# Patient Record
Sex: MALE | Race: WHITE | NOT HISPANIC OR LATINO | Employment: OTHER | URBAN - METROPOLITAN AREA
[De-identification: names, ages, dates, MRNs, and addresses within clinical notes are randomized per-mention and may not be internally consistent; named-entity substitution may affect disease eponyms.]

---

## 2017-01-12 ENCOUNTER — OFFICE VISIT (OUTPATIENT)
Dept: CARDIOLOGY | Facility: CLINIC | Age: 67
End: 2017-01-12
Payer: MEDICARE

## 2017-01-12 VITALS
HEART RATE: 64 BPM | SYSTOLIC BLOOD PRESSURE: 130 MMHG | DIASTOLIC BLOOD PRESSURE: 84 MMHG | BODY MASS INDEX: 38.07 KG/M2 | HEIGHT: 73 IN | WEIGHT: 287.25 LBS

## 2017-01-12 DIAGNOSIS — I25.10 CAD S/P PERCUTANEOUS CORONARY ANGIOPLASTY: Primary | ICD-10-CM

## 2017-01-12 DIAGNOSIS — I20.89 ANGINA EFFORT: ICD-10-CM

## 2017-01-12 DIAGNOSIS — Z98.61 CAD S/P PERCUTANEOUS CORONARY ANGIOPLASTY: Primary | ICD-10-CM

## 2017-01-12 DIAGNOSIS — Z98.61 S/P PTCA (PERCUTANEOUS TRANSLUMINAL CORONARY ANGIOPLASTY): ICD-10-CM

## 2017-01-12 DIAGNOSIS — I25.119 ATHEROSCLEROSIS OF NATIVE CORONARY ARTERY WITH ANGINA PECTORIS, UNSPECIFIED WHETHER NATIVE OR TRANSPLANTED HEART: ICD-10-CM

## 2017-01-12 DIAGNOSIS — E66.9 OBESITY (BMI 30-39.9): ICD-10-CM

## 2017-01-12 DIAGNOSIS — I10 ESSENTIAL HYPERTENSION: ICD-10-CM

## 2017-01-12 DIAGNOSIS — E66.01 MORBID OBESITY DUE TO EXCESS CALORIES: Chronic | ICD-10-CM

## 2017-01-12 DIAGNOSIS — I45.10 RBBB: ICD-10-CM

## 2017-01-12 DIAGNOSIS — E78.5 HYPERLIPIDEMIA, UNSPECIFIED HYPERLIPIDEMIA TYPE: ICD-10-CM

## 2017-01-12 PROCEDURE — 99214 OFFICE O/P EST MOD 30 MIN: CPT | Mod: S$PBB,,, | Performed by: NURSE PRACTITIONER

## 2017-01-12 PROCEDURE — 99213 OFFICE O/P EST LOW 20 MIN: CPT | Mod: PBBFAC | Performed by: NURSE PRACTITIONER

## 2017-01-12 PROCEDURE — 99999 PR PBB SHADOW E&M-EST. PATIENT-LVL III: CPT | Mod: PBBFAC,,, | Performed by: NURSE PRACTITIONER
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
## 2017-01-12 NOTE — MR AVS SNAPSHOT
[facility]  [id number] [place] [id number]  Phone: [phone]  Fax: [phone]                  Cole Doan   2017 1:00 PM   Office Visit    Description:  Male : 1950   Provider:  Gómez Oseguera NP   Department:  [facility]           Reason for Visit     Coronary Artery Disease     Hypertension           Diagnoses this Visit        Comments    CAD S/P percutaneous coronary angioplasty    -  Primary     S/P PTCA (percutaneous transluminal coronary angioplasty)         Morbid obesity due to excess calories         Hyperlipidemia, unspecified hyperlipidemia type         Angina effort         RBBB         Essential hypertension         Atherosclerosis of native coronary artery with angina pectoris, unspecified whether native or transplanted heart         Obesity (BMI 30-39.9)                To Do List           Goals (5 Years of Data)              Today    11/30/16    10/6/16    Weight < 220 lb (99.791 kg)   130.3 kg (287 lb 4.2 oz)  134.8 kg (297 lb 2.9 oz)  131.4 kg (289 lb 11 oz)      Follow-Up and Disposition     Return in about 6 months (around 2017).      [facility]     [facility] -  Assistance  Registered nurses in the [facility] provide clinical advisement, health education, appointment booking, and other advisory services.  Call for this free service at [phone].             Medications           Message regarding Medications     Verify the changes and/or additions to your medication regime listed below are the same as discussed with your clinician today.  If any of these changes or additions are incorrect, please notify your healthcare provider.        STOP taking these medications     enalapril (VASOTEC) 20 MG tablet Take 20 mg by mouth once daily.           Verify that the below list of medications is an accurate representation of the medications you are currently taking.  If none  "reported, the list may be blank. If incorrect, please contact your healthcare provider. Carry this list with you in case of emergency.           Current Medications     alprazolam (XANAX) 0.5 MG tablet Take 0.5 mg by mouth 3 (three) times daily as needed.     amlodipine (NORVASC) 2.5 MG tablet Take 1 tablet (2.5 mg total) by mouth once daily.    aspirin (ECOTRIN) 81 MG EC tablet Take 81 mg by mouth once daily.    atorvastatin (LIPITOR) 20 MG tablet Take 80 mg by mouth once daily. TAKE 1/2 TABLET DAILY (40MG TOTAL)    b complex vitamins tablet Take 1 tablet by mouth once daily.    buPROPion (WELLBUTRIN SR) 200 MG TbSR Take 200 mg by mouth once daily.    clopidogrel (PLAVIX) 75 mg tablet Take 1 tablet (75 mg total) by mouth once daily.    desonide (DESOWEN) 0.05 % cream Apply topically 2 (two) times daily. Apply to lids.    furosemide (LASIX) 40 MG tablet Takes one-half tablet by mouth daily    gabapentin (NEURONTIN) 300 MG capsule Take 300 mg by mouth 3 (three) times daily.     isosorbide mononitrate (IMDUR) 60 MG 24 hr tablet Take 1 tablet (60 mg total) by mouth 2 (two) times daily.    metformin (GLUCOPHAGE) 1000 MG tablet Take 1,000 mg by mouth 2 (two) times daily with meals.    miconazole (MICOTIN) 2 % cream Apply topically 2 (two) times daily.    ranolazine (RANEXA) 500 MG Tb12 Take 1 tablet (500 mg total) by mouth 2 (two) times daily.    terbinafine HCl (LAMISIL) 250 mg tablet Take 250 mg by mouth once daily.           Clinical Reference Information           Vital Signs - Last Recorded  Most recent update: 1/12/2017  1:09 PM by Elizabeth Oneill LPN    BP Pulse Ht Wt BMI    130/84 (BP Location: Right arm, Patient Position: Sitting, BP Method: Manual) 64 6' 1" (1.854 m) 130.3 kg (287 lb 4.2 oz) 37.9 kg/m2      Blood Pressure          Most Recent Value    BP  130/84      Allergies as of 1/12/2017     Iodinated Contrast Media - Iv Dye      Immunizations Administered on Date of Encounter - 1/12/2017     None    "   Orders Placed During Today's Visit     Future Labs/Procedures Expected by Expires    Comprehensive metabolic panel  1/12/2017 3/13/2018    Lipid panel  1/12/2017 3/13/2018

## 2017-01-19 ENCOUNTER — LAB VISIT (OUTPATIENT)
Dept: LAB | Facility: HOSPITAL | Age: 67
End: 2017-01-19
Attending: INTERNAL MEDICINE
Payer: MEDICARE

## 2017-01-19 DIAGNOSIS — I25.10 CAD S/P PERCUTANEOUS CORONARY ANGIOPLASTY: ICD-10-CM

## 2017-01-19 DIAGNOSIS — Z98.61 CAD S/P PERCUTANEOUS CORONARY ANGIOPLASTY: ICD-10-CM

## 2017-01-19 LAB
ALBUMIN SERPL BCP-MCNC: 3.5 G/DL
ALP SERPL-CCNC: 72 U/L
ALT SERPL W/O P-5'-P-CCNC: 19 U/L
ANION GAP SERPL CALC-SCNC: 9 MMOL/L
AST SERPL-CCNC: 15 U/L
BILIRUB SERPL-MCNC: 0.6 MG/DL
BUN SERPL-MCNC: 16 MG/DL
CALCIUM SERPL-MCNC: 9 MG/DL
CHLORIDE SERPL-SCNC: 106 MMOL/L
CHOLEST/HDLC SERPL: 5 {RATIO}
CO2 SERPL-SCNC: 27 MMOL/L
CREAT SERPL-MCNC: 0.9 MG/DL
EST. GFR  (AFRICAN AMERICAN): >60 ML/MIN/1.73 M^2
EST. GFR  (NON AFRICAN AMERICAN): >60 ML/MIN/1.73 M^2
GLUCOSE SERPL-MCNC: 117 MG/DL
HDL/CHOLESTEROL RATIO: 19.9 %
HDLC SERPL-MCNC: 161 MG/DL
HDLC SERPL-MCNC: 32 MG/DL
LDLC SERPL CALC-MCNC: 89.2 MG/DL
NONHDLC SERPL-MCNC: 129 MG/DL
POTASSIUM SERPL-SCNC: 4.3 MMOL/L
PROT SERPL-MCNC: 6.7 G/DL
SODIUM SERPL-SCNC: 142 MMOL/L
TRIGL SERPL-MCNC: 199 MG/DL

## 2017-01-19 PROCEDURE — 36415 COLL VENOUS BLD VENIPUNCTURE: CPT

## 2017-01-19 PROCEDURE — 80061 LIPID PANEL: CPT

## 2017-01-19 PROCEDURE — 80053 COMPREHEN METABOLIC PANEL: CPT

## 2017-01-23 ENCOUNTER — TELEPHONE (OUTPATIENT)
Dept: CARDIOLOGY | Facility: CLINIC | Age: 67
End: 2017-01-23

## 2017-02-18 DIAGNOSIS — Z98.61 CAD S/P PERCUTANEOUS CORONARY ANGIOPLASTY: ICD-10-CM

## 2017-02-18 DIAGNOSIS — I25.10 CAD S/P PERCUTANEOUS CORONARY ANGIOPLASTY: ICD-10-CM

## 2017-02-18 RX ORDER — AMLODIPINE BESYLATE 2.5 MG/1
TABLET ORAL
Qty: 30 TABLET | Refills: 3 | Status: SHIPPED | OUTPATIENT
Start: 2017-02-18 | End: 2017-11-29 | Stop reason: SDUPTHER

## 2017-02-20 DIAGNOSIS — I25.10 CAD S/P PERCUTANEOUS CORONARY ANGIOPLASTY: ICD-10-CM

## 2017-02-20 DIAGNOSIS — Z98.61 CAD S/P PERCUTANEOUS CORONARY ANGIOPLASTY: ICD-10-CM

## 2017-02-20 RX ORDER — AMLODIPINE BESYLATE 2.5 MG/1
2.5 TABLET ORAL DAILY
Qty: 30 TABLET | Refills: 3 | Status: SHIPPED | OUTPATIENT
Start: 2017-02-20 | End: 2017-03-17 | Stop reason: SDUPTHER

## 2017-03-17 ENCOUNTER — OFFICE VISIT (OUTPATIENT)
Dept: INTERNAL MEDICINE | Facility: CLINIC | Age: 67
End: 2017-03-17
Payer: MEDICARE

## 2017-03-17 VITALS
HEART RATE: 62 BPM | WEIGHT: 300.94 LBS | OXYGEN SATURATION: 97 % | SYSTOLIC BLOOD PRESSURE: 130 MMHG | TEMPERATURE: 99 F | DIASTOLIC BLOOD PRESSURE: 82 MMHG | HEIGHT: 74 IN | BODY MASS INDEX: 38.62 KG/M2

## 2017-03-17 DIAGNOSIS — J01.10 ACUTE NON-RECURRENT FRONTAL SINUSITIS: Primary | ICD-10-CM

## 2017-03-17 PROCEDURE — 96372 THER/PROPH/DIAG INJ SC/IM: CPT | Mod: PBBFAC,PO

## 2017-03-17 PROCEDURE — 99213 OFFICE O/P EST LOW 20 MIN: CPT | Mod: S$PBB,,,

## 2017-03-17 PROCEDURE — 99999 PR PBB SHADOW E&M-EST. PATIENT-LVL IV: CPT | Mod: PBBFAC,,,

## 2017-03-17 PROCEDURE — 99214 OFFICE O/P EST MOD 30 MIN: CPT | Mod: PBBFAC,25,PO

## 2017-03-17 RX ORDER — ATORVASTATIN CALCIUM 80 MG/1
80 TABLET, FILM COATED ORAL DAILY
COMMUNITY
End: 2022-07-18

## 2017-03-17 RX ORDER — AMOXICILLIN AND CLAVULANATE POTASSIUM 875; 125 MG/1; MG/1
1 TABLET, FILM COATED ORAL EVERY 12 HOURS
Qty: 14 TABLET | Refills: 0 | Status: SHIPPED | OUTPATIENT
Start: 2017-03-17 | End: 2017-03-24

## 2017-03-17 RX ORDER — METHYLPREDNISOLONE ACETATE 80 MG/ML
80 INJECTION, SUSPENSION INTRA-ARTICULAR; INTRALESIONAL; INTRAMUSCULAR; SOFT TISSUE
Status: COMPLETED | OUTPATIENT
Start: 2017-03-17 | End: 2017-03-17

## 2017-03-17 RX ORDER — HYDROCODONE POLISTIREX AND CHLORPHENIRAMINE POLISTIREX 10; 8 MG/5ML; MG/5ML
5 SUSPENSION, EXTENDED RELEASE ORAL EVERY 12 HOURS PRN
Qty: 115 ML | Refills: 0 | Status: SHIPPED | OUTPATIENT
Start: 2017-03-17 | End: 2017-03-27

## 2017-03-17 RX ADMIN — METHYLPREDNISOLONE ACETATE 80 MG: 80 INJECTION, SUSPENSION INTRALESIONAL; INTRAMUSCULAR; INTRASYNOVIAL; SOFT TISSUE at 11:03

## 2017-03-17 NOTE — PROGRESS NOTES
Administered Depomedrol 80mg/ml IM Left Ventrogluteal per Mr. Dion NP. Advised to remain in lobby 15 min to monitor for adverse reaction. Tolerated well/TGD

## 2017-03-17 NOTE — PROGRESS NOTES
Subjective:       Patient ID: Cole Doan is a 66 y.o. male.    Chief Complaint: Cough (X 3 days ); Nasal Congestion; and Headache    HPI   The patient presents today with a 3 day(s) complaint of nasal congestion, PND, rhinorrhea. he says His symptoms are constant and moderate in intensity.  he voices associated cough and sinus pressure. he denies SOB, wheezing. he can not identify and exacerbating or mitigating factors.      Review of Systems   Constitutional: Negative for activity change, appetite change, fatigue, fever and unexpected weight change.   HENT: Positive for congestion, postnasal drip, rhinorrhea and sinus pressure.    Eyes: Negative.    Respiratory: Positive for cough. Negative for chest tightness, shortness of breath and wheezing.    Cardiovascular: Negative for chest pain, palpitations and leg swelling.   Gastrointestinal: Negative for constipation, diarrhea, nausea and vomiting.   Endocrine: Negative.    Genitourinary: Negative.    Musculoskeletal: Negative.    Skin: Negative for color change.   Allergic/Immunologic: Negative.    Neurological: Negative for dizziness, weakness and light-headedness.   Hematological: Negative.    Psychiatric/Behavioral: Negative for sleep disturbance.         Objective:      Physical Exam   Constitutional: He is oriented to person, place, and time. He appears well-developed and well-nourished. No distress.   HENT:   Head: Normocephalic and atraumatic. Hair is normal.   Right Ear: Hearing, tympanic membrane, external ear and ear canal normal.   Left Ear: Hearing, tympanic membrane, external ear and ear canal normal.   Nose: Mucosal edema and rhinorrhea present. No nose lacerations, sinus tenderness, nasal deformity, septal deviation or nasal septal hematoma. No epistaxis.  No foreign bodies. Right sinus exhibits frontal sinus tenderness. Right sinus exhibits no maxillary sinus tenderness. Left sinus exhibits no maxillary sinus tenderness and no frontal sinus  tenderness.   Mouth/Throat: Uvula is midline, oropharynx is clear and moist and mucous membranes are normal.   Eyes: Conjunctivae are normal. Pupils are equal, round, and reactive to light. Right eye exhibits no discharge. Left eye exhibits no discharge.   Neck: Trachea normal, normal range of motion and phonation normal. Neck supple. No tracheal deviation present.   Cardiovascular: Normal rate, regular rhythm, normal heart sounds and intact distal pulses.  Exam reveals no gallop and no friction rub.    No murmur heard.  Pulmonary/Chest: Effort normal and breath sounds normal. No respiratory distress. He has no decreased breath sounds. He has no wheezes. He has no rhonchi. He has no rales. He exhibits no tenderness.   Musculoskeletal: Normal range of motion.   Lymphadenopathy:        Head (right side): No submental, no submandibular, no tonsillar, no preauricular, no posterior auricular and no occipital adenopathy present.        Head (left side): No submental, no submandibular, no tonsillar, no preauricular, no posterior auricular and no occipital adenopathy present.     He has no cervical adenopathy.        Right cervical: No superficial cervical, no deep cervical and no posterior cervical adenopathy present.       Left cervical: No superficial cervical, no deep cervical and no posterior cervical adenopathy present.   Neurological: He is alert and oriented to person, place, and time. He exhibits normal muscle tone. GCS eye subscore is 4. GCS verbal subscore is 5. GCS motor subscore is 6.   Skin: Skin is warm and dry. No rash noted. He is not diaphoretic. No erythema. No pallor.   Psychiatric: He has a normal mood and affect. His speech is normal and behavior is normal. Judgment and thought content normal.       Assessment:       1. Acute non-recurrent frontal sinusitis          Plan:   Acute non-recurrent frontal sinusitis  -     amoxicillin-clavulanate 875-125mg (AUGMENTIN) 875-125 mg per tablet; Take 1 tablet by  mouth every 12 (twelve) hours.  Dispense: 14 tablet; Refill: 0  -     methylPREDNISolone acetate injection 80 mg; Inject 1 mL (80 mg total) into the muscle one time.  -     hydrocodone-chlorpheniramine (TUSSIONEX) 10-8 mg/5 mL suspension; Take 5 mLs by mouth every 12 (twelve) hours as needed.  Dispense: 115 mL; Refill: 0          Disclaimer: This note is prepared using voice recognition software.  As such there may be errors in the dictation.  It has not been proofread.

## 2017-06-27 DIAGNOSIS — I25.10 CORONARY ARTERY DISEASE INVOLVING NATIVE CORONARY ARTERY OF NATIVE HEART WITHOUT ANGINA PECTORIS: ICD-10-CM

## 2017-06-27 RX ORDER — CLOPIDOGREL BISULFATE 75 MG/1
75 TABLET ORAL DAILY
Qty: 30 TABLET | Refills: 6 | OUTPATIENT
Start: 2017-06-27

## 2017-08-04 ENCOUNTER — OFFICE VISIT (OUTPATIENT)
Dept: CARDIOLOGY | Facility: CLINIC | Age: 67
End: 2017-08-04
Payer: MEDICARE

## 2017-08-04 VITALS
DIASTOLIC BLOOD PRESSURE: 58 MMHG | SYSTOLIC BLOOD PRESSURE: 102 MMHG | HEIGHT: 74 IN | WEIGHT: 302 LBS | HEART RATE: 76 BPM | BODY MASS INDEX: 38.76 KG/M2

## 2017-08-04 DIAGNOSIS — G47.30 SLEEP APNEA, UNSPECIFIED TYPE: ICD-10-CM

## 2017-08-04 DIAGNOSIS — Z98.61 CAD S/P PERCUTANEOUS CORONARY ANGIOPLASTY: ICD-10-CM

## 2017-08-04 DIAGNOSIS — Z98.61 S/P PTCA (PERCUTANEOUS TRANSLUMINAL CORONARY ANGIOPLASTY): ICD-10-CM

## 2017-08-04 DIAGNOSIS — I10 ESSENTIAL HYPERTENSION: ICD-10-CM

## 2017-08-04 DIAGNOSIS — E11.9 TYPE 2 DIABETES MELLITUS WITHOUT COMPLICATION, WITHOUT LONG-TERM CURRENT USE OF INSULIN: ICD-10-CM

## 2017-08-04 DIAGNOSIS — E78.5 HYPERLIPIDEMIA, UNSPECIFIED HYPERLIPIDEMIA TYPE: ICD-10-CM

## 2017-08-04 DIAGNOSIS — I45.10 RBBB: ICD-10-CM

## 2017-08-04 DIAGNOSIS — E66.01 MORBID OBESITY DUE TO EXCESS CALORIES: Chronic | ICD-10-CM

## 2017-08-04 DIAGNOSIS — R07.9 CHEST PAIN, UNSPECIFIED TYPE: Primary | ICD-10-CM

## 2017-08-04 DIAGNOSIS — I25.10 CAD S/P PERCUTANEOUS CORONARY ANGIOPLASTY: ICD-10-CM

## 2017-08-04 DIAGNOSIS — F43.10 PTSD (POST-TRAUMATIC STRESS DISORDER): ICD-10-CM

## 2017-08-04 DIAGNOSIS — I25.119 ATHEROSCLEROSIS OF NATIVE CORONARY ARTERY WITH ANGINA PECTORIS, UNSPECIFIED WHETHER NATIVE OR TRANSPLANTED HEART: ICD-10-CM

## 2017-08-04 PROCEDURE — 99213 OFFICE O/P EST LOW 20 MIN: CPT | Mod: PBBFAC,25,PO | Performed by: INTERNAL MEDICINE

## 2017-08-04 PROCEDURE — 99999 PR PBB SHADOW E&M-EST. PATIENT-LVL III: CPT | Mod: PBBFAC,,, | Performed by: INTERNAL MEDICINE

## 2017-08-04 PROCEDURE — 1159F MED LIST DOCD IN RCRD: CPT | Mod: ,,, | Performed by: INTERNAL MEDICINE

## 2017-08-04 PROCEDURE — 99214 OFFICE O/P EST MOD 30 MIN: CPT | Mod: S$PBB,,, | Performed by: INTERNAL MEDICINE

## 2017-08-04 PROCEDURE — 93005 ELECTROCARDIOGRAM TRACING: CPT | Mod: PBBFAC,PO | Performed by: NUCLEAR MEDICINE

## 2017-08-04 PROCEDURE — 93010 ELECTROCARDIOGRAM REPORT: CPT | Mod: S$PBB,,, | Performed by: NUCLEAR MEDICINE

## 2017-08-04 RX ORDER — RANOLAZINE 500 MG/1
500 TABLET, EXTENDED RELEASE ORAL 2 TIMES DAILY
COMMUNITY
End: 2020-10-09

## 2017-08-04 RX ORDER — LANOLIN ALCOHOL/MO/W.PET/CERES
100 CREAM (GRAM) TOPICAL DAILY
COMMUNITY
End: 2017-10-04

## 2017-08-04 NOTE — PROGRESS NOTES
Subjective:   Patient ID:  Cole Doan is a 66 y.o. male who presents for follow up of No chief complaint on file.      HPI  A 67 yo male with newly diagnosed diabetic on metformin neuropathy is here for f/u. He has been using his cpap regularily claims he has  Been compliant with meds. Has however gained weight . He has been complaining of more swelling in legs and arms he has been having issues with gait and neuropathy. He is very limited now with his activities. He has chest pain he is having pain now it is a constant pain regardless of activity. It is  not related to activity it is across the mid chest it is not associated with any other symptoms. He has no issues clinically at nite he uses his cpap and sleeps well no orthopnea pnd he is more sleepy and tired.he has not been checking his sugar he has no meter and has gained weight.he checks his bp at home is elevated unusually low today.no podiatrist eval.  Past Medical History:   Diagnosis Date    Anticoagulant long-term use     BPH (benign prostatic hypertrophy)     CAD (coronary artery disease)     HTN (hypertension) 5/15/2014    Hyperlipidemia     Hypertension     Obesity 5/15/2014    Pneumonia     PTSD (post-traumatic stress disorder) 5/15/2014    RBBB 5/15/2014    S/P PTCA (percutaneous transluminal coronary angioplasty) 5/15/2014    Sleep apnea 5/15/2014    Special screening for malignant neoplasms, colon 1/22/2014    Type 2 diabetes mellitus without complication 9/28/2015    Type 2 diabetes mellitus without complication 9/28/2015       Past Surgical History:   Procedure Laterality Date    APPENDECTOMY      at age 14    CORONARY ANGIOPLASTY WITH STENT PLACEMENT      1990, 2008, 2012       Social History   Substance Use Topics    Smoking status: Former Smoker     Packs/day: 1.00     Years: 40.00     Types: Cigarettes     Start date: 5/15/2009    Smokeless tobacco: Former User     Quit date: 1/13/2010      Comment: He quit four years  ago.    Alcohol use No       Family History   Problem Relation Age of Onset    Cataracts Father     Colon cancer Neg Hx        Current Outpatient Prescriptions   Medication Sig    alprazolam (XANAX) 0.5 MG tablet Take 0.5 mg by mouth 3 (three) times daily as needed.     amlodipine (NORVASC) 2.5 MG tablet TAKE 1 TABLET (2.5 MG TOTAL) BY MOUTH ONCE DAILY.    aspirin (ECOTRIN) 81 MG EC tablet Take 81 mg by mouth once daily.    atorvastatin (LIPITOR) 80 MG tablet Take 80 mg by mouth. Take a half tablet daily    buPROPion (WELLBUTRIN SR) 200 MG TbSR Take 200 mg by mouth once daily.    clopidogrel (PLAVIX) 75 mg tablet Take 1 tablet (75 mg total) by mouth once daily.    cyanocobalamin (VITAMIN B-12) 1000 MCG tablet Take 100 mcg by mouth once daily.    desonide (DESOWEN) 0.05 % cream Apply topically 2 (two) times daily. Apply to lids.    furosemide (LASIX) 40 MG tablet Takes one-half tablet by mouth daily (Patient taking differently: 40 mg. Takes 2 tablet by mouth daily)    gabapentin (NEURONTIN) 300 MG capsule Take 300 mg by mouth 3 (three) times daily.     isosorbide mononitrate (IMDUR) 60 MG 24 hr tablet Take 1 tablet (60 mg total) by mouth 2 (two) times daily. (Patient taking differently: Take 60 mg by mouth once daily. )    metformin (GLUCOPHAGE) 1000 MG tablet Take 1,000 mg by mouth 2 (two) times daily with meals.    miconazole (MICOTIN) 2 % cream Apply topically 2 (two) times daily.    ranolazine (RANEXA) 500 MG Tb12 Take 500 mg by mouth 2 (two) times daily.    terbinafine HCl (LAMISIL) 250 mg tablet Take 250 mg by mouth once daily.     No current facility-administered medications for this visit.      Current Outpatient Prescriptions on File Prior to Visit   Medication Sig    alprazolam (XANAX) 0.5 MG tablet Take 0.5 mg by mouth 3 (three) times daily as needed.     amlodipine (NORVASC) 2.5 MG tablet TAKE 1 TABLET (2.5 MG TOTAL) BY MOUTH ONCE DAILY.    aspirin (ECOTRIN) 81 MG EC tablet Take 81 mg  by mouth once daily.    atorvastatin (LIPITOR) 80 MG tablet Take 80 mg by mouth. Take a half tablet daily    buPROPion (WELLBUTRIN SR) 200 MG TbSR Take 200 mg by mouth once daily.    clopidogrel (PLAVIX) 75 mg tablet Take 1 tablet (75 mg total) by mouth once daily.    desonide (DESOWEN) 0.05 % cream Apply topically 2 (two) times daily. Apply to lids.    furosemide (LASIX) 40 MG tablet Takes one-half tablet by mouth daily (Patient taking differently: 40 mg. Takes 2 tablet by mouth daily)    gabapentin (NEURONTIN) 300 MG capsule Take 300 mg by mouth 3 (three) times daily.     isosorbide mononitrate (IMDUR) 60 MG 24 hr tablet Take 1 tablet (60 mg total) by mouth 2 (two) times daily. (Patient taking differently: Take 60 mg by mouth once daily. )    metformin (GLUCOPHAGE) 1000 MG tablet Take 1,000 mg by mouth 2 (two) times daily with meals.    miconazole (MICOTIN) 2 % cream Apply topically 2 (two) times daily.    terbinafine HCl (LAMISIL) 250 mg tablet Take 250 mg by mouth once daily.    [DISCONTINUED] atorvastatin (LIPITOR) 20 MG tablet Take 80 mg by mouth once daily. TAKE 1/2 TABLET DAILY (40MG TOTAL)    [DISCONTINUED] b complex vitamins tablet Take 1 tablet by mouth once daily.     No current facility-administered medications on file prior to visit.      Review of patient's allergies indicates:   Allergen Reactions    Iodinated contrast- oral and iv dye        Review of Systems   Constitution: Positive for weakness and malaise/fatigue.   HENT: Negative for headaches.    Eyes: Negative for blurred vision.   Cardiovascular: Positive for chest pain, dyspnea on exertion and leg swelling. Negative for claudication, cyanosis, irregular heartbeat, near-syncope, orthopnea, palpitations and paroxysmal nocturnal dyspnea.   Respiratory: Positive for shortness of breath and snoring. Negative for cough and hemoptysis.    Hematologic/Lymphatic: Negative for bleeding problem. Does not bruise/bleed easily.   Skin:  "Negative for dry skin and itching.   Musculoskeletal: Positive for falls. Negative for muscle weakness and myalgias.   Gastrointestinal: Negative for abdominal pain, diarrhea, heartburn, hematemesis, hematochezia and melena.   Genitourinary: Negative for flank pain and hematuria.   Neurological: Positive for disturbances in coordination, loss of balance, numbness and paresthesias. Negative for dizziness, focal weakness, light-headedness and seizures.   Psychiatric/Behavioral: Negative for altered mental status and memory loss. The patient is not nervous/anxious.    Allergic/Immunologic: Negative for hives.       Objective:   Physical Exam   Constitutional: He is oriented to person, place, and time. He appears well-developed and well-nourished. No distress.   HENT:   Head: Normocephalic and atraumatic.   Eyes: EOM are normal. Pupils are equal, round, and reactive to light. Right eye exhibits no discharge. Left eye exhibits no discharge.   Neck: Neck supple. No JVD present. No thyromegaly present.   Cardiovascular: Normal rate, regular rhythm, normal heart sounds and intact distal pulses.  Exam reveals no gallop and no friction rub.    No murmur heard.  Pulmonary/Chest: Effort normal and breath sounds normal. No respiratory distress. He has no wheezes. He has no rales. He exhibits no tenderness.   Abdominal: Soft. Bowel sounds are normal. He exhibits no distension. There is no tenderness.   Obese abdomen   Musculoskeletal: Normal range of motion. He exhibits no edema.   Neurological: He is alert and oriented to person, place, and time. No cranial nerve deficit.   Skin: Skin is warm and dry. No rash noted. He is not diaphoretic. No erythema.   Psychiatric: He has a normal mood and affect. His behavior is normal.   Nursing note and vitals reviewed.    Vitals:    08/04/17 0912   BP: (!) 102/58   Pulse: 76   Weight: (!) 137 kg (302 lb 0.5 oz)   Height: 6' 1.5" (1.867 m)     Lab Results   Component Value Date    CHOL 161 " 01/19/2017    CHOL 171 08/03/2016    CHOL 147 07/14/2014     Lab Results   Component Value Date    HDL 32 (L) 01/19/2017    HDL 25 (L) 08/03/2016    HDL 31 (L) 07/14/2014     Lab Results   Component Value Date    LDLCALC 89.2 01/19/2017    LDLCALC 106.4 08/03/2016    LDLCALC 86.6 07/14/2014     Lab Results   Component Value Date    TRIG 199 (H) 01/19/2017    TRIG 198 (H) 08/03/2016    TRIG 147 07/14/2014     Lab Results   Component Value Date    CHOLHDL 19.9 (L) 01/19/2017    CHOLHDL 14.6 (L) 08/03/2016    CHOLHDL 21.1 07/14/2014       Chemistry        Component Value Date/Time     01/19/2017 1016    K 4.3 01/19/2017 1016     01/19/2017 1016    CO2 27 01/19/2017 1016    BUN 16 01/19/2017 1016    CREATININE 0.9 01/19/2017 1016     (H) 01/19/2017 1016        Component Value Date/Time    CALCIUM 9.0 01/19/2017 1016    ALKPHOS 72 01/19/2017 1016    AST 15 01/19/2017 1016    ALT 19 01/19/2017 1016    BILITOT 0.6 01/19/2017 1016    ESTGFRAFRICA >60.0 01/19/2017 1016    EGFRNONAA >60.0 01/19/2017 1016          Lab Results   Component Value Date    TSH 1.139 09/23/2016     Lab Results   Component Value Date    INR 1.0 08/03/2016    INR 1.0 09/28/2015     Lab Results   Component Value Date    WBC 5.31 08/03/2016    HGB 16.0 08/03/2016    HCT 47.2 08/03/2016    MCV 91 08/03/2016     08/03/2016     BMP  Sodium   Date Value Ref Range Status   01/19/2017 142 136 - 145 mmol/L Final     Potassium   Date Value Ref Range Status   01/19/2017 4.3 3.5 - 5.1 mmol/L Final     Chloride   Date Value Ref Range Status   01/19/2017 106 95 - 110 mmol/L Final     CO2   Date Value Ref Range Status   01/19/2017 27 23 - 29 mmol/L Final     BUN, Bld   Date Value Ref Range Status   01/19/2017 16 8 - 23 mg/dL Final     Creatinine   Date Value Ref Range Status   01/19/2017 0.9 0.5 - 1.4 mg/dL Final     Calcium   Date Value Ref Range Status   01/19/2017 9.0 8.7 - 10.5 mg/dL Final     Anion Gap   Date Value Ref Range Status    01/19/2017 9 8 - 16 mmol/L Final     eGFR if    Date Value Ref Range Status   01/19/2017 >60.0 >60 mL/min/1.73 m^2 Final     eGFR if non    Date Value Ref Range Status   01/19/2017 >60.0 >60 mL/min/1.73 m^2 Final     Comment:     Calculation used to obtain the estimated glomerular filtration  rate (eGFR) is the CKD-EPI equation. Since race is unknown   in our information system, the eGFR values for   -American and Non--American patients are given   for each creatinine result.       CrCl cannot be calculated (Patient's most recent sCr result is older than the maximum 7 days allowed.).    Assessment:     1. Chest pain, unspecified type    2. Morbid obesity due to excess calories    3. Atherosclerosis of native coronary artery with angina pectoris, unspecified whether native or transplanted heart    4. S/P PTCA (percutaneous transluminal coronary angioplasty)    5. Essential hypertension    6. Hyperlipidemia, unspecified hyperlipidemia type    7. RBBB    8. Sleep apnea, unspecified type    9. PTSD (post-traumatic stress disorder)    10. CAD S/P percutaneous coronary angioplasty    11. Type 2 diabetes mellitus without complication, without long-term current use of insulin      He is back with recurrent symptoms and weight fluctuation with questionable blood pressure control and diabetes control.  He has issues with neuropathy decreased exercise tolerance and element of deconditioning.  elio supposed to be adequately treated needs pulmonary f/u and r/o pulmonary htn  Chest pain is atyp[ical and appears non cardiac.  Significant ectopy on ekg.  Cad is adequately treated with current regimen  Plan:     Back to pulmonary   Endocrinology referral   Podiatry f/u   rlo   Echo   bnp   F/u in 6 weeks   Weight loss diet control   Water aerobics.

## 2017-08-21 ENCOUNTER — CLINICAL SUPPORT (OUTPATIENT)
Dept: CARDIOLOGY | Facility: CLINIC | Age: 67
End: 2017-08-21
Payer: MEDICARE

## 2017-08-21 ENCOUNTER — PROCEDURE VISIT (OUTPATIENT)
Dept: PULMONOLOGY | Facility: CLINIC | Age: 67
End: 2017-08-21
Payer: MEDICARE

## 2017-08-21 ENCOUNTER — LAB VISIT (OUTPATIENT)
Dept: LAB | Facility: HOSPITAL | Age: 67
End: 2017-08-21
Attending: INTERNAL MEDICINE
Payer: MEDICARE

## 2017-08-21 VITALS — HEIGHT: 73 IN | WEIGHT: 302.13 LBS | BODY MASS INDEX: 40.04 KG/M2

## 2017-08-21 DIAGNOSIS — G47.30 SLEEP APNEA, UNSPECIFIED TYPE: ICD-10-CM

## 2017-08-21 DIAGNOSIS — E66.01 MORBID OBESITY DUE TO EXCESS CALORIES: Chronic | ICD-10-CM

## 2017-08-21 DIAGNOSIS — I25.10 CAD S/P PERCUTANEOUS CORONARY ANGIOPLASTY: ICD-10-CM

## 2017-08-21 DIAGNOSIS — R07.9 CHEST PAIN, UNSPECIFIED TYPE: ICD-10-CM

## 2017-08-21 DIAGNOSIS — Z98.61 CAD S/P PERCUTANEOUS CORONARY ANGIOPLASTY: ICD-10-CM

## 2017-08-21 DIAGNOSIS — E11.9 TYPE 2 DIABETES MELLITUS WITHOUT COMPLICATION, WITHOUT LONG-TERM CURRENT USE OF INSULIN: ICD-10-CM

## 2017-08-21 LAB
BNP SERPL-MCNC: 38 PG/ML
DIASTOLIC DYSFUNCTION: NO
ESTIMATED PA SYSTOLIC PRESSURE: 33.47
MITRAL VALVE MOBILITY: NORMAL
RETIRED EF AND QEF - SEE NOTES: 60 (ref 55–65)
TRICUSPID VALVE REGURGITATION: NORMAL

## 2017-08-21 PROCEDURE — 0298T HOLTER MONITOR - 3-14 DAY ADULT: CPT | Mod: ,,, | Performed by: INTERNAL MEDICINE

## 2017-08-21 PROCEDURE — 83880 ASSAY OF NATRIURETIC PEPTIDE: CPT

## 2017-08-21 PROCEDURE — 0296T HOLTER MONITOR - 3-14 DAY ADULT: CPT | Mod: PBBFAC | Performed by: INTERNAL MEDICINE

## 2017-08-21 PROCEDURE — 94620 PR PULMONARY STRESS TESTING,SIMPLE: CPT | Mod: 26,S$PBB,, | Performed by: INTERNAL MEDICINE

## 2017-08-21 PROCEDURE — 36415 COLL VENOUS BLD VENIPUNCTURE: CPT

## 2017-08-21 PROCEDURE — 93306 TTE W/DOPPLER COMPLETE: CPT | Mod: 26,S$PBB,, | Performed by: NUCLEAR MEDICINE

## 2017-08-21 NOTE — PROCEDURES
"[facility]  Six Minute Walk     SUMMARY     Ordering Provider: Dr. Rutledge   Interpreting Provider: Dr. Lawler  Performing nurse/tech/RT: NAOMI Fang RRt  Diagnosis:  (Sleep Apnea)  Height: 6' 1" (185.4 cm)  Weight: (!) 137.1 kg (302 lb 2.3 oz)  BMI (Calculated): 39.9   Patient Race:             Phase Oxygen Assessment Supplemental O2 Heart   Rate Blood Pressure Santino Dyspnea Scale Rating   Resting 97 % Room Air 86 bpm 131/78 3   Exercise        Minute        1 93 % Room Air 80 bpm     2 93 % Room Air 84 bpm     3 93 % Room Air 89 bpm     4 94 % Room Air 85 bpm     5 93 % Room Air 86 bpm     6  94 % Room Air 84 bpm 143/84 7-8   Recovery        Minute        1 96 % Room Air 61 bpm     2 96 % Room Air 59 bpm     3 96 % Room Air 57 bpm     4 95 % Room Air 56 bpm 118/74 4     Six Minute Walk Summary  6MWT Status: completed without stopping  Patient Reported: Chest pain, Dyspnea (Patient felt off balance) while walking.     Interpretation:  Did the patient stop or pause?: No         Total Time Walked (Calculated): 360 seconds  Final Partial Lap Distance (feet): 0 feet  Total Distance Meters (Calculated): 304.8 meters  Predicted Distance Meters (Calculated): 516.93 meters  Percentage of Predicted (Calculated): 58.96  Peak VO2 (Calculated): 13.12  Mets: 3.75  Has The Patient Had a Previous Six Minute Walk Test?: No       Previous 6MWT Results  Has The Patient Had a Previous Six Minute Walk Test?: No     REPORT  Patient complained of chest pain during protocol  Clinical correlation  Distance completed was less than predicted: 58% predicted  There was mild exercise desaturation to 93% oxygen saturation.  Dyspnea was moderate Santino score 7-8  Heart rate remained stable in the 80s.  Blood pressure was stable  Clinical correlation    Erik Lawler MD    "

## 2017-08-23 ENCOUNTER — TELEPHONE (OUTPATIENT)
Dept: CARDIOLOGY | Facility: CLINIC | Age: 67
End: 2017-08-23

## 2017-08-25 ENCOUNTER — HOSPITAL ENCOUNTER (OUTPATIENT)
Dept: CARDIOLOGY | Facility: CLINIC | Age: 67
Discharge: HOME | End: 2017-08-25
Payer: MEDICARE

## 2017-08-25 DIAGNOSIS — E11.9 TYPE 2 DIABETES MELLITUS WITHOUT COMPLICATION, WITHOUT LONG-TERM CURRENT USE OF INSULIN: ICD-10-CM

## 2017-08-25 DIAGNOSIS — E66.01 MORBID OBESITY DUE TO EXCESS CALORIES: Chronic | ICD-10-CM

## 2017-08-25 DIAGNOSIS — G47.30 SLEEP APNEA, UNSPECIFIED TYPE: ICD-10-CM

## 2017-08-25 DIAGNOSIS — I25.10 CAD S/P PERCUTANEOUS CORONARY ANGIOPLASTY: ICD-10-CM

## 2017-08-25 DIAGNOSIS — R07.9 CHEST PAIN, UNSPECIFIED TYPE: ICD-10-CM

## 2017-08-25 DIAGNOSIS — Z98.61 CAD S/P PERCUTANEOUS CORONARY ANGIOPLASTY: ICD-10-CM

## 2017-08-25 LAB — DIASTOLIC DYSFUNCTION: NO

## 2017-08-25 PROCEDURE — 94621 CARDIOPULM EXERCISE TESTING: CPT | Mod: PBBFAC | Performed by: INTERNAL MEDICINE

## 2017-08-28 ENCOUNTER — OFFICE VISIT (OUTPATIENT)
Dept: PULMONOLOGY | Facility: CLINIC | Age: 67
End: 2017-08-28
Payer: MEDICARE

## 2017-08-28 VITALS
OXYGEN SATURATION: 95 % | HEIGHT: 73 IN | SYSTOLIC BLOOD PRESSURE: 130 MMHG | HEART RATE: 60 BPM | WEIGHT: 304.44 LBS | BODY MASS INDEX: 40.35 KG/M2 | DIASTOLIC BLOOD PRESSURE: 78 MMHG | RESPIRATION RATE: 18 BRPM

## 2017-08-28 DIAGNOSIS — R06.02 SHORTNESS OF BREATH ON EXERTION: Primary | ICD-10-CM

## 2017-08-28 DIAGNOSIS — F17.201 TOBACCO USE DISORDER, MODERATE, IN SUSTAINED REMISSION: ICD-10-CM

## 2017-08-28 DIAGNOSIS — G47.33 OSA ON CPAP: ICD-10-CM

## 2017-08-28 PROCEDURE — 99999 PR PBB SHADOW E&M-EST. PATIENT-LVL V: CPT | Mod: PBBFAC,,, | Performed by: NURSE PRACTITIONER

## 2017-08-28 PROCEDURE — 3078F DIAST BP <80 MM HG: CPT | Mod: ,,, | Performed by: NURSE PRACTITIONER

## 2017-08-28 PROCEDURE — 3075F SYST BP GE 130 - 139MM HG: CPT | Mod: ,,, | Performed by: NURSE PRACTITIONER

## 2017-08-28 PROCEDURE — 99215 OFFICE O/P EST HI 40 MIN: CPT | Mod: PBBFAC | Performed by: NURSE PRACTITIONER

## 2017-08-28 PROCEDURE — 1159F MED LIST DOCD IN RCRD: CPT | Mod: ,,, | Performed by: NURSE PRACTITIONER

## 2017-08-28 PROCEDURE — 99213 OFFICE O/P EST LOW 20 MIN: CPT | Mod: S$PBB,,, | Performed by: NURSE PRACTITIONER

## 2017-08-28 PROCEDURE — 1125F AMNT PAIN NOTED PAIN PRSNT: CPT | Mod: ,,, | Performed by: NURSE PRACTITIONER

## 2017-08-28 NOTE — PROGRESS NOTES
Subjective:      Patient ID: Cole Doan is a 66 y.o. male.    Patient Active Problem List   Diagnosis    Special screening for malignant neoplasms, colon    Atherosclerotic heart disease of native coronary artery with angina pectoris    S/P PTCA (percutaneous transluminal coronary angioplasty)    HTN (hypertension)    Hyperlipidemia    RBBB    SATYA on CPAP    Obesity    PTSD (post-traumatic stress disorder)    Shortness of breath    Morbid obesity    Personal history of colonic polyps    Angina effort    CAD S/P percutaneous coronary angioplasty    Type 2 diabetes mellitus without complication    Obesity (BMI 30-39.9)    Chest pain    Tobacco use disorder, moderate, in sustained remission     Problem list has been reviewed.    Chief Complaint: Sleep Apnea and Shortness of Breath (with exertion)    HPI: Cole Doan presents to pulmonary clinic referred by his cardiologist, Dr. Rutledge related to diagnosis of sleep apnea on CPAP and complaints of shortness of breath with exertion.   He reports having a sleep study done in about 2014 or 2015 he is not sure exact date or place he had study.  He was seen by Dr. Rogers in 2014 and sleep study was ordered, not done by patient.   [facility] and obtained order for sleep study, CPAP equipment and [facility].   Compliance download done at this visit reveals:   He is on CPAP  of 13 cmH2O pressure.   He is compliant with CPAP use. Complaince download today reveals 100 % of days with greater than 4 hours of device use.  Patient reports benefit from CPAP use and denies snoring and excessive daytime sleepiness.   Brohman: 11     Shortness of breath with exertion  Dyspnea Characteristics:   Exertional Dyspnea   Dyspnea Duration:  Subacute, present off and on over past 6 months.  Dyspnea Severity:  YOSEF 3 - 7 moderate to very severe  Dyspnea Timing:  exertional   Dyspnea  Contributing Factors:  Tobacco Abuse and Organic dust exposure    Dyspnea Associated Symptoms: None  Denies  Pleuritic chest pain,  Cough,  Sputum Production,  Hemoptysis, Wheezing,  Dysphagia and Lightheadedness, tingling in fingers or perioral area.     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  90% disabled with [facility] . [place] exposed to agent orange (a herbicide and defoliant chemical composed of 2 herbicides, 2,4,5-T and 2,4-D) exposed from 2592-9529. No occupational exposure.     Avocational Exposure:   None currently.     Pet Exposures:  Dogs. Denies allergy to Dog and  cat dander.    Previous Report Reviewed: office notes     The following portions of the patient's history were reviewed and updated as appropriate: allergies, current medications, past family history, past medical history, past social history, past surgical history and problem list.    Review of Systems   Constitutional: Negative for fever, chills, weight loss, weight gain, activity change, appetite change, fatigue and night sweats.   HENT: Negative for postnasal drip, rhinorrhea, sinus pressure, voice change and congestion.    Eyes: Negative for redness and itching.   Respiratory: Negative for snoring, cough, sputum production, chest tightness, shortness of breath, wheezing, orthopnea, asthma nighttime symptoms, dyspnea on extertion, use of rescue inhaler and somnolence.    Cardiovascular: Negative.  Negative for chest pain, palpitations and leg swelling.   Genitourinary: Negative for difficulty urinating and hematuria.   Endocrine: Negative for cold intolerance and heat intolerance.    Musculoskeletal: Negative for arthralgias, gait problem, joint swelling and myalgias.   Skin: Negative.    Gastrointestinal: Negative for nausea, vomiting, abdominal pain and acid reflux.   Neurological: Negative for dizziness, weakness,  "light-headedness and headaches.   Hematological: Negative for adenopathy. No excessive bruising.   All other systems reviewed and are negative.     Objective:     Physical Exam   Constitutional: He is oriented to person, place, and time. Vital signs are normal. He appears well-developed and well-nourished. He is active and cooperative.  Non-toxic appearance. He does not appear ill. No distress.   HENT:   Head: Normocephalic and atraumatic.   Right Ear: External ear normal.   Left Ear: External ear normal.   Nose: Nose normal.   Mouth/Throat: Oropharynx is clear and moist.   Eyes: Conjunctivae are normal.   Neck: Normal range of motion. Neck supple.   Cardiovascular: Normal rate, regular rhythm, normal heart sounds and intact distal pulses.    Pulmonary/Chest: Effort normal and breath sounds normal.   Abdominal: Soft. Bowel sounds are normal.   Musculoskeletal: Normal range of motion. He exhibits no edema.   Neurological: He is alert and oriented to person, place, and time.   Skin: Skin is warm and dry.   Psychiatric: He has a normal mood and affect. His behavior is normal. Judgment and thought content normal.   Vitals reviewed.    Vitals:    08/28/17 0903   BP: 130/78   Pulse: 60   Resp: 18   SpO2: 95%   Weight: (!) 138.1 kg (304 lb 7.3 oz)   Height: 6' 1" (1.854 m)   PainSc:   5   PainLoc: Head     body mass index is 40.17 kg/m².    Personal Diagnostic Review    CPAP download  CPAP 13 cm  Compliance Summary  7/29/2017 - 8/27/2017 (30 days)  Days with Device Usage 30 days  Days without Device Usage 0 days  Percent Days with Device Usage 100.0%  Cumulative Usage 11 days 15 hrs. 47 mins. 52 secs.  Maximum Usage (1 Day) 12 hrs. 12 mins. 1 secs.  Average Usage (All Days) 9 hrs. 19 mins. 35 secs.  Average Usage (Days Used) 9 hrs. 19 mins. 35 secs.  Minimum Usage (1 Day) 7 hrs. 35 mins. 16 secs.  Percent of Days with Usage >= 4 Hours 100.0%  Percent of Days with Usage < 4 Hours 0.0%  Date Range  Total Blower Time 11 days " 16 hrs. 6 mins. 59 secs.  Sleep Therapy Statistics  Average Time in Large Leak Per Day 22 secs.  Average AHI 0.4  CPAP 13.0 cmH2O    Patient benefits and is compliant    Pulmonary stress/six minute walk done 8/21/2017: No desaturations requiring supplemental oxygen  Phase Oxygen Assessment Supplemental O2 Heart   Rate Blood Pressure Santino Dyspnea Scale Rating   Resting 97 % Room Air 86 bpm 131/78 3   Exercise        Minute        1 93 % Room Air 80 bpm     2 93 % Room Air 84 bpm     3 93 % Room Air 89 bpm     4 94 % Room Air 85 bpm     5 93 % Room Air 86 bpm     6  94 % Room Air 84 bpm 143/84 7-8   Recovery        Minute        1 96 % Room Air 61 bpm     2 96 % Room Air 59 bpm     3 96 % Room Air 57 bpm     4 95 % Room Air 56 bpm 118/74 4     Six Minute Walk Summary  6MWT Status: completed without stopping  Patient Reported: Chest pain, Dyspnea (Patient felt off balance)   while walking.     Assessment:     1. Shortness of breath on exertion    2. Tobacco use disorder, moderate, in sustained remission    3. SATYA on CPAP        Orders Placed This Encounter   Procedures    X-Ray Chest PA And Lateral     Standing Status:   Future     Standing Expiration Date:   8/28/2018     Order Specific Question:   May the Radiologist modify the order per protocol to meet the clinical needs of the patient?     Answer:   Yes    CT Chest Lung Screening Low Dose     Schedule follow up office visit after test.     Standing Status:   Future     Standing Expiration Date:   8/28/2018     Order Specific Question:   Reason for Exam:     Answer:   pulmonary nodule    Complete PFT with bronchodilator     Standing Status:   Future     Standing Expiration Date:   8/28/2018     Plan:     Problem List Items Addressed This Visit     SATYA on CPAP    Tobacco use disorder, moderate, in sustained remission     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   CT chest screening for pulmonary nodules         Relevant Orders    CT Chest Lung Screening  Low Dose      Other Visit Diagnoses     Shortness of breath on exertion    -  Primary    Relevant Orders    Complete PFT with bronchodilator    X-Ray Chest PA And Lateral        ([facility] - [facility] Reviewed therapeutic goals for positive airway pressure therapy CPAP  Ideal is usage 100% of nights for 6 - 8 hours per night. Minimum usage is 70% of night for at least 4 hours per night used. Pateint expressed understanding.

## 2017-08-28 NOTE — LETTER
August 28, 2017      Eric Rutledge MD  [address] [facility] [address]           [facility]  [address] [place] [id number]  Phone: [phone]  Fax: [phone]          Patient: Cole Doan   MR Number: [id number]   YOB: 1950   Date of Visit: 8/28/2017       Dear Dr. Eric Rutledge:    Thank you for referring Cole Doan to me for evaluation. Attached you will find relevant portions of my assessment and plan of care.    If you have questions, please do not hesitate to call me. I look forward to following Cole Doan along with you.    Sincerely,    Ryena Jaquez, NP    Enclosure  CC:  No Recipients    If you would like to receive this communication electronically, please contact [email or web address] or [phone] to request more information on [facility] Link access.    For providers and/or their staff who would like to refer a patient to [facility], please contact us through our one-stop-shop provider referral line, [facility] , at [phone].    If you feel you have received this communication in error or would no longer like to receive these types of communications, please e-mail [email or web address]

## 2017-09-07 DIAGNOSIS — I25.10 CORONARY ARTERY DISEASE INVOLVING NATIVE CORONARY ARTERY OF NATIVE HEART WITHOUT ANGINA PECTORIS: ICD-10-CM

## 2017-09-07 RX ORDER — CLOPIDOGREL BISULFATE 75 MG/1
TABLET ORAL
Qty: 30 TABLET | Refills: 6 | Status: SHIPPED | OUTPATIENT
Start: 2017-09-07 | End: 2018-04-25 | Stop reason: SDUPTHER

## 2017-09-11 ENCOUNTER — TELEPHONE (OUTPATIENT)
Dept: RADIOLOGY | Facility: HOSPITAL | Age: 67
End: 2017-09-11

## 2017-09-12 ENCOUNTER — HOSPITAL ENCOUNTER (OUTPATIENT)
Dept: RADIOLOGY | Facility: HOSPITAL | Age: 67
Discharge: HOME | End: 2017-09-12
Attending: NURSE PRACTITIONER
Payer: MEDICARE

## 2017-09-12 ENCOUNTER — HOSPITAL ENCOUNTER (OUTPATIENT)
Dept: RADIOLOGY | Facility: HOSPITAL | Age: 67
Discharge: HOME | End: 2017-09-12
Attending: NURSE PRACTITIONER

## 2017-09-12 ENCOUNTER — PROCEDURE VISIT (OUTPATIENT)
Dept: PULMONOLOGY | Facility: CLINIC | Age: 67
End: 2017-09-12
Payer: MEDICARE

## 2017-09-12 DIAGNOSIS — F17.201 TOBACCO USE DISORDER, MODERATE, IN SUSTAINED REMISSION: ICD-10-CM

## 2017-09-12 DIAGNOSIS — R06.02 SHORTNESS OF BREATH ON EXERTION: ICD-10-CM

## 2017-09-12 PROCEDURE — 71020 XR CHEST PA AND LATERAL: CPT | Mod: TC,PO

## 2017-09-12 PROCEDURE — 94726 PLETHYSMOGRAPHY LUNG VOLUMES: CPT | Mod: 26,S$PBB,, | Performed by: INTERNAL MEDICINE

## 2017-09-12 PROCEDURE — 94727 GAS DIL/WSHOT DETER LNG VOL: CPT | Mod: PBBFAC,PO | Performed by: GENERAL PRACTICE

## 2017-09-12 PROCEDURE — 94726 PLETHYSMOGRAPHY LUNG VOLUMES: CPT | Mod: PBBFAC,PO | Performed by: GENERAL PRACTICE

## 2017-09-12 PROCEDURE — 94729 DIFFUSING CAPACITY: CPT | Mod: 26,S$PBB,, | Performed by: INTERNAL MEDICINE

## 2017-09-12 PROCEDURE — 94729 DIFFUSING CAPACITY: CPT | Mod: PBBFAC,PO | Performed by: GENERAL PRACTICE

## 2017-09-12 PROCEDURE — 94060 EVALUATION OF WHEEZING: CPT | Mod: PBBFAC,PO | Performed by: GENERAL PRACTICE

## 2017-09-12 PROCEDURE — 76497 UNLISTED CT PROCEDURE: CPT | Mod: TC,PO

## 2017-09-12 PROCEDURE — 94060 EVALUATION OF WHEEZING: CPT | Mod: 26,S$PBB,, | Performed by: INTERNAL MEDICINE

## 2017-09-12 PROCEDURE — 71020 XR CHEST PA AND LATERAL: CPT | Mod: 26,,, | Performed by: RADIOLOGY

## 2017-09-13 ENCOUNTER — TELEPHONE (OUTPATIENT)
Dept: PULMONOLOGY | Facility: CLINIC | Age: 67
End: 2017-09-13

## 2017-09-13 ENCOUNTER — TELEPHONE (OUTPATIENT)
Dept: CARDIOLOGY | Facility: CLINIC | Age: 67
End: 2017-09-13

## 2017-09-13 NOTE — TELEPHONE ENCOUNTER
----- Message from Cydney Byrnes sent at 9/13/2017  9:32 AM CDT -----  Contact: pt   Call pt regarding lab results.    ..[phone] (pgrx)

## 2017-09-13 NOTE — TELEPHONE ENCOUNTER
----- Message from Eric Rutledge MD sent at 8/26/2017  9:09 AM CDT -----  cpx reveals his symptoms from deconditioning

## 2017-09-14 LAB
POST FEF 25 75: 1.9 L/S (ref 2.12–3.9)
POST FET 100: 9.53 S
POST FEV1 FVC: 77 %
POST FEV1: 2.14 L (ref 3.45–4.33)
POST FIF 50: 1.45 L/S
POST FVC: 2.78 L (ref 4.71–5.75)
POST PEF: 6.81 L/S (ref 8.28–10.87)
PRE DLCO: 18.89 ML/MMHG/MIN (ref 26.64–34.93)
PRE ERV: 0.19 L
PRE FEF 25 75: 1.4 L/S (ref 2.12–3.9)
PRE FET 100: 11.45 S
PRE FEV1 FVC: 73 %
PRE FEV1: 2.06 L (ref 3.45–4.33)
PRE FIF 50: 1.95 L/S
PRE FRC PL: 2.98 L (ref 2.91–4.12)
PRE FVC: 2.82 L (ref 4.71–5.75)
PRE KROGHS K: 5.24 1/MIN
PRE PEF: 8.04 L/S (ref 8.28–10.87)
PRE RV: 2.63 L (ref 2.37–3.16)
PRE SVC: 2.83 L
PRE TLC: 5.46 L (ref 7.01–8.01)
PREDICTED DLCO: 30.78 ML/MMHG/MIN (ref 26.64–34.93)
PREDICTED FEV1 FVC: 74.22 % (ref 69.38–79.06)
PREDICTED FEV1: 3.89 L (ref 3.45–4.33)
PREDICTED FRC N2: 3.52 L (ref 2.91–4.12)
PREDICTED FRC PL: 3.52 L (ref 2.91–4.12)
PREDICTED FVC: 5.23 L (ref 4.71–5.75)
PREDICTED RV: 2.77 L (ref 2.37–3.16)
PREDICTED SVC: 5.09 L
PREDICTED TLC: 7.51 L (ref 7.01–8.01)
PROVOCATION PROTOCOL: ABNORMAL

## 2017-09-18 ENCOUNTER — OFFICE VISIT (OUTPATIENT)
Dept: PULMONOLOGY | Facility: CLINIC | Age: 67
End: 2017-09-18
Attending: NURSE PRACTITIONER
Payer: MEDICARE

## 2017-09-18 VITALS
OXYGEN SATURATION: 95 % | BODY MASS INDEX: 40.41 KG/M2 | RESPIRATION RATE: 20 BRPM | SYSTOLIC BLOOD PRESSURE: 110 MMHG | HEIGHT: 73 IN | HEART RATE: 42 BPM | DIASTOLIC BLOOD PRESSURE: 70 MMHG | WEIGHT: 304.88 LBS

## 2017-09-18 DIAGNOSIS — E66.01 MORBID OBESITY WITH BMI OF 40.0-44.9, ADULT: ICD-10-CM

## 2017-09-18 DIAGNOSIS — J98.4 MIXED RESTRICTIVE AND OBSTRUCTIVE LUNG DISEASE: Primary | ICD-10-CM

## 2017-09-18 DIAGNOSIS — G47.33 OSA ON CPAP: ICD-10-CM

## 2017-09-18 DIAGNOSIS — R53.81 PHYSICAL DECONDITIONING: ICD-10-CM

## 2017-09-18 DIAGNOSIS — J43.9 MIXED RESTRICTIVE AND OBSTRUCTIVE LUNG DISEASE: Primary | ICD-10-CM

## 2017-09-18 PROBLEM — J44.9 MIXED RESTRICTIVE AND OBSTRUCTIVE LUNG DISEASE: Status: ACTIVE | Noted: 2017-09-18

## 2017-09-18 PROCEDURE — 99214 OFFICE O/P EST MOD 30 MIN: CPT | Mod: PBBFAC | Performed by: NURSE PRACTITIONER

## 2017-09-18 PROCEDURE — 3078F DIAST BP <80 MM HG: CPT | Mod: ,,, | Performed by: NURSE PRACTITIONER

## 2017-09-18 PROCEDURE — 1159F MED LIST DOCD IN RCRD: CPT | Mod: ,,, | Performed by: NURSE PRACTITIONER

## 2017-09-18 PROCEDURE — 3074F SYST BP LT 130 MM HG: CPT | Mod: ,,, | Performed by: NURSE PRACTITIONER

## 2017-09-18 PROCEDURE — 99999 PR PBB SHADOW E&M-EST. PATIENT-LVL IV: CPT | Mod: PBBFAC,,, | Performed by: NURSE PRACTITIONER

## 2017-09-18 PROCEDURE — 99214 OFFICE O/P EST MOD 30 MIN: CPT | Mod: S$PBB,,, | Performed by: NURSE PRACTITIONER

## 2017-09-18 NOTE — PROGRESS NOTES
Subjective:      Patient ID: Cole Doan is a 66 y.o. male.    Chief Complaint: Shortness of Breath    HPI: Cole Doan present for follow up visit related to complaints of shortness of breath with exertion with review of chest xray, CT chest  and cpft.   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   He does not meet COPD gold criteria classification to be staged in the COPD category with FEV1/FVC:  77 (104 % predicted).   Will begin Combivent Respimat inhaler to determine benefit of short acting ipratropium bromide/albuterol for his shortness of breath with exertion. ([facility]).   CT of chest 9/7/2017 revealed no lung screening findings that require follow-up.  CXR 9/7/2017 revealed lungs are clear. Heart normal size.   Pulmonary stress/six minute walk done 8/21/2017: No desaturations requiring supplemental oxygen.  Cardiac testing cpx 8/25/2017 revealed deconditioning with report reading:   Moderate functional impairment associated with a borderline reduced breathing reserve, normal oxygen saturation, poor effort, and a normal AT.   These findings are indicative of functional impairment secondary to poor effort and deconditioning.      He is on CPAP  of 13 cmH2O pressure.   He is compliant with CPAP use. Complaince download at his visit in August 2017 revealed 100 % of days with greater than 4 hours of device use.  Patient reports benefit from CPAP use and denies snoring and excessive daytime sleepiness.   [facility].   [place]: 11     Shortness of breath with exertion  Dyspnea Characteristics:   Exertional Dyspnea   Dyspnea Duration:  Subacute, present off and on over past 6 months.  Dyspnea Severity:  YOSEF 3 - 7 moderate to very severe  Dyspnea Timing:  exertional   Dyspnea Contributing Factors:  Tobacco Abuse and Organic dust exposure    Dyspnea Associated Symptoms: None  Denies  Pleuritic chest  pain,  Cough,  Sputum Production,  Hemoptysis, Wheezing,  Dysphagia and Lightheadedness, tingling in fingers or perioral area.     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  [facility] . [place] [employer] exposed to agent orange (a herbicide and defoliant chemical composed of 2 herbicides, 2,4,5-T and 2,4-D) exposed from 2401-3411. No occupational exposure.     Avocational Exposure:   None currently.     Pet Exposures:  Dogs. Denies allergy to Dog and  cat dander.    Previous Report Reviewed: office notes     The following portions of the patient's history were reviewed and updated as appropriate: allergies, current medications, past family history, past medical history, past social history, past surgical history and problem list.    Review of Systems   Constitutional: Negative for fever, chills, weight loss, weight gain, activity change, appetite change, fatigue and night sweats.   HENT: Negative for postnasal drip, rhinorrhea, sinus pressure, voice change and congestion.    Eyes: Negative for redness and itching.   Respiratory: Negative for snoring, cough, sputum production, chest tightness, shortness of breath, wheezing, orthopnea, asthma nighttime symptoms, dyspnea on extertion, use of rescue inhaler and somnolence.    Cardiovascular: Negative.  Negative for chest pain, palpitations and leg swelling.   Genitourinary: Negative for difficulty urinating and hematuria.   Endocrine: Negative for cold intolerance and heat intolerance.    Musculoskeletal: Negative for arthralgias, gait problem, joint swelling and myalgias.   Skin: Negative.    Gastrointestinal: Negative for nausea, vomiting, abdominal pain and acid reflux.   Neurological: Negative for dizziness, weakness, light-headedness and headaches.   Hematological: Negative for adenopathy. No excessive bruising.   All other systems  "reviewed and are negative.     Objective:     Physical Exam   Constitutional: He is oriented to person, place, and time. Vital signs are normal. He appears well-developed and well-nourished. He is active and cooperative.  Non-toxic appearance. He does not appear ill. No distress.   HENT:   Head: Normocephalic and atraumatic.   Right Ear: External ear normal.   Left Ear: External ear normal.   Nose: Nose normal.   Mouth/Throat: Oropharynx is clear and moist.   Eyes: Conjunctivae are normal.   Cardiovascular: Regular rhythm, normal heart sounds and intact distal pulses.  Bradycardia present.    Pulmonary/Chest: Effort normal and breath sounds normal. No stridor.   Abdominal: Soft.   Musculoskeletal: He exhibits no edema.   Lymphadenopathy:     He has no cervical adenopathy.   Neurological: He is alert and oriented to person, place, and time.   Skin: Skin is warm and dry.   Psychiatric: He has a normal mood and affect. His behavior is normal. Judgment and thought content normal.   Vitals reviewed.    Vitals:    17 0802   BP: 110/70   Pulse: (!) 42   Resp: 20   SpO2: 95%   Weight: (!) 138.3 kg (304 lb 14.3 oz)   Height: 6' 1.2" (1.859 m)     body mass index is 40.01 kg/m².    Personal Diagnostic Review  Pulmonary function tests:2017 FEV1: 2.06  (53 % predicted), FVC:  2.82 (54 % predicted), FEV1/FVC:  73 (98 % predicted), T.46 (73 % predicted), RV/TLVC: 48 (121 % predicted), DLCO: 18.9 (61 % predicted)   Post bronchodilator: FEV1: 2.14  (55 % predicted), FVC:  2.78 (53 % predicted), FEV1/FVC:  77 (104 % predicted).   Moderate restriction.  Mild reduction in diffusion capacity -unadjusted for hemoglobin (DLCO >60% and less than 79%) .  Moderately severe obstruction (FEV1>50 and <59% of predicted).  Airflow is not clearly improved after bronchodilator. Clinical improvement following bronchodilator therapy may occur in  the absence of spirometric improvement.  This is a pattern of mixed obstruction and " restriction. Clinical correlation suggested    Chest xray 9/12/2017   2 view chest    Comparison: 11/30/2016    Findings: The lungs are clear and free of infiltrate.  No pleural effusion or pneumothorax is identified.  The heart is not enlarged.   Impression         1.  No acute cardiopulmonary process.     CT chest screening 9/12/2017  Technique: CT of the thorax was performed with low dose, lung screening protocol.  No contrast was administered.  Sagittal and coronal reconstructions were obtained.  Comparison: None.  Findings:  Lungs: There are no abnormal nodular opacities that require further evaluation.  Minor scarring or dependent atelectasis present the lung bases.  A few small subpleural blebs in the right apex.  Pleura: No effusion.  Mediastinum: No pathologic lymphadenopathy.  Heart and pericardium: Normal size without effusion.  Aorta and vasculature: Atherosclerosis including coronary arteries.  Chest wall and skeletal structures: Unremarkable except age-appropriate degenerative changes.  Upper abdomen: Unremarkable.  IMPRESSION:  No lung screening findings that require follow-up.     CPAP download  CPAP 13 cm  Compliance Summary  7/29/2017 - 8/27/2017 (30 days)  Days with Device Usage 30 days  Days without Device Usage 0 days  Percent Days with Device Usage 100.0%  Cumulative Usage 11 days 15 hrs. 47 mins. 52 secs.  Maximum Usage (1 Day) 12 hrs. 12 mins. 1 secs.  Average Usage (All Days) 9 hrs. 19 mins. 35 secs.  Average Usage (Days Used) 9 hrs. 19 mins. 35 secs.  Minimum Usage (1 Day) 7 hrs. 35 mins. 16 secs.  Percent of Days with Usage >= 4 Hours 100.0%  Percent of Days with Usage < 4 Hours 0.0%  Date Range  Total Blower Time 11 days 16 hrs. 6 mins. 59 secs.  Sleep Therapy Statistics  Average Time in Large Leak Per Day 22 secs.  Average AHI 0.4  CPAP 13.0 cmH2O    Patient benefits and is compliant    Pulmonary stress/six minute walk done 8/21/2017: No desaturations requiring supplemental oxygen  Phase  Oxygen Assessment Supplemental O2 Heart   Rate Blood Pressure Santino Dyspnea Scale Rating   Resting 97 % Room Air 86 bpm 131/78 3   Exercise        Minute        1 93 % Room Air 80 bpm     2 93 % Room Air 84 bpm     3 93 % Room Air 89 bpm     4 94 % Room Air 85 bpm     5 93 % Room Air 86 bpm     6  94 % Room Air 84 bpm 143/84 7-8   Recovery        Minute        1 96 % Room Air 61 bpm     2 96 % Room Air 59 bpm     3 96 % Room Air 57 bpm     4 95 % Room Air 56 bpm 118/74 4     Six Minute Walk Summary  6MWT Status: completed without stopping  Patient Reported: Chest pain, Dyspnea (Patient felt off balance)   while walking.     Assessment:     1. Mixed restrictive and obstructive lung disease    2. Morbid obesity with BMI of 40.0-44.9, adult    3. Physical deconditioning    4. SATYA on CPAP      No orders of the defined types were placed in this encounter.    Plan:     Problem List Items Addressed This Visit     Mixed restrictive and obstructive lung disease - Primary     Complete PFT 9/7/2017 revealed Mixed restrictive/obstructive component.  Moderate restriction.  Mild reduction in diffusion capacity -unadjusted for hemoglobin (DLCO >60% and less than 79%) .  Moderately severe obstruction (FEV1>50 and <59% of predicted).   Begin Combivent Respimat inhaler 3-4 times daily to determine if we'll improve shortness of breath with exertion symptoms.  (Patient [facility]).         Relevant Medications    ipratropium-albuterol (COMBIVENT RESPIMAT)  mcg/actuation inhaler    Morbid obesity with BMI of 40.0-44.9, adult     Encouraged calorie reduction and 30 minutes of exercise daily. Discussed impact of obesity on general health and sleep apnea.  Patient is contemplating bariatric [facility].  There is plan to meet with nutritionist then move forward with next steps.           SATYA on CPAP     Benefits and compliant.  Continue CPAP 13 cm.  [facility].    [facility]: [facility]           Physical deconditioning     Seen on cardiac CPX testing 8/25/2017: Moderate functional impairment associated with a borderline reduced breathing reserve, normal oxygen saturation, poor effort, and a normal AT. These findings are indicative of functional impairment secondary to poor effort and deconditioning.            Other Visit Diagnoses    None.       ([facility] - [facility] Reviewed therapeutic goals for positive airway pressure therapy CPAP      Return for if new onset of symtpoms, worsening, or fail to improve.  continued fu is planned with the [facility] .

## 2017-09-18 NOTE — ASSESSMENT & PLAN NOTE
Complete PFT 9/7/2017 revealed Mixed restrictive/obstructive component.  Moderate restriction.  Mild reduction in diffusion capacity -unadjusted for hemoglobin (DLCO >60% and less than 79%) .  Moderately severe obstruction (FEV1>50 and <59% of predicted).   Begin Combivent Respimat inhaler 3-4 times daily to determine if we'll improve shortness of breath with exertion symptoms.  (Patient states will [facility]).

## 2017-09-18 NOTE — ASSESSMENT & PLAN NOTE
Seen on cardiac CPX testing 8/25/2017: Moderate functional impairment associated with a borderline reduced breathing reserve, normal oxygen saturation, poor effort, and a normal AT. These findings are indicative of functional impairment secondary to poor effort and deconditioning.

## 2017-09-18 NOTE — ASSESSMENT & PLAN NOTE
Encouraged calorie reduction and 30 minutes of exercise daily. Discussed impact of obesity on general health and sleep apnea.  Patient is contemplating bariatric [facility].  There is plan to meet with nutritionist then move forward with next steps.

## 2017-10-04 ENCOUNTER — OFFICE VISIT (OUTPATIENT)
Dept: CARDIOLOGY | Facility: CLINIC | Age: 67
End: 2017-10-04
Payer: MEDICARE

## 2017-10-04 VITALS
BODY MASS INDEX: 40 KG/M2 | HEART RATE: 72 BPM | DIASTOLIC BLOOD PRESSURE: 78 MMHG | SYSTOLIC BLOOD PRESSURE: 120 MMHG | HEIGHT: 73 IN | WEIGHT: 301.81 LBS

## 2017-10-04 DIAGNOSIS — F43.10 PTSD (POST-TRAUMATIC STRESS DISORDER): ICD-10-CM

## 2017-10-04 DIAGNOSIS — I25.10 CAD S/P PERCUTANEOUS CORONARY ANGIOPLASTY: Primary | ICD-10-CM

## 2017-10-04 DIAGNOSIS — E66.01 MORBID OBESITY: Chronic | ICD-10-CM

## 2017-10-04 DIAGNOSIS — J98.4 MIXED RESTRICTIVE AND OBSTRUCTIVE LUNG DISEASE: ICD-10-CM

## 2017-10-04 DIAGNOSIS — E78.5 HYPERLIPIDEMIA, UNSPECIFIED HYPERLIPIDEMIA TYPE: ICD-10-CM

## 2017-10-04 DIAGNOSIS — E11.9 TYPE 2 DIABETES MELLITUS WITHOUT COMPLICATION, WITHOUT LONG-TERM CURRENT USE OF INSULIN: ICD-10-CM

## 2017-10-04 DIAGNOSIS — I10 ESSENTIAL HYPERTENSION: ICD-10-CM

## 2017-10-04 DIAGNOSIS — G47.33 OSA ON CPAP: ICD-10-CM

## 2017-10-04 DIAGNOSIS — J43.9 MIXED RESTRICTIVE AND OBSTRUCTIVE LUNG DISEASE: ICD-10-CM

## 2017-10-04 DIAGNOSIS — R53.81 PHYSICAL DECONDITIONING: ICD-10-CM

## 2017-10-04 DIAGNOSIS — Z98.61 S/P PTCA (PERCUTANEOUS TRANSLUMINAL CORONARY ANGIOPLASTY): ICD-10-CM

## 2017-10-04 DIAGNOSIS — E66.9 OBESITY (BMI 30-39.9): ICD-10-CM

## 2017-10-04 DIAGNOSIS — I45.10 RBBB: ICD-10-CM

## 2017-10-04 DIAGNOSIS — R07.9 CHEST PAIN, UNSPECIFIED TYPE: ICD-10-CM

## 2017-10-04 DIAGNOSIS — I25.119 ATHEROSCLEROSIS OF NATIVE CORONARY ARTERY WITH ANGINA PECTORIS, UNSPECIFIED WHETHER NATIVE OR TRANSPLANTED HEART: ICD-10-CM

## 2017-10-04 DIAGNOSIS — E66.01 MORBID OBESITY WITH BMI OF 40.0-44.9, ADULT: ICD-10-CM

## 2017-10-04 DIAGNOSIS — Z98.61 CAD S/P PERCUTANEOUS CORONARY ANGIOPLASTY: Primary | ICD-10-CM

## 2017-10-04 PROCEDURE — 99214 OFFICE O/P EST MOD 30 MIN: CPT | Mod: S$PBB,,, | Performed by: INTERNAL MEDICINE

## 2017-10-04 PROCEDURE — 99213 OFFICE O/P EST LOW 20 MIN: CPT | Mod: PBBFAC,PO | Performed by: INTERNAL MEDICINE

## 2017-10-04 PROCEDURE — 99999 PR PBB SHADOW E&M-EST. PATIENT-LVL III: CPT | Mod: PBBFAC,,, | Performed by: INTERNAL MEDICINE

## 2017-10-04 NOTE — PROGRESS NOTES
Subjective:   Patient ID:  Cole Doan is a 67 y.o. male who presents for follow up of Results (rev labs,zio,echo)      HPI  A 66 yo male with h/o cad s/p ptca obesity htn hlp obesity deconditioning is here fro f/u . He has significant limitation due to shortness of breath he had a w/u to try to pinpoint the reason he had cpx that pointed towards deconditioning and poor effort. He has  No significant arrythmias of zio patch he ahs normal lvf. He has been suing cpap regualrily tries to eb complaint. Issue to day was to discuss test results. has no change in status. [facility] he wants to have the sleeve procedure done.  Past Medical History:   Diagnosis Date    Anticoagulant long-term use     BPH (benign prostatic hypertrophy)     CAD (coronary artery disease)     HTN (hypertension) 5/15/2014    Hyperlipidemia     Hypertension     Obesity 5/15/2014    Pneumonia     PTSD (post-traumatic stress disorder) 5/15/2014    RBBB 5/15/2014    S/P PTCA (percutaneous transluminal coronary angioplasty) 5/15/2014    Sleep apnea 5/15/2014    Special screening for malignant neoplasms, colon 1/22/2014    Type 2 diabetes mellitus without complication 9/28/2015    Type 2 diabetes mellitus without complication 9/28/2015       Past Surgical History:   Procedure Laterality Date    APPENDECTOMY      at age 14    CORONARY ANGIOPLASTY WITH STENT PLACEMENT      1990, 2008, 2012       Social History   Substance Use Topics    Smoking status: Former Smoker     Packs/day: 1.00     Years: 40.00     Types: Cigarettes     Start date: 1965     Quit date: 12/31/2009    Smokeless tobacco: Never Used    Alcohol use No       Family History   Problem Relation Age of Onset    Cataracts Father     Heart disease Father     Hypertension Father     Heart disease Mother     Hypertension Mother     Colon cancer Neg Hx        Current Outpatient Prescriptions   Medication Sig     alprazolam (XANAX) 0.5 MG tablet Take 0.5 mg by mouth 3 (three) times daily as needed.     amlodipine (NORVASC) 2.5 MG tablet TAKE 1 TABLET (2.5 MG TOTAL) BY MOUTH ONCE DAILY.    aspirin (ECOTRIN) 81 MG EC tablet Take 81 mg by mouth once daily.    atorvastatin (LIPITOR) 80 MG tablet Take 80 mg by mouth. Take a half tablet daily    buPROPion (WELLBUTRIN SR) 200 MG TbSR Take 200 mg by mouth once daily.    clopidogrel (PLAVIX) 75 mg tablet TAKE 1 TABLET BY MOUTH EVERY DAY    desonide (DESOWEN) 0.05 % cream Apply topically 2 (two) times daily. Apply to lids.    furosemide (LASIX) 40 MG tablet Takes one-half tablet by mouth daily (Patient taking differently: 40 mg. Takes 2 tablet by mouth daily)    gabapentin (NEURONTIN) 300 MG capsule Take 300 mg by mouth 3 (three) times daily.     ipratropium-albuterol (COMBIVENT RESPIMAT)  mcg/actuation inhaler Inhale 1 puff into the lungs 4 (four) times daily.    isosorbide mononitrate (IMDUR) 60 MG 24 hr tablet Take 1 tablet (60 mg total) by mouth 2 (two) times daily. (Patient taking differently: Take 60 mg by mouth once daily. )    metformin (GLUCOPHAGE) 1000 MG tablet Take 1,000 mg by mouth 2 (two) times daily with meals.    miconazole (MICOTIN) 2 % cream Apply topically 2 (two) times daily.    ranolazine (RANEXA) 500 MG Tb12 Take 500 mg by mouth 2 (two) times daily.    terbinafine HCl (LAMISIL) 250 mg tablet Take 250 mg by mouth once daily.     No current facility-administered medications for this visit.      Current Outpatient Prescriptions on File Prior to Visit   Medication Sig    alprazolam (XANAX) 0.5 MG tablet Take 0.5 mg by mouth 3 (three) times daily as needed.     amlodipine (NORVASC) 2.5 MG tablet TAKE 1 TABLET (2.5 MG TOTAL) BY MOUTH ONCE DAILY.    aspirin (ECOTRIN) 81 MG EC tablet Take 81 mg by mouth once daily.    atorvastatin (LIPITOR) 80 MG tablet Take 80 mg by mouth. Take a half tablet daily    buPROPion (WELLBUTRIN SR) 200 MG TbSR Take  200 mg by mouth once daily.    clopidogrel (PLAVIX) 75 mg tablet TAKE 1 TABLET BY MOUTH EVERY DAY    desonide (DESOWEN) 0.05 % cream Apply topically 2 (two) times daily. Apply to lids.    furosemide (LASIX) 40 MG tablet Takes one-half tablet by mouth daily (Patient taking differently: 40 mg. Takes 2 tablet by mouth daily)    gabapentin (NEURONTIN) 300 MG capsule Take 300 mg by mouth 3 (three) times daily.     ipratropium-albuterol (COMBIVENT RESPIMAT)  mcg/actuation inhaler Inhale 1 puff into the lungs 4 (four) times daily.    isosorbide mononitrate (IMDUR) 60 MG 24 hr tablet Take 1 tablet (60 mg total) by mouth 2 (two) times daily. (Patient taking differently: Take 60 mg by mouth once daily. )    metformin (GLUCOPHAGE) 1000 MG tablet Take 1,000 mg by mouth 2 (two) times daily with meals.    miconazole (MICOTIN) 2 % cream Apply topically 2 (two) times daily.    ranolazine (RANEXA) 500 MG Tb12 Take 500 mg by mouth 2 (two) times daily.    terbinafine HCl (LAMISIL) 250 mg tablet Take 250 mg by mouth once daily.    [DISCONTINUED] cyanocobalamin (VITAMIN B-12) 1000 MCG tablet Take 100 mcg by mouth once daily.     No current facility-administered medications on file prior to visit.      Review of patient's allergies indicates:   Allergen Reactions    Iodinated contrast- oral and iv dye        ROS  Constitution: Positive for weakness and malaise/fatigue.   HENT: Negative for headaches.    Eyes: Negative for blurred vision.   Cardiovascular: Positive for chest pain, dyspnea on exertion and leg swelling. Negative for claudication, cyanosis, irregular heartbeat, near-syncope, orthopnea, palpitations and paroxysmal nocturnal dyspnea.   Respiratory: Positive for shortness of breath and snoring. Negative for cough and hemoptysis.    Hematologic/Lymphatic: Negative for bleeding problem. Does not bruise/bleed easily.   Skin: Negative for dry skin and itching.   Musculoskeletal: Positive for falls. Negative for  "muscle weakness and myalgias.   Gastrointestinal: Negative for abdominal pain, diarrhea, heartburn, hematemesis, hematochezia and melena.   Genitourinary: Negative for flank pain and hematuria.   Neurological: Positive for disturbances in coordination, loss of balance, numbness and paresthesias. Negative for dizziness, focal weakness, light-headedness and seizures.   Psychiatric/Behavioral: Negative for altered mental status and memory loss. The patient is not nervous/anxious.    Allergic/Immunologic: Negative for hives.   Objective:   Physical Exam  Vitals:    10/04/17 1023   BP: 120/78   Pulse: 72   Weight: (!) 136.9 kg (301 lb 13 oz)   Height: 6' 1.2" (1.859 m)   Constitutional: He is oriented to person, place, and time. He appears well-developed and well-nourished. No distress.   HENT:   Head: Normocephalic and atraumatic.   Eyes: EOM are normal. Pupils are equal, round, and reactive to light. Right eye exhibits no discharge. Left eye exhibits no discharge.   Neck: Neck supple. No JVD present. No thyromegaly present.   Cardiovascular: Normal rate, regular rhythm, normal heart sounds and intact distal pulses.  Exam reveals no gallop and no friction rub.    No murmur heard.  Pulmonary/Chest: Effort normal and breath sounds normal. No respiratory distress. He has no wheezes. He has no rales. He exhibits no tenderness.   Abdominal: Soft. Bowel sounds are normal. He exhibits no distension. There is no tenderness.   Obese abdomen   Musculoskeletal: Normal range of motion. He exhibits no edema.   Neurological: He is alert and oriented to person, place, and time. No cranial nerve deficit.   Skin: Skin is warm and dry. No rash noted. He is not diaphoretic. No erythema.   Psychiatric: He has a normal mood and affect. His behavior is normal.   Nursing note and vitals reviewed  Lab Results   Component Value Date    CHOL 161 01/19/2017    CHOL 171 08/03/2016    CHOL 147 07/14/2014     Lab Results   Component Value Date    " HDL 32 (L) 01/19/2017    HDL 25 (L) 08/03/2016    HDL 31 (L) 07/14/2014     Lab Results   Component Value Date    LDLCALC 89.2 01/19/2017    LDLCALC 106.4 08/03/2016    LDLCALC 86.6 07/14/2014     Lab Results   Component Value Date    TRIG 199 (H) 01/19/2017    TRIG 198 (H) 08/03/2016    TRIG 147 07/14/2014     Lab Results   Component Value Date    CHOLHDL 19.9 (L) 01/19/2017    CHOLHDL 14.6 (L) 08/03/2016    CHOLHDL 21.1 07/14/2014       Chemistry        Component Value Date/Time     01/19/2017 1016    K 4.3 01/19/2017 1016     01/19/2017 1016    CO2 27 01/19/2017 1016    BUN 16 01/19/2017 1016    CREATININE 0.9 01/19/2017 1016     (H) 01/19/2017 1016        Component Value Date/Time    CALCIUM 9.0 01/19/2017 1016    ALKPHOS 72 01/19/2017 1016    AST 15 01/19/2017 1016    ALT 19 01/19/2017 1016    BILITOT 0.6 01/19/2017 1016    ESTGFRAFRICA >60.0 01/19/2017 1016    EGFRNONAA >60.0 01/19/2017 1016          Lab Results   Component Value Date    TSH 1.139 09/23/2016     Lab Results   Component Value Date    INR 1.0 08/03/2016    INR 1.0 09/28/2015     Lab Results   Component Value Date    WBC 5.31 08/03/2016    HGB 16.0 08/03/2016    HCT 47.2 08/03/2016    MCV 91 08/03/2016     08/03/2016     BMP  Sodium   Date Value Ref Range Status   01/19/2017 142 136 - 145 mmol/L Final     Potassium   Date Value Ref Range Status   01/19/2017 4.3 3.5 - 5.1 mmol/L Final     Chloride   Date Value Ref Range Status   01/19/2017 106 95 - 110 mmol/L Final     CO2   Date Value Ref Range Status   01/19/2017 27 23 - 29 mmol/L Final     BUN, Bld   Date Value Ref Range Status   01/19/2017 16 8 - 23 mg/dL Final     Creatinine   Date Value Ref Range Status   01/19/2017 0.9 0.5 - 1.4 mg/dL Final     Calcium   Date Value Ref Range Status   01/19/2017 9.0 8.7 - 10.5 mg/dL Final     Anion Gap   Date Value Ref Range Status   01/19/2017 9 8 - 16 mmol/L Final     eGFR if    Date Value Ref Range Status    01/19/2017 >60.0 >60 mL/min/1.73 m^2 Final     eGFR if non    Date Value Ref Range Status   01/19/2017 >60.0 >60 mL/min/1.73 m^2 Final     Comment:     Calculation used to obtain the estimated glomerular filtration  rate (eGFR) is the CKD-EPI equation. Since race is unknown   in our information system, the eGFR values for   -American and Non--American patients are given   for each creatinine result.       CrCl cannot be calculated (Patient's most recent lab result is older than the maximum 7 days allowed.).    Assessment:     1. CAD S/P percutaneous coronary angioplasty    2. Morbid obesity    3. S/P PTCA (percutaneous transluminal coronary angioplasty)    4. Essential hypertension    5. SATYA on CPAP    6. RBBB    7. Hyperlipidemia, unspecified hyperlipidemia type    8. Type 2 diabetes mellitus without complication, without long-term current use of insulin    9. Obesity (BMI 30-39.9)    10. Chest pain, unspecified type    11. Morbid obesity with BMI of 40.0-44.9, adult    12. Mixed restrictive and obstructive lung disease    13. Physical deconditioning    14. PTSD (post-traumatic stress disorder)    15. Atherosclerosis of native coronary artery with angina pectoris, unspecified whether native or transplanted heart      No change in status studies point to deconditioning and poor effort for symptoms. He will be evaluated for weight loss surgery in an effort to control his symptoms and longevity.emphasized risk factor modification.  Plan:   Continue current therapy  Cardiac low salt diet.  Risk factor modification and excercise program.  F/u in 6 months   No contraindication cardiac wise for weight loss surgery.

## 2017-11-12 DIAGNOSIS — Z23 NEED FOR TDAP VACCINATION: Primary | ICD-10-CM

## 2017-11-29 DIAGNOSIS — Z98.61 CAD S/P PERCUTANEOUS CORONARY ANGIOPLASTY: ICD-10-CM

## 2017-11-29 DIAGNOSIS — I25.10 CAD S/P PERCUTANEOUS CORONARY ANGIOPLASTY: ICD-10-CM

## 2017-11-29 RX ORDER — AMLODIPINE BESYLATE 2.5 MG/1
TABLET ORAL
Qty: 30 TABLET | Refills: 6 | Status: SHIPPED | OUTPATIENT
Start: 2017-11-29 | End: 2018-05-01 | Stop reason: SDUPTHER

## 2018-03-21 ENCOUNTER — PATIENT MESSAGE (OUTPATIENT)
Dept: GASTROENTEROLOGY | Facility: CLINIC | Age: 68
End: 2018-03-21

## 2018-03-21 ENCOUNTER — TELEPHONE (OUTPATIENT)
Dept: GASTROENTEROLOGY | Facility: CLINIC | Age: 68
End: 2018-03-21

## 2018-03-21 DIAGNOSIS — Z86.010 PERSONAL HISTORY OF COLONIC POLYPS: Primary | ICD-10-CM

## 2018-03-21 RX ORDER — SODIUM, POTASSIUM,MAG SULFATES 17.5-3.13G
SOLUTION, RECONSTITUTED, ORAL ORAL
Qty: 1 BOTTLE | Refills: 0 | Status: ON HOLD | OUTPATIENT
Start: 2018-03-21 | End: 2018-04-27 | Stop reason: CLARIF

## 2018-03-21 NOTE — TELEPHONE ENCOUNTER
Personal history of colonic polyps  Comments:  Last Colonoscopy in 2015  Orders:  -     sodium,potassium,mag sulfates (SUPREP BOWEL PREP KIT) 17.5-3.13-1.6 gram SolR; As directed  Dispense: 1 Bottle; Refill: 0  -     Case request GI: COLONOSCOPY      He will need to be cleared by Dr. Rutledge to hold Plavix and OK for colonoscopy.     Past Medical History:   Diagnosis Date    Anticoagulant long-term use     BPH (benign prostatic hypertrophy)     CAD (coronary artery disease)     HTN (hypertension) 5/15/2014    Hyperlipidemia     Hypertension     Obesity 5/15/2014    Pneumonia     PTSD (post-traumatic stress disorder) 5/15/2014    RBBB 5/15/2014    S/P PTCA (percutaneous transluminal coronary angioplasty) 5/15/2014    Sleep apnea 5/15/2014    Special screening for malignant neoplasms, colon 1/22/2014    Type 2 diabetes mellitus without complication 9/28/2015    Type 2 diabetes mellitus without complication 9/28/2015     Past Surgical History:   Procedure Laterality Date    APPENDECTOMY      at age 14    CORONARY ANGIOPLASTY WITH STENT PLACEMENT      1990, 2008, 2012         Current Outpatient Prescriptions on File Prior to Visit   Medication Sig Dispense Refill    alprazolam (XANAX) 0.5 MG tablet Take 0.5 mg by mouth 3 (three) times daily as needed.       amLODIPine (NORVASC) 2.5 MG tablet TAKE 1 TABLET BY MOUTH ONCE DAILY. 30 tablet 6    aspirin (ECOTRIN) 81 MG EC tablet Take 81 mg by mouth once daily.      atorvastatin (LIPITOR) 80 MG tablet Take 80 mg by mouth. Take a half tablet daily      buPROPion (WELLBUTRIN SR) 200 MG TbSR Take 200 mg by mouth once daily.      clopidogrel (PLAVIX) 75 mg tablet TAKE 1 TABLET BY MOUTH EVERY DAY 30 tablet 6    desonide (DESOWEN) 0.05 % cream Apply topically 2 (two) times daily. Apply to lids. 1 Tube 3    furosemide (LASIX) 40 MG tablet Takes one-half tablet by mouth daily (Patient taking differently: 40 mg. Takes 2 tablet by mouth daily) 30 tablet 1     gabapentin (NEURONTIN) 300 MG capsule Take 300 mg by mouth 3 (three) times daily.       ipratropium-albuterol (COMBIVENT RESPIMAT)  mcg/actuation inhaler Inhale 1 puff into the lungs 4 (four) times daily. 4 g 11    isosorbide mononitrate (IMDUR) 60 MG 24 hr tablet Take 1 tablet (60 mg total) by mouth 2 (two) times daily. (Patient taking differently: Take 60 mg by mouth once daily. ) 60 tablet 6    metformin (GLUCOPHAGE) 1000 MG tablet Take 1,000 mg by mouth 2 (two) times daily with meals.      miconazole (MICOTIN) 2 % cream Apply topically 2 (two) times daily.      ranolazine (RANEXA) 500 MG Tb12 Take 500 mg by mouth 2 (two) times daily.      terbinafine HCl (LAMISIL) 250 mg tablet Take 250 mg by mouth once daily.       No current facility-administered medications on file prior to visit.          Thanks,    Artem Medeiros

## 2018-03-21 NOTE — TELEPHONE ENCOUNTER
----- Message from Sally Weinstein MA sent at 3/21/2018 10:27 AM CDT -----  Contact: self  GM Dr. Medeiros pt due for colon in May and he would like to know if you can put the order in early for him to schedule this procedure?  ----- Message -----  From: Chela Sánchez  Sent: 3/21/2018   9:50 AM  To: [facility] Staff    Pt would like to schedule a colonoscopy.  He will need to speak with someone regarding orders.    Please call pt to discuss.  He can be reached at [phone]

## 2018-04-25 DIAGNOSIS — I25.10 CORONARY ARTERY DISEASE INVOLVING NATIVE CORONARY ARTERY OF NATIVE HEART WITHOUT ANGINA PECTORIS: ICD-10-CM

## 2018-04-25 RX ORDER — CLOPIDOGREL BISULFATE 75 MG/1
TABLET ORAL
Qty: 30 TABLET | Refills: 6 | Status: SHIPPED | OUTPATIENT
Start: 2018-04-25 | End: 2022-07-18

## 2018-04-27 ENCOUNTER — HOSPITAL ENCOUNTER (OUTPATIENT)
Facility: HOSPITAL | Age: 68
Discharge: HOME | End: 2018-04-27
Attending: INTERNAL MEDICINE | Admitting: INTERNAL MEDICINE
Payer: MEDICARE

## 2018-04-27 ENCOUNTER — ANESTHESIA EVENT (OUTPATIENT)
Dept: ENDOSCOPY | Facility: HOSPITAL | Age: 68
End: 2018-04-27
Payer: MEDICARE

## 2018-04-27 ENCOUNTER — SURGERY (OUTPATIENT)
Age: 68
End: 2018-04-27

## 2018-04-27 ENCOUNTER — PATIENT MESSAGE (OUTPATIENT)
Dept: GASTROENTEROLOGY | Facility: HOSPITAL | Age: 68
End: 2018-04-27

## 2018-04-27 ENCOUNTER — ANESTHESIA (OUTPATIENT)
Dept: ENDOSCOPY | Facility: HOSPITAL | Age: 68
End: 2018-04-27
Payer: MEDICARE

## 2018-04-27 DIAGNOSIS — Z12.11 SPECIAL SCREENING FOR MALIGNANT NEOPLASMS, COLON: ICD-10-CM

## 2018-04-27 DIAGNOSIS — D12.3 BENIGN NEOPLASM OF TRANSVERSE COLON: ICD-10-CM

## 2018-04-27 DIAGNOSIS — K57.30 DIVERTICULOSIS OF LARGE INTESTINE WITHOUT HEMORRHAGE: ICD-10-CM

## 2018-04-27 DIAGNOSIS — Z86.010 PERSONAL HISTORY OF COLONIC POLYPS: Primary | ICD-10-CM

## 2018-04-27 LAB — POCT GLUCOSE: 81 MG/DL (ref 70–110)

## 2018-04-27 PROCEDURE — 45380 COLONOSCOPY AND BIOPSY: CPT | Performed by: INTERNAL MEDICINE

## 2018-04-27 PROCEDURE — 27201012 HC FORCEPS, HOT/COLD, DISP: Performed by: INTERNAL MEDICINE

## 2018-04-27 PROCEDURE — 82962 GLUCOSE BLOOD TEST: CPT | Performed by: INTERNAL MEDICINE

## 2018-04-27 PROCEDURE — 88305 TISSUE EXAM BY PATHOLOGIST: CPT | Mod: 26,,, | Performed by: PATHOLOGY

## 2018-04-27 PROCEDURE — 63600175 PHARM REV CODE 636 W HCPCS: Performed by: NURSE ANESTHETIST, CERTIFIED REGISTERED

## 2018-04-27 PROCEDURE — 88305 TISSUE EXAM BY PATHOLOGIST: CPT | Performed by: PATHOLOGY

## 2018-04-27 PROCEDURE — 25000003 PHARM REV CODE 250: Performed by: INTERNAL MEDICINE

## 2018-04-27 PROCEDURE — 25000003 PHARM REV CODE 250: Performed by: NURSE ANESTHETIST, CERTIFIED REGISTERED

## 2018-04-27 PROCEDURE — 45380 COLONOSCOPY AND BIOPSY: CPT | Mod: PT,,, | Performed by: INTERNAL MEDICINE

## 2018-04-27 PROCEDURE — 37000008 HC ANESTHESIA 1ST 15 MINUTES: Performed by: INTERNAL MEDICINE

## 2018-04-27 PROCEDURE — 37000009 HC ANESTHESIA EA ADD 15 MINS: Performed by: INTERNAL MEDICINE

## 2018-04-27 RX ORDER — SODIUM CHLORIDE, SODIUM LACTATE, POTASSIUM CHLORIDE, CALCIUM CHLORIDE 600; 310; 30; 20 MG/100ML; MG/100ML; MG/100ML; MG/100ML
INJECTION, SOLUTION INTRAVENOUS CONTINUOUS
Status: DISCONTINUED | OUTPATIENT
Start: 2018-04-27 | End: 2018-04-27 | Stop reason: HOSPADM

## 2018-04-27 RX ORDER — PROPOFOL 10 MG/ML
VIAL (ML) INTRAVENOUS
Status: DISCONTINUED | OUTPATIENT
Start: 2018-04-27 | End: 2018-04-27

## 2018-04-27 RX ORDER — LIDOCAINE HYDROCHLORIDE 20 MG/ML
INJECTION, SOLUTION EPIDURAL; INFILTRATION; INTRACAUDAL; PERINEURAL
Status: DISCONTINUED | OUTPATIENT
Start: 2018-04-27 | End: 2018-04-27

## 2018-04-27 RX ADMIN — LIDOCAINE HYDROCHLORIDE 100 MG: 20 INJECTION, SOLUTION EPIDURAL; INFILTRATION; INTRACAUDAL; PERINEURAL at 03:04

## 2018-04-27 RX ADMIN — PROPOFOL 50 MG: 10 INJECTION, EMULSION INTRAVENOUS at 04:04

## 2018-04-27 RX ADMIN — SODIUM CHLORIDE, SODIUM LACTATE, POTASSIUM CHLORIDE, AND CALCIUM CHLORIDE: 600; 310; 30; 20 INJECTION, SOLUTION INTRAVENOUS at 03:04

## 2018-04-27 RX ADMIN — PROPOFOL 50 MG: 10 INJECTION, EMULSION INTRAVENOUS at 03:04

## 2018-04-27 RX ADMIN — PROPOFOL 150 MG: 10 INJECTION, EMULSION INTRAVENOUS at 03:04

## 2018-04-27 NOTE — ANESTHESIA POSTPROCEDURE EVALUATION
"Anesthesia Post Evaluation    Patient: Cole Doan    Procedure(s) Performed: Procedure(s) (LRB):  COLONOSCOPY (Left)    Final Anesthesia Type: MAC  Patient location during evaluation: PACU  Patient participation: Yes- Able to Participate  Level of consciousness: awake and alert and oriented  Post-procedure vital signs: reviewed and stable  Pain management: adequate  Airway patency: patent  PONV status at discharge: No PONV  Anesthetic complications: no      Cardiovascular status: blood pressure returned to baseline, hemodynamically stable and stable  Respiratory status: unassisted, spontaneous ventilation and room air  Hydration status: euvolemic  Follow-up not needed.        Visit Vitals  /67   Pulse 61   Temp 36.7 °C (98 °F) (Oral)   Resp 17   Ht 6' 1" (1.854 m)   Wt (!) 271.9 kg (599 lb 6.9 oz)   SpO2 98%   BMI 79.09 kg/m²       Pain/Lorena Score: Pain Assessment Performed: Yes (4/27/2018  4:26 PM)  Presence of Pain: denies (4/27/2018  4:26 PM)  Lorena Score: 10 (4/27/2018  4:16 PM)      "

## 2018-04-27 NOTE — DISCHARGE INSTRUCTIONS
If you develop fevers, severe abdominal pain, significant bleeding, nausea or vomiting, please contact us or come to our Emergency Department at [facility].  Our  contact number is [phone] available for emergencies or any question that you may have.      You may return to normal activities tomorrow.  Written discharge instructions were provided to you today and have them handy since you may not remember our conversation today.       I also recommend that you follow a high fiber diet and take calcium supplements daily as well as to continue your present medications.      If you take Aspirin, please resume taking it at your prior dose today. If we obtained biopsies or removed polyps during your procedure, we will contact you within 5 to 6 days with the results.      Thanks for trusting us with your healthcare needs.      Sincerely,     Artem Medeiros M.D.        To rate your experience with Dr. Medeiros, please click on the link below     [email or web address]

## 2018-04-27 NOTE — ANESTHESIA RELEASE NOTE
"Anesthesia Release from PACU Note    Patient: Cole Doan    Procedure(s) Performed: Procedure(s) (LRB):  COLONOSCOPY (Left)    Anesthesia type: MAC    Post pain: Adequate analgesia    Post assessment: no apparent anesthetic complications, tolerated procedure well and no evidence of recall    Last Vitals:   Visit Vitals  /67   Pulse 61   Temp 36.7 °C (98 °F) (Oral)   Resp 17   Ht 6' 1" (1.854 m)   Wt (!) 271.9 kg (599 lb 6.9 oz)   SpO2 98%   BMI 79.09 kg/m²       Post vital signs: stable    Level of consciousness: awake, alert  and oriented    Nausea/Vomiting: no nausea/no vomiting    Complications: none    Airway Patency: patent    Respiratory: unassisted, spontaneous ventilation, room air    Cardiovascular: stable and blood pressure at baseline    Hydration: euvolemic  "

## 2018-04-27 NOTE — DISCHARGE SUMMARY
Endoscopy Discharge Summary      Admit Date: 4/27/2018    Discharge Date and Time:  4/27/2018 4:10 PM    Attending Physician: Artem Medeiros MD     Discharge Physician: Artem Medeiros MD     Principal Admitting Diagnoses: Personal history of colonic polyps         Discharge Diagnosis: The primary encounter diagnosis was Personal history of colonic polyps. Diagnoses of Diverticulosis of large intestine without hemorrhage, Benign neoplasm of transverse colon, and Special screening for malignant neoplasms, colon were also pertinent to this visit.     Discharged Condition: Good    Indication for Admission: Personal history of colonic polyps     Hospital Course: Patient was admitted for an inpatient procedure and tolerated the procedure well with no complications.    Significant Diagnostic Studies:  COLON    Pathology (if any):  Specimen (12h ago through future)    Start     Ordered    04/27/18 155  Specimen to Pathology - Surgery  Once     Comments:  1. Colon polyp      04/27/18 1601          Disposition: Home.    Bleeding: None    No Implants         Follow Up/Patient Instructions:   Current Discharge Medication List      CONTINUE these medications which have NOT CHANGED    Details   alprazolam (XANAX) 0.5 MG tablet Take 0.5 mg by mouth 3 (three) times daily as needed.       amLODIPine (NORVASC) 2.5 MG tablet TAKE 1 TABLET BY MOUTH ONCE DAILY.  Qty: 30 tablet, Refills: 6    Associated Diagnoses: CAD S/P percutaneous coronary angioplasty      aspirin (ECOTRIN) 81 MG EC tablet Take 81 mg by mouth once daily.      atorvastatin (LIPITOR) 80 MG tablet Take 80 mg by mouth. Take a half tablet daily      buPROPion (WELLBUTRIN SR) 200 MG TbSR Take 200 mg by mouth once daily.      furosemide (LASIX) 40 MG tablet Takes one-half tablet by mouth daily  Qty: 30 tablet, Refills: 1    Associated Diagnoses: Unspecified essential hypertension      gabapentin (NEURONTIN) 300 MG capsule Take 300 mg by mouth 3 (three) times daily.        ipratropium-albuterol (COMBIVENT RESPIMAT)  mcg/actuation inhaler Inhale 1 puff into the lungs 4 (four) times daily.  Qty: 4 g, Refills: 11    Associated Diagnoses: Mixed restrictive and obstructive lung disease      isosorbide mononitrate (IMDUR) 60 MG 24 hr tablet Take 1 tablet (60 mg total) by mouth 2 (two) times daily.  Qty: 60 tablet, Refills: 6      metformin (GLUCOPHAGE) 1000 MG tablet Take 1,000 mg by mouth 2 (two) times daily with meals.      miconazole (MICOTIN) 2 % cream Apply topically 2 (two) times daily.      ranolazine (RANEXA) 500 MG Tb12 Take 500 mg by mouth 2 (two) times daily.      terbinafine HCl (LAMISIL) 250 mg tablet Take 250 mg by mouth once daily.      clopidogrel (PLAVIX) 75 mg tablet TAKE 1 TABLET BY MOUTH EVERY DAY  Qty: 30 tablet, Refills: 6    Associated Diagnoses: Coronary artery disease involving native coronary artery of native heart without angina pectoris               Discharge Procedure Orders  Diet general     Activity as tolerated     Call MD for:  temperature >100.4     Call MD for:  persistent nausea and vomiting     Call MD for:  severe uncontrolled pain     Call MD for:  difficulty breathing, headache or visual disturbances     Call MD for:  redness, tenderness, or signs of infection (pain, swelling, redness, odor or green/yellow discharge around incision site)     Call MD for:  hives     Call MD for:  persistent dizziness or light-headedness     No dressing needed         Follow-up Information     Yoly Spring MD.    Specialty:  Cardiology  Why:  As needed  Contact information:  [address] [facility] [address] [phone]

## 2018-04-27 NOTE — TRANSFER OF CARE
"Anesthesia Transfer of Care Note    Patient: Cole Doan    Procedure(s) Performed: Procedure(s) (LRB):  COLONOSCOPY (Left)    Patient location: PACU    Anesthesia Type: MAC    Transport from OR: Transported from OR on room air with adequate spontaneous ventilation    Post pain: adequate analgesia    Post assessment: no apparent anesthetic complications and tolerated procedure well    Post vital signs: stable    Level of consciousness: awake and responds to stimulation    Nausea/Vomiting: no nausea/vomiting    Complications: none    Transfer of care protocol was followed      Last vitals:   Visit Vitals  BP (!) 109/53   Pulse 64   Temp 36.7 °C (98 °F) (Oral)   Resp 14   Ht 6' 1" (1.854 m)   Wt (!) 271.9 kg (599 lb 6.9 oz)   SpO2 (!) 94%   BMI 79.09 kg/m²     "

## 2018-04-27 NOTE — H&P
[facility]  Gastroenterology  H&P    Patient Name: Cole Doan  MRN: [id number]  Admission Date: 4/27/2018  Code Status: No Order    Attending Provider: Artem Medeiros MD   Primary Care Physician: Yoly Spring MD  Principal Problem:Personal history of colonic polyps    Subjective:     History of Present Illness/Reasons for procedure(s): Patient has a history of colon polyps.     Past Medical History:   Diagnosis Date    Anticoagulant long-term use     BPH (benign prostatic hypertrophy)     CAD (coronary artery disease)     HTN (hypertension) 5/15/2014    Hyperlipidemia     Hypertension     Obesity 5/15/2014    Pneumonia     PTSD (post-traumatic stress disorder) 5/15/2014    RBBB 5/15/2014    S/P PTCA (percutaneous transluminal coronary angioplasty) 5/15/2014    Sleep apnea 5/15/2014    Special screening for malignant neoplasms, colon 1/22/2014    Type 2 diabetes mellitus without complication 9/28/2015    Type 2 diabetes mellitus without complication 9/28/2015       No current facility-administered medications on file prior to encounter.      Current Outpatient Prescriptions on File Prior to Encounter   Medication Sig Dispense Refill    alprazolam (XANAX) 0.5 MG tablet Take 0.5 mg by mouth 3 (three) times daily as needed.       amLODIPine (NORVASC) 2.5 MG tablet TAKE 1 TABLET BY MOUTH ONCE DAILY. 30 tablet 6    aspirin (ECOTRIN) 81 MG EC tablet Take 81 mg by mouth once daily.      atorvastatin (LIPITOR) 80 MG tablet Take 80 mg by mouth. Take a half tablet daily      buPROPion (WELLBUTRIN SR) 200 MG TbSR Take 200 mg by mouth once daily.      furosemide (LASIX) 40 MG tablet Takes one-half tablet by mouth daily (Patient taking differently: 40 mg. Takes 2 tablet by mouth daily) 30 tablet 1    gabapentin (NEURONTIN) 300 MG capsule Take 300 mg by mouth 3 (three) times daily.       ipratropium-albuterol (COMBIVENT RESPIMAT)  mcg/actuation inhaler Inhale 1 puff into the  lungs 4 (four) times daily. 4 g 11    isosorbide mononitrate (IMDUR) 60 MG 24 hr tablet Take 1 tablet (60 mg total) by mouth 2 (two) times daily. (Patient taking differently: Take 60 mg by mouth once daily. ) 60 tablet 6    metformin (GLUCOPHAGE) 1000 MG tablet Take 1,000 mg by mouth 2 (two) times daily with meals.      miconazole (MICOTIN) 2 % cream Apply topically 2 (two) times daily.      ranolazine (RANEXA) 500 MG Tb12 Take 500 mg by mouth 2 (two) times daily.      terbinafine HCl (LAMISIL) 250 mg tablet Take 250 mg by mouth once daily.      [DISCONTINUED] sodium,potassium,mag sulfates (SUPREP BOWEL PREP KIT) 17.5-3.13-1.6 gram SolR As directed 1 Bottle 0    [DISCONTINUED] desonide (DESOWEN) 0.05 % cream Apply topically 2 (two) times daily. Apply to lids. 1 Tube 3       Past Surgical History:   Procedure Laterality Date    APPENDECTOMY      at age 14    CORONARY ANGIOPLASTY WITH STENT PLACEMENT      1990, 2008, 2012       Review of patient's allergies indicates:   Allergen Reactions    Iodinated contrast- oral and iv dye      Family History     Problem Relation (Age of Onset)    Cataracts Father    Heart disease Father, Mother    Hypertension Father, Mother        Social History Main Topics    Smoking status: Former Smoker     Packs/day: 1.00     Years: 40.00     Types: Cigarettes     Start date: 1965     Quit date: 12/31/2009    Smokeless tobacco: Never Used    Alcohol use No    Drug use: No    Sexual activity: Not on file     Review of Systems   Constitutional: Negative for activity change, appetite change, fatigue, fever and unexpected weight change.   HENT: Negative for congestion, ear pain, hearing loss, mouth sores, trouble swallowing and voice change.    Eyes: Negative for pain, redness and itching.   Respiratory: Positive for shortness of breath. Negative for apnea, cough, choking, chest tightness and wheezing.    Cardiovascular: Negative for chest pain, palpitations and leg swelling.    Gastrointestinal: Negative for abdominal distention, abdominal pain, anal bleeding, blood in stool, constipation, diarrhea, nausea and vomiting.   Endocrine: Negative for cold intolerance, heat intolerance and polyphagia.   Genitourinary: Negative for dysuria, hematuria and urgency.   Musculoskeletal: Negative for arthralgias, joint swelling and neck pain.   Skin: Negative for pallor and rash.   Allergic/Immunologic: Negative for environmental allergies and food allergies.   Neurological: Negative for dizziness, facial asymmetry and light-headedness.   Hematological: Negative for adenopathy. Does not bruise/bleed easily.   Psychiatric/Behavioral: Negative for agitation, behavioral problems, confusion and decreased concentration.     Objective:     Vital Signs (Most Recent):    Vital Signs (24h Range):           There is no height or weight on file to calculate BMI.    No intake or output data in the 24 hours ending 04/27/18 1456    Lines/Drains/Airways     Peripheral Intravenous Line                 Peripheral IV - Single Lumen 08/09/16 0930 Right Hand 626 days                Physical Exam   Constitutional: He is oriented to person, place, and time. He appears well-developed and well-nourished.   HENT:   Head: Normocephalic and atraumatic.   Eyes: Conjunctivae are normal. Pupils are equal, round, and reactive to light.   Neck: Normal range of motion. Neck supple. No tracheal deviation present. No thyromegaly present.   Cardiovascular: Normal rate and regular rhythm.    Pulmonary/Chest: Effort normal and breath sounds normal. He has no wheezes. He has no rales. He exhibits no tenderness.   Abdominal: Soft. Bowel sounds are normal. He exhibits no distension and no mass. There is no tenderness. There is no guarding.   Musculoskeletal: Normal range of motion.   Lymphadenopathy:     He has no cervical adenopathy.   Neurological: He is alert and oriented to person, place, and time. No cranial nerve deficit.   Skin: Skin  is warm and dry.   Psychiatric: He has a normal mood and affect. His behavior is normal.   Nursing note and vitals reviewed.      Lab Summary Latest Ref Rng & Units 1/19/2017   Hemoglobin - (None)   Hematocrit - (None)   White count - (None)   Platelet count - (None)   Sodium 136 - 145 mmol/L 142   Potassium 3.5 - 5.1 mmol/L 4.3   Calcium 8.7 - 10.5 mg/dL 9.0   Phosphorus - (None)   Creatinine 0.5 - 1.4 mg/dL 0.9   AST 10 - 40 U/L 15   Alk Phos 55 - 135 U/L 72   Bilirubin 0.1 - 1.0 mg/dL 0.6   Glucose 70 - 110 mg/dL 117(H)   Cholesterol 120 - 199 mg/dL 161   HDL cholesterol 40 - 75 mg/dL 32(L)   Triglycerides 30 - 150 mg/dL 199(H)   LDL calc - (None)   Total protein 6.0 - 8.4 g/dL 6.7   Albumin 3.5 - 5.2 g/dL 3.5   A1C - (None)   PSA Screen - (None)   Some recent data might be hidden        Assessment/Plan:     Active Diagnoses:    Diagnosis Date Noted POA    PRINCIPAL PROBLEM:  Personal history of colonic polyps [Z86.010] 05/14/2015 Not Applicable      Problems Resolved During this Admission:    Diagnosis Date Noted Date Resolved POA       Risks, benefits and alternative options were discussed with the patient. Risks including but not limited to infection, bleeding, heart or respiratory problems and perforation that may require surgery were all explained to the patient. The patient had a chance for questions if there were doubts or concerns about the test. The referring provider will be notified that our consultation is complete and available through the patient's records.    Thanks for letting us participate in the care of this nice patient,      Artem Medeiros MD  Gastroenterology  [facility]

## 2018-04-27 NOTE — ANESTHESIA PREPROCEDURE EVALUATION
04/27/2018  Cole Doan is a 67 y.o., male.    Pre-op Assessment    I have reviewed the Patient Summary Reports.     I have reviewed the Nursing Notes.   I have reviewed the Medications.     Review of Systems  Anesthesia Hx:  No problems with previous Anesthesia  History of prior surgery of interest to airway management or planning: Previous anesthesia: General Denies Family Hx of Anesthesia complications.   Denies Personal Hx of Anesthesia complications.   Social:  Non-Smoker    Hematology/Oncology:  Hematology Normal   Oncology Normal     EENT/Dental:   Multiple caps and missing some top left rear teeth   Cardiovascular:   Exercise tolerance: poor Hypertension, well controlled CAD  CABG/stent Dysrhythmias  Angina Does report chest pain with exertion.Normal sinus rhythm  Right bundle branch block  Abnormal ECG  When compared with ECG of 26-DEC-2006 09:13,  Right bundle branch block is now Present  Confirmed by RITA ESPINAL MD ([id number]) on 5/16/2014 5:06:10 AM     Pulmonary:   Shortness of breath Sleep Apnea, CPAP    Renal/:  Renal/ Normal     Hepatic/GI:  Hepatic/GI Normal    Neurological:  Neurology Normal    Endocrine:   Diabetes    Psych:   Psychiatric History PTSD         Physical Exam  General:  Obesity    Airway/Jaw/Neck:  Airway Findings: Mouth Opening: Small, but > 3cm Tongue: Normal  General Airway Assessment: Adult  Mallampati: III  Improves to III with phonation.  TM Distance: < 4 cm  Jaw/Neck Findings:  Neck ROM: Extension Decreased, Mild  Neck Findings:  Girth Increased, Short Neck      Dental:  Dental Findings: In tact         Mental Status:  Mental Status Findings:  Cooperative         Anesthesia Plan  Type of Anesthesia, risks & benefits discussed:  Anesthesia Type:  MAC  Patient's Preference:   Intra-op Monitoring Plan:   Intra-op Monitoring Plan Comments:   Post Op Pain Control  Plan:   Post Op Pain Control Plan Comments:   Induction:   IV  Beta Blocker:         Informed Consent: Patient understands risks and agrees with Anesthesia plan.  Questions answered. Anesthesia consent signed with patient.  ASA Score: 3     Day of Surgery Review of History & Physical: I have interviewed and examined the patient. I have reviewed the patient's H&P dated:  There are no significant changes.  H&P update referred to the provider.         Ready For Surgery From Anesthesia Perspective.

## 2018-04-27 NOTE — PROVATION PATIENT INSTRUCTIONS
Discharge Summary/Instructions after an Endoscopic Procedure  Patient Name: Cole Doan  Patient MRN: [id number]  Patient YOB: 1950 Friday, April 27, 2018 Artem Medeiros MD  RESTRICTIONS:  During your procedure today, you received medications for sedation.  These   medications may affect your judgment, balance and coordination.  Therefore,   for 24 hours, you have the following restrictions:   - DO NOT drive a car, operate machinery, make legal/financial decisions,   sign important papers or drink alcohol.    ACTIVITY:  The following day: return to full activity including work, except no heavy   lifting, straining or running for 3 days if polyps were removed.  DIET:  Eat and drink normally unless instructed otherwise.     TREATMENT FOR COMMON SIDE EFFECTS:  - Mild abdominal pain, nausea, belching, bloating or excessive gas:  rest,   eat lightly and use a heating pad.  - Sore Throat: treat with throat lozenges and/or gargle with warm salt   water.  - Because air was used during the procedure, expelling large amounts of air   from your rectum or belching is normal.  - If a bowel prep was taken, you may not have a bowel movement for 1-3 days.    This is normal.  SYMPTOMS TO WATCH FOR AND REPORT TO YOUR PHYSICIAN:  1. Abdominal pain or bloating, other than gas cramps.  2. Chest pain.  3. Back pain.  4. Signs of infection such as: chills or fever occurring within 24 hours   after the procedure.  5. Rectal bleeding, which would show as bright red, maroon, or black stools.   (A tablespoon of blood from the rectum is not serious, especially if   hemorrhoids are present.)  6. Vomiting.  7. Weakness or dizziness.  GO DIRECTLY TO THE NEAREST EMERGENCY ROOM IF YOU HAVE ANY OF THE FOLLOWING:      Difficulty breathing              Chills and/or fever over 101 F   Persistent vomiting and/or vomiting blood   Severe abdominal pain   Severe chest pain   Black, tarry stools   Bleeding- more than one tablespoon   Any  other symptom or condition that you feel may need urgent attention  Your doctor recommends these additional instructions:  If any biopsies were taken, your doctors clinic will contact you in 1 to 2   weeks with any results.  - The patient will be observed post-procedure, until all discharge criteria   are met.   - Discharge patient to home (ambulatory).   - Patient has a contact number available for emergencies.  The signs and   symptoms of potential delayed complications were discussed with the   patient.  Return to normal activities tomorrow.  Written discharge   instructions were provided to the patient.   - Resume previous diet.   - Continue present medications.   - Await pathology results.   - Repeat colonoscopy in 5 years for surveillance.   - Return to referring physician as previously scheduled.  For questions, problems or results please call your physician Artem Medeiros MD at Work:  [phone]  If you have any questions about the above instructions, call the GI   department at [phone] or call the endoscopy unit at [phone]   from 7am until 3 pm.  [facility] [place], EMERGENCY ROOM PHONE NUMBER:   [phone]  IF A COMPLICATION OR EMERGENCY SITUATION ARISES AND YOU ARE UNABLE TO REACH   YOUR PHYSICIAN - GO DIRECTLY TO THE EMERGENCY ROOM.  I have read or have had read to me these discharge instructions for my   procedure and have received a written copy.  I understand these   instructions and will follow-up with my physician if I have any questions.     __________________________________       _____________________________________  Nurse Signature                                          Patient/Designated   Responsible Party Signature  Artme Medeiros MD  4/27/2018 4:07:17 PM  This report has been verified and signed electronically.

## 2018-04-30 VITALS
HEART RATE: 61 BPM | OXYGEN SATURATION: 98 % | RESPIRATION RATE: 17 BRPM | TEMPERATURE: 98 F | BODY MASS INDEX: 41.75 KG/M2 | WEIGHT: 315 LBS | SYSTOLIC BLOOD PRESSURE: 136 MMHG | HEIGHT: 73 IN | DIASTOLIC BLOOD PRESSURE: 67 MMHG

## 2018-05-01 DIAGNOSIS — Z98.61 CAD S/P PERCUTANEOUS CORONARY ANGIOPLASTY: ICD-10-CM

## 2018-05-01 DIAGNOSIS — I25.10 CAD S/P PERCUTANEOUS CORONARY ANGIOPLASTY: ICD-10-CM

## 2018-05-01 RX ORDER — AMLODIPINE BESYLATE 2.5 MG/1
TABLET ORAL
Qty: 90 TABLET | Refills: 3 | Status: SHIPPED | OUTPATIENT
Start: 2018-05-01 | End: 2022-07-18

## 2018-05-03 ENCOUNTER — PATIENT MESSAGE (OUTPATIENT)
Dept: GASTROENTEROLOGY | Facility: CLINIC | Age: 68
End: 2018-05-03

## 2018-07-24 ENCOUNTER — TELEPHONE (OUTPATIENT)
Dept: SURGERY | Facility: CLINIC | Age: 68
End: 2018-07-24

## 2018-07-24 NOTE — TELEPHONE ENCOUNTER
----- Message from Winnie Michelle sent at 7/24/2018  4:14 PM CDT -----  Contact: travis-Daughter  [facility] and [facility] would like Dr. Candelario to complete surgery. He was told he had to complete a survey (at the clinic) to be given a  nutritionist first. Please call back at [phone] or daughter at [phone].    Thanks,  Winnie Michelle

## 2018-08-01 ENCOUNTER — OFFICE VISIT (OUTPATIENT)
Dept: SURGERY | Facility: CLINIC | Age: 68
End: 2018-08-01
Payer: COMMERCIAL

## 2018-08-01 VITALS
TEMPERATURE: 98 F | HEART RATE: 69 BPM | HEIGHT: 73 IN | WEIGHT: 271.38 LBS | DIASTOLIC BLOOD PRESSURE: 57 MMHG | SYSTOLIC BLOOD PRESSURE: 85 MMHG | BODY MASS INDEX: 35.97 KG/M2

## 2018-08-01 DIAGNOSIS — I10 ESSENTIAL HYPERTENSION: ICD-10-CM

## 2018-08-01 DIAGNOSIS — E66.9 OBESITY (BMI 30-39.9): ICD-10-CM

## 2018-08-01 DIAGNOSIS — I25.10 CAD S/P PERCUTANEOUS CORONARY ANGIOPLASTY: ICD-10-CM

## 2018-08-01 DIAGNOSIS — Z98.61 S/P PTCA (PERCUTANEOUS TRANSLUMINAL CORONARY ANGIOPLASTY): ICD-10-CM

## 2018-08-01 DIAGNOSIS — E66.01 MORBID OBESITY WITH BMI OF 40.0-44.9, ADULT: ICD-10-CM

## 2018-08-01 DIAGNOSIS — E78.5 HYPERLIPIDEMIA, UNSPECIFIED HYPERLIPIDEMIA TYPE: Primary | ICD-10-CM

## 2018-08-01 DIAGNOSIS — G47.33 OSA ON CPAP: ICD-10-CM

## 2018-08-01 DIAGNOSIS — E11.9 TYPE 2 DIABETES MELLITUS WITHOUT COMPLICATION, WITHOUT LONG-TERM CURRENT USE OF INSULIN: ICD-10-CM

## 2018-08-01 DIAGNOSIS — Z98.61 CAD S/P PERCUTANEOUS CORONARY ANGIOPLASTY: ICD-10-CM

## 2018-08-01 DIAGNOSIS — F17.201 TOBACCO USE DISORDER, MODERATE, IN SUSTAINED REMISSION: ICD-10-CM

## 2018-08-01 PROCEDURE — 99213 OFFICE O/P EST LOW 20 MIN: CPT | Mod: PBBFAC,PO | Performed by: SURGERY

## 2018-08-01 PROCEDURE — 99204 OFFICE O/P NEW MOD 45 MIN: CPT | Mod: S$PBB,,, | Performed by: SURGERY

## 2018-08-01 PROCEDURE — 99999 PR PBB SHADOW E&M-EST. PATIENT-LVL III: CPT | Mod: PBBFAC,,, | Performed by: SURGERY

## 2018-08-01 RX ORDER — LANOLIN ALCOHOL/MO/W.PET/CERES
100 CREAM (GRAM) TOPICAL DAILY
COMMUNITY

## 2018-08-01 RX ORDER — DABIGATRAN ETEXILATE MESYLATE 150 MG/1
150 CAPSULE ORAL DAILY
COMMUNITY

## 2018-08-01 RX ORDER — CHOLECALCIFEROL (VITAMIN D3) 25 MCG
2000 TABLET ORAL DAILY
COMMUNITY

## 2018-08-02 NOTE — PROGRESS NOTES
BARIATRIC NEW PATIENT EVALUATION    CHIEF COMPLAINT:   Morbid obesity with a BMI of 35.81 and inability to lose weight.    HISTORY OF PRESENT ILLNESS:  Cole Doan is a 67 y.o.-year-old male presenting for morbid obesity with a BMI of 35.81 and inability to lose weight. The patient has tried diet as well as exercise.  [facility] and [facility] for [facility]ric surgery however he reports the bariatric surgeon he was following with has since left. He reports his wife and daughter both went to [place] to have weight loss surgery a to his comorbidities he like to have his done closer to where his physicians are.  He has [facility] and reports it is that he was cleared for his surgery from psych, Nutrition and has cardiology clearance.  He is currently taking aspirin, Plavix and Pradaxa.  He has not undergone an EGD yet.  Since following in the bariatric program he has noticed his weight good down from 315 lb to 271 lb.    HPI    CO-MORBIDITIES:  coronary artery disease, diabetes mellitus, dyslipidemia, hypertension and obstructive sleep apnea    PAST MEDICAL HISTORY:  Past Medical History:   Diagnosis Date    Anticoagulant long-term use     BPH (benign prostatic hypertrophy)     CAD (coronary artery disease)     HTN (hypertension) 5/15/2014    Hyperlipidemia     Hypertension     Obesity 5/15/2014    Pneumonia     PTSD (post-traumatic stress disorder) 5/15/2014    RBBB 5/15/2014    S/P PTCA (percutaneous transluminal coronary angioplasty) 5/15/2014    Sleep apnea 5/15/2014    Special screening for malignant neoplasms, colon 1/22/2014    Type 2 diabetes mellitus without complication 9/28/2015    Type 2 diabetes mellitus without complication 9/28/2015        PAST SURGICAL HISTORY:  Past Surgical History:   Procedure Laterality Date    APPENDECTOMY      at age 14    COLONOSCOPY Left 4/27/2018    Procedure: COLONOSCOPY;  Surgeon: Artem MONDRAGON  MD Waldemar;  Location: [facility];  Service: Endoscopy;  Laterality: Left;    CORONARY ANGIOPLASTY WITH STENT PLACEMENT      1990, 2008, 2012       FAMILY HISTORY:  Family History   Problem Relation Age of Onset    Cataracts Father     Heart disease Father     Hypertension Father     Heart disease Mother     Hypertension Mother     Colon cancer Neg Hx         SOCIAL HISTORY:   reports that he quit smoking about 8 years ago. His smoking use included Cigarettes. He started smoking about 53 years ago. He has a 40.00 pack-year smoking history. He has never used smokeless tobacco. He reports that he does not drink alcohol or use drugs.     MEDICATIONS:  Current Outpatient Prescriptions on File Prior to Visit   Medication Sig Dispense Refill    alprazolam (XANAX) 0.5 MG tablet Take 0.5 mg by mouth 3 (three) times daily as needed.       amLODIPine (NORVASC) 2.5 MG tablet TAKE 1 TABLET BY MOUTH ONCE DAILY. 90 tablet 3    aspirin (ECOTRIN) 81 MG EC tablet Take 81 mg by mouth once daily.      atorvastatin (LIPITOR) 80 MG tablet Take 80 mg by mouth. Take a half tablet daily      buPROPion (WELLBUTRIN SR) 200 MG TbSR Take 200 mg by mouth once daily.      clopidogrel (PLAVIX) 75 mg tablet TAKE 1 TABLET BY MOUTH EVERY DAY 30 tablet 6    furosemide (LASIX) 40 MG tablet Takes one-half tablet by mouth daily (Patient taking differently: 40 mg. Takes 2 tablet by mouth daily) 30 tablet 1    gabapentin (NEURONTIN) 300 MG capsule Take 300 mg by mouth 3 (three) times daily.       ipratropium-albuterol (COMBIVENT RESPIMAT)  mcg/actuation inhaler Inhale 1 puff into the lungs 4 (four) times daily. 4 g 11    isosorbide mononitrate (IMDUR) 60 MG 24 hr tablet Take 1 tablet (60 mg total) by mouth 2 (two) times daily. (Patient taking differently: Take 60 mg by mouth once daily. ) 60 tablet 6    metformin (GLUCOPHAGE) 1000 MG tablet Take 1,000 mg by mouth 2 (two) times daily with meals.      miconazole (MICOTIN) 2 % cream  Apply topically 2 (two) times daily.      ranolazine (RANEXA) 500 MG Tb12 Take 500 mg by mouth 2 (two) times daily.      terbinafine HCl (LAMISIL) 250 mg tablet Take 250 mg by mouth once daily.       No current facility-administered medications on file prior to visit.        Medications have been reviewed.    ALLERGIES:  Review of patient's allergies indicates:   Allergen Reactions    Iodinated contrast- oral and iv dye        Allergies have been reviewed.    ROS:  Review of Systems   Constitutional: Negative for activity change, appetite change, chills, diaphoresis, fatigue, fever and unexpected weight change.   HENT: Negative for congestion, hearing loss, sore throat and trouble swallowing.    Eyes: Negative for visual disturbance.   Respiratory: Negative for apnea, cough, choking, chest tightness, shortness of breath and stridor.    Cardiovascular: Negative for chest pain, palpitations and leg swelling.   Gastrointestinal: Negative for abdominal distention, abdominal pain, anal bleeding, blood in stool, constipation, diarrhea, nausea, rectal pain and vomiting.   Endocrine: Negative for cold intolerance, heat intolerance, polydipsia, polyphagia and polyuria.   Genitourinary: Negative for difficulty urinating, dysuria, frequency, hematuria and urgency.   Musculoskeletal: Negative for arthralgias, back pain, myalgias and neck pain.   Skin: Negative for color change, pallor, rash and wound.   Neurological: Negative for dizziness, syncope, weakness, light-headedness, numbness and headaches.   Hematological: Negative for adenopathy. Does not bruise/bleed easily.   Psychiatric/Behavioral: Negative for agitation, confusion, decreased concentration and sleep disturbance. The patient is not nervous/anxious.        PE:  Physical Exam   Constitutional: He is oriented to person, place, and time. He appears well-developed and well-nourished. No distress.   HENT:   Head: Normocephalic and atraumatic.   Right Ear: External  ear normal.   Left Ear: External ear normal.   Eyes: Conjunctivae and EOM are normal. Pupils are equal, round, and reactive to light. No scleral icterus.   Neck: Normal range of motion. Neck supple. No tracheal deviation present. No thyromegaly present.   Cardiovascular: Normal rate, regular rhythm, normal heart sounds and intact distal pulses.  Exam reveals no gallop and no friction rub.    No murmur heard.  Pulmonary/Chest: Effort normal and breath sounds normal. No respiratory distress. He has no wheezes. He has no rales. He exhibits no tenderness.   Abdominal: Soft. Bowel sounds are normal. He exhibits no distension. There is no tenderness. No hernia.       Musculoskeletal: Normal range of motion. He exhibits no edema, tenderness or deformity.   Lymphadenopathy:     He has no cervical adenopathy.   Neurological: He is alert and oriented to person, place, and time.   Skin: Skin is warm and dry. No rash noted. He is not diaphoretic. No erythema. No pallor.   Psychiatric: He has a normal mood and affect. His behavior is normal. Judgment and thought content normal.   Vitals reviewed.      DIAGNOSIS:  1.  Morbid obesity with a BMI of 35.81 and inability to lose weight.  2. Co-morbidities: coronary artery disease, diabetes mellitus, dyslipidemia, hypertension and obstructive sleep apnea    PLAN:  The patient is a good candidate for Bariatric Surgery. He is interested in sleeve gastrectomy. The surgery and post-op care was discussed in detail with the patient. All questions were answered.    He understands that bariatric surgery is a tool to aid in weight loss and that he needs to be committed to the diet and exercise post-operatively for successful weight loss. Discussed with patient that bariatric surgery is not the easy way out and that it will take plenty of dedication on the patient's part to be successful. Also discussed the possibility of weight regain if the patient strays from the diet guidelines or exercise  requirements. Patient verbalized understanding and wishes to proceed with the work-up.  The patient is a good candidate for operatively-induced weight loss.  The patient's comorbidities can significantly improve from weight loss following surgery.    We discussed preliminary steps of the program including the evaluation process.  I have outlined the risks of the procedures including, but not limited to, bleeding, slippage, erosion, stricture, death, deep venous thrombosis, pulmonary embolus, healing issues including intra-abdominal leakage or perforation with abscess or need for drainage or reoperation, wound infection, need for open surgery, injury to intra-abdominal organs or bleeding requiring transfusion, intensive care unit care, and possible prolonged hospitalization.      Other long-term issues were discussed including, but not limited to, permanent change in volume of food and type of foods eaten and importance of ongoing long-term followup.  I advised the patient that if they can lose weight before surgery, it will reduce his operative risk.  The patient understands and wishes to proceed.    PLAN: The patient is interested in proceeding with laparoscopic vertical sleeve gastrectomy with possible robotic assistance. We will start the next step of the evaluation process to include acquiring letters of medical necessity and insurance preapproval.  In addition, he will be sent for a pre-surgical psychological evaluation.  We will review his previous workup to this point and schedule him for the remaining test. Once insurance approves request or the patient has made other financial arrangements for surgery, we will schedule the following preoperative tests:  EGD  We will see him back for a final visit after the above have been completed.      Referring Physician: Teddy, Albert  RTC: As scheduled.

## 2018-08-13 ENCOUNTER — TELEPHONE (OUTPATIENT)
Dept: SURGERY | Facility: CLINIC | Age: 68
End: 2018-08-13

## 2018-08-13 ENCOUNTER — PATIENT MESSAGE (OUTPATIENT)
Dept: SURGERY | Facility: CLINIC | Age: 68
End: 2018-08-13

## 2018-08-13 NOTE — TELEPHONE ENCOUNTER
----- Message from Hazel San sent at 8/13/2018  2:56 PM CDT -----  Contact: Patient  Patient Is returning a call, please call him back at [phone]. Thank you

## 2018-08-14 ENCOUNTER — NUTRITION (OUTPATIENT)
Dept: SURGERY | Facility: CLINIC | Age: 68
End: 2018-08-14
Payer: OTHER GOVERNMENT

## 2018-08-14 VITALS — WEIGHT: 270.19 LBS | BODY MASS INDEX: 35.81 KG/M2 | HEIGHT: 73 IN

## 2018-08-14 DIAGNOSIS — E66.9 OBESITY (BMI 30-39.9): Primary | ICD-10-CM

## 2018-08-14 PROCEDURE — 99213 OFFICE O/P EST LOW 20 MIN: CPT | Mod: PBBFAC,PO | Performed by: DIETITIAN, REGISTERED

## 2018-08-14 PROCEDURE — 99999 PR PBB SHADOW E&M-EST. PATIENT-LVL III: CPT | Mod: PBBFAC,,, | Performed by: DIETITIAN, REGISTERED

## 2018-08-14 PROCEDURE — 97802 MEDICAL NUTRITION INDIV IN: CPT | Mod: PBBFAC,PO

## 2018-08-14 NOTE — PROGRESS NOTES
PCP: Yoly Spring MD  REFERRING PROVIDER:  [facility]      HISTORY OF PRESENT ILLNESS:  67 y.o. male patient is in clinic today for bariatric surgery assessment. Patient has 0 month(s) of MSD. Patient is planning to have gastric sleeve procedure.    Weight difficulties for 1 year.  [facility], protein shakes, portion control, lower carb, diabetes medications with wt loss with most lost (regained) 80. Patient-reported goal weight of 185 lbs - long term after surgery.     The encounter diagnosis was Obesity (BMI 30-39.9).    VITAL SIGNS:  There were no vitals filed for this visit.  IBW: 178 lbs +/-10%, AdjIBW: 213 lbs/96.8kg and BMI: 35.65    ALLERGIES & MEDICATIONS: Reviewed and Reconciled  MEDICAL/SURGICAL & FAMILY HISTORY: Reviewed and Reconciled    LABORATORY:  A1C: No results found for: HGBA1C  Chol:   Cholesterol   Date Value Ref Range Status   01/19/2017 161 120 - 199 mg/dL Final     Comment:     The National Cholesterol Education Program (NCEP) has set the  following guidelines (reference ranges) for Cholesterol:  Optimal.....................<200 mg/dL  Borderline High.............200-239 mg/dL  High........................> or = 240 mg/dL       Trig:   Triglycerides   Date Value Ref Range Status   01/19/2017 199 (H) 30 - 150 mg/dL Final     Comment:     The National Cholesterol Education Program (NCEP) has set the  following guidelines (reference values) for triglycerides:  Normal......................<150 mg/dL  Borderline High.............150-199 mg/dL  High........................200-499 mg/dL       HDL:   HDL   Date Value Ref Range Status   01/19/2017 32 (L) 40 - 75 mg/dL Final     Comment:     The National Cholesterol Education Program (NCEP) has set the  following guidelines (reference values) for HDL Cholesterol:  Low...............<40 mg/dL  Optimal...........>60 mg/dL       LDL: No components found for: LDL   BUN:      BUN, Bld   Date Value  Ref Range Status   01/19/2017 16 8 - 23 mg/dL Final     AST:    AST   Date Value Ref Range Status   01/19/2017 15 10 - 40 U/L Final         ALT:    ALT   Date Value Ref Range Status   01/19/2017 19 10 - 44 U/L Final     Micro/Cr Ratio:    Creatinine   Date Value Ref Range Status   01/19/2017 0.9 0.5 - 1.4 mg/dL Final       SELF-MONITORING: Glucometer - ; Food - none are avail    ACTIVITY LEVEL: Aerobic 30-35 min 3-4 d/wk. Resistance none. Walks in pool as well  NUTRITION INTAKE: Meal patterns include 3 meals, 1-2 snacks daily with intake 1800 cals/d. Patient is not limiting carbohydrates, saturated fat or sodium. Inadequate intakes of fruits, vegetables, whole grains.  B - protein shake or a few eggs  S - protein bar or 1/4 cup nuts  L - protein shake  S - protein bar  D - fish or chicken and vegetables  S - watermelon  Beverages - water- 68 oz/day  DIning out - shares meal with wife    PSYCHOSOCIAL: Stage of change - action  Barriers to change - none  Motivation to change (10high): 10  Predicted compliance (10high): 10  Realistic expectation for wt loss (10high): 9  Verbalizes understanding of dietary changes post procedure and willingness to participate in physical activity (10high): 10    MNT ASSESSMENT:   1500 calories,  grams protein daily  30 grams carb/meal, 15 grams carb/snack  increase fruit 2 serv/d, vegetables 2+ cups/d, whole grains 3+serv/d, lowfat dairy 3+serv/d  low-fat, low-sodium  150 min physical activity per week, moderate intensity, as tolerated    PLAN:    Reviewed MNT guidelines for obesity and weight management.   Encouraged daily self-monitoring of food & activity patterns.    Provided pre & post surgery meal-planning instruction via bariatric diet manual, foodlists, plate method, food models, food labels and/or ADA booklet. Reviewed micro/macronutrient effect on weight management. Discussed carbohydrate counting and spacing techniques with emphasis on supplementation necessary  for altered metabolism. Reviewed principles of energy metabolism to assist weight and health management.                                                          Discussed physical activity with review of benefits, methods, precautions.    Discussed bx strategies for improving, strategies for improving social & environmental support of lifestyle changes.     GOALS: Self monitoring: daily food & activity journal. Meal plan-90% accuracy, Physical activity-150 minutes per week. 1. Protein shake in place of breakfast daily, 2. Meal plan guide as provided, 3. Review manual and return with questions/concerns.  FU: Follow-up 2-4 weeks after surgery.    Visit Time Spent:  30 minutes    Thank you for the opportunity to work with your patient.

## 2018-08-16 ENCOUNTER — ANESTHESIA (OUTPATIENT)
Dept: ENDOSCOPY | Facility: HOSPITAL | Age: 68
End: 2018-08-16
Payer: OTHER GOVERNMENT

## 2018-08-16 ENCOUNTER — HOSPITAL ENCOUNTER (OUTPATIENT)
Facility: HOSPITAL | Age: 68
Discharge: HOME | End: 2018-08-16
Attending: SURGERY | Admitting: SURGERY
Payer: OTHER GOVERNMENT

## 2018-08-16 ENCOUNTER — ANESTHESIA EVENT (OUTPATIENT)
Dept: ENDOSCOPY | Facility: HOSPITAL | Age: 68
End: 2018-08-16
Payer: OTHER GOVERNMENT

## 2018-08-16 VITALS
RESPIRATION RATE: 17 BRPM | OXYGEN SATURATION: 97 % | BODY MASS INDEX: 35.39 KG/M2 | HEART RATE: 60 BPM | DIASTOLIC BLOOD PRESSURE: 78 MMHG | TEMPERATURE: 98 F | HEIGHT: 73 IN | SYSTOLIC BLOOD PRESSURE: 119 MMHG | WEIGHT: 267 LBS

## 2018-08-16 DIAGNOSIS — E66.01 MORBID OBESITY WITH BMI OF 40.0-44.9, ADULT: Primary | ICD-10-CM

## 2018-08-16 DIAGNOSIS — E66.01 MORBID OBESITY: ICD-10-CM

## 2018-08-16 LAB — POCT GLUCOSE: 93 MG/DL (ref 70–110)

## 2018-08-16 PROCEDURE — 63600175 PHARM REV CODE 636 W HCPCS: Performed by: NURSE ANESTHETIST, CERTIFIED REGISTERED

## 2018-08-16 PROCEDURE — 25000003 PHARM REV CODE 250: Performed by: SURGERY

## 2018-08-16 PROCEDURE — 88305 TISSUE EXAM BY PATHOLOGIST: CPT | Mod: 26,,, | Performed by: PATHOLOGY

## 2018-08-16 PROCEDURE — 82962 GLUCOSE BLOOD TEST: CPT | Performed by: SURGERY

## 2018-08-16 PROCEDURE — 88305 TISSUE EXAM BY PATHOLOGIST: CPT | Performed by: PATHOLOGY

## 2018-08-16 PROCEDURE — 37000009 HC ANESTHESIA EA ADD 15 MINS: Performed by: SURGERY

## 2018-08-16 PROCEDURE — 25000003 PHARM REV CODE 250: Performed by: NURSE ANESTHETIST, CERTIFIED REGISTERED

## 2018-08-16 PROCEDURE — 27201012 HC FORCEPS, HOT/COLD, DISP: Performed by: SURGERY

## 2018-08-16 PROCEDURE — 43239 EGD BIOPSY SINGLE/MULTIPLE: CPT | Performed by: SURGERY

## 2018-08-16 PROCEDURE — 37000008 HC ANESTHESIA 1ST 15 MINUTES: Performed by: SURGERY

## 2018-08-16 PROCEDURE — 43239 EGD BIOPSY SINGLE/MULTIPLE: CPT | Mod: ,,, | Performed by: SURGERY

## 2018-08-16 PROCEDURE — 00731 ANES UPR GI NDSC PX NOS: CPT | Performed by: SURGERY

## 2018-08-16 RX ORDER — PROPOFOL 10 MG/ML
INJECTION, EMULSION INTRAVENOUS
Status: DISCONTINUED | OUTPATIENT
Start: 2018-08-16 | End: 2018-08-16

## 2018-08-16 RX ORDER — SODIUM CHLORIDE, SODIUM LACTATE, POTASSIUM CHLORIDE, CALCIUM CHLORIDE 600; 310; 30; 20 MG/100ML; MG/100ML; MG/100ML; MG/100ML
INJECTION, SOLUTION INTRAVENOUS CONTINUOUS
Status: DISCONTINUED | OUTPATIENT
Start: 2018-08-16 | End: 2018-08-16 | Stop reason: HOSPADM

## 2018-08-16 RX ORDER — SODIUM CHLORIDE 0.9 % (FLUSH) 0.9 %
3 SYRINGE (ML) INJECTION
Status: DISCONTINUED | OUTPATIENT
Start: 2018-08-16 | End: 2018-08-16 | Stop reason: HOSPADM

## 2018-08-16 RX ORDER — LIDOCAINE HCL/PF 100 MG/5ML
SYRINGE (ML) INTRAVENOUS
Status: DISCONTINUED | OUTPATIENT
Start: 2018-08-16 | End: 2018-08-16

## 2018-08-16 RX ORDER — ETOMIDATE 2 MG/ML
INJECTION INTRAVENOUS
Status: DISCONTINUED | OUTPATIENT
Start: 2018-08-16 | End: 2018-08-16

## 2018-08-16 RX ADMIN — SODIUM CHLORIDE, SODIUM LACTATE, POTASSIUM CHLORIDE, AND CALCIUM CHLORIDE: 600; 310; 30; 20 INJECTION, SOLUTION INTRAVENOUS at 07:08

## 2018-08-16 RX ADMIN — PROPOFOL 140 MG: 10 INJECTION, EMULSION INTRAVENOUS at 07:08

## 2018-08-16 RX ADMIN — ETOMIDATE 20 MG: 2 INJECTION, SOLUTION INTRAVENOUS at 07:08

## 2018-08-16 RX ADMIN — SODIUM CHLORIDE, SODIUM LACTATE, POTASSIUM CHLORIDE, AND CALCIUM CHLORIDE: 600; 310; 30; 20 INJECTION, SOLUTION INTRAVENOUS at 06:08

## 2018-08-16 RX ADMIN — LIDOCAINE HYDROCHLORIDE 100 MG: 20 INJECTION, SOLUTION INTRAVENOUS at 07:08

## 2018-08-16 NOTE — DISCHARGE INSTRUCTIONS
Gastritis (Adult)    Gastritis is inflammation and irritation of the stomach lining. It can be present for a short time (acute) or be long lasting (chronic). Gastritis is often caused by infection with bacteria called H pylori. More than a third of people in the [place] have this bacteria in their bodies. In many cases, H pylori causes no problems or symptoms. In some people, though, the infection irritates the stomach lining and causes gastritis. Other causes of stomach irritation include drinking alcohol or taking pain-relieving medicines called NSAIDs (such as aspirin or ibuprofen).   Symptoms of gastritis can include:  · Abdominal pain or bloating  · Loss of appetite  · Nausea or vomiting  · Vomiting blood or having black stools  · Feeling more tired than usual  An inflamed and irritated stomach lining is more likely to develop a sore called an ulcer. To help prevent this, gastritis should be treated.  Home care  If needed, medicines may be prescribed. If you have H pylori infection, treating it will likely relieve your symptoms. Other changes can help reduce stomach irritation and help it heal.  · If you have been prescribed medicines for H pylori infection, take them as directed. Take all of the medicine until it is finished or your healthcare provider tells you to stop, even if you feel better.  · Your healthcare provider may recommend avoiding NSAIDs. If you take daily aspirin for your heart or other medical reasons, do not stop without talking to your healthcare provider first.  · Avoid drinking alcohol.  · Stop smoking. Smoking can irritate the stomach and delay healing. As much as possible, stay away from second hand smoke.  Follow-up care  Follow up with your healthcare provider, or as advised by our staff. Testing may be needed to check for inflammation or an ulcer.  When to seek medical advice  Call your healthcare provider for any of the following:  · Stomach pain that gets worse or moves to the lower  right abdomen (appendix area)  · Chest pain that appears or gets worse, or spreads to the back, neck, shoulder, or arm  · Frequent vomiting (cant keep down liquids)  · Blood in the stool or vomit (red or black in color)  · Feeling weak or dizzy  · Fever of 100.4ºF (38ºC) or higher, or as directed by your healthcare provider  Date Last Reviewed: 6/22/2015  © 2933-9261 [employer]. [address] [place] [address]. All rights reserved. This information is not intended as a substitute for professional medical care. Always follow your healthcare professional's instructions.

## 2018-08-16 NOTE — ANESTHESIA PREPROCEDURE EVALUATION
08/16/2018  Cole Doan is a 67 y.o., male.    Anesthesia Evaluation    I have reviewed the Patient Summary Reports.    I have reviewed the Nursing Notes.   I have reviewed the Medications.     Review of Systems  Anesthesia Hx:  No problems with previous Anesthesia    Social:  Former Smoker, No Alcohol Use    Hematology/Oncology:  Hematology Normal   Oncology Normal     EENT/Dental:EENT/Dental Normal   Cardiovascular:   Exercise tolerance: poor Pacemaker Hypertension, well controlled CAD  CABG/stent Dysrhythmias  Angina hyperlipidemia CONCLUSIONS     1 - No wall motion abnormalities.     2 - Normal left ventricular systolic function (EF 60-65%).     3 - Normal left ventricular diastolic function.     4 - Normal right ventricular systolic function .     5 - The estimated PA systolic pressure is 33 mmHg.     6 - Mild tricuspid regurgitation.   Sinus rhythm with frequent Premature ventricular complexes in a pattern of  bigeminy  Right bundle branch block  Abnormal ECG  No previous ECGs available  Confirmed by JULY VARGAS DENNIS ([id number]) on 8/5/2017 7:55:04 PM  x6 stents with last placed in 2012   Pulmonary:   Pneumonia COPD Shortness of breath Sleep Apnea    Renal/:  Renal/ Normal     Hepatic/GI:   2200 last fluid/meal.   Musculoskeletal:  Musculoskeletal Normal    Neurological:  Neurology Normal    Endocrine:   Diabetes, well controlled, type 2, using insulin    Dermatological:  Skin Normal    Psych:   Psychiatric History          Physical Exam  General:  Well nourished, Obesity    Airway/Jaw/Neck:  Airway Findings: Mallampati: IV                Anesthesia Plan  Type of Anesthesia, risks & benefits discussed:  Anesthesia Type:  MAC  Patient's Preference:   Intra-op Monitoring Plan:   Intra-op Monitoring Plan Comments:   Post Op Pain Control Plan:   Post Op Pain Control Plan Comments:   Induction:    IV  Beta Blocker:  Patient is not currently on a Beta-Blocker (No further documentation required).       Informed Consent: Patient understands risks and agrees with Anesthesia plan.  Questions answered. Anesthesia consent signed with patient.  ASA Score: 3     Day of Surgery Review of History & Physical: I have interviewed and examined the patient. I have reviewed the patient's H&P dated: 08/16/18. There are no significant changes.          Ready For Surgery From Anesthesia Perspective.

## 2018-08-16 NOTE — ADDENDUM NOTE
Addendum  created 08/16/18 2210 by Carolina Canales CRNA    Intraprocedure Flowsheets edited, Intraprocedure LDAs edited, LDA properties accepted

## 2018-08-16 NOTE — TRANSFER OF CARE
"Anesthesia Transfer of Care Note    Patient: Cole Doan    Procedure(s) Performed: Procedure(s) (LRB):  ESOPHAGOGASTRODUODENOSCOPY (EGD) (N/A)    Patient location: PACU    Anesthesia Type: MAC    Transport from OR: Transported from OR on room air with adequate spontaneous ventilation    Post pain: adequate analgesia    Post assessment: no apparent anesthetic complications    Post vital signs: stable    Level of consciousness: sedated    Nausea/Vomiting: no nausea/vomiting    Complications: none    Transfer of care protocol was followed      Last vitals:   Visit Vitals  /66 (BP Location: Right leg, Patient Position: Lying)   Pulse 60   Temp 36.8 °C (98.2 °F) (Oral)   Resp 17   Ht 6' 1" (1.854 m)   Wt 121.1 kg (267 lb)   SpO2 95%   BMI 35.23 kg/m²     "

## 2018-08-16 NOTE — [employer] PATIENT INSTRUCTIONS
Discharge Summary/Instructions after an Endoscopic Procedure  Patient Name: Cole Doan  Patient MRN: [id number]  Patient YOB: 1950 Thursday, August 16, 2018 Mario Cesar MD  RESTRICTIONS:  During your procedure today, you received medications for sedation.  These   medications may affect your judgment, balance and coordination.  Therefore,   for 24 hours, you have the following restrictions:   - DO NOT drive a car, operate machinery, make legal/financial decisions,   sign important papers or drink alcohol.    ACTIVITY:  Today: no heavy lifting, straining or running due to procedural   sedation/anesthesia.  The following day: return to full activity including work.  DIET:  Eat and drink normally unless instructed otherwise.     TREATMENT FOR COMMON SIDE EFFECTS:  - Mild abdominal pain, nausea, belching, bloating or excessive gas:  rest,   eat lightly and use a heating pad.  - Sore Throat: treat with throat lozenges and/or gargle with warm salt   water.  - Because air was used during the procedure, expelling large amounts of air   from your rectum or belching is normal.  - If a bowel prep was taken, you may not have a bowel movement for 1-3 days.    This is normal.  SYMPTOMS TO WATCH FOR AND REPORT TO YOUR PHYSICIAN:  1. Abdominal pain or bloating, other than gas cramps.  2. Chest pain.  3. Back pain.  4. Signs of infection such as: chills or fever occurring within 24 hours   after the procedure.  5. Rectal bleeding, which would show as bright red, maroon, or black stools.   (A tablespoon of blood from the rectum is not serious, especially if   hemorrhoids are present.)  6. Vomiting.  7. Weakness or dizziness.  GO DIRECTLY TO THE NEAREST EMERGENCY ROOM IF YOU HAVE ANY OF THE FOLLOWING:      Difficulty breathing              Chills and/or fever over 101 F   Persistent vomiting and/or vomiting blood   Severe abdominal pain   Severe chest pain   Black, tarry stools   Bleeding- more than one  tablespoon   Any other symptom or condition that you feel may need urgent attention  Your doctor recommends these additional instructions:  If any biopsies were taken, your doctors clinic will contact you in 1 to 2   weeks with any results.  - Patient has a contact number available for emergencies.  The signs and   symptoms of potential delayed complications were discussed with the   patient.  Return to normal activities tomorrow.  Written discharge   instructions were provided to the patient.   - Resume previous diet.   - Continue present medications.   - Resume Coumadin (warfarin) tomorrow at prior dose.   - Await pathology results.   - Discharge patient to home.   - Return to Bariatric clinic as previously scheduled.  For questions, problems or results please call your physician Mario Cesar MD at Work:  [phone]  If you have any questions about the above instructions, call the GI   department at [phone] or call the endoscopy unit at [phone]   from 7am until 3 pm.  [facility], EMERGENCY ROOM PHONE NUMBER:   [phone]  IF A COMPLICATION OR EMERGENCY SITUATION ARISES AND YOU ARE UNABLE TO REACH   YOUR PHYSICIAN - GO DIRECTLY TO THE EMERGENCY ROOM.  I have read or have had read to me these discharge instructions for my   procedure and have received a written copy.  I understand these   instructions and will follow-up with my physician if I have any questions.     __________________________________       _____________________________________  Nurse Signature                                          Patient/Designated   Responsible Party Signature  Mario Cesar MD  8/16/2018 7:21:11 AM  This report has been verified and signed electronically.  [employer]

## 2018-08-16 NOTE — ANESTHESIA RELEASE NOTE
"Anesthesia Release from PACU Note    Patient: Cole Doan    Procedure(s) Performed: Procedure(s) (LRB):  ESOPHAGOGASTRODUODENOSCOPY (EGD) (N/A)    Anesthesia type: MAC    Post pain: Adequate analgesia    Post assessment: no apparent anesthetic complications, tolerated procedure well and no evidence of recall    Last Vitals:   Visit Vitals  /66 (BP Location: Right leg, Patient Position: Lying)   Pulse 60   Temp 36.8 °C (98.2 °F) (Oral)   Resp 17   Ht 6' 1" (1.854 m)   Wt 121.1 kg (267 lb)   SpO2 95%   BMI 35.23 kg/m²       Post vital signs: stable    Level of consciousness: awake    Nausea/Vomiting: no nausea/no vomiting    Complications: none    Airway Patency: patent    Respiratory: unassisted, spontaneous ventilation, room air    Cardiovascular: stable    Hydration: euvolemic  "

## 2018-08-16 NOTE — PLAN OF CARE
Dr Cesar came to bedside and discussed findings. NO N/V,  no abdominal pain, no GI bleeding, and vitals stable.  Pt discharged from unit.

## 2018-08-16 NOTE — BRIEF OP NOTE
[facility]  Brief Operative Note     SUMMARY     Surgery Date: 8/16/2018     Surgeon(s) and Role:     * Mario Cesar MD - Primary    Assisting Surgeon: None    Pre-op Diagnosis:  Morbid obesity with BMI of 40.0-44.9, adult [E66.01, Z68.41]    Post-op Diagnosis:  Post-Op Diagnosis Codes:     * Morbid obesity with BMI of 40.0-44.9, adult [E66.01, Z68.41]    Procedure(s) (LRB):  ESOPHAGOGASTRODUODENOSCOPY (EGD) (N/A)    Anesthesia: Monitor Anesthesia Care    Description of the findings of the procedure: EGD    Findings/Key Components: see op note    Estimated Blood Loss: * No values recorded between 8/16/2018 12:00 AM and 8/16/2018  7:27 AM *         Specimens:   Specimen (12h ago, onward)    Start     Ordered    08/16/18 0716  Specimen to Pathology - Surgery  Once     Comments:  1. Antrum biopsy- r/o H. pylori     Start Status   08/16/18 0716 Collected (08/16/18 0719)       08/16/18 0718          Discharge Note    SUMMARY     Admit Date: 8/16/2018    Discharge Date and Time:  08/16/2018 7:28 AM    Hospital Course the patient underwent EGD and was discharged post op    Final Diagnosis: Post-Op Diagnosis Codes:     * Morbid obesity with BMI of 40.0-44.9, adult [E66.01, Z68.41]    Disposition: Home or Self Care    Follow Up/Patient Instructions:     Medications:  Reconciled Home Medications:      Medication List      CHANGE how you take these medications    furosemide 40 MG tablet  Commonly known as:  LASIX  Takes one-half tablet by mouth daily  What changed:    · how much to take  · additional instructions     isosorbide mononitrate 60 MG 24 hr tablet  Commonly known as:  IMDUR  Take 1 tablet (60 mg total) by mouth 2 (two) times daily.  What changed:  when to take this        CONTINUE taking these medications    ALPRAZolam 0.5 MG tablet  Commonly known as:  XANAX  Take 0.5 mg by mouth 3 (three) times daily as needed.     amLODIPine 2.5 MG tablet  Commonly known as:  NORVASC  TAKE 1 TABLET BY MOUTH ONCE  "DAILY.     aspirin 81 MG EC tablet  Commonly known as:  ECOTRIN  Take 81 mg by mouth once daily.     atorvastatin 80 MG tablet  Commonly known as:  LIPITOR  Take 80 mg by mouth. Take a half tablet daily     buPROPion 200 MG Tb12  Commonly known as:  WELLBUTRIN SR  Take 200 mg by mouth once daily.     clopidogrel 75 mg tablet  Commonly known as:  PLAVIX  TAKE 1 TABLET BY MOUTH EVERY DAY     cyanocobalamin 1000 MCG tablet  Commonly known as:  VITAMIN B-12  Take 100 mcg by mouth once daily.     gabapentin 300 MG capsule  Commonly known as:  NEURONTIN  Take 300 mg by mouth 3 (three) times daily.     ipratropium-albuterol  mcg/actuation inhaler  Commonly known as:  COMBIVENT RESPIMAT  Inhale 1 puff into the lungs 4 (four) times daily.     metFORMIN 1000 MG tablet  Commonly known as:  GLUCOPHAGE  Take 1,000 mg by mouth 2 (two) times daily with meals.     miconazole 2 % cream  Commonly known as:  MICOTIN  Apply topically 2 (two) times daily.     PRADAXA 150 mg Cap  Generic drug:  dabigatran etexilate  Take by mouth once daily. "Do NOT break, chew, or open capsules."     ranolazine 500 MG Tb12  Commonly known as:  RANEXA  Take 500 mg by mouth 2 (two) times daily.     terbinafine HCl 250 mg tablet  Commonly known as:  LAMISIL  Take 250 mg by mouth once daily.     vitamin D 1000 units Tab  Take 2,000 Units by mouth once daily.          Discharge Procedure Orders   Diet Adult Regular     Notify your health care provider if you experience any of the following:  temperature >100.4     Notify your health care provider if you experience any of the following:  persistent nausea and vomiting or diarrhea     Notify your health care provider if you experience any of the following:  severe uncontrolled pain     Notify your health care provider if you experience any of the following:  redness, tenderness, or signs of infection (pain, swelling, redness, odor or green/yellow discharge around incision site)     Notify your health " care provider if you experience any of the following:  difficulty breathing or increased cough     Notify your health care provider if you experience any of the following:  severe persistent headache     Notify your health care provider if you experience any of the following:  worsening rash     Notify your health care provider if you experience any of the following:  persistent dizziness, light-headedness, or visual disturbances     Notify your health care provider if you experience any of the following:  increased confusion or weakness     Activity as tolerated     Follow-up Information     Mario Cesar MD.    Specialties:  General Surgery, Bariatrics  Why:  As needed  Contact information:  [address] [place] [address] DR Rafita ALONZO [id number] [phone]

## 2018-08-16 NOTE — H&P
Endoscopy H&P    Procedure : EGD      preop bariatric surgery, morbid obesity      Past Medical History:   Diagnosis Date    Anticoagulant long-term use     BPH (benign prostatic hypertrophy)     CAD (coronary artery disease)     HTN (hypertension) 5/15/2014    Hyperlipidemia     Hypertension     Obesity 5/15/2014    Pneumonia     PTSD (post-traumatic stress disorder) 5/15/2014    RBBB 5/15/2014    S/P PTCA (percutaneous transluminal coronary angioplasty) 5/15/2014    Sleep apnea 5/15/2014    Special screening for malignant neoplasms, colon 1/22/2014    Type 2 diabetes mellitus without complication 9/28/2015    Type 2 diabetes mellitus without complication 9/28/2015     Past Surgical History:   Procedure Laterality Date    APPENDECTOMY      at age 14    CORONARY ANGIOPLASTY WITH STENT PLACEMENT      1990, 2008, 2012     No current facility-administered medications on file prior to encounter.      Current Outpatient Medications on File Prior to Encounter   Medication Sig Dispense Refill    alprazolam (XANAX) 0.5 MG tablet Take 0.5 mg by mouth 3 (three) times daily as needed.       amLODIPine (NORVASC) 2.5 MG tablet TAKE 1 TABLET BY MOUTH ONCE DAILY. 90 tablet 3    atorvastatin (LIPITOR) 80 MG tablet Take 80 mg by mouth. Take a half tablet daily      buPROPion (WELLBUTRIN SR) 200 MG TbSR Take 200 mg by mouth once daily.      cyanocobalamin (VITAMIN B-12) 1000 MCG tablet Take 100 mcg by mouth once daily.      furosemide (LASIX) 40 MG tablet Takes one-half tablet by mouth daily (Patient taking differently: 40 mg. Takes 2 tablet by mouth daily) 30 tablet 1    gabapentin (NEURONTIN) 300 MG capsule Take 300 mg by mouth 3 (three) times daily.       ipratropium-albuterol (COMBIVENT RESPIMAT)  mcg/actuation inhaler Inhale 1 puff into the lungs 4 (four) times daily. 4 g 11    isosorbide mononitrate (IMDUR) 60 MG 24 hr tablet Take 1 tablet (60 mg total) by mouth 2 (two) times daily. (Patient  "taking differently: Take 60 mg by mouth once daily. ) 60 tablet 6    metformin (GLUCOPHAGE) 1000 MG tablet Take 1,000 mg by mouth 2 (two) times daily with meals.      ranolazine (RANEXA) 500 MG Tb12 Take 500 mg by mouth 2 (two) times daily.      terbinafine HCl (LAMISIL) 250 mg tablet Take 250 mg by mouth once daily.      vitamin D 1000 units Tab Take 2,000 Units by mouth once daily.      aspirin (ECOTRIN) 81 MG EC tablet Take 81 mg by mouth once daily.      clopidogrel (PLAVIX) 75 mg tablet TAKE 1 TABLET BY MOUTH EVERY DAY 30 tablet 6    dabigatran etexilate (PRADAXA) 150 mg Cap Take by mouth once daily. "Do NOT break, chew, or open capsules."      miconazole (MICOTIN) 2 % cream Apply topically 2 (two) times daily.       Review of patient's allergies indicates:   Allergen Reactions    Iodinated contrast- oral and iv dye              Review of Systems -ROS:  GENERAL: No fever, chills, fatigability or weight loss.  CHEST: Denies HERRON, cyanosis, wheezing, cough and sputum production.  CARDIOVASCULAR: Denies chest pain, PND, orthopnea or reduced exercise tolerance.   Musculoskeletal ROS: negative for - gait disturbance or joint pain  Neurological ROS: negative for - confusion or memory loss        Physical Exam:  General: well developed, well nourished, no distress  Head: normocephalic  Neck: supple, symmetrical, trachea midline  Lungs:  clear to auscultation bilaterally and normal respiratory effort  Heart: regular rate and rhythm, S1, S2 normal, no murmur, rub or gallop and regular rate and rhythm  Abdomen: soft, non-tender non-distented; bowel sounds normal; no masses,  no organomegaly  Extremities: no cyanosis or edema, or clubbing       Moderate Sedation (choice): Mallampati Score 1    ASA : II    IMP: preop bariatric surgery, morbid obesity    Plan: EGD with Moderate sedation.  I have explained the procedure including indications, alternatives, expected outcomes and potential complications. The patient " appears to understand and gives informed consent. The patient is medically ready for surgery.

## 2018-08-21 ENCOUNTER — TELEPHONE (OUTPATIENT)
Dept: SURGERY | Facility: CLINIC | Age: 68
End: 2018-08-21

## 2018-08-21 ENCOUNTER — PATIENT MESSAGE (OUTPATIENT)
Dept: SURGERY | Facility: HOSPITAL | Age: 68
End: 2018-08-21

## 2018-08-21 RX ORDER — AMOXICILLIN 500 MG/1
1000 TABLET, FILM COATED ORAL EVERY 12 HOURS
Qty: 56 TABLET | Refills: 0 | Status: SHIPPED | OUTPATIENT
Start: 2018-08-21 | End: 2018-08-22

## 2018-08-21 RX ORDER — CLARITHROMYCIN 500 MG/1
500 TABLET, FILM COATED ORAL 2 TIMES DAILY
Qty: 28 TABLET | Refills: 0 | Status: SHIPPED | OUTPATIENT
Start: 2018-08-21 | End: 2018-08-22

## 2018-08-21 RX ORDER — PANTOPRAZOLE SODIUM 40 MG/1
40 TABLET, DELAYED RELEASE ORAL 2 TIMES DAILY
Qty: 28 TABLET | Refills: 0 | Status: SHIPPED | OUTPATIENT
Start: 2018-08-21 | End: 2018-08-22

## 2018-08-21 NOTE — TELEPHONE ENCOUNTER
----- Message from Mario Cesar MD sent at 8/21/2018  3:06 PM CDT -----  Regarding: triple therapy  Patient will need to come by clinic and pickup prescriptions for antibiotic triple therapy to treat H pylori as [facility] am [facility].  We can bring up the prescriptions for him tomorrow and he can [facility] the.  Thanks

## 2018-08-22 ENCOUNTER — TELEPHONE (OUTPATIENT)
Dept: SURGERY | Facility: CLINIC | Age: 68
End: 2018-08-22

## 2018-08-22 RX ORDER — AMOXICILLIN 500 MG/1
1000 TABLET, FILM COATED ORAL EVERY 12 HOURS
Qty: 56 TABLET | Refills: 0 | Status: SHIPPED | OUTPATIENT
Start: 2018-08-22 | End: 2018-09-05

## 2018-08-22 RX ORDER — PANTOPRAZOLE SODIUM 40 MG/1
40 TABLET, DELAYED RELEASE ORAL 2 TIMES DAILY
Qty: 28 TABLET | Refills: 0 | Status: SHIPPED | OUTPATIENT
Start: 2018-08-22 | End: 2019-05-23

## 2018-08-22 RX ORDER — CLARITHROMYCIN 500 MG/1
500 TABLET, FILM COATED ORAL 2 TIMES DAILY
Qty: 28 TABLET | Refills: 0 | Status: SHIPPED | OUTPATIENT
Start: 2018-08-22 | End: 2018-09-05

## 2018-08-22 NOTE — TELEPHONE ENCOUNTER
----- Message from Alexa Leonardo LPN sent at 8/22/2018 11:06 AM CDT -----  Contact: [phone]       ----- Message -----  From: Noa Suero  Sent: 8/22/2018  10:49 AM  To: Arsenio Martin Staff    Pt would like to consult with the nurse about Rx medications.  [facility], [facility] to [facility].   Please call back at [phone].  Thx_           Pt Uses:  .    [facility] [id number] [place] [address] [address] [facility]  MEGAN Am[address]S  [address]  [facility] [address]  Phone: [phone] Fax: [phone]

## 2018-09-04 ENCOUNTER — PATIENT MESSAGE (OUTPATIENT)
Dept: SURGERY | Facility: HOSPITAL | Age: 68
End: 2018-09-04

## 2018-09-04 DIAGNOSIS — A04.8 H. PYLORI INFECTION: Primary | ICD-10-CM

## 2018-09-06 ENCOUNTER — LAB VISIT (OUTPATIENT)
Dept: LAB | Facility: HOSPITAL | Age: 68
End: 2018-09-06
Attending: SURGERY
Payer: MEDICARE

## 2018-09-06 DIAGNOSIS — A04.8 H. PYLORI INFECTION: ICD-10-CM

## 2018-09-06 PROCEDURE — 87338 HPYLORI STOOL AG IA: CPT

## 2018-09-11 ENCOUNTER — PATIENT MESSAGE (OUTPATIENT)
Dept: SURGERY | Facility: HOSPITAL | Age: 68
End: 2018-09-11

## 2018-09-11 LAB — H PYLORI AG STL QL: NOT DETECTED

## 2018-09-19 ENCOUNTER — OFFICE VISIT (OUTPATIENT)
Dept: SURGERY | Facility: CLINIC | Age: 68
End: 2018-09-19
Payer: OTHER GOVERNMENT

## 2018-09-19 ENCOUNTER — LAB VISIT (OUTPATIENT)
Dept: LAB | Facility: HOSPITAL | Age: 68
End: 2018-09-19
Attending: SURGERY
Payer: MEDICARE

## 2018-09-19 VITALS
DIASTOLIC BLOOD PRESSURE: 60 MMHG | WEIGHT: 267 LBS | BODY MASS INDEX: 35.39 KG/M2 | SYSTOLIC BLOOD PRESSURE: 81 MMHG | HEIGHT: 73 IN | TEMPERATURE: 99 F | HEART RATE: 65 BPM

## 2018-09-19 DIAGNOSIS — G47.33 OSA ON CPAP: ICD-10-CM

## 2018-09-19 DIAGNOSIS — E78.5 HYPERLIPIDEMIA, UNSPECIFIED HYPERLIPIDEMIA TYPE: ICD-10-CM

## 2018-09-19 DIAGNOSIS — I10 ESSENTIAL HYPERTENSION: ICD-10-CM

## 2018-09-19 DIAGNOSIS — Z98.61 CAD S/P PERCUTANEOUS CORONARY ANGIOPLASTY: ICD-10-CM

## 2018-09-19 DIAGNOSIS — I25.119 ATHEROSCLEROSIS OF NATIVE CORONARY ARTERY WITH ANGINA PECTORIS, UNSPECIFIED WHETHER NATIVE OR TRANSPLANTED HEART: ICD-10-CM

## 2018-09-19 DIAGNOSIS — E66.01 MORBID OBESITY: Primary | Chronic | ICD-10-CM

## 2018-09-19 DIAGNOSIS — I25.10 CAD S/P PERCUTANEOUS CORONARY ANGIOPLASTY: ICD-10-CM

## 2018-09-19 DIAGNOSIS — E11.9 TYPE 2 DIABETES MELLITUS WITHOUT COMPLICATION, WITHOUT LONG-TERM CURRENT USE OF INSULIN: ICD-10-CM

## 2018-09-19 DIAGNOSIS — E66.01 MORBID OBESITY: Chronic | ICD-10-CM

## 2018-09-19 LAB
ALBUMIN SERPL BCP-MCNC: 3.9 G/DL
ALP SERPL-CCNC: 66 U/L
ALT SERPL W/O P-5'-P-CCNC: 18 U/L
ANION GAP SERPL CALC-SCNC: 9 MMOL/L
AST SERPL-CCNC: 14 U/L
BASOPHILS # BLD AUTO: 0.03 K/UL
BASOPHILS NFR BLD: 0.4 %
BILIRUB SERPL-MCNC: 1 MG/DL
BUN SERPL-MCNC: 21 MG/DL
CALCIUM SERPL-MCNC: 9.4 MG/DL
CHLORIDE SERPL-SCNC: 105 MMOL/L
CO2 SERPL-SCNC: 29 MMOL/L
CREAT SERPL-MCNC: 0.9 MG/DL
DIFFERENTIAL METHOD: NORMAL
EOSINOPHIL # BLD AUTO: 0.1 K/UL
EOSINOPHIL NFR BLD: 1 %
ERYTHROCYTE [DISTWIDTH] IN BLOOD BY AUTOMATED COUNT: 14.5 %
EST. GFR  (AFRICAN AMERICAN): >60 ML/MIN/1.73 M^2
EST. GFR  (NON AFRICAN AMERICAN): >60 ML/MIN/1.73 M^2
GLUCOSE SERPL-MCNC: 82 MG/DL
HCT VFR BLD AUTO: 44.3 %
HGB BLD-MCNC: 14.4 G/DL
IMM GRANULOCYTES # BLD AUTO: 0.02 K/UL
IMM GRANULOCYTES NFR BLD AUTO: 0.3 %
LYMPHOCYTES # BLD AUTO: 1.8 K/UL
LYMPHOCYTES NFR BLD: 24.5 %
MCH RBC QN AUTO: 30.8 PG
MCHC RBC AUTO-ENTMCNC: 32.5 G/DL
MCV RBC AUTO: 95 FL
MONOCYTES # BLD AUTO: 0.8 K/UL
MONOCYTES NFR BLD: 10.7 %
NEUTROPHILS # BLD AUTO: 4.5 K/UL
NEUTROPHILS NFR BLD: 63.1 %
NRBC BLD-RTO: 0 /100 WBC
PLATELET # BLD AUTO: 175 K/UL
PMV BLD AUTO: 10.7 FL
POTASSIUM SERPL-SCNC: 4 MMOL/L
PROT SERPL-MCNC: 7 G/DL
RBC # BLD AUTO: 4.68 M/UL
SODIUM SERPL-SCNC: 143 MMOL/L
WBC # BLD AUTO: 7.18 K/UL

## 2018-09-19 PROCEDURE — 99213 OFFICE O/P EST LOW 20 MIN: CPT | Mod: S$PBB,,, | Performed by: SURGERY

## 2018-09-19 PROCEDURE — 80053 COMPREHEN METABOLIC PANEL: CPT

## 2018-09-19 PROCEDURE — 36415 COLL VENOUS BLD VENIPUNCTURE: CPT | Mod: PO

## 2018-09-19 PROCEDURE — 99214 OFFICE O/P EST MOD 30 MIN: CPT | Mod: PBBFAC,PO | Performed by: SURGERY

## 2018-09-19 PROCEDURE — 99999 PR PBB SHADOW E&M-EST. PATIENT-LVL IV: CPT | Mod: PBBFAC,,, | Performed by: SURGERY

## 2018-09-19 PROCEDURE — 85025 COMPLETE CBC W/AUTO DIFF WBC: CPT

## 2018-09-19 RX ORDER — SODIUM CHLORIDE 9 MG/ML
INJECTION, SOLUTION INTRAVENOUS CONTINUOUS
Status: CANCELLED | OUTPATIENT
Start: 2018-09-19

## 2018-09-19 RX ORDER — LIRAGLUTIDE 6 MG/ML
INJECTION, SOLUTION SUBCUTANEOUS DAILY
COMMUNITY
End: 2019-05-23

## 2018-09-19 RX ORDER — LIDOCAINE HYDROCHLORIDE 10 MG/ML
1 INJECTION, SOLUTION EPIDURAL; INFILTRATION; INTRACAUDAL; PERINEURAL ONCE
Status: CANCELLED | OUTPATIENT
Start: 2018-09-19 | End: 2018-09-19

## 2018-09-19 RX ORDER — HEPARIN SODIUM 5000 [USP'U]/ML
5000 INJECTION, SOLUTION INTRAVENOUS; SUBCUTANEOUS
Status: CANCELLED | OUTPATIENT
Start: 2018-09-19

## 2018-09-19 NOTE — PROGRESS NOTES
BARIATRIC NEW PATIENT EVALUATION    CHIEF COMPLAINT:   Morbid obesity with a BMI of 35.81 and inability to lose weight.    HISTORY OF PRESENT ILLNESS:  Cole Doan is a 67 y.o.-year-old male presents to schedule surgery. Since last visit he has undergone EGD which biopsy showed H pylori, he was treated for this and subsequent tests confirmed a right occasion.  He has continued to lose weight wall dieting.    Initially presenting for morbid obesity with a BMI of 35.81 and inability to lose weight. The patient has tried diet as well as exercise.  [facility] however he reports the bariatric surgeon he was following with has since left. He reports his wife and daughter both went to [place] to have weight loss surgery a to his comorbidities he like to have his done closer to where his physicians are.  [facility] and reports it is that he was cleared for his surgery from psych, Nutrition and has cardiology clearance.  He is currently taking aspirin, Plavix and Pradaxa.  He has not undergone an EGD yet.  Since following in the bariatric program he has noticed his weight good down from 315 lb to 271 lb.    HPI    CO-MORBIDITIES:  coronary artery disease, diabetes mellitus, dyslipidemia, hypertension and obstructive sleep apnea    PAST MEDICAL HISTORY:  Past Medical History:   Diagnosis Date    Anticoagulant long-term use     BPH (benign prostatic hypertrophy)     CAD (coronary artery disease)     HTN (hypertension) 5/15/2014    Hyperlipidemia     Hypertension     Obesity 5/15/2014    Pneumonia     PTSD (post-traumatic stress disorder) 5/15/2014    RBBB 5/15/2014    S/P PTCA (percutaneous transluminal coronary angioplasty) 5/15/2014    Sleep apnea 5/15/2014    Special screening for malignant neoplasms, colon 1/22/2014    Type 2 diabetes mellitus without complication 9/28/2015    Type 2 diabetes  mellitus without complication 9/28/2015        PAST SURGICAL HISTORY:  Past Surgical History:   Procedure Laterality Date    APPENDECTOMY      at age 14    COLONOSCOPY Left 4/27/2018    Procedure: COLONOSCOPY;  Surgeon: Artem Medeiros MD;  Location: [facility];  Service: Endoscopy;  Laterality: Left;    COLONOSCOPY Left 4/27/2018    [facility] ENDO    COLONOSCOPY N/A 5/14/2015    [facility] Travis Miguel [facility] ENDO    COLONOSCOPY N/A 1/22/2014    [facility] at [facility]    CORONARY ANGIOPLASTY WITH STENT PLACEMENT      1990, 2008, 2012    ESOPHAGOGASTRODUODENOSCOPY N/A 8/16/2018    Procedure: ESOPHAGOGASTRODUODENOSCOPY (EGD);  Surgeon: Mario Cesar MD;  Location: [facility];  Service: General;  Laterality: N/A;    ESOPHAGOGASTRODUODENOSCOPY (EGD) N/A 8/16/2018    [facility] at [facility]    HEART CATH-LEFT Left 9/28/2015    Performed by Eric Rutledge [facility] at [facility] CATH LAB       FAMILY HISTORY:  Family History   Problem Relation Age of Onset    Cataracts Father     Heart disease Father     Hypertension Father     Heart disease Mother     Hypertension Mother     Colon cancer Neg Hx         SOCIAL HISTORY:   reports that he quit smoking about 8 years ago. His smoking use included cigarettes. He started smoking about 53 years ago. He has a 40.00 pack-year smoking history. he has never used smokeless tobacco. He reports that he does not drink alcohol or use drugs.     MEDICATIONS:  Current Outpatient Medications on File Prior to Visit   Medication Sig Dispense Refill    alprazolam (XANAX) 0.5 MG tablet Take 0.5 mg by mouth 3 (three) times daily as needed.       amLODIPine (NORVASC) 2.5 MG tablet TAKE 1 TABLET BY MOUTH ONCE DAILY. 90 tablet 3    aspirin (ECOTRIN) 81 MG EC tablet Take 81 mg by mouth once daily.      atorvastatin (LIPITOR) 80 MG tablet Take 80 mg by mouth. Take a half tablet daily      buPROPion (WELLBUTRIN SR)  "200 MG TbSR Take 200 mg by mouth once daily.      clopidogrel (PLAVIX) 75 mg tablet TAKE 1 TABLET BY MOUTH EVERY DAY 30 tablet 6    cyanocobalamin (VITAMIN B-12) 1000 MCG tablet Take 100 mcg by mouth once daily.      dabigatran etexilate (PRADAXA) 150 mg Cap Take by mouth once daily. "Do NOT break, chew, or open capsules."      furosemide (LASIX) 40 MG tablet Takes one-half tablet by mouth daily (Patient taking differently: 40 mg. Takes 2 tablet by mouth daily) 30 tablet 1    gabapentin (NEURONTIN) 300 MG capsule Take 300 mg by mouth 3 (three) times daily.       ipratropium-albuterol (COMBIVENT RESPIMAT)  mcg/actuation inhaler Inhale 1 puff into the lungs 4 (four) times daily. 4 g 11    isosorbide mononitrate (IMDUR) 60 MG 24 hr tablet Take 1 tablet (60 mg total) by mouth 2 (two) times daily. (Patient taking differently: Take 60 mg by mouth once daily. ) 60 tablet 6    liraglutide (SAXENDA) 3 mg/0.5 mL (18 mg/3 mL) PnIj Inject into the skin once daily.      metformin (GLUCOPHAGE) 1000 MG tablet Take 1,000 mg by mouth 2 (two) times daily with meals.      miconazole (MICOTIN) 2 % cream Apply topically 2 (two) times daily.      ranolazine (RANEXA) 500 MG Tb12 Take 500 mg by mouth 2 (two) times daily.      terbinafine HCl (LAMISIL) 250 mg tablet Take 250 mg by mouth once daily.      vitamin D 1000 units Tab Take 2,000 Units by mouth once daily.      pantoprazole (PROTONIX) 40 MG tablet Take 1 tablet (40 mg total) by mouth 2 (two) times daily. for 14 days 28 tablet 0     No current facility-administered medications on file prior to visit.        Medications have been reviewed.    ALLERGIES:  Review of patient's allergies indicates:   Allergen Reactions    Iodinated contrast- oral and iv dye        Allergies have been reviewed.    ROS:  Review of Systems   Constitutional: Negative for activity change, appetite change, chills, diaphoresis, fatigue, fever and unexpected weight change.   HENT: Negative " for congestion, hearing loss, sore throat and trouble swallowing.    Eyes: Negative for visual disturbance.   Respiratory: Negative for apnea, cough, choking, chest tightness, shortness of breath and stridor.    Cardiovascular: Negative for chest pain, palpitations and leg swelling.   Gastrointestinal: Negative for abdominal distention, abdominal pain, anal bleeding, blood in stool, constipation, diarrhea, nausea, rectal pain and vomiting.   Endocrine: Negative for cold intolerance, heat intolerance, polydipsia, polyphagia and polyuria.   Genitourinary: Negative for difficulty urinating, dysuria, frequency, hematuria and urgency.   Musculoskeletal: Negative for arthralgias, back pain, myalgias and neck pain.   Skin: Negative for color change, pallor, rash and wound.   Neurological: Negative for dizziness, syncope, weakness, light-headedness, numbness and headaches.   Hematological: Negative for adenopathy. Does not bruise/bleed easily.   Psychiatric/Behavioral: Negative for agitation, confusion, decreased concentration and sleep disturbance. The patient is not nervous/anxious.        PE:  Physical Exam   Constitutional: He is oriented to person, place, and time. He appears well-developed and well-nourished. No distress.   HENT:   Head: Normocephalic and atraumatic.   Right Ear: External ear normal.   Left Ear: External ear normal.   Eyes: Conjunctivae and EOM are normal. Pupils are equal, round, and reactive to light. No scleral icterus.   Neck: Normal range of motion. Neck supple. No tracheal deviation present. No thyromegaly present.   Cardiovascular: Normal rate, regular rhythm, normal heart sounds and intact distal pulses. Exam reveals no gallop and no friction rub.   No murmur heard.  Pulmonary/Chest: Effort normal and breath sounds normal. No respiratory distress. He has no wheezes. He has no rales. He exhibits no tenderness.   Abdominal: Soft. Bowel sounds are normal. He exhibits no distension. There is no  tenderness. No hernia.       Musculoskeletal: Normal range of motion. He exhibits no edema, tenderness or deformity.   Lymphadenopathy:     He has no cervical adenopathy.   Neurological: He is alert and oriented to person, place, and time.   Skin: Skin is warm and dry. No rash noted. He is not diaphoretic. No erythema. No pallor.   Psychiatric: He has a normal mood and affect. His behavior is normal. Judgment and thought content normal.   Vitals reviewed.      DIAGNOSIS:  1.  Morbid obesity with a BMI of 35.81 and inability to lose weight.  2. Co-morbidities: coronary artery disease, diabetes mellitus, dyslipidemia, hypertension and obstructive sleep apnea    PLAN:  Robotic sleeve gastrectomy 9/27/18  Preop:  CBC, CMP; EKG reviewed  He understands that bariatric surgery is a tool to aid in weight loss and that he needs to be committed to the diet and exercise post-operatively for successful weight loss. Discussed with patient that bariatric surgery is not the easy way out and that it will take plenty of dedication on the patient's part to be successful. Also discussed the possibility of weight regain if the patient strays from the diet guidelines or exercise requirements. Patient verbalized understanding and wishes to proceed with the work-up.  The patient is a good candidate for operatively-induced weight loss.  The patient's comorbidities can significantly improve from weight loss following surgery.    We discussed preliminary steps of the program including the evaluation process.  I have outlined the risks of the procedures including, but not limited to, bleeding, slippage, erosion, stricture, death, deep venous thrombosis, pulmonary embolus, healing issues including intra-abdominal leakage or perforation with abscess or need for drainage or reoperation, wound infection, need for open surgery, injury to intra-abdominal organs or bleeding requiring transfusion, intensive care unit care, and possible prolonged  hospitalization.    Other long-term issues were discussed including, but not limited to, permanent change in volume of food and type of foods eaten and importance of ongoing long-term followup.  I advised the patient that if they can lose weight before surgery, it will reduce his operative risk.  The patient understands and wishes to proceed.      Referring Physician: [facility]*  RTC: As scheduled.

## 2018-09-19 NOTE — LETTER
September 19, 2018      [facility]  [address] [place] [address]           [facility] - General Surgery  [address] [place] [address]  Phone: [phone]  Fax: [phone]          Patient: Cole Doan   MR Number: [id number]   YOB: 1950   Date of Visit: 9/19/2018       Dear [facility]:    Thank you for referring Cole Doan to me for evaluation. Attached you will find relevant portions of my assessment and plan of care.    If you have questions, please do not hesitate to call me. I look forward to following Cole Doan along with you.    Sincerely,    Mario Cesar MD    Enclosure  CC:  No Recipients    If you would like to receive this communication electronically, please contact [email or web address] or [phone] to request more information on [employer] access.    For providers and/or their staff who would like to refer a patient to [facility], please contact us through our one-stop-shop provider referral line, Freddie Freed, at [phone].    If you feel you have received this communication in error or would no longer like to receive these types of communications, please e-mail [email or web address]

## 2018-09-19 NOTE — H&P (VIEW-ONLY)
BARIATRIC NEW PATIENT EVALUATION    CHIEF COMPLAINT:   Morbid obesity with a BMI of 35.81 and inability to lose weight.    HISTORY OF PRESENT ILLNESS:  Cole Doan is a 67 y.o.-year-old male presents to schedule surgery. Since last visit he has undergone EGD which biopsy showed H pylori, he was treated for this and subsequent tests confirmed a right occasion.  He has continued to lose weight wall dieting.    Initially presenting for morbid obesity with a BMI of 35.81 and inability to lose weight. The patient has tried diet as well as exercise.  [facility] for [facility] surgery however he reports the bariatric surgeon he was following with has since left. He reports his wife and daughter both went to [place] to have weight loss surgery a to his comorbidities he like to have his done closer to where his physicians are.  [facility] and reports it is that he was cleared for his surgery from psych, Nutrition and has cardiology clearance.  He is currently taking aspirin, Plavix and Pradaxa.  He has not undergone an EGD yet.  Since following in the bariatric program he has noticed his weight good down from 315 lb to 271 lb.    HPI    CO-MORBIDITIES:  coronary artery disease, diabetes mellitus, dyslipidemia, hypertension and obstructive sleep apnea    PAST MEDICAL HISTORY:  Past Medical History:   Diagnosis Date    Anticoagulant long-term use     BPH (benign prostatic hypertrophy)     CAD (coronary artery disease)     HTN (hypertension) 5/15/2014    Hyperlipidemia     Hypertension     Obesity 5/15/2014    Pneumonia     PTSD (post-traumatic stress disorder) 5/15/2014    RBBB 5/15/2014    S/P PTCA (percutaneous transluminal coronary angioplasty) 5/15/2014    Sleep apnea 5/15/2014    Special screening for malignant neoplasms, colon 1/22/2014    Type 2 diabetes mellitus without complication 9/28/2015    Type 2 diabetes  mellitus without complication 9/28/2015        PAST SURGICAL HISTORY:  Past Surgical History:   Procedure Laterality Date    APPENDECTOMY      at age 14    COLONOSCOPY Left 4/27/2018    Procedure: COLONOSCOPY;  Surgeon: Artem Medeiros MD;  Location: [facility];  Service: Endoscopy;  Laterality: Left;    COLONOSCOPY Left 4/27/2018    [facility] ENDO    COLONOSCOPY N/A 5/14/2015    [facility] by Travis Miguel MD at [facility] ENDO    COLONOSCOPY N/A 1/22/2014    [facility]    CORONARY ANGIOPLASTY WITH STENT PLACEMENT      1990, 2008, 2012    ESOPHAGOGASTRODUODENOSCOPY N/A 8/16/2018    Procedure: ESOPHAGOGASTRODUODENOSCOPY (EGD);  Surgeon: Mario Cesar MD;  Location: [facility];  Service: General;  Laterality: N/A;    ESOPHAGOGASTRODUODENOSCOPY (EGD) N/A 8/16/2018    [facility]formed by [facility] at [facility]    HEART CATH-LEFT Left 9/28/2015    Performed by Eric Rutledge MD at [facility] CATH LAB       FAMILY HISTORY:  Family History   Problem Relation Age of Onset    Cataracts Father     Heart disease Father     Hypertension Father     Heart disease Mother     Hypertension Mother     Colon cancer Neg Hx         SOCIAL HISTORY:   reports that he quit smoking about 8 years ago. His smoking use included cigarettes. He started smoking about 53 years ago. He has a 40.00 pack-year smoking history. he has never used smokeless tobacco. He reports that he does not drink alcohol or use drugs.     MEDICATIONS:  Current Outpatient Medications on File Prior to Visit   Medication Sig Dispense Refill    alprazolam (XANAX) 0.5 MG tablet Take 0.5 mg by mouth 3 (three) times daily as needed.       amLODIPine (NORVASC) 2.5 MG tablet TAKE 1 TABLET BY MOUTH ONCE DAILY. 90 tablet 3    aspirin (ECOTRIN) 81 MG EC tablet Take 81 mg by mouth once daily.      atorvastatin (LIPITOR) 80 MG tablet Take 80 mg by mouth. Take a half tablet daily      buPROPion (WELLBUTRIN SR)  "200 MG TbSR Take 200 mg by mouth once daily.      clopidogrel (PLAVIX) 75 mg tablet TAKE 1 TABLET BY MOUTH EVERY DAY 30 tablet 6    cyanocobalamin (VITAMIN B-12) 1000 MCG tablet Take 100 mcg by mouth once daily.      dabigatran etexilate (PRADAXA) 150 mg Cap Take by mouth once daily. "Do NOT break, chew, or open capsules."      furosemide (LASIX) 40 MG tablet Takes one-half tablet by mouth daily (Patient taking differently: 40 mg. Takes 2 tablet by mouth daily) 30 tablet 1    gabapentin (NEURONTIN) 300 MG capsule Take 300 mg by mouth 3 (three) times daily.       ipratropium-albuterol (COMBIVENT RESPIMAT)  mcg/actuation inhaler Inhale 1 puff into the lungs 4 (four) times daily. 4 g 11    isosorbide mononitrate (IMDUR) 60 MG 24 hr tablet Take 1 tablet (60 mg total) by mouth 2 (two) times daily. (Patient taking differently: Take 60 mg by mouth once daily. ) 60 tablet 6    liraglutide (SAXENDA) 3 mg/0.5 mL (18 mg/3 mL) PnIj Inject into the skin once daily.      metformin (GLUCOPHAGE) 1000 MG tablet Take 1,000 mg by mouth 2 (two) times daily with meals.      miconazole (MICOTIN) 2 % cream Apply topically 2 (two) times daily.      ranolazine (RANEXA) 500 MG Tb12 Take 500 mg by mouth 2 (two) times daily.      terbinafine HCl (LAMISIL) 250 mg tablet Take 250 mg by mouth once daily.      vitamin D 1000 units Tab Take 2,000 Units by mouth once daily.      pantoprazole (PROTONIX) 40 MG tablet Take 1 tablet (40 mg total) by mouth 2 (two) times daily. for 14 days 28 tablet 0     No current facility-administered medications on file prior to visit.        Medications have been reviewed.    ALLERGIES:  Review of patient's allergies indicates:   Allergen Reactions    Iodinated contrast- oral and iv dye        Allergies have been reviewed.    ROS:  Review of Systems   Constitutional: Negative for activity change, appetite change, chills, diaphoresis, fatigue, fever and unexpected weight change.   HENT: Negative " for congestion, hearing loss, sore throat and trouble swallowing.    Eyes: Negative for visual disturbance.   Respiratory: Negative for apnea, cough, choking, chest tightness, shortness of breath and stridor.    Cardiovascular: Negative for chest pain, palpitations and leg swelling.   Gastrointestinal: Negative for abdominal distention, abdominal pain, anal bleeding, blood in stool, constipation, diarrhea, nausea, rectal pain and vomiting.   Endocrine: Negative for cold intolerance, heat intolerance, polydipsia, polyphagia and polyuria.   Genitourinary: Negative for difficulty urinating, dysuria, frequency, hematuria and urgency.   Musculoskeletal: Negative for arthralgias, back pain, myalgias and neck pain.   Skin: Negative for color change, pallor, rash and wound.   Neurological: Negative for dizziness, syncope, weakness, light-headedness, numbness and headaches.   Hematological: Negative for adenopathy. Does not bruise/bleed easily.   Psychiatric/Behavioral: Negative for agitation, confusion, decreased concentration and sleep disturbance. The patient is not nervous/anxious.        PE:  Physical Exam   Constitutional: He is oriented to person, place, and time. He appears well-developed and well-nourished. No distress.   HENT:   Head: Normocephalic and atraumatic.   Right Ear: External ear normal.   Left Ear: External ear normal.   Eyes: Conjunctivae and EOM are normal. Pupils are equal, round, and reactive to light. No scleral icterus.   Neck: Normal range of motion. Neck supple. No tracheal deviation present. No thyromegaly present.   Cardiovascular: Normal rate, regular rhythm, normal heart sounds and intact distal pulses. Exam reveals no gallop and no friction rub.   No murmur heard.  Pulmonary/Chest: Effort normal and breath sounds normal. No respiratory distress. He has no wheezes. He has no rales. He exhibits no tenderness.   Abdominal: Soft. Bowel sounds are normal. He exhibits no distension. There is no  tenderness. No hernia.       Musculoskeletal: Normal range of motion. He exhibits no edema, tenderness or deformity.   Lymphadenopathy:     He has no cervical adenopathy.   Neurological: He is alert and oriented to person, place, and time.   Skin: Skin is warm and dry. No rash noted. He is not diaphoretic. No erythema. No pallor.   Psychiatric: He has a normal mood and affect. His behavior is normal. Judgment and thought content normal.   Vitals reviewed.      DIAGNOSIS:  1.  Morbid obesity with a BMI of 35.81 and inability to lose weight.  2. Co-morbidities: coronary artery disease, diabetes mellitus, dyslipidemia, hypertension and obstructive sleep apnea    PLAN:  Robotic sleeve gastrectomy 9/27/18  Preop:  CBC, CMP; EKG reviewed  He understands that bariatric surgery is a tool to aid in weight loss and that he needs to be committed to the diet and exercise post-operatively for successful weight loss. Discussed with patient that bariatric surgery is not the easy way out and that it will take plenty of dedication on the patient's part to be successful. Also discussed the possibility of weight regain if the patient strays from the diet guidelines or exercise requirements. Patient verbalized understanding and wishes to proceed with the work-up.  The patient is a good candidate for operatively-induced weight loss.  The patient's comorbidities can significantly improve from weight loss following surgery.    We discussed preliminary steps of the program including the evaluation process.  I have outlined the risks of the procedures including, but not limited to, bleeding, slippage, erosion, stricture, death, deep venous thrombosis, pulmonary embolus, healing issues including intra-abdominal leakage or perforation with abscess or need for drainage or reoperation, wound infection, need for open surgery, injury to intra-abdominal organs or bleeding requiring transfusion, intensive care unit care, and possible prolonged  hospitalization.    Other long-term issues were discussed including, but not limited to, permanent change in volume of food and type of foods eaten and importance of ongoing long-term followup.  I advised the patient that if they can lose weight before surgery, it will reduce his operative risk.  The patient understands and wishes to proceed.      Referring Physician: [facility]*  RTC: As scheduled.

## 2018-09-25 ENCOUNTER — TELEPHONE (OUTPATIENT)
Dept: SURGERY | Facility: CLINIC | Age: 68
End: 2018-09-25

## 2018-09-26 ENCOUNTER — ANESTHESIA EVENT (OUTPATIENT)
Dept: SURGERY | Facility: HOSPITAL | Age: 68
DRG: 619 | End: 2018-09-26
Payer: OTHER GOVERNMENT

## 2018-09-26 ENCOUNTER — CLINICAL SUPPORT (OUTPATIENT)
Dept: CARDIOLOGY | Facility: CLINIC | Age: 68
End: 2018-09-26
Payer: MEDICARE

## 2018-09-26 DIAGNOSIS — Z01.818 PRE-OP TESTING: Primary | ICD-10-CM

## 2018-09-26 DIAGNOSIS — Z01.818 PRE-OP TESTING: ICD-10-CM

## 2018-09-26 PROCEDURE — 93010 ELECTROCARDIOGRAM REPORT: CPT | Mod: S$PBB,,, | Performed by: INTERNAL MEDICINE

## 2018-09-26 PROCEDURE — 93005 ELECTROCARDIOGRAM TRACING: CPT | Mod: PBBFAC | Performed by: INTERNAL MEDICINE

## 2018-09-26 NOTE — PRE ADMISSION SCREENING
Pre op instructions reviewed with patient per phone:    To confirm, Your surgeon has instructed you:  Surgery is scheduled 09/27/18at 0830.  Pre admit office to call later today only if arrival time changes    Please report to [facility] 1st floor main lobby by 0700.      INSTRUCTIONS IMPORTANT!!! Pt states he is currently on liquid diet /Dr Cesar's instructions  ¨ No smoking after 12 midnight, the night before surgery.  ¨ You  may have clear liquids up until 3 hours prior to surgery.  This includes: grape, cranberry, and apple juice (not orange, and no coffee.)   ¨ OK to brush teeth, but no gum, candy or mints!    ¨ Take only these medicines with a small swallow of water-morning of surgery.  Xanax, Amlodipine, Atorvastatin, Wellbutrin, Gabapentin, Ranexa, Isosorbide, Pantoprazole, use Combivent inhaler      ____  Do not wear makeup, including mascara.  ____  No powder, lotions or creams to surgical area.  ____  Please remove all jewelry, including piercings and leave at home.  ____  No money or valuables needed. Please leave at home.  ____  Please bring identification and insurance information to hospital.  ____  If going home the same day, arrange for a ride home. You will not be able to   drive if Anesthesia was used.  ____  Children, under 12 years old, must remain in the waiting room with an adult.  They are not allowed in patient areas.  ____  Wear loose fitting clothing. Allow for dressings, bandages.  ____  Stop Aspirin, Ibuprofen, Motrin and Aleve at least 5-7 days before surgery, unless otherwise instructed by your doctor, or the nurse.   You MAY use Tylenol/acetaminophen until day of surgery.  ____  If you take diabetic medication, do not take am of surgery unless instructed by   Doctor.  ____ Stop taking any Fish Oil supplement or any Vitamins that contain Vitamin E at least 5 days prior to surgery.          Bathing Instructions-- The night before surgery and the morning prior to  coming to the hospital:   -Do not shave the surgical area.   -Shower and wash your hair and body as usual with your regular soap and shampoo.   -Rinse your hair and body completely.   -Use one packet of hibiclens to wash the surgical site (using your hand) gently for 5 minutes.  Do not scrub you skin too hard.   -Do not use hibiclens on your head, face, or genitals.   -Do not wash with regular soap after you use the hibiclens.   -Rinse your body thoroughly.   -Dry with clean, soft towel.  Do not use lotion, cream, deodorant, or powders on   the surgical site.    Use antibacterial soap in place of hibiclens if your surgery is on the head, face or genitals.         Surgical Site Infection    Prevention of surgical site infections:     -Keep incisions clean and dry.   -Do not soak/submerge incisions in water until completely healed.   -Do not apply lotions, powders, creams, or deodorants to site.   -Always make sure hands are cleaned with antibacterial soap/ alcohol-based   prior to touching the surgical site.  (This includes doctors, nurses, staff, and yourself.)    Signs and symptoms:   -Redness and pain around the area where you had surgery   -Drainage of cloudy fluid from your surgical wound   -Fever over 100.4  I have read or had read and explained to me, and understand the above information.

## 2018-09-27 ENCOUNTER — ANESTHESIA (OUTPATIENT)
Dept: SURGERY | Facility: HOSPITAL | Age: 68
DRG: 619 | End: 2018-09-27
Payer: OTHER GOVERNMENT

## 2018-09-27 ENCOUNTER — HOSPITAL ENCOUNTER (INPATIENT)
Facility: HOSPITAL | Age: 68
LOS: 4 days | Discharge: HOME | DRG: 619 | End: 2018-10-01
Attending: SURGERY | Admitting: SURGERY
Payer: OTHER GOVERNMENT

## 2018-09-27 DIAGNOSIS — I25.119 ATHEROSCLEROSIS OF NATIVE CORONARY ARTERY WITH ANGINA PECTORIS, UNSPECIFIED WHETHER NATIVE OR TRANSPLANTED HEART: ICD-10-CM

## 2018-09-27 DIAGNOSIS — I25.10 CAD S/P PERCUTANEOUS CORONARY ANGIOPLASTY: ICD-10-CM

## 2018-09-27 DIAGNOSIS — R79.89 ELEVATED TROPONIN: ICD-10-CM

## 2018-09-27 DIAGNOSIS — G47.33 OSA ON CPAP: ICD-10-CM

## 2018-09-27 DIAGNOSIS — E11.9 TYPE 2 DIABETES MELLITUS WITHOUT COMPLICATION, WITHOUT LONG-TERM CURRENT USE OF INSULIN: ICD-10-CM

## 2018-09-27 DIAGNOSIS — J43.9 MIXED RESTRICTIVE AND OBSTRUCTIVE LUNG DISEASE: ICD-10-CM

## 2018-09-27 DIAGNOSIS — E66.01 MORBID OBESITY WITH BMI OF 40.0-44.9, ADULT: ICD-10-CM

## 2018-09-27 DIAGNOSIS — E66.01 MORBID OBESITY: Chronic | ICD-10-CM

## 2018-09-27 DIAGNOSIS — R07.9 CHEST PAIN: ICD-10-CM

## 2018-09-27 DIAGNOSIS — Z98.84 S/P LAPAROSCOPIC SLEEVE GASTRECTOMY: Primary | ICD-10-CM

## 2018-09-27 DIAGNOSIS — I21.4 NSTEMI (NON-ST ELEVATED MYOCARDIAL INFARCTION): ICD-10-CM

## 2018-09-27 DIAGNOSIS — E78.5 HYPERLIPIDEMIA, UNSPECIFIED HYPERLIPIDEMIA TYPE: ICD-10-CM

## 2018-09-27 DIAGNOSIS — Z98.61 CAD S/P PERCUTANEOUS CORONARY ANGIOPLASTY: ICD-10-CM

## 2018-09-27 DIAGNOSIS — I10 ESSENTIAL HYPERTENSION: ICD-10-CM

## 2018-09-27 DIAGNOSIS — J98.4 MIXED RESTRICTIVE AND OBSTRUCTIVE LUNG DISEASE: ICD-10-CM

## 2018-09-27 LAB
ANION GAP SERPL CALC-SCNC: 12 MMOL/L
BASOPHILS # BLD AUTO: 0 K/UL
BASOPHILS NFR BLD: 0 %
BNP SERPL-MCNC: 38 PG/ML
BUN SERPL-MCNC: 26 MG/DL
CALCIUM SERPL-MCNC: 9.4 MG/DL
CHLORIDE SERPL-SCNC: 103 MMOL/L
CO2 SERPL-SCNC: 26 MMOL/L
CREAT SERPL-MCNC: 0.8 MG/DL
DIFFERENTIAL METHOD: ABNORMAL
EOSINOPHIL # BLD AUTO: 0 K/UL
EOSINOPHIL NFR BLD: 0 %
ERYTHROCYTE [DISTWIDTH] IN BLOOD BY AUTOMATED COUNT: 14.1 %
EST. GFR  (AFRICAN AMERICAN): >60 ML/MIN/1.73 M^2
EST. GFR  (NON AFRICAN AMERICAN): >60 ML/MIN/1.73 M^2
ESTIMATED AVG GLUCOSE: 100 MG/DL
GLUCOSE SERPL-MCNC: 128 MG/DL
HBA1C MFR BLD HPLC: 5.1 %
HCT VFR BLD AUTO: 43 %
HGB BLD-MCNC: 15 G/DL
LYMPHOCYTES # BLD AUTO: 0.8 K/UL
LYMPHOCYTES NFR BLD: 10.4 %
MAGNESIUM SERPL-MCNC: 1.8 MG/DL
MCH RBC QN AUTO: 31.9 PG
MCHC RBC AUTO-ENTMCNC: 34.9 G/DL
MCV RBC AUTO: 92 FL
MONOCYTES # BLD AUTO: 0.1 K/UL
MONOCYTES NFR BLD: 0.8 %
NEUTROPHILS # BLD AUTO: 6.8 K/UL
NEUTROPHILS NFR BLD: 88.8 %
PLATELET # BLD AUTO: 194 K/UL
PMV BLD AUTO: 9.4 FL
POCT GLUCOSE: 115 MG/DL (ref 70–110)
POCT GLUCOSE: 124 MG/DL (ref 70–110)
POCT GLUCOSE: 148 MG/DL (ref 70–110)
POCT GLUCOSE: 93 MG/DL (ref 70–110)
POTASSIUM SERPL-SCNC: 4.4 MMOL/L
RBC # BLD AUTO: 4.7 M/UL
SODIUM SERPL-SCNC: 141 MMOL/L
TROPONIN I SERPL DL<=0.01 NG/ML-MCNC: 0.26 NG/ML
WBC # BLD AUTO: 7.67 K/UL

## 2018-09-27 PROCEDURE — 37000008 HC ANESTHESIA 1ST 15 MINUTES: Performed by: SURGERY

## 2018-09-27 PROCEDURE — 99900031 HC PATIENT EDUCATION (STAT)

## 2018-09-27 PROCEDURE — 94770 HC EXHALED C02 TEST: CPT

## 2018-09-27 PROCEDURE — 80048 BASIC METABOLIC PNL TOTAL CA: CPT

## 2018-09-27 PROCEDURE — 88307 TISSUE EXAM BY PATHOLOGIST: CPT | Performed by: PATHOLOGY

## 2018-09-27 PROCEDURE — 43775 LAP SLEEVE GASTRECTOMY: CPT | Mod: 80,,, | Performed by: SURGERY

## 2018-09-27 PROCEDURE — 63600175 PHARM REV CODE 636 W HCPCS: Performed by: NURSE ANESTHETIST, CERTIFIED REGISTERED

## 2018-09-27 PROCEDURE — 36000713 HC OR TIME LEV V EA ADD 15 MIN: Performed by: SURGERY

## 2018-09-27 PROCEDURE — 25000003 PHARM REV CODE 250: Performed by: SURGERY

## 2018-09-27 PROCEDURE — 63600175 PHARM REV CODE 636 W HCPCS: Performed by: ANESTHESIOLOGY

## 2018-09-27 PROCEDURE — 71000033 HC RECOVERY, INTIAL HOUR: Performed by: SURGERY

## 2018-09-27 PROCEDURE — 84484 ASSAY OF TROPONIN QUANT: CPT

## 2018-09-27 PROCEDURE — 63600175 PHARM REV CODE 636 W HCPCS: Performed by: SURGERY

## 2018-09-27 PROCEDURE — 88307 TISSUE EXAM BY PATHOLOGIST: CPT | Mod: 26,,, | Performed by: PATHOLOGY

## 2018-09-27 PROCEDURE — 36415 COLL VENOUS BLD VENIPUNCTURE: CPT

## 2018-09-27 PROCEDURE — 99900035 HC TECH TIME PER 15 MIN (STAT)

## 2018-09-27 PROCEDURE — 43775 LAP SLEEVE GASTRECTOMY: CPT | Mod: ,,, | Performed by: SURGERY

## 2018-09-27 PROCEDURE — 84484 ASSAY OF TROPONIN QUANT: CPT | Mod: 91

## 2018-09-27 PROCEDURE — 27000221 HC OXYGEN, UP TO 24 HOURS

## 2018-09-27 PROCEDURE — 27201423 OPTIME MED/SURG SUP & DEVICES STERILE SUPPLY: Performed by: SURGERY

## 2018-09-27 PROCEDURE — 93005 ELECTROCARDIOGRAM TRACING: CPT

## 2018-09-27 PROCEDURE — 25000003 PHARM REV CODE 250: Performed by: NURSE ANESTHETIST, CERTIFIED REGISTERED

## 2018-09-27 PROCEDURE — S0020 INJECTION, BUPIVICAINE HYDRO: HCPCS | Performed by: SURGERY

## 2018-09-27 PROCEDURE — 83880 ASSAY OF NATRIURETIC PEPTIDE: CPT

## 2018-09-27 PROCEDURE — 37000009 HC ANESTHESIA EA ADD 15 MINS: Performed by: SURGERY

## 2018-09-27 PROCEDURE — 83735 ASSAY OF MAGNESIUM: CPT

## 2018-09-27 PROCEDURE — 93010 ELECTROCARDIOGRAM REPORT: CPT | Mod: ,,, | Performed by: INTERNAL MEDICINE

## 2018-09-27 PROCEDURE — 11000001 HC ACUTE MED/SURG PRIVATE ROOM

## 2018-09-27 PROCEDURE — 8E0W4CZ ROBOTIC ASSISTED PROCEDURE OF TRUNK REGION, PERCUTANEOUS ENDOSCOPIC APPROACH: ICD-10-PCS | Performed by: SURGERY

## 2018-09-27 PROCEDURE — 94760 N-INVAS EAR/PLS OXIMETRY 1: CPT

## 2018-09-27 PROCEDURE — 85025 COMPLETE CBC W/AUTO DIFF WBC: CPT

## 2018-09-27 PROCEDURE — 36000712 HC OR TIME LEV V 1ST 15 MIN: Performed by: SURGERY

## 2018-09-27 PROCEDURE — 94799 UNLISTED PULMONARY SVC/PX: CPT

## 2018-09-27 PROCEDURE — 25000003 PHARM REV CODE 250: Performed by: ANESTHESIOLOGY

## 2018-09-27 PROCEDURE — 83036 HEMOGLOBIN GLYCOSYLATED A1C: CPT

## 2018-09-27 PROCEDURE — 0DB64Z3 EXCISION OF STOMACH, PERCUTANEOUS ENDOSCOPIC APPROACH, VERTICAL: ICD-10-PCS | Performed by: SURGERY

## 2018-09-27 RX ORDER — GLUCAGON 1 MG
1 KIT INJECTION
Status: DISCONTINUED | OUTPATIENT
Start: 2018-09-27 | End: 2018-10-01 | Stop reason: HOSPADM

## 2018-09-27 RX ORDER — GLYCOPYRROLATE 0.2 MG/ML
INJECTION INTRAMUSCULAR; INTRAVENOUS
Status: DISCONTINUED | OUTPATIENT
Start: 2018-09-27 | End: 2018-09-27

## 2018-09-27 RX ORDER — ONDANSETRON 2 MG/ML
4 INJECTION INTRAMUSCULAR; INTRAVENOUS EVERY 8 HOURS PRN
Status: DISCONTINUED | OUTPATIENT
Start: 2018-09-27 | End: 2018-10-01 | Stop reason: HOSPADM

## 2018-09-27 RX ORDER — CHLORHEXIDINE GLUCONATE ORAL RINSE 1.2 MG/ML
10 SOLUTION DENTAL 2 TIMES DAILY
Status: DISCONTINUED | OUTPATIENT
Start: 2018-09-27 | End: 2018-10-01 | Stop reason: HOSPADM

## 2018-09-27 RX ORDER — ROCURONIUM BROMIDE 10 MG/ML
INJECTION, SOLUTION INTRAVENOUS
Status: DISCONTINUED | OUTPATIENT
Start: 2018-09-27 | End: 2018-09-27

## 2018-09-27 RX ORDER — MORPHINE SULFATE 4 MG/ML
2 INJECTION, SOLUTION INTRAMUSCULAR; INTRAVENOUS EVERY 5 MIN PRN
Status: DISCONTINUED | OUTPATIENT
Start: 2018-09-27 | End: 2018-09-27

## 2018-09-27 RX ORDER — SUCCINYLCHOLINE CHLORIDE 20 MG/ML
INJECTION INTRAMUSCULAR; INTRAVENOUS
Status: DISCONTINUED | OUTPATIENT
Start: 2018-09-27 | End: 2018-09-27

## 2018-09-27 RX ORDER — SODIUM CHLORIDE, SODIUM LACTATE, POTASSIUM CHLORIDE, CALCIUM CHLORIDE 600; 310; 30; 20 MG/100ML; MG/100ML; MG/100ML; MG/100ML
INJECTION, SOLUTION INTRAVENOUS CONTINUOUS
Status: DISCONTINUED | OUTPATIENT
Start: 2018-09-27 | End: 2018-09-27

## 2018-09-27 RX ORDER — LIDOCAINE HYDROCHLORIDE 10 MG/ML
1 INJECTION, SOLUTION EPIDURAL; INFILTRATION; INTRACAUDAL; PERINEURAL ONCE
Status: DISCONTINUED | OUTPATIENT
Start: 2018-09-27 | End: 2018-09-27 | Stop reason: HOSPADM

## 2018-09-27 RX ORDER — PROPOFOL 10 MG/ML
VIAL (ML) INTRAVENOUS
Status: DISCONTINUED | OUTPATIENT
Start: 2018-09-27 | End: 2018-09-27

## 2018-09-27 RX ORDER — ACETAMINOPHEN 10 MG/ML
INJECTION, SOLUTION INTRAVENOUS
Status: DISCONTINUED | OUTPATIENT
Start: 2018-09-27 | End: 2018-09-27

## 2018-09-27 RX ORDER — ENOXAPARIN SODIUM 100 MG/ML
40 INJECTION SUBCUTANEOUS EVERY 24 HOURS
Status: DISCONTINUED | OUTPATIENT
Start: 2018-09-28 | End: 2018-09-28

## 2018-09-27 RX ORDER — EPHEDRINE SULFATE 50 MG/ML
INJECTION, SOLUTION INTRAVENOUS
Status: DISCONTINUED | OUTPATIENT
Start: 2018-09-27 | End: 2018-09-27

## 2018-09-27 RX ORDER — SODIUM CHLORIDE, SODIUM LACTATE, POTASSIUM CHLORIDE, CALCIUM CHLORIDE 600; 310; 30; 20 MG/100ML; MG/100ML; MG/100ML; MG/100ML
INJECTION, SOLUTION INTRAVENOUS CONTINUOUS
Status: DISCONTINUED | OUTPATIENT
Start: 2018-09-27 | End: 2018-09-28

## 2018-09-27 RX ORDER — NITROGLYCERIN 0.4 MG/1
0.4 TABLET SUBLINGUAL EVERY 5 MIN PRN
Status: DISCONTINUED | OUTPATIENT
Start: 2018-09-27 | End: 2018-10-01 | Stop reason: HOSPADM

## 2018-09-27 RX ORDER — SODIUM CHLORIDE 0.9 % (FLUSH) 0.9 %
3 SYRINGE (ML) INJECTION EVERY 8 HOURS
Status: DISCONTINUED | OUTPATIENT
Start: 2018-09-27 | End: 2018-09-27 | Stop reason: HOSPADM

## 2018-09-27 RX ORDER — DIPHENHYDRAMINE HYDROCHLORIDE 50 MG/ML
25 INJECTION INTRAMUSCULAR; INTRAVENOUS EVERY 4 HOURS PRN
Status: DISCONTINUED | OUTPATIENT
Start: 2018-09-27 | End: 2018-10-01 | Stop reason: HOSPADM

## 2018-09-27 RX ORDER — HYDRALAZINE HYDROCHLORIDE 20 MG/ML
10 INJECTION INTRAMUSCULAR; INTRAVENOUS EVERY 6 HOURS PRN
Status: DISCONTINUED | OUTPATIENT
Start: 2018-09-27 | End: 2018-10-01 | Stop reason: HOSPADM

## 2018-09-27 RX ORDER — OXYCODONE HYDROCHLORIDE 5 MG/1
5 TABLET ORAL
Status: DISCONTINUED | OUTPATIENT
Start: 2018-09-27 | End: 2018-09-27

## 2018-09-27 RX ORDER — HYDROMORPHONE HCL IN 0.9% NACL 6 MG/30 ML
PATIENT CONTROLLED ANALGESIA SYRINGE INTRAVENOUS CONTINUOUS
Status: DISCONTINUED | OUTPATIENT
Start: 2018-09-27 | End: 2018-09-29

## 2018-09-27 RX ORDER — ONDANSETRON 2 MG/ML
INJECTION INTRAMUSCULAR; INTRAVENOUS
Status: DISCONTINUED | OUTPATIENT
Start: 2018-09-27 | End: 2018-09-27

## 2018-09-27 RX ORDER — FENTANYL CITRATE 50 UG/ML
INJECTION, SOLUTION INTRAMUSCULAR; INTRAVENOUS
Status: DISCONTINUED | OUTPATIENT
Start: 2018-09-27 | End: 2018-09-27

## 2018-09-27 RX ORDER — HEPARIN SODIUM 5000 [USP'U]/ML
5000 INJECTION, SOLUTION INTRAVENOUS; SUBCUTANEOUS
Status: DISCONTINUED | OUTPATIENT
Start: 2018-09-27 | End: 2018-09-27 | Stop reason: HOSPADM

## 2018-09-27 RX ORDER — ACETAMINOPHEN 10 MG/ML
1000 INJECTION, SOLUTION INTRAVENOUS EVERY 8 HOURS
Status: COMPLETED | OUTPATIENT
Start: 2018-09-27 | End: 2018-09-28

## 2018-09-27 RX ORDER — MEPERIDINE HYDROCHLORIDE 50 MG/ML
12.5 INJECTION INTRAMUSCULAR; INTRAVENOUS; SUBCUTANEOUS ONCE AS NEEDED
Status: COMPLETED | OUTPATIENT
Start: 2018-09-27 | End: 2018-09-27

## 2018-09-27 RX ORDER — PANTOPRAZOLE SODIUM 40 MG/10ML
40 INJECTION, POWDER, LYOPHILIZED, FOR SOLUTION INTRAVENOUS
Status: DISCONTINUED | OUTPATIENT
Start: 2018-09-28 | End: 2018-10-01 | Stop reason: HOSPADM

## 2018-09-27 RX ORDER — DEXAMETHASONE SODIUM PHOSPHATE 4 MG/ML
INJECTION, SOLUTION INTRA-ARTICULAR; INTRALESIONAL; INTRAMUSCULAR; INTRAVENOUS; SOFT TISSUE
Status: DISCONTINUED | OUTPATIENT
Start: 2018-09-27 | End: 2018-09-27

## 2018-09-27 RX ORDER — LIDOCAINE HCL/PF 100 MG/5ML
SYRINGE (ML) INTRAVENOUS
Status: DISCONTINUED | OUTPATIENT
Start: 2018-09-27 | End: 2018-09-27

## 2018-09-27 RX ORDER — IPRATROPIUM BROMIDE AND ALBUTEROL SULFATE 2.5; .5 MG/3ML; MG/3ML
3 SOLUTION RESPIRATORY (INHALATION) EVERY 6 HOURS PRN
Status: DISCONTINUED | OUTPATIENT
Start: 2018-09-27 | End: 2018-10-01 | Stop reason: HOSPADM

## 2018-09-27 RX ORDER — ASPIRIN 325 MG
325 TABLET ORAL DAILY
Status: DISCONTINUED | OUTPATIENT
Start: 2018-09-28 | End: 2018-09-27

## 2018-09-27 RX ORDER — SODIUM CHLORIDE 0.9 % (FLUSH) 0.9 %
3 SYRINGE (ML) INJECTION
Status: DISCONTINUED | OUTPATIENT
Start: 2018-09-27 | End: 2018-10-01 | Stop reason: HOSPADM

## 2018-09-27 RX ORDER — NEOSTIGMINE METHYLSULFATE 1 MG/ML
INJECTION, SOLUTION INTRAVENOUS
Status: DISCONTINUED | OUTPATIENT
Start: 2018-09-27 | End: 2018-09-27

## 2018-09-27 RX ORDER — BUPIVACAINE HYDROCHLORIDE 5 MG/ML
INJECTION, SOLUTION EPIDURAL; INTRACAUDAL
Status: DISCONTINUED | OUTPATIENT
Start: 2018-09-27 | End: 2018-09-27 | Stop reason: HOSPADM

## 2018-09-27 RX ORDER — PHENYLEPHRINE HYDROCHLORIDE 10 MG/ML
INJECTION INTRAVENOUS
Status: DISCONTINUED | OUTPATIENT
Start: 2018-09-27 | End: 2018-09-27

## 2018-09-27 RX ORDER — LIDOCAINE HYDROCHLORIDE 10 MG/ML
INJECTION, SOLUTION EPIDURAL; INFILTRATION; INTRACAUDAL; PERINEURAL
Status: DISCONTINUED | OUTPATIENT
Start: 2018-09-27 | End: 2018-09-27 | Stop reason: HOSPADM

## 2018-09-27 RX ORDER — NALOXONE HCL 0.4 MG/ML
0.02 VIAL (ML) INJECTION
Status: DISCONTINUED | OUTPATIENT
Start: 2018-09-27 | End: 2018-10-01 | Stop reason: HOSPADM

## 2018-09-27 RX ORDER — INSULIN ASPART 100 [IU]/ML
0-5 INJECTION, SOLUTION INTRAVENOUS; SUBCUTANEOUS EVERY 6 HOURS PRN
Status: DISCONTINUED | OUTPATIENT
Start: 2018-09-27 | End: 2018-10-01 | Stop reason: HOSPADM

## 2018-09-27 RX ORDER — SODIUM CHLORIDE 9 MG/ML
INJECTION, SOLUTION INTRAVENOUS CONTINUOUS
Status: DISCONTINUED | OUTPATIENT
Start: 2018-09-27 | End: 2018-09-27

## 2018-09-27 RX ORDER — CEFAZOLIN SODIUM 1 G/50ML
3 SOLUTION INTRAVENOUS
Status: COMPLETED | OUTPATIENT
Start: 2018-09-27 | End: 2018-09-27

## 2018-09-27 RX ORDER — ASPIRIN 325 MG
325 TABLET ORAL ONCE
Status: DISCONTINUED | OUTPATIENT
Start: 2018-09-27 | End: 2018-10-01 | Stop reason: HOSPADM

## 2018-09-27 RX ORDER — METOCLOPRAMIDE HYDROCHLORIDE 5 MG/ML
10 INJECTION INTRAMUSCULAR; INTRAVENOUS EVERY 10 MIN PRN
Status: DISCONTINUED | OUTPATIENT
Start: 2018-09-27 | End: 2018-09-27 | Stop reason: HOSPADM

## 2018-09-27 RX ADMIN — EPHEDRINE SULFATE 10 MG: 50 INJECTION, SOLUTION INTRAMUSCULAR; INTRAVENOUS; SUBCUTANEOUS at 10:09

## 2018-09-27 RX ADMIN — NEOSTIGMINE METHYLSULFATE 5 MG: 1 INJECTION INTRAVENOUS at 12:09

## 2018-09-27 RX ADMIN — PHENYLEPHRINE HYDROCHLORIDE 200 MCG: 10 INJECTION INTRAVENOUS at 10:09

## 2018-09-27 RX ADMIN — PROPOFOL 100 MG: 10 INJECTION, EMULSION INTRAVENOUS at 10:09

## 2018-09-27 RX ADMIN — FENTANYL CITRATE 25 MCG: 50 INJECTION, SOLUTION INTRAMUSCULAR; INTRAVENOUS at 12:09

## 2018-09-27 RX ADMIN — ONDANSETRON 4 MG: 2 INJECTION INTRAMUSCULAR; INTRAVENOUS at 02:09

## 2018-09-27 RX ADMIN — FENTANYL CITRATE 25 MCG: 50 INJECTION, SOLUTION INTRAMUSCULAR; INTRAVENOUS at 01:09

## 2018-09-27 RX ADMIN — ROCURONIUM BROMIDE 45 MG: 10 INJECTION, SOLUTION INTRAVENOUS at 10:09

## 2018-09-27 RX ADMIN — SODIUM CHLORIDE, SODIUM LACTATE, POTASSIUM CHLORIDE, AND CALCIUM CHLORIDE: 600; 310; 30; 20 INJECTION, SOLUTION INTRAVENOUS at 10:09

## 2018-09-27 RX ADMIN — SODIUM CHLORIDE, SODIUM LACTATE, POTASSIUM CHLORIDE, AND CALCIUM CHLORIDE: .6; .31; .03; .02 INJECTION, SOLUTION INTRAVENOUS at 02:09

## 2018-09-27 RX ADMIN — MEPERIDINE HYDROCHLORIDE 12.5 MG: 50 INJECTION INTRAMUSCULAR; INTRAVENOUS; SUBCUTANEOUS at 01:09

## 2018-09-27 RX ADMIN — ROCURONIUM BROMIDE 20 MG: 10 INJECTION, SOLUTION INTRAVENOUS at 11:09

## 2018-09-27 RX ADMIN — FENTANYL CITRATE 100 MCG: 50 INJECTION, SOLUTION INTRAMUSCULAR; INTRAVENOUS at 10:09

## 2018-09-27 RX ADMIN — Medication: at 01:09

## 2018-09-27 RX ADMIN — DEXAMETHASONE SODIUM PHOSPHATE 8 MG: 4 INJECTION, SOLUTION INTRA-ARTICULAR; INTRALESIONAL; INTRAMUSCULAR; INTRAVENOUS; SOFT TISSUE at 11:09

## 2018-09-27 RX ADMIN — ACETAMINOPHEN 1000 MG: 10 INJECTION, SOLUTION INTRAVENOUS at 11:09

## 2018-09-27 RX ADMIN — ONDANSETRON 4 MG: 2 INJECTION, SOLUTION INTRAMUSCULAR; INTRAVENOUS at 12:09

## 2018-09-27 RX ADMIN — PROMETHAZINE HYDROCHLORIDE 6.25 MG: 25 INJECTION INTRAMUSCULAR; INTRAVENOUS at 04:09

## 2018-09-27 RX ADMIN — CHLORHEXIDINE GLUCONATE 10 ML: 1.2 RINSE ORAL at 08:09

## 2018-09-27 RX ADMIN — SODIUM CHLORIDE, SODIUM LACTATE, POTASSIUM CHLORIDE, AND CALCIUM CHLORIDE: 600; 310; 30; 20 INJECTION, SOLUTION INTRAVENOUS at 11:09

## 2018-09-27 RX ADMIN — HEPARIN SODIUM 5000 UNITS: 5000 INJECTION, SOLUTION INTRAVENOUS; SUBCUTANEOUS at 10:09

## 2018-09-27 RX ADMIN — PHENYLEPHRINE HYDROCHLORIDE 100 MCG: 10 INJECTION INTRAVENOUS at 10:09

## 2018-09-27 RX ADMIN — ROCURONIUM BROMIDE 5 MG: 10 INJECTION, SOLUTION INTRAVENOUS at 10:09

## 2018-09-27 RX ADMIN — SUCCINYLCHOLINE CHLORIDE 200 MG: 20 INJECTION, SOLUTION INTRAMUSCULAR; INTRAVENOUS at 10:09

## 2018-09-27 RX ADMIN — SODIUM CHLORIDE, SODIUM LACTATE, POTASSIUM CHLORIDE, AND CALCIUM CHLORIDE: .6; .31; .03; .02 INJECTION, SOLUTION INTRAVENOUS at 08:09

## 2018-09-27 RX ADMIN — EPHEDRINE SULFATE 15 MG: 50 INJECTION, SOLUTION INTRAMUSCULAR; INTRAVENOUS; SUBCUTANEOUS at 10:09

## 2018-09-27 RX ADMIN — LIDOCAINE HYDROCHLORIDE 100 MG: 20 INJECTION, SOLUTION INTRAVENOUS at 10:09

## 2018-09-27 RX ADMIN — CEFAZOLIN SODIUM 3 G: 1 SOLUTION INTRAVENOUS at 10:09

## 2018-09-27 RX ADMIN — ROBINUL 0.7 MG: 0.2 INJECTION INTRAMUSCULAR; INTRAVENOUS at 12:09

## 2018-09-27 RX ADMIN — ACETAMINOPHEN 1000 MG: 10 INJECTION, SOLUTION INTRAVENOUS at 06:09

## 2018-09-27 NOTE — ASSESSMENT & PLAN NOTE
- General surgery primary -- medical management per primary team  - S/p robotic sleeve gastrectomy today, 09/27/18, by Dr. Cesar  - Analgesics/Antiemetics PRN  - Encourage IS use

## 2018-09-27 NOTE — ANESTHESIA PROCEDURE NOTES
Intubation    Patient location during procedure: done in OR  Staffing  Resident/CRNA: Keila Britton CRNA  Performed: resident/CRNA   Preanesthetic Checklist  Completed: patient identified, site marked, surgical consent, pre-op evaluation, timeout performed, IV checked, risks and benefits discussed, monitors and equipment checked and anesthesia consent given  Intubation  Indication: airway protection  Pre-oxygenation. Induction: intravenous, mask ventilation: easy with oral airway.  Intubation: postinduction, laryngoscopy glidescope, Glidescope (4).  Endotracheal Tube: 8.0 mm ID, cuffed (inflated to minimal occlusive pressure)  Attempts: 1, Grade I - full view of cords  Complicating Factors: none  Tube secured at 23 cm at the lips.  Findings post-intubation: bilateral breath sounds, positive ETCO2, atraumatic / condition of teeth unchanged  Position Confirmation: auscultation  Eye Care: taped after induction / before airway management

## 2018-09-27 NOTE — PLAN OF CARE
Problem: Patient Care Overview  Goal: Plan of Care Review  Outcome: Ongoing (interventions implemented as appropriate)  Patient remained free from injury and IVF maintained. Patient received adequate dosing from PCA when needed. Patient experienced nausea after procedure and was treated.

## 2018-09-27 NOTE — ANESTHESIA POSTPROCEDURE EVALUATION
"Anesthesia Post Evaluation    Patient: Cole Doan    Procedure(s) Performed: Procedure(s) (LRB):  XI ROBOTIC SLEEVE GASTRECTOMY (N/A)    Final Anesthesia Type: general  Patient location during evaluation: PACU  Patient participation: Yes- Able to Participate  Level of consciousness: awake and alert  Post-procedure vital signs: reviewed and stable  Pain management: adequate  Airway patency: patent  PONV status at discharge: No PONV  Anesthetic complications: no      Cardiovascular status: blood pressure returned to baseline  Respiratory status: unassisted  Hydration status: euvolemic  Follow-up not needed.        Visit Vitals  BP (!) 158/76 (BP Location: Right arm, Patient Position: Lying)   Pulse 73   Temp 36.6 °C (97.8 °F) (Oral)   Resp 20   Ht 6' 1" (1.854 m)   Wt 112.5 kg (248 lb 0.3 oz)   SpO2 (!) 94%   BMI 32.72 kg/m²       Pain/Lorena Score: Pain Assessment Performed: Yes (9/27/2018  3:26 PM)  Presence of Pain: denies (9/27/2018  3:26 PM)  Pain Rating Prior to Med Admin: 6 (9/27/2018  2:21 PM)  Pain Rating Post Med Admin: 0 (9/27/2018  3:26 PM)  Lorena Score: 8 (9/27/2018  2:00 PM)        "

## 2018-09-27 NOTE — SUBJECTIVE & OBJECTIVE
"Past Medical History:   Diagnosis Date    Anticoagulant long-term use     Plavix    BPH (benign prostatic hypertrophy)     CAD (coronary artery disease)     HTN (hypertension) 5/15/2014    Hyperlipidemia     Obesity 5/15/2014    SATYA on CPAP     Pneumonia     PTSD (post-traumatic stress disorder) 5/15/2014    RBBB 5/15/2014    S/P PTCA (percutaneous transluminal coronary angioplasty) 5/15/2014    Special screening for malignant neoplasms, colon 1/22/2014    Type 2 diabetes mellitus without complication 9/28/2015       Past Surgical History:   Procedure Laterality Date    APPENDECTOMY      at age 14    ATRIAL CARDIAC PACEMAKER INSERTION      COLONOSCOPY Left 4/27/2018    Procedure: COLONOSCOPY;  Surgeon: Artem Medeiros MD;  Location: [facility];  Service: Endoscopy;  Laterality: Left;    COLONOSCOPY Left 4/27/2018    [facility] END[facility]    COLONOSCOPY N/A 5/14/2015    Performed by Travis Miguel MD at [facility] ENDO    COLONOSCOPY N/A 1/22/2014    Per[facility] by [facility] at [facility]    CORONARY ANGIOPLASTY WITH STENT PLACEMENT      1990, 2008, 2012    ESOPHAGOGASTRODUODENOSCOPY N/A 8/16/2018    Procedure: ESOPHAGOGASTRODUODENOSCOPY (EGD);  Surgeon: Mario Cesar MD;  Location: [facility];  Service: General;  Laterality: N/A;    ESOPHAGOGASTRODUODENOSCOPY (EGD) N/A 8/16/2018    Performed by [facility]esar MD at [facility] ENDO    HEART CATH-LEFT Left 9/28/2015    Performed by Eric Rutledge MD at [facility] CATH LAB    VASECTOMY         Review of patient's allergies indicates:   Allergen Reactions    Iodinated contrast- oral and iv dye      States, "My arteries started shutting down on me"       No current facility-administered medications on file prior to encounter.      Current Outpatient Medications on File Prior to Encounter   Medication Sig    alprazolam (XANAX) 0.5 MG tablet Take 0.5 mg by mouth 3 (three) times daily as needed.     amLODIPine (NORVASC) 2.5 MG tablet " "TAKE 1 TABLET BY MOUTH ONCE DAILY.    aspirin (ECOTRIN) 81 MG EC tablet Take 81 mg by mouth once daily.    atorvastatin (LIPITOR) 80 MG tablet Take 80 mg by mouth once daily. Take a half tablet daily     buPROPion (WELLBUTRIN SR) 200 MG TbSR Take 200 mg by mouth once daily.    clopidogrel (PLAVIX) 75 mg tablet TAKE 1 TABLET BY MOUTH EVERY DAY    dabigatran etexilate (PRADAXA) 150 mg Cap Take 150 mg by mouth once daily. "Do NOT break, chew, or open capsules."     furosemide (LASIX) 40 MG tablet Takes one-half tablet by mouth daily (Patient taking differently: Take 40 mg by mouth 2 (two) times daily. Takes 2 tablet by mouth daily)    gabapentin (NEURONTIN) 300 MG capsule Take 300 mg by mouth 3 (three) times daily.     ipratropium-albuterol (COMBIVENT RESPIMAT)  mcg/actuation inhaler Inhale 1 puff into the lungs 4 (four) times daily.    isosorbide mononitrate (IMDUR) 60 MG 24 hr tablet Take 1 tablet (60 mg total) by mouth 2 (two) times daily. (Patient taking differently: Take 60 mg by mouth once daily. )    liraglutide (SAXENDA) 3 mg/0.5 mL (18 mg/3 mL) PnIj Inject into the skin once daily.    metformin (GLUCOPHAGE) 1000 MG tablet Take 1,000 mg by mouth 2 (two) times daily with meals.    pantoprazole (PROTONIX) 40 MG tablet Take 1 tablet (40 mg total) by mouth 2 (two) times daily. for 14 days    ranolazine (RANEXA) 500 MG Tb12 Take 500 mg by mouth 2 (two) times daily.    vitamin D 1000 units Tab Take 2,000 Units by mouth once daily.    cyanocobalamin (VITAMIN B-12) 1000 MCG tablet Take 100 mcg by mouth once daily.    terbinafine HCl (LAMISIL) 250 mg tablet Take 250 mg by mouth once daily.     Family History     Problem Relation (Age of Onset)    Cataracts Father    Heart disease Father, Mother    Hypertension Father, Mother        Tobacco Use    Smoking status: Former Smoker     Packs/day: 1.00     Years: 40.00     Pack years: 40.00     Types: Cigarettes     Start date: 1965     Last attempt to " quit: 2009     Years since quittin.7    Smokeless tobacco: Never Used   Substance and Sexual Activity    Alcohol use: No    Drug use: No    Sexual activity: Not on file     Review of Systems   Constitutional: Positive for activity change. Negative for chills and fever.   HENT: Negative.    Eyes: Negative.    Respiratory: Negative for apnea, cough, choking, chest tightness, shortness of breath, wheezing and stridor.    Cardiovascular: Negative for chest pain, palpitations and leg swelling.   Gastrointestinal: Positive for abdominal pain and nausea. Negative for constipation, diarrhea and vomiting.   Endocrine: Negative.    Genitourinary: Negative for decreased urine volume, difficulty urinating, dysuria, frequency, hematuria and urgency.   Musculoskeletal: Negative for arthralgias, back pain, myalgias, neck pain and neck stiffness.   Allergic/Immunologic: Negative.    Neurological: Negative for dizziness, tremors, seizures, syncope, speech difficulty, weakness and headaches.   Hematological: Negative.    Psychiatric/Behavioral: Negative.      Objective:     Vital Signs (Most Recent):  Temp: 97.6 °F (36.4 °C) (18 1526)  Pulse: 67 (18 1526)  Resp: 16 (18 1526)  BP: (!) 149/72 (18 1526)  SpO2: 95 % (18 1526) Vital Signs (24h Range):  Temp:  [97.5 °F (36.4 °C)-98 °F (36.7 °C)] 97.6 °F (36.4 °C)  Pulse:  [60-72] 67  Resp:  [11-20] 16  SpO2:  [93 %-99 %] 95 %  BP: (112-167)/(66-77) 149/72     Weight: 112.5 kg (248 lb 0.3 oz)  Body mass index is 32.72 kg/m².    Physical Exam   Constitutional: He is oriented to person, place, and time. He appears well-developed and well-nourished. He is cooperative. He is easily aroused.   HENT:   Head: Normocephalic and atraumatic.   Eyes: EOM are normal.   Neck: Neck supple.   Cardiovascular: Normal rate, regular rhythm, normal heart sounds and intact distal pulses.   No murmur heard.  Pulmonary/Chest: Effort normal and breath sounds normal. No  stridor. No respiratory distress. He has no wheezes. He has no rales. He exhibits no tenderness.   Abdominal: Normal appearance. Bowel sounds are decreased. There is generalized tenderness.   4 laparoscopic sites   Genitourinary:   Genitourinary Comments: Deferred   Musculoskeletal: He exhibits no edema, tenderness or deformity.   Neurological: He is alert, oriented to person, place, and time and easily aroused. No sensory deficit.   Skin: Skin is warm and dry. Capillary refill takes less than 2 seconds.   Psychiatric: He has a normal mood and affect. His behavior is normal. Judgment and thought content normal.   Nursing note and vitals reviewed.      Significant Labs:   POCT Glucose:   Recent Labs   Lab  09/27/18   0806  09/27/18   1323   POCTGLUCOSE  93  148*       Significant Imaging: I have reviewed all pertinent imaging results/findings within the past 24 hours.

## 2018-09-27 NOTE — ASSESSMENT & PLAN NOTE
- Resume Statin once diet advanced. Will defer ASA and Pradaxa to primary on when to restart these medications post op

## 2018-09-27 NOTE — INTERVAL H&P NOTE
The patient has been examined and the H&P has been reviewed:    I concur with the findings and no changes have occurred since H&P was written.    Anesthesia/Surgery risks, benefits and alternative options discussed and understood by patient/family.          Active Hospital Problems    Diagnosis  POA    Morbid obesity [E66.01]  Yes     Chronic      Resolved Hospital Problems   No resolved problems to display.

## 2018-09-27 NOTE — OP NOTE
[facility]  Surgery Department  Operative Note    SUMMARY     Date of Procedure: 9/27/2018     Procedure: Procedure(s) (LRB):  XI ROBOTIC SLEEVE GASTRECTOMY (N/A)     Surgeon(s) and Role:     * Mario Cesar MD - Primary    Assisting Surgeon:  Louis Jeansonne, MD    Pre-Operative Diagnosis: Morbid obesity [E66.01]  Essential hypertension [I10]  CAD S/P percutaneous coronary angioplasty [I25.10, Z98.61]  SATYA on CPAP [G47.33, Z99.89]  Atherosclerosis of native coronary artery with angina pectoris, unspecified whether native or transplanted heart [I25.119]  Hyperlipidemia, unspecified hyperlipidemia type [E78.5]  Type 2 diabetes mellitus without complication, without long-term current use of insulin [E11.9]    Post-Operative Diagnosis: Post-Op Diagnosis Codes:     * Morbid obesity [E66.01]     * Essential hypertension [I10]     * CAD S/P percutaneous coronary angioplasty [I25.10, Z98.61]     * SATYA on CPAP [G47.33, Z99.89]     * Atherosclerosis of native coronary artery with angina pectoris, unspecified whether native or transplanted heart [I25.119]     * Hyperlipidemia, unspecified hyperlipidemia type [E78.5]     * Type 2 diabetes mellitus without complication, without long-term current use of insulin [E11.9]    Anesthesia: General    Technical Procedures Used:  Robotic sleeve gastrectomy    Description of the Findings of the Procedure:  Gastric sleeve    Complications: No    Estimated Blood Loss (EBL): 15 mL           Implants: * No implants in log *    Specimens:   Specimen (12h ago, onward)    Start     Ordered    09/27/18 1223  Specimen to Pathology - Surgery  Once     Comments:  1.) greater curvature of stomach-permDx.) Morbid obesity     Start Status   09/27/18 1223 Collected (09/27/18 1223)       09/27/18 1222                  Condition: Good    Disposition: PACU - hemodynamically stable.    Procedure in detail:  The patient was placed under general anesthesia. Pre-op antibiotics were given  within an hour before the incision and SCD's were placed for DVT prophylaxis. The abdomen was prepped and draped in sterile fashion. General anesthesia was applied with lidocaine.     An incision was made to the right of the umbilicus. The fascia was elevated and the Veress needle was inserted. Proper position was confirmed by aspiration and saline meniscus test. The abdomen was insufflated with carbon dioxide to a pressure of 15 mmHg. An 8-mm trocar was then inserted under direct vision using the optiview technique. 8 mm trocars were also placed in the left mid abdomen x 2. A 5mm trocar was placed in the right subcostal area, and a 12 mm trocar was placed in the right mid abdomen. A liver retractor was placed through the 5 mm port.     The short gastric vessels were divided with the vessel sealer from 5 cm proximal to the pylorus to the GE junction. A 38-Czech bougie was then placed orally and positioned against the lesser curvature. A vertical sleeve gastrectomy was then performed. Starting 5 cm proximal to the pylorus, the stomach was stapled, keeping the staple line several centimeters from the lesser curve when inferior to the angularis. Superior to the angularis, the staple line was placed against the bougie. The final staple firing was 1 cm lateral to the bougie in order to avoid stapling esophageal tissue. Two green loads were used on the inferior aspect of the staple line, and the rest were blue loads.  There was good hemostasis.     The superior aspect of the staple line was reinforced using strata fix absorbable suture.  The greater curve of the stomach was removed.   The liver retractor and trocars were removed.   The 12 mm trocar site was closed with 0 vicryl.  Skin incisions were closed with 4-0 Monocryl and acrylate adhesive.  The patient tolerated the procedure in a stable and satisfactory condition and was transferred to recovery postoperatively

## 2018-09-27 NOTE — HPI
Cole Doan is a 68 year old with a PMHx of DM II, SATYA on CPAP, HLD, CAD, and BPH admitted by General surgery s/p robotic sleeve gastrectomy performed by Dr. Cesar on today, 09/27/18. EBL 15 mL. HM consulted for medical management.
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
## 2018-09-27 NOTE — CONSULTS
[facility] Hospital Medicine  Consult Note    Patient Name: Cole Doan  MRN: [id number]  Admission Date: 9/27/2018  Hospital Length of Stay: 0 days  Attending Physician: Mario Cesar MD   Primary Care Provider: Yoly Spring MD           Patient information was obtained from patient and ER records.     Inpatient consult to Internal Medicine  Consult performed by: MICHELLE uFng  Consult ordered by: Mario Cesar MD  Reason for consult: Medical management        Subjective:     Principal Problem: Morbid obesity    Chief Complaint: No chief complaint on file.       HPI: Cole Doan is a 68 year old with a PMHx of DM II, SATYA on CPAP, HLD, CAD, and BPH admitted by General surgery s/p robotic sleeve gastrectomy performed by Dr. Cesar on today, 09/27/18. EBL 15 mL. HM consulted for medical management.     Past Medical History:   Diagnosis Date    Anticoagulant long-term use     Plavix    BPH (benign prostatic hypertrophy)     CAD (coronary artery disease)     HTN (hypertension) 5/15/2014    Hyperlipidemia     Obesity 5/15/2014    SATYA on CPAP     Pneumonia     PTSD (post-traumatic stress disorder) 5/15/2014    RBBB 5/15/2014    S/P PTCA (percutaneous transluminal coronary angioplasty) 5/15/2014    Special screening for malignant neoplasms, colon 1/22/2014    Type 2 diabetes mellitus without complication 9/28/2015       Past Surgical History:   Procedure Laterality Date    APPENDECTOMY      at age 14    ATRIAL CARDIAC PACEMAKER INSERTION      COLONOSCOPY Left 4/27/2018    Procedure: COLONOSCOPY;  Surgeon: Artem Medeiros MD;  Location: [facility];  Service: Endoscopy;  Laterality: Left;    COLONOSCOPY Left 4/27/2018    [facility]em [facility] at [facility] ENDO    COLONOSCOPY N/A 5/14/2015    Performed by Travis Miguel MD at [facility] ENDO    COLONOSCOPY N/A 1/22/2014    Performed by Artem Medeiros MD at [facility]    CORONARY ANGIOPLASTY WITH STENT PLACEMENT      1990,  "2008, 2012    ESOPHAGOGASTRODUODENOSCOPY N/A 8/16/2018    Procedure: ESOPHAGOGASTRODUODENOSCOPY (EGD);  Surgeon: Mario Cesar MD;  Location: [facility];  Service: General;  Laterality: N/A;    ESOPHAGOGASTRODUODENOSCOPY (EGD) N/A 8/16/2018    Performed by [facility] at [facility] ENDO    HEART CATH-LEFT Left 9/28/2015    Performed by Eric Rutledge MD at [facility] CATH LAB    VASECTOMY         Review of patient's allergies indicates:   Allergen Reactions    Iodinated contrast- oral and iv dye      States, "My arteries started shutting down on me"       No current facility-administered medications on file prior to encounter.      Current Outpatient Medications on File Prior to Encounter   Medication Sig    alprazolam (XANAX) 0.5 MG tablet Take 0.5 mg by mouth 3 (three) times daily as needed.     amLODIPine (NORVASC) 2.5 MG tablet TAKE 1 TABLET BY MOUTH ONCE DAILY.    aspirin (ECOTRIN) 81 MG EC tablet Take 81 mg by mouth once daily.    atorvastatin (LIPITOR) 80 MG tablet Take 80 mg by mouth once daily. Take a half tablet daily     buPROPion (WELLBUTRIN SR) 200 MG TbSR Take 200 mg by mouth once daily.    clopidogrel (PLAVIX) 75 mg tablet TAKE 1 TABLET BY MOUTH EVERY DAY    dabigatran etexilate (PRADAXA) 150 mg Cap Take 150 mg by mouth once daily. "Do NOT break, chew, or open capsules."     furosemide (LASIX) 40 MG tablet Takes one-half tablet by mouth daily (Patient taking differently: Take 40 mg by mouth 2 (two) times daily. Takes 2 tablet by mouth daily)    gabapentin (NEURONTIN) 300 MG capsule Take 300 mg by mouth 3 (three) times daily.     ipratropium-albuterol (COMBIVENT RESPIMAT)  mcg/actuation inhaler Inhale 1 puff into the lungs 4 (four) times daily.    isosorbide mononitrate (IMDUR) 60 MG 24 hr tablet Take 1 tablet (60 mg total) by mouth 2 (two) times daily. (Patient taking differently: Take 60 mg by mouth once daily. )    liraglutide (SAXENDA) 3 mg/0.5 mL (18 mg/3 mL) Veronica Inject into the " skin once daily.    metformin (GLUCOPHAGE) 1000 MG tablet Take 1,000 mg by mouth 2 (two) times daily with meals.    pantoprazole (PROTONIX) 40 MG tablet Take 1 tablet (40 mg total) by mouth 2 (two) times daily. for 14 days    ranolazine (RANEXA) 500 MG Tb12 Take 500 mg by mouth 2 (two) times daily.    vitamin D 1000 units Tab Take 2,000 Units by mouth once daily.    cyanocobalamin (VITAMIN B-12) 1000 MCG tablet Take 100 mcg by mouth once daily.    terbinafine HCl (LAMISIL) 250 mg tablet Take 250 mg by mouth once daily.     Family History     Problem Relation (Age of Onset)    Cataracts Father    Heart disease Father, Mother    Hypertension Father, Mother        Tobacco Use    Smoking status: Former Smoker     Packs/day: 1.00     Years: 40.00     Pack years: 40.00     Types: Cigarettes     Start date:      Last attempt to quit: 2009     Years since quittin.7    Smokeless tobacco: Never Used   Substance and Sexual Activity    Alcohol use: No    Drug use: No    Sexual activity: Not on file     Review of Systems   Constitutional: Positive for activity change. Negative for chills and fever.   HENT: Negative.    Eyes: Negative.    Respiratory: Negative for apnea, cough, choking, chest tightness, shortness of breath, wheezing and stridor.    Cardiovascular: Negative for chest pain, palpitations and leg swelling.   Gastrointestinal: Positive for abdominal pain and nausea. Negative for constipation, diarrhea and vomiting.   Endocrine: Negative.    Genitourinary: Negative for decreased urine volume, difficulty urinating, dysuria, frequency, hematuria and urgency.   Musculoskeletal: Negative for arthralgias, back pain, myalgias, neck pain and neck stiffness.   Allergic/Immunologic: Negative.    Neurological: Negative for dizziness, tremors, seizures, syncope, speech difficulty, weakness and headaches.   Hematological: Negative.    Psychiatric/Behavioral: Negative.      Objective:     Vital Signs (Most  Recent):  Temp: 97.6 °F (36.4 °C) (09/27/18 1526)  Pulse: 67 (09/27/18 1526)  Resp: 16 (09/27/18 1526)  BP: (!) 149/72 (09/27/18 1526)  SpO2: 95 % (09/27/18 1526) Vital Signs (24h Range):  Temp:  [97.5 °F (36.4 °C)-98 °F (36.7 °C)] 97.6 °F (36.4 °C)  Pulse:  [60-72] 67  Resp:  [11-20] 16  SpO2:  [93 %-99 %] 95 %  BP: (112-167)/(66-77) 149/72     Weight: 112.5 kg (248 lb 0.3 oz)  Body mass index is 32.72 kg/m².    Physical Exam   Constitutional: He is oriented to person, place, and time. He appears well-developed and well-nourished. He is cooperative. He is easily aroused.   HENT:   Head: Normocephalic and atraumatic.   Eyes: EOM are normal.   Neck: Neck supple.   Cardiovascular: Normal rate, regular rhythm, normal heart sounds and intact distal pulses.   No murmur heard.  Pulmonary/Chest: Effort normal and breath sounds normal. No stridor. No respiratory distress. He has no wheezes. He has no rales. He exhibits no tenderness.   Abdominal: Normal appearance. Bowel sounds are decreased. There is generalized tenderness.   4 laparoscopic sites   Genitourinary:   Genitourinary Comments: Deferred   Musculoskeletal: He exhibits no edema, tenderness or deformity.   Neurological: He is alert, oriented to person, place, and time and easily aroused. No sensory deficit.   Skin: Skin is warm and dry. Capillary refill takes less than 2 seconds.   Psychiatric: He has a normal mood and affect. His behavior is normal. Judgment and thought content normal.   Nursing note and vitals reviewed.      Significant Labs:   POCT Glucose:   Recent Labs   Lab  09/27/18   0806  09/27/18   1323   POCTGLUCOSE  93  148*       Significant Imaging: I have reviewed all pertinent imaging results/findings within the past 24 hours.    Assessment/Plan:     * Morbid obesity    - General surgery primary -- medical management per primary team  - S/p robotic sleeve gastrectomy today, 09/27/18, by Dr. Cesar  - Analgesics/Antiemetics PRN  - Encourage IS use           HTN (hypertension)    - Resume home medication, including Amlodipine, once diet advanced  - IV Hydralazine PRN          Type 2 diabetes mellitus without complication    - Accuchecks and low dose SSI  - Continue Metformin upon discharge          CAD S/P percutaneous coronary angioplasty    - Resume Statin once diet advanced. Will defer ASA and Pradaxa to primary on when to restart these medications post op          Hyperlipidemia    - Resume Statin once diet advanced          SATYA on CPAP    - Resume nightly CPAP -- titrate to patient's comfort and O2 saturation          PTSD (post-traumatic stress disorder)    - Continue home medication, PO Wellbutrin, once diet advanced          Mixed restrictive and obstructive lung disease    - Supplemental oxygen prn, keep O2 sats > 88%  - Duonebs PRN            VTE Risk Mitigation (From admission, onward)        Ordered     enoxaparin injection 40 mg  Daily      09/27/18 7083              Thank you for your consult. I will follow-up with patient. Please contact us if you have any additional questions.    Brielle Mondragon, MICHELLE  Department of Hospital Medicine   [facility]

## 2018-09-28 PROBLEM — I21.4 NSTEMI (NON-ST ELEVATED MYOCARDIAL INFARCTION): Status: ACTIVE | Noted: 2018-09-28

## 2018-09-28 LAB
ANION GAP SERPL CALC-SCNC: 11 MMOL/L
APTT BLDCRRT: 26.2 SEC
APTT BLDCRRT: 28.1 SEC
APTT BLDCRRT: 39.7 SEC
APTT BLDCRRT: 40.4 SEC
BASOPHILS # BLD AUTO: 0 K/UL
BASOPHILS # BLD AUTO: 0 K/UL
BASOPHILS NFR BLD: 0 %
BASOPHILS NFR BLD: 0 %
BUN SERPL-MCNC: 19 MG/DL
CALCIUM SERPL-MCNC: 9.2 MG/DL
CHLORIDE SERPL-SCNC: 105 MMOL/L
CO2 SERPL-SCNC: 23 MMOL/L
CREAT SERPL-MCNC: 0.8 MG/DL
DIASTOLIC DYSFUNCTION: NO
DIFFERENTIAL METHOD: ABNORMAL
DIFFERENTIAL METHOD: ABNORMAL
EOSINOPHIL # BLD AUTO: 0 K/UL
EOSINOPHIL # BLD AUTO: 0 K/UL
EOSINOPHIL NFR BLD: 0 %
EOSINOPHIL NFR BLD: 0 %
ERYTHROCYTE [DISTWIDTH] IN BLOOD BY AUTOMATED COUNT: 14.2 %
ERYTHROCYTE [DISTWIDTH] IN BLOOD BY AUTOMATED COUNT: 14.5 %
ERYTHROCYTE [DISTWIDTH] IN BLOOD BY AUTOMATED COUNT: 14.5 %
EST. GFR  (AFRICAN AMERICAN): >60 ML/MIN/1.73 M^2
EST. GFR  (NON AFRICAN AMERICAN): >60 ML/MIN/1.73 M^2
ESTIMATED PA SYSTOLIC PRESSURE: 24.34
GLUCOSE SERPL-MCNC: 118 MG/DL
HCT VFR BLD AUTO: 41.8 %
HCT VFR BLD AUTO: 43.4 %
HCT VFR BLD AUTO: 43.4 %
HGB BLD-MCNC: 14.6 G/DL
HGB BLD-MCNC: 14.6 G/DL
HGB BLD-MCNC: 14.7 G/DL
INR PPP: 1
LYMPHOCYTES # BLD AUTO: 0.9 K/UL
LYMPHOCYTES # BLD AUTO: 1.1 K/UL
LYMPHOCYTES NFR BLD: 12.5 %
LYMPHOCYTES NFR BLD: 14.1 %
MCH RBC QN AUTO: 31.3 PG
MCH RBC QN AUTO: 31.3 PG
MCH RBC QN AUTO: 32 PG
MCHC RBC AUTO-ENTMCNC: 33.6 G/DL
MCHC RBC AUTO-ENTMCNC: 33.6 G/DL
MCHC RBC AUTO-ENTMCNC: 35.2 G/DL
MCV RBC AUTO: 91 FL
MCV RBC AUTO: 93 FL
MCV RBC AUTO: 93 FL
MONOCYTES # BLD AUTO: 0.4 K/UL
MONOCYTES # BLD AUTO: 0.8 K/UL
MONOCYTES NFR BLD: 5.6 %
MONOCYTES NFR BLD: 9.8 %
NEUTROPHILS # BLD AUTO: 5.9 K/UL
NEUTROPHILS # BLD AUTO: 6 K/UL
NEUTROPHILS NFR BLD: 76.1 %
NEUTROPHILS NFR BLD: 81.9 %
PLATELET # BLD AUTO: 180 K/UL
PLATELET # BLD AUTO: 180 K/UL
PLATELET # BLD AUTO: 193 K/UL
PMV BLD AUTO: 9.3 FL
PMV BLD AUTO: 9.7 FL
PMV BLD AUTO: 9.7 FL
POCT GLUCOSE: 103 MG/DL (ref 70–110)
POCT GLUCOSE: 122 MG/DL (ref 70–110)
POCT GLUCOSE: 95 MG/DL (ref 70–110)
POCT GLUCOSE: 96 MG/DL (ref 70–110)
POTASSIUM SERPL-SCNC: 4 MMOL/L
PROTHROMBIN TIME: 10.6 SEC
RBC # BLD AUTO: 4.59 M/UL
RBC # BLD AUTO: 4.66 M/UL
RBC # BLD AUTO: 4.66 M/UL
RETIRED EF AND QEF - SEE NOTES: 60 (ref 55–65)
SODIUM SERPL-SCNC: 139 MMOL/L
TROPONIN I SERPL DL<=0.01 NG/ML-MCNC: 0.57 NG/ML
TROPONIN I SERPL DL<=0.01 NG/ML-MCNC: 0.79 NG/ML
TROPONIN I SERPL DL<=0.01 NG/ML-MCNC: 1 NG/ML
TROPONIN I SERPL DL<=0.01 NG/ML-MCNC: 1.1 NG/ML
WBC # BLD AUTO: 7.26 K/UL
WBC # BLD AUTO: 7.89 K/UL
WBC # BLD AUTO: 7.89 K/UL

## 2018-09-28 PROCEDURE — 84484 ASSAY OF TROPONIN QUANT: CPT | Mod: 91

## 2018-09-28 PROCEDURE — 25000003 PHARM REV CODE 250: Performed by: INTERNAL MEDICINE

## 2018-09-28 PROCEDURE — 99900037 HC PT THERAPY SCREENING (STAT)

## 2018-09-28 PROCEDURE — 36415 COLL VENOUS BLD VENIPUNCTURE: CPT

## 2018-09-28 PROCEDURE — 99291 CRITICAL CARE FIRST HOUR: CPT | Mod: ,,, | Performed by: NURSE PRACTITIONER

## 2018-09-28 PROCEDURE — 93010 ELECTROCARDIOGRAM REPORT: CPT | Mod: ,,, | Performed by: INTERNAL MEDICINE

## 2018-09-28 PROCEDURE — 27000221 HC OXYGEN, UP TO 24 HOURS

## 2018-09-28 PROCEDURE — 63600175 PHARM REV CODE 636 W HCPCS: Performed by: HOSPITALIST

## 2018-09-28 PROCEDURE — 93005 ELECTROCARDIOGRAM TRACING: CPT

## 2018-09-28 PROCEDURE — 85025 COMPLETE CBC W/AUTO DIFF WBC: CPT

## 2018-09-28 PROCEDURE — 85610 PROTHROMBIN TIME: CPT

## 2018-09-28 PROCEDURE — 85730 THROMBOPLASTIN TIME PARTIAL: CPT | Mod: 91

## 2018-09-28 PROCEDURE — 25000003 PHARM REV CODE 250: Performed by: SURGERY

## 2018-09-28 PROCEDURE — 97802 MEDICAL NUTRITION INDIV IN: CPT

## 2018-09-28 PROCEDURE — 25000003 PHARM REV CODE 250: Performed by: HOSPITALIST

## 2018-09-28 PROCEDURE — C9113 INJ PANTOPRAZOLE SODIUM, VIA: HCPCS | Performed by: SURGERY

## 2018-09-28 PROCEDURE — A4216 STERILE WATER/SALINE, 10 ML: HCPCS | Performed by: ANESTHESIOLOGY

## 2018-09-28 PROCEDURE — 94770 HC EXHALED C02 TEST: CPT

## 2018-09-28 PROCEDURE — 25000003 PHARM REV CODE 250: Performed by: PHYSICIAN ASSISTANT

## 2018-09-28 PROCEDURE — 85730 THROMBOPLASTIN TIME PARTIAL: CPT

## 2018-09-28 PROCEDURE — 94799 UNLISTED PULMONARY SVC/PX: CPT

## 2018-09-28 PROCEDURE — 20000000 HC ICU ROOM

## 2018-09-28 PROCEDURE — 80048 BASIC METABOLIC PNL TOTAL CA: CPT

## 2018-09-28 PROCEDURE — 63600175 PHARM REV CODE 636 W HCPCS: Performed by: SURGERY

## 2018-09-28 PROCEDURE — C8923 2D TTE W OR W/O FOL W/CON,CO: HCPCS

## 2018-09-28 PROCEDURE — 25500020 PHARM REV CODE 255: Performed by: SURGERY

## 2018-09-28 PROCEDURE — 25000003 PHARM REV CODE 250: Performed by: ANESTHESIOLOGY

## 2018-09-28 PROCEDURE — 93307 TTE W/O DOPPLER COMPLETE: CPT | Mod: 26,,, | Performed by: INTERNAL MEDICINE

## 2018-09-28 PROCEDURE — 99900035 HC TECH TIME PER 15 MIN (STAT)

## 2018-09-28 PROCEDURE — 94760 N-INVAS EAR/PLS OXIMETRY 1: CPT

## 2018-09-28 PROCEDURE — 99291 CRITICAL CARE FIRST HOUR: CPT | Mod: ,,, | Performed by: INTERNAL MEDICINE

## 2018-09-28 PROCEDURE — 84484 ASSAY OF TROPONIN QUANT: CPT

## 2018-09-28 PROCEDURE — 25000003 PHARM REV CODE 250: Performed by: NURSE PRACTITIONER

## 2018-09-28 RX ORDER — ATORVASTATIN CALCIUM 40 MG/1
80 TABLET, FILM COATED ORAL NIGHTLY
Status: DISCONTINUED | OUTPATIENT
Start: 2018-09-28 | End: 2018-10-01 | Stop reason: HOSPADM

## 2018-09-28 RX ORDER — ALPRAZOLAM 1 MG/1
1 TABLET ORAL DAILY
Status: DISCONTINUED | OUTPATIENT
Start: 2018-09-29 | End: 2018-10-01 | Stop reason: HOSPADM

## 2018-09-28 RX ORDER — METOPROLOL TARTRATE 25 MG/1
25 TABLET, FILM COATED ORAL 2 TIMES DAILY
Status: DISCONTINUED | OUTPATIENT
Start: 2018-09-28 | End: 2018-10-01 | Stop reason: HOSPADM

## 2018-09-28 RX ORDER — NITROGLYCERIN 20 MG/100ML
5 INJECTION INTRAVENOUS CONTINUOUS
Status: DISCONTINUED | OUTPATIENT
Start: 2018-09-28 | End: 2018-09-29

## 2018-09-28 RX ORDER — RANOLAZINE 500 MG/1
500 TABLET, EXTENDED RELEASE ORAL 2 TIMES DAILY
Status: DISCONTINUED | OUTPATIENT
Start: 2018-09-28 | End: 2018-10-01 | Stop reason: HOSPADM

## 2018-09-28 RX ORDER — HEPARIN SODIUM,PORCINE/D5W 25000/250
12 INTRAVENOUS SOLUTION INTRAVENOUS CONTINUOUS
Status: DISCONTINUED | OUTPATIENT
Start: 2018-09-28 | End: 2018-09-30

## 2018-09-28 RX ORDER — BUPROPION HYDROCHLORIDE 100 MG/1
200 TABLET, EXTENDED RELEASE ORAL DAILY
Status: DISCONTINUED | OUTPATIENT
Start: 2018-09-28 | End: 2018-10-01 | Stop reason: HOSPADM

## 2018-09-28 RX ORDER — ALPRAZOLAM 0.5 MG/1
0.5 TABLET ORAL ONCE
Status: COMPLETED | OUTPATIENT
Start: 2018-09-28 | End: 2018-09-28

## 2018-09-28 RX ORDER — AMLODIPINE BESYLATE 2.5 MG/1
2.5 TABLET ORAL DAILY
Status: DISCONTINUED | OUTPATIENT
Start: 2018-09-28 | End: 2018-10-01 | Stop reason: HOSPADM

## 2018-09-28 RX ORDER — ATORVASTATIN CALCIUM 40 MG/1
80 TABLET, FILM COATED ORAL DAILY
Status: DISCONTINUED | OUTPATIENT
Start: 2018-09-28 | End: 2018-09-28

## 2018-09-28 RX ORDER — METOPROLOL TARTRATE 25 MG/1
12.5 TABLET ORAL 2 TIMES DAILY
Status: DISCONTINUED | OUTPATIENT
Start: 2018-09-28 | End: 2018-09-28

## 2018-09-28 RX ORDER — ALPRAZOLAM 0.5 MG/1
0.5 TABLET ORAL NIGHTLY
Status: DISCONTINUED | OUTPATIENT
Start: 2018-09-28 | End: 2018-10-01 | Stop reason: HOSPADM

## 2018-09-28 RX ORDER — ASPIRIN 81 MG/1
81 TABLET ORAL DAILY
Status: DISCONTINUED | OUTPATIENT
Start: 2018-09-28 | End: 2018-10-01 | Stop reason: HOSPADM

## 2018-09-28 RX ORDER — CLOPIDOGREL BISULFATE 75 MG/1
75 TABLET ORAL DAILY
Status: DISCONTINUED | OUTPATIENT
Start: 2018-09-28 | End: 2018-10-01 | Stop reason: HOSPADM

## 2018-09-28 RX ADMIN — ACETAMINOPHEN 1000 MG: 10 INJECTION, SOLUTION INTRAVENOUS at 05:09

## 2018-09-28 RX ADMIN — DIPHENHYDRAMINE HYDROCHLORIDE 25 MG: 50 INJECTION, SOLUTION INTRAMUSCULAR; INTRAVENOUS at 04:09

## 2018-09-28 RX ADMIN — NITROGLYCERIN 0.4 MG: 0.4 TABLET, ORALLY DISINTEGRATING SUBLINGUAL at 08:09

## 2018-09-28 RX ADMIN — ALPRAZOLAM 0.5 MG: 0.5 TABLET ORAL at 08:09

## 2018-09-28 RX ADMIN — NITROGLYCERIN 0.4 MG: 0.4 TABLET, ORALLY DISINTEGRATING SUBLINGUAL at 05:09

## 2018-09-28 RX ADMIN — HEPARIN SODIUM AND DEXTROSE 12 UNITS/KG/HR: 10000; 5 INJECTION INTRAVENOUS at 03:09

## 2018-09-28 RX ADMIN — METOPROLOL TARTRATE 25 MG: 25 TABLET ORAL at 08:09

## 2018-09-28 RX ADMIN — SODIUM CHLORIDE 3 ML: 9 INJECTION, SOLUTION INTRAMUSCULAR; INTRAVENOUS; SUBCUTANEOUS at 04:09

## 2018-09-28 RX ADMIN — BUPROPION HYDROCHLORIDE 200 MG: 100 TABLET, FILM COATED, EXTENDED RELEASE ORAL at 01:09

## 2018-09-28 RX ADMIN — CHLORHEXIDINE GLUCONATE 10 ML: 1.2 RINSE ORAL at 09:09

## 2018-09-28 RX ADMIN — RANOLAZINE 500 MG: 500 TABLET, FILM COATED, EXTENDED RELEASE ORAL at 08:09

## 2018-09-28 RX ADMIN — AMLODIPINE BESYLATE 2.5 MG: 2.5 TABLET ORAL at 03:09

## 2018-09-28 RX ADMIN — ONDANSETRON 4 MG: 2 INJECTION INTRAMUSCULAR; INTRAVENOUS at 04:09

## 2018-09-28 RX ADMIN — ATORVASTATIN CALCIUM 80 MG: 40 TABLET, FILM COATED ORAL at 03:09

## 2018-09-28 RX ADMIN — NITROGLYCERIN 1 INCH: 20 OINTMENT TOPICAL at 09:09

## 2018-09-28 RX ADMIN — ALPRAZOLAM 0.5 MG: 0.5 TABLET ORAL at 04:09

## 2018-09-28 RX ADMIN — NITROGLYCERIN 5 MCG/MIN: 20 INJECTION INTRAVENOUS at 10:09

## 2018-09-28 RX ADMIN — PANTOPRAZOLE SODIUM 40 MG: 40 INJECTION, POWDER, FOR SOLUTION INTRAVENOUS at 05:09

## 2018-09-28 RX ADMIN — HEPARIN SODIUM AND DEXTROSE 15 UNITS/KG/HR: 10000; 5 INJECTION INTRAVENOUS at 07:09

## 2018-09-28 RX ADMIN — METOPROLOL TARTRATE 25 MG: 25 TABLET ORAL at 09:09

## 2018-09-28 RX ADMIN — CHLORHEXIDINE GLUCONATE 10 ML: 1.2 RINSE ORAL at 08:09

## 2018-09-28 RX ADMIN — ASPIRIN 81 MG: 81 TABLET, COATED ORAL at 09:09

## 2018-09-28 RX ADMIN — Medication: at 04:09

## 2018-09-28 RX ADMIN — CLOPIDOGREL BISULFATE 75 MG: 75 TABLET ORAL at 03:09

## 2018-09-28 RX ADMIN — RANOLAZINE 500 MG: 500 TABLET, FILM COATED, EXTENDED RELEASE ORAL at 03:09

## 2018-09-28 RX ADMIN — ATORVASTATIN CALCIUM 80 MG: 40 TABLET, FILM COATED ORAL at 08:09

## 2018-09-28 RX ADMIN — DIATRIZOATE MEGLUMINE AND DIATRIZOATE SODIUM 120 ML: 660; 100 LIQUID ORAL; RECTAL at 04:09

## 2018-09-28 RX ADMIN — ACETAMINOPHEN 1000 MG: 10 INJECTION, SOLUTION INTRAVENOUS at 03:09

## 2018-09-28 NOTE — MEDICAL/APP STUDENT
[facility] Hospital Medicine  Progress Note    Patient Name: Cole oDan  MRN: [id number]  Patient Class: IP- Inpatient   Admission Date: 9/27/2018  Length of Stay: 1 days  Attending Physician: Mario Cesar MD  Primary Care Provider: Yoly Spring MD        Subjective:     Principal Problem:Morbid obesity    Interval History: Mr Doan, 69yo M, POD#1 from a sleeve gastrectomy, was having intermittent chest pain yesterday, and overnight. He says the chest pain is substernally and to the left. It does not radiate to the neck, arm, or back. He has had chest pain similar to this before. Patient also reports nausea. Patient has not had a bowel movement. Patient is urinating on his own.    Objective:     Vital Signs (Most Recent):  Temp: 97.4 °F (36.3 °C) (09/28/18 0359)  Pulse: 81 (09/28/18 0543)  Resp: 18 (09/28/18 0359)  BP: 123/65 (09/28/18 0543)  SpO2: 96 % (09/28/18 0554) Vital Signs (24h Range):  Temp:  [97.4 °F (36.3 °C)-98 °F (36.7 °C)] 97.4 °F (36.3 °C)  Pulse:  [60-81] 81  Resp:  [11-20] 18  SpO2:  [92 %-99 %] 96 %  BP: (112-167)/(65-80) 123/65     Weight: 112.5 kg (248 lb 0.3 oz)  Body mass index is 32.72 kg/m².    Intake/Output Summary (Last 24 hours) at 9/28/2018 0646  Last data filed at 9/28/2018 0600  Gross per 24 hour   Intake 2024.26 ml   Output 315 ml   Net 1709.26 ml      Physical Exam   Constitutional: He is oriented to person, place, and time. He appears well-developed and well-nourished. No distress.   Eyes: EOM are normal. Pupils are equal, round, and reactive to light.   Cardiovascular: Normal rate, regular rhythm and normal heart sounds.   No murmur heard.  Pulmonary/Chest: Effort normal and breath sounds normal. No respiratory distress.   Abdominal: Soft. There is tenderness (at trochar sites).   Neurological: He is alert and oriented to person, place, and time.       Significant Labs:   BMP:   Recent Labs   Lab  09/27/18   1842  09/28/18   0553   GLU  128*  118*   NA   141  139   K  4.4  4.0   CL  103  105   CO2  26  23   BUN  26*  19   CREATININE  0.8  0.8   CALCIUM  9.4  9.2   MG  1.8   --      Cardiac Markers:   Recent Labs   Lab  09/27/18   1949   BNP  38     Coagulation:   Recent Labs   Lab  09/28/18   0215  09/28/18   0553   INR  1.0   --    APTT  26.2  40.4*       Significant Imaging: I have reviewed all pertinent imaging results/findings within the past 24 hours.    Assessment/Plan:    Assessment:  Mr Doan 67yo M, POD#1 from a sleeve gastrectomy who now has intermittent chest pain.     Plan:  Chest Pain:   -Echo planned for today  -Continue telemetry  -Serial troponins  -Continue heparin drip and metoprolol    Nausea:  -Continue NPO diet, advance to clear liquids when able to tolerate  -Continue pantoprazole  -Ondansetron PRN  -Phenergan PRN    Plan for discharge when chest pain is under control, troponins normalize, and patient able to tolerate an advanced diet.    Active Diagnoses:    Diagnosis Date Noted POA    PRINCIPAL PROBLEM:  Morbid obesity [E66.01] 05/30/2014 Yes     Chronic    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, J98.4] 09/18/2017 Yes    Type 2 diabetes mellitus without complication [E11.9] 09/28/2015 Yes    CAD S/P percutaneous coronary angioplasty [I25.10, Z98.61] 09/28/2015 Not Applicable    HTN (hypertension) [I10] 05/15/2014 Yes    SATYA on CPAP [G47.33, Z99.89] 05/15/2014 Not Applicable    Hyperlipidemia [E78.5] 05/15/2014 Yes    PTSD (post-traumatic stress disorder) [F43.10] 05/15/2014 Yes      Problems Resolved During this Admission:     VTE Risk Mitigation (From admission, onward)        Ordered     heparin 25,000 units in dextrose 5% 250 mL (100 units/mL) infusion LOW INTENSITY nomogram - [facility]  Continuous      09/28/18 0149     heparin 25,000 units in dextrose 5% (100 units/ml) IV bolus from bag - ADDITIONAL PRN BOLUS - 60 units/kg (max bolus 4000 units)  As needed (PRN)      09/28/18 0149     heparin 25,000 units in dextrose  5% (100 units/ml) IV bolus from bag - ADDITIONAL PRN BOLUS - 30 units/kg (max bolus 4000 units)  As needed (PRN)      09/28/18 0149             Mayela Shields  Department of Hospital Medicine   [facility]

## 2018-09-28 NOTE — HOSPITAL COURSE
Arrived to ICU AAO x 3, tridil infusion initiated at 5mcg/min and is CP free remains hypertensive; discussed non use of CPAP last night and he reports feeling he did not need s/t NC oxygen   9/29 Feeling well , sitting up in bed and chair

## 2018-09-28 NOTE — PLAN OF CARE
Problem: Patient Care Overview  Goal: Plan of Care Review  Outcome: Ongoing (interventions implemented as appropriate)  Patient wants to wear NC for tonight as opposed to CPAP due to abdominal pain/tenderness. SpO2 98% at 2 L/M

## 2018-09-28 NOTE — SUBJECTIVE & OBJECTIVE
Interval History: Patient c/o chest pain. Cardiology at bedside. Repeat troponin ordered. Chest pain unrelieved with nitro SL and nitro paste. Patient transfer to ICU on Tridil gtt and further maximize medical therapy.     Review of Systems   Constitutional: Positive for activity change. Negative for chills and fever.   HENT: Negative.    Eyes: Negative.    Respiratory: Negative for apnea, cough, choking, chest tightness, shortness of breath, wheezing and stridor.    Cardiovascular: Positive for chest pain. Negative for palpitations and leg swelling.   Gastrointestinal: Positive for abdominal pain and nausea. Negative for constipation, diarrhea and vomiting.   Endocrine: Negative.    Genitourinary: Negative for decreased urine volume, difficulty urinating, dysuria, frequency, hematuria and urgency.   Musculoskeletal: Negative for arthralgias, back pain, myalgias, neck pain and neck stiffness.   Allergic/Immunologic: Negative.    Neurological: Negative for dizziness, tremors, seizures, syncope, speech difficulty, weakness and headaches.   Hematological: Negative.    Psychiatric/Behavioral: Negative.      Objective:     Vital Signs (Most Recent):  Temp: 98.5 °F (36.9 °C) (09/28/18 1030)  Pulse: 61 (09/28/18 1100)  Resp: 11 (09/28/18 1100)  BP: (!) 151/74 (09/28/18 1045)  SpO2: 96 % (09/28/18 1100) Vital Signs (24h Range):  Temp:  [97.4 °F (36.3 °C)-98.7 °F (37.1 °C)] 98.5 °F (36.9 °C)  Pulse:  [61-81] 61  Resp:  [10-20] 11  SpO2:  [92 %-99 %] 96 %  BP: (123-167)/(65-80) 151/74     Weight: 119.6 kg (263 lb 10.7 oz)  Body mass index is 34.79 kg/m².    Intake/Output Summary (Last 24 hours) at 9/28/2018 1127  Last data filed at 9/28/2018 0600  Gross per 24 hour   Intake 2024.26 ml   Output 315 ml   Net 1709.26 ml      Physical Exam   Constitutional: He is oriented to person, place, and time. He appears well-developed and well-nourished. He is cooperative. He is easily aroused.   HENT:   Head: Normocephalic and atraumatic.    Eyes: EOM are normal.   Neck: Neck supple.   Cardiovascular: Normal rate, regular rhythm, normal heart sounds and intact distal pulses.   No murmur heard.  Pulmonary/Chest: Effort normal and breath sounds normal. No stridor. No respiratory distress. He has no wheezes. He has no rales. He exhibits no tenderness.   Abdominal: Normal appearance. Bowel sounds are decreased. There is generalized tenderness.   x4 surgical sites   Genitourinary:   Genitourinary Comments: Deferred   Musculoskeletal: He exhibits no edema, tenderness or deformity.   Neurological: He is alert, oriented to person, place, and time and easily aroused. No sensory deficit.   Skin: Skin is warm and dry. Capillary refill takes less than 2 seconds.   Psychiatric: He has a normal mood and affect. His behavior is normal. Judgment and thought content normal.   Nursing note and vitals reviewed.      Significant Labs:   BMP:   Recent Labs   Lab  09/27/18 1842 09/28/18   0553   GLU  128*  118*   NA  141  139   K  4.4  4.0   CL  103  105   CO2  26  23   BUN  26*  19   CREATININE  0.8  0.8   CALCIUM  9.4  9.2   MG  1.8   --      CBC:   Recent Labs   Lab  09/27/18 1952 09/28/18   0215  09/28/18   0553   WBC  7.67  7.26  7.89  7.89   HGB  15.0  14.7  14.6  14.6   HCT  43.0  41.8  43.4  43.4   PLT  194  193  180  180     Troponin:   Recent Labs   Lab  09/27/18 1842 09/27/18   2330  09/28/18   0553   TROPONINI  0.262*  1.102*  1.005*     All pertinent labs within the past 24 hours have been reviewed.    Significant Imaging:

## 2018-09-28 NOTE — PT/OT/SLP PROGRESS
Physical Therapy      Patient Name:  Cole Doan   MRN:  [id number]    P.T. EVAL INITIATED VIA CHART REVIEW, PT TF TO ICU FOR INFUSION, PT REQUEST TO HOLD P.T. EVAL UNTIL COMPLETE, WILL RE-ATTEMPT P.T. EVAL NEXT VISIT    Jonna Elliott, PT   9/28/2018  1200

## 2018-09-28 NOTE — HPI
Mr. Doan is a 68 year old male patient with a PMHx of CAD s/p PTCA and previous stents, HTN, hyperlipidemia, DM, obesity, and SATYA who presented to [facility] on 9/27/18  for robotic sleeve gastrectomy by Dr. Cesar. Post-procedure, patient complained of substernal chest pressure/heaviness associated with flushing.  Troponin was drawn and came back at 0.262. Repeat troponin continued to trend up and cardiology consulted to assist with management. Patient seen and examined today. Feels ok. Still having bouts of substernal chest heaviness/pressure. Reports nitroglycerin generally helps but symptoms remained refractory this AM. Denies any associated SOB, nausea, vomiting, or diaphoresis. Reports chronic history of chest pain symptoms that recently improved after PPM placement. Of note, patient also underwent LHC at [facility] by Dr. Martinez with no intervention and was told no significant changes from prior angiograms. Patient is compliant with his medications. Chart reviewed. Troponin 0.262>1.102>1.005. EKG reviewed. 2D echo pending.

## 2018-09-28 NOTE — CONSULTS
[facility] -   Cardiology  Consult Note    Patient Name: Cole Doan  MRN: [id number]  Admission Date: 9/27/2018  Hospital Length of Stay: 1 days  Code Status: Prior   Attending Provider: Mario Cesar MD   Consulting Provider: Rasheeda Villegas PA-C  Primary Care Physician: Yoly Spring MD  Principal Problem:Morbid obesity    Patient information was obtained from patient, past medical records and ER records.     Inpatient consult to Cardiology  Consult performed by: Rasheeda Villegas PA-C  Consult ordered by: Hardik Reyes MD        Subjective:     Chief Complaint: Chest pain    HPI:   Mr. Doan is a 68 year old male patient with a PMHx of CAD s/p PTCA and previous stents, HTN, hyperlipidemia, DM, obesity, and SATYA who presented to [facility] on 9/27/18  for robotic sleeve gastrectomy by Dr. Cesar. Post-procedure, patient complained of substernal chest pressure/heaviness associated with flushing.  Troponin was drawn and came back at 0.262. Repeat troponin continued to trend up and cardiology consulted to assist with management. Patient seen and examined today. Feels ok. Still having bouts of substernal chest heaviness/pressure. Reports nitroglycerin generally helps but symptoms remained refractory this AM. Denies any associated SOB, nausea, vomiting, or diaphoresis. Reports chronic history of chest pain symptoms that recently improved after PPM placement. Of note, patient also underwent LHC at [facility] by Dr. Martinez with no intervention and was told no significant changes from prior angiograms. Patient is compliant with his medications. Chart reviewed. Troponin 0.262>1.102>1.005. EKG reviewed. 2D echo pending.     Past Medical History:   Diagnosis Date    Anticoagulant long-term use     Plavix    BPH (benign prostatic hypertrophy)     CAD (coronary artery disease)     HTN (hypertension) 5/15/2014    Hyperlipidemia     Obesity 5/15/2014    SATYA on CPAP     Pneumonia     PTSD (post-traumatic  "stress disorder) 5/15/2014    RBBB 5/15/2014    S/P PTCA (percutaneous transluminal coronary angioplasty) 5/15/2014    Special screening for malignant neoplasms, colon 1/22/2014    Type 2 diabetes mellitus without complication 9/28/2015       Past Surgical History:   Procedure Laterality Date    APPENDECTOMY      at age 14    ATRIAL CARDIAC PACEMAKER INSERTION      COLONOSCOPY Left 4/27/2018    Procedure: COLONOSCOPY;  Surgeon: Artem Medeiros MD;  Location: [facility];  Service: Endoscopy;  Laterality: Left;    COLONOSCOPY Left 4/27/2018    Performed by Artem Medeiros MD at [facility] ENDO    COLONOSCOPY N/A 5/14/2015    Performed by Travis Miguel MD at [facility] ENDO    COLONOSCOPY N/A 1/22/2014    Performed by [facility]em [facility]eiros MD at [facility]    CORONARY ANGIOPLASTY WITH STENT PLACEMENT      1990, 2008, 2012    ESOPHAGOGASTRODUODENOSCOPY N/A 8/16/2018    Procedure: ESOPHAGOGASTRODUODENOSCOPY (EGD);  Surgeon: Mario Cesar MD;  Location: [facility];  Service: General;  Laterality: N/A;    ESOPHAGOGASTRODUODENOSCOPY (EGD) N/A 8/16/2018    Performed by Mario Cesar MD at [facility] ENDO    HEART CATH-LEFT Left 9/28/2015    Performed by Eric Rutledge MD at [facility] CATH LAB    VASECTOMY         Review of patient's allergies indicates:   Allergen Reactions    Iodinated contrast- oral and iv dye      States, "My arteries started shutting down on me"       No current facility-administered medications on file prior to encounter.      Current Outpatient Medications on File Prior to Encounter   Medication Sig    alprazolam (XANAX) 0.5 MG tablet Take 0.5 mg by mouth 3 (three) times daily as needed.     amLODIPine (NORVASC) 2.5 MG tablet TAKE 1 TABLET BY MOUTH ONCE DAILY.    aspirin (ECOTRIN) 81 MG EC tablet Take 81 mg by mouth once daily.    atorvastatin (LIPITOR) 80 MG tablet Take 80 mg by mouth once daily. Take a half tablet daily     buPROPion (WELLBUTRIN SR) 200 MG TbSR Take 200 mg by mouth once daily.    " "clopidogrel (PLAVIX) 75 mg tablet TAKE 1 TABLET BY MOUTH EVERY DAY    dabigatran etexilate (PRADAXA) 150 mg Cap Take 150 mg by mouth once daily. "Do NOT break, chew, or open capsules."     furosemide (LASIX) 40 MG tablet Takes one-half tablet by mouth daily (Patient taking differently: Take 40 mg by mouth 2 (two) times daily. Takes 2 tablet by mouth daily)    gabapentin (NEURONTIN) 300 MG capsule Take 300 mg by mouth 3 (three) times daily.     ipratropium-albuterol (COMBIVENT RESPIMAT)  mcg/actuation inhaler Inhale 1 puff into the lungs 4 (four) times daily.    isosorbide mononitrate (IMDUR) 60 MG 24 hr tablet Take 1 tablet (60 mg total) by mouth 2 (two) times daily. (Patient taking differently: Take 60 mg by mouth once daily. )    liraglutide (SAXENDA) 3 mg/0.5 mL (18 mg/3 mL) PnIj Inject into the skin once daily.    metformin (GLUCOPHAGE) 1000 MG tablet Take 1,000 mg by mouth 2 (two) times daily with meals.    pantoprazole (PROTONIX) 40 MG tablet Take 1 tablet (40 mg total) by mouth 2 (two) times daily. for 14 days    ranolazine (RANEXA) 500 MG Tb12 Take 500 mg by mouth 2 (two) times daily.    vitamin D 1000 units Tab Take 2,000 Units by mouth once daily.    cyanocobalamin (VITAMIN B-12) 1000 MCG tablet Take 100 mcg by mouth once daily.    terbinafine HCl (LAMISIL) 250 mg tablet Take 250 mg by mouth once daily.     Family History     Problem Relation (Age of Onset)    Cataracts Father    Heart disease Father, Mother    Hypertension Father, Mother        Tobacco Use    Smoking status: Former Smoker     Packs/day: 1.00     Years: 40.00     Pack years: 40.00     Types: Cigarettes     Start date:      Last attempt to quit: 2009     Years since quittin.7    Smokeless tobacco: Never Used   Substance and Sexual Activity    Alcohol use: No    Drug use: No    Sexual activity: Not on file     Review of Systems   Constitution: Positive for malaise/fatigue.             HENT: Negative.  "   Eyes: Negative.    Cardiovascular: Positive for chest pain.   Respiratory: Negative.    Endocrine: Negative.    Hematologic/Lymphatic: Negative.    Skin: Positive for flushing.   Gastrointestinal: Negative.    Genitourinary: Negative.    Neurological: Negative.    Psychiatric/Behavioral: Negative.    Allergic/Immunologic: Negative.      Objective:     Vital Signs (Most Recent):  Temp: 98.7 °F (37.1 °C) (09/28/18 0739)  Pulse: 69 (09/28/18 0901)  Resp: 11 (09/28/18 0759)  BP: 139/73 (09/28/18 0901)  SpO2: 97 % (09/28/18 0759) Vital Signs (24h Range):  Temp:  [97.4 °F (36.3 °C)-98.7 °F (37.1 °C)] 98.7 °F (37.1 °C)  Pulse:  [62-81] 69  Resp:  [11-20] 11  SpO2:  [92 %-99 %] 97 %  BP: (123-167)/(65-80) 139/73     Weight: 112.5 kg (248 lb 0.3 oz)  Body mass index is 32.72 kg/m².    SpO2: 97 %  O2 Device (Oxygen Therapy): nasal cannula      Intake/Output Summary (Last 24 hours) at 9/28/2018 0945  Last data filed at 9/28/2018 0600  Gross per 24 hour   Intake 2024.26 ml   Output 315 ml   Net 1709.26 ml       Lines/Drains/Airways     Peripheral Intravenous Line                 Peripheral IV - Single Lumen 08/09/16 0930 Right Hand 780 days         Peripheral IV - Single Lumen 08/16/18 0651 Right Hand 43 days         Peripheral IV - Single Lumen 09/27/18 0800 Left Hand 1 day         Peripheral IV - Single Lumen 09/28/18 0252 Anterior;Distal;Right Antecubital less than 1 day                Physical Exam   Constitutional: He is oriented to person, place, and time. He appears well-developed and well-nourished. No distress.   Flushed     HENT:   Head: Normocephalic and atraumatic.   Eyes: Pupils are equal, round, and reactive to light. Right eye exhibits no discharge. Left eye exhibits no discharge.   Neck: Neck supple. No JVD present.   Cardiovascular: Normal rate, regular rhythm, S1 normal and S2 normal.   No murmur heard.  Pulmonary/Chest: Effort normal and breath sounds normal. No respiratory distress. He has no wheezes. He  has no rales.   PPM site well-healed   Abdominal: Soft.   Mild distention and tenderness   Musculoskeletal: He exhibits no edema.   Neurological: He is alert and oriented to person, place, and time.   Skin: Skin is warm and dry. He is not diaphoretic. No erythema.   Psychiatric: He has a normal mood and affect. His behavior is normal. Thought content normal.   Nursing note and vitals reviewed.      Significant Labs:   CMP   Recent Labs   Lab  09/27/18   1842  09/28/18   0553   NA  141  139   K  4.4  4.0   CL  103  105   CO2  26  23   GLU  128*  118*   BUN  26*  19   CREATININE  0.8  0.8   CALCIUM  9.4  9.2   ANIONGAP  12  11   ESTGFRAFRICA  >60  >60   EGFRNONAA  >60  >60   , CBC   Recent Labs   Lab  09/27/18 1952 09/28/18   0215  09/28/18   0553   WBC  7.67  7.26  7.89  7.89   HGB  15.0  14.7  14.6  14.6   HCT  43.0  41.8  43.4  43.4   PLT  194  193  180  180   , Troponin   Recent Labs   Lab  09/27/18 1842  09/27/18   2330  09/28/18   0553   TROPONINI  0.262*  1.102*  1.005*    and All pertinent lab results from the last 24 hours have been reviewed.    Significant Imaging: Echocardiogram: Pending, EKG: Reviewed and X-Ray: CXR: X-Ray Chest 1 View (CXR):   Results for orders placed or performed during the hospital encounter of 09/27/18   X-Ray Chest 1 View    Narrative    EXAMINATION:  XR CHEST 1 VIEW    CLINICAL HISTORY:  chest pain;    COMPARISON:  Chest x-ray, 09/12/2017.    FINDINGS:  The lungs are clear. The heart size is top normal.  There is a left-sided dual lead AICD with leads projecting in the right atrium and right ventricle.  No pleural effusion or pneumothorax.      Impression    No acute cardiopulmonary disease.      Electronically signed by: Nawaf Rubio MD  Date:    09/27/2018  Time:    21:00    and X-Ray Chest PA and Lateral (CXR): No results found for this visit on 09/27/18.    Assessment and Plan:   Patient who presents with NSTEMI likely triggered by stress of surgery. Refractory chest  pain this AM. Will transfer to ICU on Tridil gtt and further maximize medical therapy. Recent LHC at outside facility reportedly unchanged from prior angiograms. Records have been requested. Further rec's to follow.    * Morbid obesity    -s/p sleeve gastrectomy on 9/27/18 by Dr. Cesar  -mgmt as per surgery team        NSTEMI (non-ST elevated myocardial infarction)    -Patient who presents with NSTEMI s/p sleeve gastrectomy  -Complains of recurrent/refractory chest pain this AM, unrelieved by sublingual nitroglycerin  -Reports recent LHC by Dr. Martinez at [facility] with no intervention, no change from prior angiograms, medical mgmt recommended  -Records have been requested  -Previous LHC at our facility in 2016 showed patent stents, 50-70% diagonal lesion, and small non-dominant  RCA  -Will plan to optimize medical mgmt  -Start Tridil gtt  -Continue ASA, heparin gtt, BB, statin  -Further rec's pending review of recent angiogram films        CAD S/P percutaneous coronary angioplasty    -See plan under NSTEMI        Hyperlipidemia    -Continue statin        HTN (hypertension)    -Continue Metoprolol  -Tridil gtt initiated due to refractory chest pain            VTE Risk Mitigation (From admission, onward)        Ordered     heparin 25,000 units in dextrose 5% 250 mL (100 units/mL) infusion LOW INTENSITY nomogram - [facility]  Continuous      09/28/18 0149     heparin 25,000 units in dextrose 5% (100 units/ml) IV bolus from bag - ADDITIONAL PRN BOLUS - 60 units/kg (max bolus 4000 units)  As needed (PRN)      09/28/18 0149     heparin 25,000 units in dextrose 5% (100 units/ml) IV bolus from bag - ADDITIONAL PRN BOLUS - 30 units/kg (max bolus 4000 units)  As needed (PRN)      09/28/18 0149          Thank you for your consult. I will follow-up with patient.    Rasheeda Villegas PA-C  Cardiology   [facility]    Chart reviewed. Dr. Garner examined patient and agrees with plan as outlined above.

## 2018-09-28 NOTE — SUBJECTIVE & OBJECTIVE
"Interval History: chest pain resolved, c/o nausea. Pain controlled.     Medications:  Continuous Infusions:   heparin (porcine) in D5W 12 Units/kg/hr (09/28/18 0354)    hydromorphone in 0.9 % NaCl 6 mg/30 ml      nitroGLYCERIN       Scheduled Meds:   acetaminophen  1,000 mg Intravenous Q8H    aspirin  81 mg Oral Daily    aspirin  325 mg Oral Once    atorvastatin  80 mg Oral QHS    chlorhexidine  10 mL Mouth/Throat BID    metoprolol tartrate  25 mg Oral BID    nozaseptin   Each Nare BID    pantoprazole  40 mg Intravenous Before breakfast     PRN Meds:albuterol-ipratropium, dextrose 50%, diphenhydrAMINE, glucagon (human recombinant), heparin (PORCINE), heparin (PORCINE), hydrALAZINE, influenza, insulin aspart U-100, naloxone, nitroGLYCERIN, ondansetron, promethazine (PHENERGAN) IVPB, sodium chloride 0.9%     Review of patient's allergies indicates:   Allergen Reactions    Iodinated contrast- oral and iv dye      States, "My arteries started shutting down on me"     Objective:     Vital Signs (Most Recent):  Temp: 98.7 °F (37.1 °C) (09/28/18 0739)  Pulse: 69 (09/28/18 0901)  Resp: 11 (09/28/18 0759)  BP: 139/73 (09/28/18 0901)  SpO2: 97 % (09/28/18 0759) Vital Signs (24h Range):  Temp:  [97.4 °F (36.3 °C)-98.7 °F (37.1 °C)] 98.7 °F (37.1 °C)  Pulse:  [62-81] 69  Resp:  [11-20] 11  SpO2:  [92 %-99 %] 97 %  BP: (123-167)/(65-80) 139/73     Weight: 112.5 kg (248 lb 0.3 oz)  Body mass index is 32.72 kg/m².    Intake/Output - Last 3 Shifts       09/26 0700 - 09/27 0659 09/27 0700 - 09/28 0659 09/28 0700 - 09/29 0659    I.V. (mL/kg)  1734.3 (15.4)     IV Piggyback  290     Total Intake(mL/kg)  2024.3 (18)     Urine (mL/kg/hr)  300     Blood  15     Total Output  315     Net  +1709.3            Urine Occurrence  1 x           Physical Exam   Constitutional: He is oriented to person, place, and time. He appears well-developed and well-nourished.   HENT:   Head: Normocephalic and atraumatic.   Eyes: EOM are normal. "   Cardiovascular: Normal rate and regular rhythm.   Pulmonary/Chest: Effort normal. No respiratory distress.   Abdominal: Soft. There is tenderness (appropriately).   Musculoskeletal: Normal range of motion.   Neurological: He is alert and oriented to person, place, and time.   Skin: Skin is warm and dry.   Psychiatric: He has a normal mood and affect. Thought content normal.   Vitals reviewed.      Significant Labs:  CBC:   Recent Labs   Lab  09/28/18   0553   WBC  7.89  7.89   RBC  4.66  4.66   HGB  14.6  14.6   HCT  43.4  43.4   PLT  180  180   MCV  93  93   MCH  31.3*  31.3*   MCHC  33.6  33.6     CMP:   Recent Labs   Lab  09/28/18   0553   GLU  118*   CALCIUM  9.2   NA  139   K  4.0   CO2  23   CL  105   BUN  19   CREATININE  0.8     Cardiac markers:   Recent Labs   Lab  09/28/18   0553   TROPONINI  1.005*       Significant Diagnostics:  I have reviewed all pertinent imaging results/findings within the past 24 hours.

## 2018-09-28 NOTE — ASSESSMENT & PLAN NOTE
Robotic sleeve gastrectomy POD1  Pain controlled.   Nausea  Dietician consult for post sleeve diet  Npo until cleared by cardiology

## 2018-09-28 NOTE — PROGRESS NOTES
"Recommendations   Recommendation/Intervention 1) ADAT to bariatric diet.   2) If unable to advance diet within 72 hours consider alternate nutrition support.   3) Provided pt with post op bariatric discharge diet instruction w/ handouts. Dietitian's contact information was provided to pt/family for further questions or concerns.   4) RD to monitor    Goals Diet advancement within 96 hrs   Nutrition Goal Status new   Communication of RD Recs (Reviewed with NP)            09/28/18 1500   Reason for Assessment   Reason for Assessment identified at risk by screening criteria  (MST score)   Diagnosis (Morbid obesity, HLD,HTN, SATYA on CPAP, CAD, DM2, NSTEMI)   Relevant Medical History (BPH, CAD, HTN, HLD, Obesity, DM2)   General Information Comments Pt NPO. S/p sleeve gastrectomy. Pt reports intentional wt loss of ~ 67 lb. in the last 8 months to prepare for bariatric surgery. Educated pt and wife on bariatric discharge diet. Discussed transition from clear liquids to solid foods, use of MVI, and protein drinks. Answered any questions or concerns.   Nutrition Discharge Planning D/c on bariatric diet    Nutrition Risk Screen   Nutrition Risk Screen no indicators present   Anthropometrics   Height Method Stated   Height 6' 1" (1.854 m)   Height (inches) 73 in   Weight Method Bed Scale   Weight 119.6 kg (263 lb 10.7 oz)   Weight (lb) 263.67 lb   Ideal Body Weight (IBW), Male 184 lb   % Ideal Body Weight, Male (lb) 143.3 lb   BMI (Calculated) 34.9   BMI Grade 30 - 34.9- obesity - grade I   Labs/Tests/Procedures/Meds   Pertinent Labs Reviewed Reviewed  BMP  Lab Results   Component Value Date     09/28/2018    K 4.0 09/28/2018     09/28/2018    CO2 23 09/28/2018    BUN 19 09/28/2018    CREATININE 0.8 09/28/2018    CALCIUM 9.2 09/28/2018    ANIONGAP 11 09/28/2018    ESTGFRAFRICA >60 09/28/2018    EGFRNONAA >60 09/28/2018     Lab Results   Component Value Date    CALCIUM 9.2 09/28/2018     Lab Results   Component Value " "Date    ALBUMIN 3.9 09/19/2018     Recent Labs   Lab  09/28/18   1231   POCTGLUCOSE  95                Pertinent Medications Reviewed Reviewed  No current facility-administered medications on file prior to encounter.      Current Outpatient Medications on File Prior to Encounter   Medication Sig Dispense Refill    aspirin (ECOTRIN) 81 MG EC tablet Take 81 mg by mouth once daily.      atorvastatin (LIPITOR) 80 MG tablet Take 80 mg by mouth once daily. Take a half tablet daily       dabigatran etexilate (PRADAXA) 150 mg Cap Take 150 mg by mouth once daily. "Do NOT break, chew, or open capsules."       furosemide (LASIX) 40 MG tablet Takes one-half tablet by mouth daily (Patient taking differently: Take 40 mg by mouth 2 (two) times daily. Takes 2 tablet by mouth daily) 30 tablet 1    metformin (GLUCOPHAGE) 1000 MG tablet Take 1,000 mg by mouth 2 (two) times daily with meals.      vitamin D 1000 units Tab Take 2,000 Units by mouth once daily.      cyanocobalamin (VITAMIN B-12) 1000 MCG tablet Take 100 mcg by mouth once daily.        Physical Findings/Assessment   Overall Physical Appearance obese   Oral/Mouth Cavity WDL   Skin (Baldev score = 22)   Estimated/Assessed Needs   Weight Used For Calorie Calculations 119.6 kg (263 lb 10.7 oz)   Energy Calorie Requirements (kcal) 2014  (No AF)   Energy Need Method [place]-St Jeor   RMR ([place]-St. Jeor Equation) 2014.88   Protein Requirements 143 g (1.2 g/kg)   Weight Used For Protein Calculations 119.6 kg (263 lb 10.7 oz)   1.0 gm Protein (gm) 119.85   1.2 gm Protein (gm) 143.82   Fluid Requirements (mL) (1 mL/kcal or per MD)   Fluid Need Method RDA Method   RDA Method (mL) 2014   Nutrition Prescription Ordered   Current Diet Order NPO   Monitor and Evaluation   Food and Nutrient Intake energy intake   Food and Nutrient Adminstration diet order   Anthropometric Measurements height/length;weight;body mass index   Biochemical Data, Medical Tests and Procedures " electrolyte and renal panel;glucose/endocrine profile;lipid profile   Nutrition-Focused Physical Findings overall appearance   Nutrition Follow-up   RD Follow-up? Yes   Next Date to be Seen by RD 10/01/18     Nutrition Problem  Inadequate energy intake     Related to (etiology):   NPO status    Signs and Symptoms (as evidenced by):   Not meeting EEN/EPN    Interventions/Recommendations (treatment strategy):  See above     Nutrition Diagnosis Status:   Nick Almeida, Dietetic intern      I certify that I directed the dietetic intern in service delivery and guided them using my skilled judgment. As the cosigning dietitian, I have reviewed the dietetic interns documentation and am responsible for the treatment, assessment, and plan.

## 2018-09-28 NOTE — ASSESSMENT & PLAN NOTE
Apneic episodes potentially exacerbate cardiac stress/demand  Encourage consistent nocturnal CPAP use

## 2018-09-28 NOTE — ASSESSMENT & PLAN NOTE
Patient dx with NSTEMI s/p sleeve gastrectomy  -Troponin 0.262>1.102>1.005  -Heparin gtt started   -ASA, bb, Stain   -2D echo pending  -Reports recent LHC by Dr. Martinez at [facility] with no intervention, no change from prior angiograms, medical mgmt recommended  9/28/18  -- Pt c/o chest pain this AM, unrelieved by sublingual nitroglycerin and nitro paste   -Start Tridil gtt and transfered to ICU

## 2018-09-28 NOTE — ASSESSMENT & PLAN NOTE
Continue metoprolol  ICU hemodynamic monitoring with tridil infusion, titrate for CP and BP control

## 2018-09-28 NOTE — ASSESSMENT & PLAN NOTE
- General surgery primary -- medical management per primary team  - S/p robotic sleeve gastrectomy today, 09/27/18, by Dr. Cesar  - Analgesics/Antiemetics PRN  - Encourage IS use  -Nutritionist consult   -Pain controlled

## 2018-09-28 NOTE — ASSESSMENT & PLAN NOTE
[facility] Med consulted  Cardiology consulted after pt experienced chest pain, elevated enzymes, ekg changes

## 2018-09-28 NOTE — HOSPITAL COURSE
Patient admitted s/p robotic sleeve gastrectomy by Dr. Cesar. Post-procedure, patient complained of substernal chest pressure/heaviness associated with flushing. Troponin 0.262 and Cardiology consulted to assist with management. Patient diagnosed with NSTEMI s/p sleeve gastrectomy. Patient started on heparin gtt. Continue ASA, BB, and statin. Of note, patient underwent LHC at [facility] by Dr. Martinez with no intervention and was told no significant changes from prior angiograms. Troponin 0.262>1.102>1.005. EKG reviewed. 2D echo pending. 9/28/18-Patient complains of recurrent chest pain this AM, unrelieved by sublingual nitroglycerin and nitro paste. Patient transferred to ICU on Tridil gtt for critical care management.  Weaned Tridil and transferred to floor.  Chest pain resolved and cardiac enzymes trended toward normal.  Surgical sites remained clean and intact without sign of infection.

## 2018-09-28 NOTE — NURSING
Pt complains of generalized abdominal pain. PCA Pump is in use. Lap sites are intact. Abdomen is non distended. Hypoactive bowel sounds. Heparin Bolus started. Pt denies chest pain at this time. Pt denies n/v. Iv fluids are infusing. Blood glucose is being monitored. No injuries. Will continue to monitor.

## 2018-09-28 NOTE — PROGRESS NOTES
"[facility]  General Surgery  Progress Note    Subjective:     History of Present Illness:  Cole Doan presented for robotic sleeve gastrectomy    Post-Op Info:  Procedure(s) (LRB):  XI ROBOTIC SLEEVE GASTRECTOMY (N/A)   1 Day Post-Op     Interval History: chest pain resolved, c/o nausea. Pain controlled.     Medications:  Continuous Infusions:   heparin (porcine) in D5W 12 Units/kg/hr (09/28/18 0354)    hydromorphone in 0.9 % NaCl 6 mg/30 ml      nitroGLYCERIN       Scheduled Meds:   acetaminophen  1,000 mg Intravenous Q8H    aspirin  81 mg Oral Daily    aspirin  325 mg Oral Once    atorvastatin  80 mg Oral QHS    chlorhexidine  10 mL Mouth/Throat BID    metoprolol tartrate  25 mg Oral BID    nozaseptin   Each Nare BID    pantoprazole  40 mg Intravenous Before breakfast     PRN Meds:albuterol-ipratropium, dextrose 50%, diphenhydrAMINE, glucagon (human recombinant), heparin (PORCINE), heparin (PORCINE), hydrALAZINE, influenza, insulin aspart U-100, naloxone, nitroGLYCERIN, ondansetron, promethazine (PHENERGAN) IVPB, sodium chloride 0.9%     Review of patient's allergies indicates:   Allergen Reactions    Iodinated contrast- oral and iv dye      States, "My arteries started shutting down on me"     Objective:     Vital Signs (Most Recent):  Temp: 98.7 °F (37.1 °C) (09/28/18 0739)  Pulse: 69 (09/28/18 0901)  Resp: 11 (09/28/18 0759)  BP: 139/73 (09/28/18 0901)  SpO2: 97 % (09/28/18 0759) Vital Signs (24h Range):  Temp:  [97.4 °F (36.3 °C)-98.7 °F (37.1 °C)] 98.7 °F (37.1 °C)  Pulse:  [62-81] 69  Resp:  [11-20] 11  SpO2:  [92 %-99 %] 97 %  BP: (123-167)/(65-80) 139/73     Weight: 112.5 kg (248 lb 0.3 oz)  Body mass index is 32.72 kg/m².    Intake/Output - Last 3 Shifts       09/26 0700 - 09/27 0659 09/27 0700 - 09/28 0659 09/28 0700 - 09/29 0659    I.V. (mL/kg)  1734.3 (15.4)     IV Piggyback  290     Total Intake(mL/kg)  2024.3 (18)     Urine (mL/kg/hr)  300     Blood  15     Total " Output  315     Net  +1709.3            Urine Occurrence  1 x           Physical Exam   Constitutional: He is oriented to person, place, and time. He appears well-developed and well-nourished.   HENT:   Head: Normocephalic and atraumatic.   Eyes: EOM are normal.   Cardiovascular: Normal rate and regular rhythm.   Pulmonary/Chest: Effort normal. No respiratory distress.   Abdominal: Soft. There is tenderness (appropriately).   Musculoskeletal: Normal range of motion.   Neurological: He is alert and oriented to person, place, and time.   Skin: Skin is warm and dry.   Psychiatric: He has a normal mood and affect. Thought content normal.   Vitals reviewed.      Significant Labs:  CBC:   Recent Labs   Lab  09/28/18   0553   WBC  7.89  7.89   RBC  4.66  4.66   HGB  14.6  14.6   HCT  43.4  43.4   PLT  180  180   MCV  93  93   MCH  31.3*  31.3*   MCHC  33.6  33.6     CMP:   Recent Labs   Lab  09/28/18   0553   GLU  118*   CALCIUM  9.2   NA  139   K  4.0   CO2  23   CL  105   BUN  19   CREATININE  0.8     Cardiac markers:   Recent Labs   Lab  09/28/18   0553   TROPONINI  1.005*       Significant Diagnostics:  I have reviewed all pertinent imaging results/findings within the past 24 hours.    Assessment/Plan:     * Morbid obesity    Robotic sleeve gastrectomy POD1  Pain controlled.   Nausea  Dietician consult for post sleeve diet  Npo until cleared by cardiology        Type 2 diabetes mellitus without complication    Hospital Med consulted        CAD S/P percutaneous coronary angioplasty    Hospital Med consulted  Cardiology consulted after pt experienced chest pain, elevated enzymes, ekg changes        SATYA on CPAP    Hospital Med consulted          Hyperlipidemia    Hospital Med consulted          HTN (hypertension)    Hospital Med consulted              Adelia Worley PA-C  General Surgery  [facility]

## 2018-09-28 NOTE — ASSESSMENT & PLAN NOTE
- Resume home medication, including Amlodipine, once diet advanced  - IV Hydralazine PRN  -Blood pressure likely elevated 2/2 pain

## 2018-09-28 NOTE — HOSPITAL COURSE
POD1: Pt with chest pain, abnormal EKG, elevated troponin overnight. He was started on heparin gtts. Cardiology and Hospital Medicine were consulted. From surgery standpoint doing well with good pain control. He c/o nausea.     POD2: HD stable in ICU. Chest pain resolved. On hep gtt. Off nitro gtt. Troponins trending down. Tolerating clears well. Pain well controlled. Denies nausea. No BM/flatus yet.    POD3: Transferred to floor yesterday. No CP/SOB. Tolerating clears. No nausea or emesis. +flatus, no BM.

## 2018-09-28 NOTE — PLAN OF CARE
Assessment completed.  Met with the patient/ family. CM explained and left info in blue transition of care folder regarding Advance Directives, Living Will ( FULL CODE) ,  Pamphlet on D/C planning on admission and Pharmacy bedside delivery.  The role of CM explained for ICU transitions of care/ discharge planning. Per EMR,  PMHx of DM II, SATYA on CPAP, HLD, CAD, and BPH admitted by General surgery s/p robotic sleeve gastrectomy performed by Dr. Cesar. Post-procedure, patient complained of substernal chest pressure/heaviness associated with flushing. Troponin 0.262 and Cardiology consulted to assist with management. Patient diagnosed with NSTEMI s/p sleeve gastrectomy. Patient started on heparin gtt.   9/28/18-Patient complains of recurrent chest pain this AM, unrelieved by sublingual nitroglycerin and nitro paste. Patient transferred to ICU on Tridil gtt for critical care management.  Patient has family support of his spouse / kids . Patient has no needs at this time. CM to f/u for safe transition    Yoly Spring MD       [facility] [address] [place] [address]  Phone: [phone] Fax: [phone]           09/28/18 1255   Discharge Assessment   Assessment Type Discharge Planning Assessment   Confirmed/corrected address and phone number on facesheet? Yes   Assessment information obtained from? Patient;Caregiver;Medical Record   Expected Length of Stay (days) (TBD)   Communicated expected length of stay with patient/caregiver no   Prior to hospitilization cognitive status: Alert/Oriented   Prior to hospitalization functional status: Independent   Current cognitive status: Alert/Oriented   Current Functional Status: Independent   Lives With spouse   Able to Return to Prior Arrangements yes   Is patient able to care for self after discharge? Yes   Who are your caregiver(s) and their phone number(s)? (Daniella Doan [phone])   Patient's perception of discharge disposition  home or selfcare   Readmission Within The Last 30 Days no previous admission in last 30 days   Patient currently being followed by outpatient case management? No   Patient currently receives any other outside agency services? No   Equipment Currently Used at Home CPAP  [facility]   Do you have any problems affording any of your prescribed medications? No   Is the patient taking medications as prescribed? yes   Does the patient have transportation home? Yes   Transportation Available family or friend will provide;car   Discharge Plan A Home with family;Home Health   Discharge Plan B Home with family   Patient/Family In Agreement With Plan yes

## 2018-09-28 NOTE — CONSULTS
[facility]   Critical Care Medicine  Consult Note    Patient Name: Cole Doan  MRN: [id number]  Admission Date: 9/27/2018  Hospital Length of Stay: 1 days  Code Status: Prior  Attending Physician: Mario Cesar MD   Primary Care Provider: Yoly Spring MD   Principal Problem: NSTEMI (non-ST elevated myocardial infarction)      Subjective:     HPI:  68 year old obese male with PMH of HTN, CAD post PTCA with PPM, DM2, PTSD, mild COPD s/t former 40pack year smoking, and SATYA with home CPAP  Presented to [facility] on 9/27 for planned robotic sleeve gastrectomy; post operatively reported substernal chest pain and troponin +0.262 which increased 1.1 and throughout night pain has decreased but not subsided with trials of SL NTG and NTG paste  He is being transferred to ICU for tridil infusion and medical management of NSTEMI    Of note he recently underwent LHC and PPM placement @ [facility] with reported unchanged CAD, he reports that since this his endurance has improved in addition to decreased episodes of CP although he did have episode of substernal CP requiring SL nitro on 9/23 (prior to surgery)    Hospital/ICU Course:  Arrived to ICU AAO x 3, tridil infusion initiated at 5mcg/min and is CP free remains hypertensive; discussed non use of CPAP last night and he reports feeling he did not need s/t NC oxygen     Past Medical History:   Diagnosis Date    Anticoagulant long-term use     Plavix    BPH (benign prostatic hypertrophy)     CAD (coronary artery disease)     HTN (hypertension) 5/15/2014    Hyperlipidemia     Obesity 5/15/2014    SATYA on CPAP     Pneumonia     PTSD (post-traumatic stress disorder) 5/15/2014    RBBB 5/15/2014    S/P PTCA (percutaneous transluminal coronary angioplasty) 5/15/2014    Special screening for malignant neoplasms, colon 1/22/2014    Type 2 diabetes mellitus without complication 9/28/2015       Past Surgical History:   Procedure Laterality Date    APPENDECTOMY    "   at age 14    ATRIAL CARDIAC PACEMAKER INSERTION      COLONOSCOPY Left 2018    Procedure: COLONOSCOPY;  Surgeon: Artem Medeiros MD;  Location: [facility] ENDO;  Service: Endoscopy;  Laterality: Left;    COLONOSCOPY Left 2018    Performed by Artem Medeiros MD at [facility] ENDO    COLONOSCOPY N/A 2015    Performed by Travis Miguel MD at [facility] ENDO    COLONOSCOPY N/A 2014    Performed by Artem Medeiros MD at [facility] ENDO    CORONARY ANGIOPLASTY WITH STENT PLACEMENT      , ,     ESOPHAGOGASTRODUODENOSCOPY N/A 2018    Procedure: ESOPHAGOGASTRODUODENOSCOPY (EGD);  Surgeon: Mario Cesar MD;  Location: [facility] ENDO;  Service: General;  Laterality: N/A;    ESOPHAGOGASTRODUODENOSCOPY (EGD) N/A 2018    Performed by Mario Cesar MD at [facility] ENDO    HEART CATH-LEFT Left 2015    Performed by Eric Rutledge MD at [facility] CATH LAB    ROBOT-ASSISTED LAPAROSCOPIC SLEEVE GASTRECTOMY USING DA LURDES XI N/A 2018    Procedure: XI ROBOTIC SLEEVE GASTRECTOMY;  Surgeon: Mario Cesar MD;  Location: [facility] OR;  Service: General;  Laterality: N/A;    VASECTOMY      XI ROBOTIC SLEEVE GASTRECTOMY N/A 2018    Performed by Mario Cesar MD at [facility] OR       Review of patient's allergies indicates:   Allergen Reactions    Iodinated contrast- oral and iv dye      States, "My arteries started shutting down on me"       Family History     Problem Relation (Age of Onset)    Cataracts Father    Heart disease Father, Mother    Hypertension Father, Mother        Tobacco Use    Smoking status: Former Smoker     Packs/day: 1.00     Years: 40.00     Pack years: 40.00     Types: Cigarettes     Start date:      Last attempt to quit: 2009     Years since quittin.7    Smokeless tobacco: Never Used   Substance and Sexual Activity    Alcohol use: No    Drug use: No    Sexual activity: Not on file         Review of Systems   Constitutional: Negative for fatigue, fever and unexpected weight " change.   HENT: Negative for congestion and sore throat.    Respiratory: Negative for cough, shortness of breath and wheezing.    Cardiovascular: Positive for chest pain and leg swelling (trace).   Gastrointestinal: Positive for abdominal pain (appropriate post op) and nausea.   Genitourinary: Negative.    Musculoskeletal: Negative.    Skin: Negative.    Neurological: Negative for syncope and speech difficulty.   Psychiatric/Behavioral: The patient is not nervous/anxious.      Objective:     Vital Signs (Most Recent):  Temp: 98.5 °F (36.9 °C) (09/28/18 1030)  Pulse: 61 (09/28/18 1100)  Resp: 11 (09/28/18 1100)  BP: (!) 151/74 (09/28/18 1045)  SpO2: 96 % (09/28/18 1100) Vital Signs (24h Range):  Temp:  [97.4 °F (36.3 °C)-98.7 °F (37.1 °C)] 98.5 °F (36.9 °C)  Pulse:  [61-81] 61  Resp:  [10-20] 11  SpO2:  [92 %-99 %] 96 %  BP: (123-167)/(65-80) 151/74     Weight: 119.6 kg (263 lb 10.7 oz)  Body mass index is 34.79 kg/m².      Intake/Output Summary (Last 24 hours) at 9/28/2018 1204  Last data filed at 9/28/2018 0600  Gross per 24 hour   Intake 1024.26 ml   Output 315 ml   Net 709.26 ml       Physical Exam   Constitutional: He is oriented to person, place, and time. He appears well-developed and well-nourished. No distress.   HENT:   Head: Normocephalic and atraumatic.   Eyes: Conjunctivae are normal. Pupils are equal, round, and reactive to light.   Neck: No JVD present. No tracheal deviation present.   Cardiovascular: Normal rate and regular rhythm.   No murmur heard.  Pulses:       Radial pulses are 2+ on the right side, and 2+ on the left side.        Dorsalis pedis pulses are 2+ on the right side, and 2+ on the left side.   Pulmonary/Chest: Effort normal and breath sounds normal. No respiratory distress.   Abdominal: Soft. Bowel sounds are decreased.   Lap sites x 4 intact without drainage; appropriate post op tenderness   Musculoskeletal: He exhibits edema (trace lower extremity).   Neurological: He is alert and  oriented to person, place, and time.   Skin: Skin is warm and dry. Capillary refill takes less than 2 seconds. He is not diaphoretic.       Vents:  Oxygen Concentration (%): 28 (09/28/18 0759)    Lines/Drains/Airways     Peripheral Intravenous Line                 Peripheral IV - Single Lumen 08/09/16 0930 Right Hand 780 days         Peripheral IV - Single Lumen 08/16/18 0651 Right Hand 43 days         Peripheral IV - Single Lumen 09/27/18 0800 Left Hand 1 day         Peripheral IV - Single Lumen 09/28/18 0252 Anterior;Distal;Right Antecubital less than 1 day                Significant Labs:    CBC/Anemia Profile:  Recent Labs   Lab  09/27/18   1952  09/28/18   0215  09/28/18   0553   WBC  7.67  7.26  7.89  7.89   HGB  15.0  14.7  14.6  14.6   HCT  43.0  41.8  43.4  43.4   PLT  194  193  180  180   MCV  92  91  93  93   RDW  14.1  14.2  14.5  14.5        Chemistries:  Recent Labs   Lab  09/27/18   1842  09/28/18   0553   NA  141  139   K  4.4  4.0   CL  103  105   CO2  26  23   BUN  26*  19   CREATININE  0.8  0.8   CALCIUM  9.4  9.2   MG  1.8   --        All pertinent labs within the past 24 hours have been reviewed.    Significant Imaging:   I have reviewed all pertinent imaging results/findings within the past 24 hours.    Assessment/Plan:     Pulmonary   Mixed restrictive and obstructive lung disease    Supplemental oxygen prn for sat 92 or greater while on PCA  Continue duoneb prn        Cardiac/Vascular   NSTEMI (non-ST elevated myocardial infarction)    Medical management  Await [facility] record  Cardiology following        HTN (hypertension)    Continue metoprolol  ICU hemodynamic monitoring with tridil infusion, titrate for CP and BP control        Endocrine   * S/P laparoscopic sleeve gastrectomy    Surgery following  POD1        Morbid obesity with BMI of 40.0-44.9, adult    S/p sleeve gastrectomy  Diet teaching prior to discharge        Type 2 diabetes mellitus without complication    Currently  controlled  Goal range 100-180 while critically ill  Monitor with SSI prn        Other   SATYA on CPAP    Apneic episodes potentially exacerbate cardiac stress/demand  Encourage consistent nocturnal CPAP use          Preventive Measures:   Nutrition: advance per surgery  Stress Ulcer: PPI  DVT: heparin/SCDs  BB: metoprolol  Bowel Prophylaxis: prn miralax  Head of Bed/Reposition: Elevate HOB and turn Q1-2 hours    Mobility: OOB with assist  Code Status: Full    Counseling/Consultation: I have discussed the care of this patient in detail with nursing staff and Dr. Adhikari. Status and plan of care discussed with team on multidisciplinary rounds.     Critical Care Time: 45 minutes  Critical secondary to CP, HTN, NSTEMI on tridil infusion   Critical care was time spent personally by me on the following activities: development of treatment plan with patient or surrogate and bedside caregivers, discussions with consultants, evaluation of patient's response to treatment, examination of patient, ordering and performing treatments and interventions, ordering and review of laboratory studies, ordering and review of radiographic studies, pulse oximetry, re-evaluation of patient's condition. This critical care time did not overlap with that of any other provider or involve time for any procedures.    Thank you for your consult.      LISANDRA Mai-BC  Critical Care Medicine  [facility]

## 2018-09-28 NOTE — ASSESSMENT & PLAN NOTE
-Patient who presents with NSTEMI s/p sleeve gastrectomy  -Complains of recurrent/refractory chest pain this AM, unrelieved by sublingual nitroglycerin  -Reports recent LHC by Dr. Martinez at [facility] with no intervention, no change from prior angiograms, medical mgmt recommended  -Records have been requested  -Previous LHC at our facility in 2016 showed patent stents, 50-70% diagonal lesion, and small non-dominant  RCA  -Will plan to optimize medical mgmt  -Start Tridil gtt  -Continue ASA, heparin gtt, BB, statin  -Further rec's pending review of recent angiogram films

## 2018-09-28 NOTE — SUBJECTIVE & OBJECTIVE
"Past Medical History:   Diagnosis Date    Anticoagulant long-term use     Plavix    BPH (benign prostatic hypertrophy)     CAD (coronary artery disease)     HTN (hypertension) 5/15/2014    Hyperlipidemia     Obesity 5/15/2014    SATYA on CPAP     Pneumonia     PTSD (post-traumatic stress disorder) 5/15/2014    RBBB 5/15/2014    S/P PTCA (percutaneous transluminal coronary angioplasty) 5/15/2014    Special screening for malignant neoplasms, colon 1/22/2014    Type 2 diabetes mellitus without complication 9/28/2015       Past Surgical History:   Procedure Laterality Date    APPENDECTOMY      at age 14    ATRIAL CARDIAC PACEMAKER INSERTION      COLONOSCOPY Left 4/27/2018    Procedure: COLONOSCOPY;  Surgeon: Artem Medeiros MD;  Location: [facility] ENDO;  Service: Endoscopy;  Laterality: Left;    COLONOSCOPY Left 4/27/2018    Performed by Artem Medeiros MD at [facility] ENDO    COLONOSCOPY N/A 5/14/2015    Performed by Travis Miguel MD at [facility] ENDO    COLONOSCOPY N/A 1/22/2014    Performed by Artem Medeiros MD at [facility] ENDO    CORONARY ANGIOPLASTY WITH STENT PLACEMENT      1990, 2008, 2012    ESOPHAGOGASTRODUODENOSCOPY N/A 8/16/2018    Procedure: ESOPHAGOGASTRODUODENOSCOPY (EGD);  Surgeon: Mario Cesar MD;  Location: [facility] ENDO;  Service: General;  Laterality: N/A;    ESOPHAGOGASTRODUODENOSCOPY (EGD) N/A 8/16/2018    Performed by Mario Cesar MD at [facility] ENDO    HEART CATH-LEFT Left 9/28/2015    Performed by Eric Rutledge MD at [facility] CATH LAB    ROBOT-ASSISTED LAPAROSCOPIC SLEEVE GASTRECTOMY USING DA LURDES XI N/A 9/27/2018    Procedure: XI ROBOTIC SLEEVE GASTRECTOMY;  Surgeon: Mario Cesar MD;  Location: [facility] OR;  Service: General;  Laterality: N/A;    VASECTOMY      XI ROBOTIC SLEEVE GASTRECTOMY N/A 9/27/2018    Performed by Mario Cesar MD at [facility] OR       Review of patient's allergies indicates:   Allergen Reactions    Iodinated contrast- oral and iv dye      States, "My arteries started shutting " "down on me"       Family History     Problem Relation (Age of Onset)    Cataracts Father    Heart disease Father, Mother    Hypertension Father, Mother        Tobacco Use    Smoking status: Former Smoker     Packs/day: 1.00     Years: 40.00     Pack years: 40.00     Types: Cigarettes     Start date:      Last attempt to quit: 2009     Years since quittin.7    Smokeless tobacco: Never Used   Substance and Sexual Activity    Alcohol use: No    Drug use: No    Sexual activity: Not on file         Review of Systems   Constitutional: Negative for fatigue, fever and unexpected weight change.   HENT: Negative for congestion and sore throat.    Respiratory: Negative for cough, shortness of breath and wheezing.    Cardiovascular: Positive for chest pain and leg swelling (trace).   Gastrointestinal: Positive for abdominal pain (appropriate post op) and nausea.   Genitourinary: Negative.    Musculoskeletal: Negative.    Skin: Negative.    Neurological: Negative for syncope and speech difficulty.   Psychiatric/Behavioral: The patient is not nervous/anxious.      Objective:     Vital Signs (Most Recent):  Temp: 98.5 °F (36.9 °C) (18 1030)  Pulse: 61 (18 1100)  Resp: 11 (18 1100)  BP: (!) 151/74 (18 1045)  SpO2: 96 % (18 1100) Vital Signs (24h Range):  Temp:  [97.4 °F (36.3 °C)-98.7 °F (37.1 °C)] 98.5 °F (36.9 °C)  Pulse:  [61-81] 61  Resp:  [10-20] 11  SpO2:  [92 %-99 %] 96 %  BP: (123-167)/(65-80) 151/74     Weight: 119.6 kg (263 lb 10.7 oz)  Body mass index is 34.79 kg/m².      Intake/Output Summary (Last 24 hours) at 2018 1204  Last data filed at 2018 0600  Gross per 24 hour   Intake 1024.26 ml   Output 315 ml   Net 709.26 ml       Physical Exam   Constitutional: He is oriented to person, place, and time. He appears well-developed and well-nourished. No distress.   HENT:   Head: Normocephalic and atraumatic.   Eyes: Conjunctivae are normal. Pupils are equal, round, " and reactive to light.   Neck: No JVD present. No tracheal deviation present.   Cardiovascular: Normal rate and regular rhythm.   No murmur heard.  Pulses:       Radial pulses are 2+ on the right side, and 2+ on the left side.        Dorsalis pedis pulses are 2+ on the right side, and 2+ on the left side.   Pulmonary/Chest: Effort normal and breath sounds normal. No respiratory distress.   Abdominal: Soft. Bowel sounds are decreased.   Lap sites x 4 intact without drainage; appropriate post op tenderness   Musculoskeletal: He exhibits edema (trace lower extremity).   Neurological: He is alert and oriented to person, place, and time.   Skin: Skin is warm and dry. Capillary refill takes less than 2 seconds. He is not diaphoretic.       Vents:  Oxygen Concentration (%): 28 (09/28/18 0759)    Lines/Drains/Airways     Peripheral Intravenous Line                 Peripheral IV - Single Lumen 08/09/16 0930 Right Hand 780 days         Peripheral IV - Single Lumen 08/16/18 0651 Right Hand 43 days         Peripheral IV - Single Lumen 09/27/18 0800 Left Hand 1 day         Peripheral IV - Single Lumen 09/28/18 0252 Anterior;Distal;Right Antecubital less than 1 day                Significant Labs:    CBC/Anemia Profile:  Recent Labs   Lab  09/27/18   1952  09/28/18   0215  09/28/18   0553   WBC  7.67  7.26  7.89  7.89   HGB  15.0  14.7  14.6  14.6   HCT  43.0  41.8  43.4  43.4   PLT  194  193  180  180   MCV  92  91  93  93   RDW  14.1  14.2  14.5  14.5        Chemistries:  Recent Labs   Lab  09/27/18   1842  09/28/18   0553   NA  141  139   K  4.4  4.0   CL  103  105   CO2  26  23   BUN  26*  19   CREATININE  0.8  0.8   CALCIUM  9.4  9.2   MG  1.8   --        All pertinent labs within the past 24 hours have been reviewed.    Significant Imaging:   I have reviewed all pertinent imaging results/findings within the past 24 hours.

## 2018-09-28 NOTE — PROGRESS NOTES
Repeat troponin 1.1.  EKG pending.  Discussed case with Dr. Kenny (cardiology on call).  Plan to start heparin gtt per ACS protocol, atorvastatin 80mg qhs, metoprolol, TTE in AM, and continue to trend troponins per cardiology recommendations.  Discussed with Dr. Cesar (general surgery) who agrees with current plan.  Placed recommended orders per cardiology and for transfer to telemetry floor for closer monitoring.      Critical care time 30 min

## 2018-09-28 NOTE — HPI
68 year old obese male with PMH of HTN, CAD post PTCA with PPM, DM2, PTSD, mild COPD s/t former 40pack year smoking, and SATYA with home CPAP  Presented to [facility] on 9/27 for planned robotic sleeve gastrectomy; post operatively reported substernal chest pain and troponin +0.262 which increased 1.1 and throughout night pain has decreased but not subsided with trials of SL NTG and NTG paste  He is being transferred to ICU for tridil infusion and medical management of NSTEMI    Of note he recently underwent LHC and PPM placement @ [facility] with reported unchanged CAD, he reports that since this his endurance has improved in addition to decreased episodes of CP although he did have episode of substernal CP requiring SL nitro on 9/23 (prior to surgery)

## 2018-09-28 NOTE — SUBJECTIVE & OBJECTIVE
"Past Medical History:   Diagnosis Date    Anticoagulant long-term use     Plavix    BPH (benign prostatic hypertrophy)     CAD (coronary artery disease)     HTN (hypertension) 5/15/2014    Hyperlipidemia     Obesity 5/15/2014    SATYA on CPAP     Pneumonia     PTSD (post-traumatic stress disorder) 5/15/2014    RBBB 5/15/2014    S/P PTCA (percutaneous transluminal coronary angioplasty) 5/15/2014    Special screening for malignant neoplasms, colon 1/22/2014    Type 2 diabetes mellitus without complication 9/28/2015       Past Surgical History:   Procedure Laterality Date    APPENDECTOMY      at age 14    ATRIAL CARDIAC PACEMAKER INSERTION      COLONOSCOPY Left 4/27/2018    Procedure: COLONOSCOPY;  Surgeon: Artem Medeiros MD;  Location: [facility];  Service: Endoscopy;  Laterality: Left;    COLONOSCOPY Left 4/27/2018    Performed by Artem Medeiros MD at [facility] ENDO    COLONOSCOPY N/A 5/14/2015    Performed by Travis Miguel MD at [facility] ENDO    COLONOSCOPY N/A 1/22/2014    Performed by [facility]em [facility]ros MD at [facility]    CORONARY ANGIOPLASTY WITH STENT PLACEMENT      1990, 2008, 2012    ESOPHAGOGASTRODUODENOSCOPY N/A 8/16/2018    Procedure: ESOPHAGOGASTRODUODENOSCOPY (EGD);  Surgeon: Mario Cesar MD;  Location: [facility];  Service: General;  Laterality: N/A;    ESOPHAGOGASTRODUODENOSCOPY (EGD) N/A 8/16/2018    Performed by Mario Cesar MD at [facility] ENDO    HEART CATH-LEFT Left 9/28/2015    Performed by Eric Rutledge MD at [facility] CATH LAB    VASECTOMY         Review of patient's allergies indicates:   Allergen Reactions    Iodinated contrast- oral and iv dye      States, "My arteries started shutting down on me"       No current facility-administered medications on file prior to encounter.      Current Outpatient Medications on File Prior to Encounter   Medication Sig    alprazolam (XANAX) 0.5 MG tablet Take 0.5 mg by mouth 3 (three) times daily as needed.     amLODIPine (NORVASC) 2.5 MG tablet " "TAKE 1 TABLET BY MOUTH ONCE DAILY.    aspirin (ECOTRIN) 81 MG EC tablet Take 81 mg by mouth once daily.    atorvastatin (LIPITOR) 80 MG tablet Take 80 mg by mouth once daily. Take a half tablet daily     buPROPion (WELLBUTRIN SR) 200 MG TbSR Take 200 mg by mouth once daily.    clopidogrel (PLAVIX) 75 mg tablet TAKE 1 TABLET BY MOUTH EVERY DAY    dabigatran etexilate (PRADAXA) 150 mg Cap Take 150 mg by mouth once daily. "Do NOT break, chew, or open capsules."     furosemide (LASIX) 40 MG tablet Takes one-half tablet by mouth daily (Patient taking differently: Take 40 mg by mouth 2 (two) times daily. Takes 2 tablet by mouth daily)    gabapentin (NEURONTIN) 300 MG capsule Take 300 mg by mouth 3 (three) times daily.     ipratropium-albuterol (COMBIVENT RESPIMAT)  mcg/actuation inhaler Inhale 1 puff into the lungs 4 (four) times daily.    isosorbide mononitrate (IMDUR) 60 MG 24 hr tablet Take 1 tablet (60 mg total) by mouth 2 (two) times daily. (Patient taking differently: Take 60 mg by mouth once daily. )    liraglutide (SAXENDA) 3 mg/0.5 mL (18 mg/3 mL) PnIj Inject into the skin once daily.    metformin (GLUCOPHAGE) 1000 MG tablet Take 1,000 mg by mouth 2 (two) times daily with meals.    pantoprazole (PROTONIX) 40 MG tablet Take 1 tablet (40 mg total) by mouth 2 (two) times daily. for 14 days    ranolazine (RANEXA) 500 MG Tb12 Take 500 mg by mouth 2 (two) times daily.    vitamin D 1000 units Tab Take 2,000 Units by mouth once daily.    cyanocobalamin (VITAMIN B-12) 1000 MCG tablet Take 100 mcg by mouth once daily.    terbinafine HCl (LAMISIL) 250 mg tablet Take 250 mg by mouth once daily.     Family History     Problem Relation (Age of Onset)    Cataracts Father    Heart disease Father, Mother    Hypertension Father, Mother        Tobacco Use    Smoking status: Former Smoker     Packs/day: 1.00     Years: 40.00     Pack years: 40.00     Types: Cigarettes     Start date: 1965     Last attempt to " quit: 2009     Years since quittin.7    Smokeless tobacco: Never Used   Substance and Sexual Activity    Alcohol use: No    Drug use: No    Sexual activity: Not on file     Review of Systems   Constitution: Positive for malaise/fatigue.             HENT: Negative.    Eyes: Negative.    Cardiovascular: Positive for chest pain.   Respiratory: Negative.    Endocrine: Negative.    Hematologic/Lymphatic: Negative.    Skin: Positive for flushing.   Gastrointestinal: Negative.    Genitourinary: Negative.    Neurological: Negative.    Psychiatric/Behavioral: Negative.    Allergic/Immunologic: Negative.      Objective:     Vital Signs (Most Recent):  Temp: 98.7 °F (37.1 °C) (18 0739)  Pulse: 69 (18 0901)  Resp: 11 (18 0759)  BP: 139/73 (18 0901)  SpO2: 97 % (18 075) Vital Signs (24h Range):  Temp:  [97.4 °F (36.3 °C)-98.7 °F (37.1 °C)] 98.7 °F (37.1 °C)  Pulse:  [62-81] 69  Resp:  [11-20] 11  SpO2:  [92 %-99 %] 97 %  BP: (123-167)/(65-80) 139/73     Weight: 112.5 kg (248 lb 0.3 oz)  Body mass index is 32.72 kg/m².    SpO2: 97 %  O2 Device (Oxygen Therapy): nasal cannula      Intake/Output Summary (Last 24 hours) at 2018 0945  Last data filed at 2018 0600  Gross per 24 hour   Intake 2024.26 ml   Output 315 ml   Net 1709.26 ml       Lines/Drains/Airways     Peripheral Intravenous Line                 Peripheral IV - Single Lumen 16 0930 Right Hand 780 days         Peripheral IV - Single Lumen 18 0651 Right Hand 43 days         Peripheral IV - Single Lumen 18 0800 Left Hand 1 day         Peripheral IV - Single Lumen 18 0252 Anterior;Distal;Right Antecubital less than 1 day                Physical Exam   Constitutional: He is oriented to person, place, and time. He appears well-developed and well-nourished. No distress.   Flushed     HENT:   Head: Normocephalic and atraumatic.   Eyes: Pupils are equal, round, and reactive to light. Right eye exhibits  no discharge. Left eye exhibits no discharge.   Neck: Neck supple. No JVD present.   Cardiovascular: Normal rate, regular rhythm, S1 normal and S2 normal.   No murmur heard.  Pulmonary/Chest: Effort normal and breath sounds normal. No respiratory distress. He has no wheezes. He has no rales.   PPM site well-healed   Abdominal: Soft.   Mild distention and tenderness   Musculoskeletal: He exhibits no edema.   Neurological: He is alert and oriented to person, place, and time.   Skin: Skin is warm and dry. He is not diaphoretic. No erythema.   Psychiatric: He has a normal mood and affect. His behavior is normal. Thought content normal.   Nursing note and vitals reviewed.      Significant Labs:   CMP   Recent Labs   Lab  09/27/18   1842  09/28/18   0553   NA  141  139   K  4.4  4.0   CL  103  105   CO2  26  23   GLU  128*  118*   BUN  26*  19   CREATININE  0.8  0.8   CALCIUM  9.4  9.2   ANIONGAP  12  11   ESTGFRAFRICA  >60  >60   EGFRNONAA  >60  >60   , CBC   Recent Labs   Lab  09/27/18   1952  09/28/18   0215  09/28/18   0553   WBC  7.67  7.26  7.89  7.89   HGB  15.0  14.7  14.6  14.6   HCT  43.0  41.8  43.4  43.4   PLT  194  193  180  180   , Troponin   Recent Labs   Lab  09/27/18   1842  09/27/18   2330  09/28/18   0553   TROPONINI  0.262*  1.102*  1.005*    and All pertinent lab results from the last 24 hours have been reviewed.    Significant Imaging: Echocardiogram: Pending, EKG: Reviewed and X-Ray: CXR: X-Ray Chest 1 View (CXR):   Results for orders placed or performed during the hospital encounter of 09/27/18   X-Ray Chest 1 View    Narrative    EXAMINATION:  XR CHEST 1 VIEW    CLINICAL HISTORY:  chest pain;    COMPARISON:  Chest x-ray, 09/12/2017.    FINDINGS:  The lungs are clear. The heart size is top normal.  There is a left-sided dual lead AICD with leads projecting in the right atrium and right ventricle.  No pleural effusion or pneumothorax.      Impression    No acute cardiopulmonary  disease.      Electronically signed by: Nawaf Rubio MD  Date:    09/27/2018  Time:    21:00    and X-Ray Chest PA and Lateral (CXR): No results found for this visit on 09/27/18.

## 2018-09-28 NOTE — PLAN OF CARE
Problem: Patient Care Overview  Goal: Plan of Care Review  Outcome: Ongoing (interventions implemented as appropriate)  Pt denies chest pain, titrated Nitroglycerine gtt off.  C/o discomfort in abdominal incision sites, has Dilaudid PCA- states pain is well controlled.  Pt taken for Upper GI follow through study this afternoon, tolerated well, c/o nausea after test- Zofran resolves nausea.  Wife at bedside, discussed POC at length with patient and wife.

## 2018-09-28 NOTE — PROGRESS NOTES
"Spoke to Dr. Cesar about patient's chest discomfort and elevated troponin.  EKG show RBBB, no ST changes concerning for ischemia.  H/O of CAD and CHF. Will check BNP now, trend troponin q6h x 3.  Repeat EKG in 6 hours.  Give ASA 325mg x 1 now.  NTG SL q5 min chest pain.  Monitor closely overnight.  Patient reports chest pressure and "bloating" and denies pain.  Reports he feels like it's "gas or bloating".  Informed patient's wife of initial elevated troponin and the need to monitor more closely.  She and patient acknowledge understanding.  Will update patient and ancillary staff of any changes.   "

## 2018-09-28 NOTE — PROGRESS NOTES
[facility] Hospital Medicine  Progress Note    Patient Name: Cole Doan  MRN: [id number]  Patient Class: IP- Inpatient   Admission Date: 9/27/2018  Length of Stay: 1 days  Attending Physician: Mario Cesar MD  Primary Care Provider: Yoly Spring MD        Subjective:     Principal Problem:S/P laparoscopic sleeve gastrectomy    HPI:  Cole Doan is a 68 year old with a PMHx of DM II, SATYA on CPAP, HLD, CAD, and BPH admitted by General surgery s/p robotic sleeve gastrectomy performed by Dr. Cesar on today, 09/27/18. EBL 15 mL.  consulted for medical management.     Hospital Course:  Patient admitted s/p robotic sleeve gastrectomy by Dr. Cesar. Post-procedure, patient complained of substernal chest pressure/heaviness associated with flushing. Troponin 0.262 and Cardiology consulted to assist with management. Patient diagnosed with NSTEMI s/p sleeve gastrectomy. Patient started on heparin gtt. Continue ASA, BB, and statin. Of note, patient underwent LHC at [facility] by Dr. Martinez with no intervention and was told no significant changes from prior angiograms. Troponin 0.262>1.102>1.005. EKG reviewed. 2D echo pending. 9/28/18-Patient complains of recurrent chest pain this AM, unrelieved by sublingual nitroglycerin and nitro paste. Patient transferred to ICU on Tridil gtt for critical care management.       Interval History: Patient c/o chest pain. Cardiology at bedside. Repeat troponin ordered. Chest pain unrelieved with nitro SL and nitro paste. Patient transfer to ICU on Tridil gtt for critical care management.     Review of Systems   Constitutional: Positive for activity change. Negative for chills and fever.   HENT: Negative.    Eyes: Negative.    Respiratory: Negative for apnea, cough, choking, chest tightness, shortness of breath, wheezing and stridor.    Cardiovascular: Positive for chest pain. Negative for palpitations and leg swelling.   Gastrointestinal: Positive for abdominal pain  and nausea. Negative for constipation, diarrhea and vomiting.   Endocrine: Negative.    Genitourinary: Negative for decreased urine volume, difficulty urinating, dysuria, frequency, hematuria and urgency.   Musculoskeletal: Negative for arthralgias, back pain, myalgias, neck pain and neck stiffness.   Allergic/Immunologic: Negative.    Neurological: Negative for dizziness, tremors, seizures, syncope, speech difficulty, weakness and headaches.   Hematological: Negative.    Psychiatric/Behavioral: Negative.      Objective:     Vital Signs (Most Recent):  Temp: 98.5 °F (36.9 °C) (09/28/18 1030)  Pulse: 61 (09/28/18 1100)  Resp: 11 (09/28/18 1100)  BP: (!) 151/74 (09/28/18 1045)  SpO2: 96 % (09/28/18 1100) Vital Signs (24h Range):  Temp:  [97.4 °F (36.3 °C)-98.7 °F (37.1 °C)] 98.5 °F (36.9 °C)  Pulse:  [61-81] 61  Resp:  [10-20] 11  SpO2:  [92 %-99 %] 96 %  BP: (123-167)/(65-80) 151/74     Weight: 119.6 kg (263 lb 10.7 oz)  Body mass index is 34.79 kg/m².    Intake/Output Summary (Last 24 hours) at 9/28/2018 1127  Last data filed at 9/28/2018 0600  Gross per 24 hour   Intake 2024.26 ml   Output 315 ml   Net 1709.26 ml      Physical Exam   Constitutional: He is oriented to person, place, and time. He appears well-developed and well-nourished. He is cooperative. He is easily aroused.   HENT:   Head: Normocephalic and atraumatic.   Eyes: EOM are normal.   Neck: Neck supple.   Cardiovascular: Normal rate, regular rhythm, normal heart sounds and intact distal pulses.   No murmur heard.  Pulmonary/Chest: Effort normal and breath sounds normal. No stridor. No respiratory distress. He has no wheezes. He has no rales. He exhibits no tenderness.   Abdominal: Normal appearance. Bowel sounds are decreased. There is generalized tenderness.   x4 surgical sites   Genitourinary:   Genitourinary Comments: Deferred   Musculoskeletal: He exhibits no edema, tenderness or deformity.   Neurological: He is alert, oriented to person, place,  and time and easily aroused. No sensory deficit.   Skin: Skin is warm and dry. Capillary refill takes less than 2 seconds.   Psychiatric: He has a normal mood and affect. His behavior is normal. Judgment and thought content normal.   Nursing note and vitals reviewed.      Significant Labs:   BMP:   Recent Labs   Lab  09/27/18 1842  09/28/18   0553   GLU  128*  118*   NA  141  139   K  4.4  4.0   CL  103  105   CO2  26  23   BUN  26*  19   CREATININE  0.8  0.8   CALCIUM  9.4  9.2   MG  1.8   --      CBC:   Recent Labs   Lab  09/27/18 1952 09/28/18   0215  09/28/18   0553   WBC  7.67  7.26  7.89  7.89   HGB  15.0  14.7  14.6  14.6   HCT  43.0  41.8  43.4  43.4   PLT  194  193  180  180     Troponin:   Recent Labs   Lab  09/27/18 1842 09/27/18   2330  09/28/18   0553   TROPONINI  0.262*  1.102*  1.005*     All pertinent labs within the past 24 hours have been reviewed.    Significant Imaging:       Assessment/Plan:      * S/P laparoscopic sleeve gastrectomy    - General surgery primary -- medical management per primary team  - S/p robotic sleeve gastrectomy today, 09/27/18, by Dr. Cesar  - Analgesics/Antiemetics PRN  - Encourage IS use  -Nutritionist consult   -Pain controlled           NSTEMI (non-ST elevated myocardial infarction)    Patient dx with NSTEMI s/p sleeve gastrectomy  -Troponin 0.262>1.102>1.005  -Heparin gtt started   -ASA, bb, Stain   -2D echo pending  -Reports recent LHC by Dr. Martienz at [facility] with no intervention, no change from prior angiograms, medical mgmt recommended  9/28/18  -- Pt c/o chest pain this AM, unrelieved by sublingual nitroglycerin and nitro paste   -Start Tridil gtt and transfered to ICU           Mixed restrictive and obstructive lung disease    - Supplemental oxygen prn, keep O2 sats > 88%  - Duonebs PRN          Type 2 diabetes mellitus without complication    - Accuchecks and low dose SSI  - Continue Metformin upon discharge          CAD S/P percutaneous  coronary angioplasty    - Resume Statin once diet advanced. Will defer ASA and Pradaxa to primary on when to restart these medications post op          PTSD (post-traumatic stress disorder)    - Continue home medication, PO Wellbutrin          SATYA on CPAP    - Resume nightly CPAP -- titrate to patient's comfort and O2 saturation          Hyperlipidemia    - Resume Statin             HTN (hypertension)    - Resume home medication, including Amlodipine, once diet advanced  - IV Hydralazine PRN  -Blood pressure likely elevated 2/2 pain             VTE Risk Mitigation (From admission, onward)        Ordered     heparin 25,000 units in dextrose 5% 250 mL (100 units/mL) infusion LOW INTENSITY nomogram - [facility]  Continuous      09/28/18 0149     heparin 25,000 units in dextrose 5% (100 units/ml) IV bolus from bag - ADDITIONAL PRN BOLUS - 60 units/kg (max bolus 4000 units)  As needed (PRN)      09/28/18 0149     heparin 25,000 units in dextrose 5% (100 units/ml) IV bolus from bag - ADDITIONAL PRN BOLUS - 30 units/kg (max bolus 4000 units)  As needed (PRN)      09/28/18 0149          Critical care time spent on the evaluation and treatment of severe organ dysfunction, review of pertinent labs and imaging studies, discussions with consulting providers and discussions with patient/family: 40 minutes.    Debbie Pavon NP  Department of Hospital Medicine   [facility]

## 2018-09-28 NOTE — PLAN OF CARE
Problem: Patient Care Overview  Goal: Plan of Care Review  Outcome: Ongoing (interventions implemented as appropriate)  Recommendations   Recommendation/Intervention 1) ADAT to bariatric diet.   2) If unable to advance diet within 72 hours consider alternate nutrition support.   3) Provided pt with post op bariatric discharge diet instruction w/ handouts. Dietitian's contact information was provided to pt/family for further questions or concerns.   4) RD to monitor    Goals Diet advancement within 96 hrs   Nutrition Goal Status new   Communication of RD Recs

## 2018-09-29 LAB
APTT BLDCRRT: 45.4 SEC
APTT BLDCRRT: 48.1 SEC
APTT BLDCRRT: 50.8 SEC
POCT GLUCOSE: 101 MG/DL (ref 70–110)
POCT GLUCOSE: 87 MG/DL (ref 70–110)
POCT GLUCOSE: 89 MG/DL (ref 70–110)
POCT GLUCOSE: 95 MG/DL (ref 70–110)
TROPONIN I SERPL DL<=0.01 NG/ML-MCNC: 0.13 NG/ML
TROPONIN I SERPL DL<=0.01 NG/ML-MCNC: 0.17 NG/ML
TROPONIN I SERPL DL<=0.01 NG/ML-MCNC: 0.31 NG/ML

## 2018-09-29 PROCEDURE — 25000003 PHARM REV CODE 250: Performed by: HOSPITALIST

## 2018-09-29 PROCEDURE — 25000003 PHARM REV CODE 250: Performed by: PHYSICIAN ASSISTANT

## 2018-09-29 PROCEDURE — 84484 ASSAY OF TROPONIN QUANT: CPT | Mod: 91

## 2018-09-29 PROCEDURE — 99233 SBSQ HOSP IP/OBS HIGH 50: CPT | Mod: ,,, | Performed by: INTERNAL MEDICINE

## 2018-09-29 PROCEDURE — 63600175 PHARM REV CODE 636 W HCPCS: Performed by: SURGERY

## 2018-09-29 PROCEDURE — 85730 THROMBOPLASTIN TIME PARTIAL: CPT | Mod: 91

## 2018-09-29 PROCEDURE — 99232 SBSQ HOSP IP/OBS MODERATE 35: CPT | Mod: ,,, | Performed by: INTERNAL MEDICINE

## 2018-09-29 PROCEDURE — 93005 ELECTROCARDIOGRAM TRACING: CPT

## 2018-09-29 PROCEDURE — 21400001 HC TELEMETRY ROOM

## 2018-09-29 PROCEDURE — 27000221 HC OXYGEN, UP TO 24 HOURS

## 2018-09-29 PROCEDURE — 94799 UNLISTED PULMONARY SVC/PX: CPT

## 2018-09-29 PROCEDURE — 36415 COLL VENOUS BLD VENIPUNCTURE: CPT

## 2018-09-29 PROCEDURE — 25000003 PHARM REV CODE 250: Performed by: INTERNAL MEDICINE

## 2018-09-29 PROCEDURE — 25000003 PHARM REV CODE 250: Performed by: NURSE PRACTITIONER

## 2018-09-29 PROCEDURE — 25000003 PHARM REV CODE 250: Performed by: SURGERY

## 2018-09-29 PROCEDURE — 94770 HC EXHALED C02 TEST: CPT

## 2018-09-29 PROCEDURE — C9113 INJ PANTOPRAZOLE SODIUM, VIA: HCPCS | Performed by: SURGERY

## 2018-09-29 PROCEDURE — 63600175 PHARM REV CODE 636 W HCPCS: Performed by: HOSPITALIST

## 2018-09-29 PROCEDURE — 25000003 PHARM REV CODE 250: Performed by: COLON & RECTAL SURGERY

## 2018-09-29 PROCEDURE — 99900035 HC TECH TIME PER 15 MIN (STAT)

## 2018-09-29 PROCEDURE — 99024 POSTOP FOLLOW-UP VISIT: CPT | Mod: ,,, | Performed by: COLON & RECTAL SURGERY

## 2018-09-29 PROCEDURE — 93010 ELECTROCARDIOGRAM REPORT: CPT | Mod: ,,, | Performed by: INTERNAL MEDICINE

## 2018-09-29 RX ORDER — ISOSORBIDE MONONITRATE 30 MG/1
30 TABLET, EXTENDED RELEASE ORAL DAILY
Status: DISCONTINUED | OUTPATIENT
Start: 2018-09-29 | End: 2018-10-01 | Stop reason: HOSPADM

## 2018-09-29 RX ORDER — OXYCODONE HYDROCHLORIDE 5 MG/1
5 TABLET ORAL EVERY 4 HOURS PRN
Status: DISCONTINUED | OUTPATIENT
Start: 2018-09-29 | End: 2018-10-01 | Stop reason: HOSPADM

## 2018-09-29 RX ADMIN — AMLODIPINE BESYLATE 2.5 MG: 2.5 TABLET ORAL at 08:09

## 2018-09-29 RX ADMIN — METOPROLOL TARTRATE 25 MG: 25 TABLET ORAL at 08:09

## 2018-09-29 RX ADMIN — HEPARIN SODIUM AND DEXTROSE 12 UNITS/KG/HR: 10000; 5 INJECTION INTRAVENOUS at 04:09

## 2018-09-29 RX ADMIN — ATORVASTATIN CALCIUM 80 MG: 40 TABLET, FILM COATED ORAL at 08:09

## 2018-09-29 RX ADMIN — ALPRAZOLAM 1 MG: 1 TABLET ORAL at 08:09

## 2018-09-29 RX ADMIN — CLOPIDOGREL BISULFATE 75 MG: 75 TABLET ORAL at 08:09

## 2018-09-29 RX ADMIN — ALUMINUM HYDROXIDE, MAGNESIUM HYDROXIDE, AND SIMETHICONE: 200; 200; 20 SUSPENSION ORAL at 06:09

## 2018-09-29 RX ADMIN — CHLORHEXIDINE GLUCONATE 10 ML: 1.2 RINSE ORAL at 08:09

## 2018-09-29 RX ADMIN — OXYCODONE HYDROCHLORIDE 5 MG: 5 TABLET ORAL at 08:09

## 2018-09-29 RX ADMIN — ISOSORBIDE MONONITRATE 30 MG: 30 TABLET, EXTENDED RELEASE ORAL at 11:09

## 2018-09-29 RX ADMIN — BUPROPION HYDROCHLORIDE 200 MG: 100 TABLET, FILM COATED, EXTENDED RELEASE ORAL at 08:09

## 2018-09-29 RX ADMIN — ASPIRIN 81 MG: 81 TABLET, COATED ORAL at 08:09

## 2018-09-29 RX ADMIN — PANTOPRAZOLE SODIUM 40 MG: 40 INJECTION, POWDER, FOR SOLUTION INTRAVENOUS at 05:09

## 2018-09-29 RX ADMIN — RANOLAZINE 500 MG: 500 TABLET, FILM COATED, EXTENDED RELEASE ORAL at 08:09

## 2018-09-29 RX ADMIN — ALPRAZOLAM 0.5 MG: 0.5 TABLET ORAL at 08:09

## 2018-09-29 NOTE — SUBJECTIVE & OBJECTIVE
"Interval History: CP resolved. Tolerating clears. Pain well controlled. Denies nausea or vomiting. HD stable.    Medications:  Continuous Infusions:   heparin (porcine) in D5W 14.962 Units/kg/hr (09/29/18 0700)    hydromorphone in 0.9 % NaCl 6 mg/30 ml      nitroGLYCERIN Stopped (09/28/18 1600)     Scheduled Meds:   ALPRAZolam  0.5 mg Oral QHS    ALPRAZolam  1 mg Oral Daily    amLODIPine  2.5 mg Oral Daily    aspirin  81 mg Oral Daily    aspirin  325 mg Oral Once    atorvastatin  80 mg Oral QHS    buPROPion  200 mg Oral Daily    chlorhexidine  10 mL Mouth/Throat BID    clopidogrel  75 mg Oral Daily    metoprolol tartrate  25 mg Oral BID    nozaseptin   Each Nare BID    pantoprazole  40 mg Intravenous Before breakfast    ranolazine  500 mg Oral BID     PRN Meds:albuterol-ipratropium, dextrose 50%, diphenhydrAMINE, glucagon (human recombinant), heparin (PORCINE), heparin (PORCINE), hydrALAZINE, influenza, insulin aspart U-100, naloxone, nitroGLYCERIN, ondansetron, promethazine (PHENERGAN) IVPB, sodium chloride 0.9%     Review of patient's allergies indicates:   Allergen Reactions    Iodinated contrast- oral and iv dye      States, "My arteries started shutting down on me" pt states only to iv dye- was specifically told that oral dye does not have the same reaction     Objective:     Vital Signs (Most Recent):  Temp: 98.2 °F (36.8 °C) (09/29/18 0700)  Pulse: (!) 57 (09/29/18 0700)  Resp: 10 (09/29/18 0700)  BP: (!) 155/111 (09/29/18 0700)  SpO2: (S) 97 % (09/29/18 0754) Vital Signs (24h Range):  Temp:  [98.2 °F (36.8 °C)-99.4 °F (37.4 °C)] 98.2 °F (36.8 °C)  Pulse:  [57-82] 57  Resp:  [9-18] 10  SpO2:  [93 %-98 %] 97 %  BP: (128-178)/() 155/111     Weight: 119.6 kg (263 lb 10.7 oz)  Body mass index is 34.79 kg/m².    Intake/Output - Last 3 Shifts       09/27 0700 - 09/28 0659 09/28 0700 - 09/29 0659 09/29 0700 - 09/30 0659    P.O.  480 60    I.V. (mL/kg) 1734.3 (15.4) 314.6 (2.6) 13.9 (0.1)    IV " Piggyback 290 140     Total Intake(mL/kg) 2024.3 (18) 934.5 (7.8) 73.9 (0.6)    Urine (mL/kg/hr) 300 1025 (0.4)     Blood 15      Total Output 315 1025     Net +1709.3 -90.5 +73.9           Urine Occurrence 1 x            Physical Exam   Constitutional: He is oriented to person, place, and time. He appears well-developed.   HENT:   Head: Normocephalic and atraumatic.   Eyes: Conjunctivae and EOM are normal.   Neck: Normal range of motion. No thyromegaly present.   Cardiovascular: Normal rate and regular rhythm.   Pulmonary/Chest: Effort normal. No respiratory distress.   Abdominal:   Soft, nondistended; appropriately TTP; incisions c/d/i without erythema or drainage   Musculoskeletal: Normal range of motion. He exhibits no edema or tenderness.   Neurological: He is alert and oriented to person, place, and time.   Skin: Skin is warm and dry. Capillary refill takes less than 2 seconds. No rash noted.   Psychiatric: He has a normal mood and affect.       Significant Labs:  CBC:   Recent Labs   Lab  09/28/18   0553   WBC  7.89  7.89   RBC  4.66  4.66   HGB  14.6  14.6   HCT  43.4  43.4   PLT  180  180   MCV  93  93   MCH  31.3*  31.3*   MCHC  33.6  33.6     BMP:   Recent Labs   Lab  09/27/18   1842  09/28/18   0553   GLU  128*  118*   NA  141  139   K  4.4  4.0   CL  103  105   CO2  26  23   BUN  26*  19   CREATININE  0.8  0.8   CALCIUM  9.4  9.2   MG  1.8   --      Coagulation:   Recent Labs   Lab  09/28/18   0215   09/29/18   0815   LABPROT  10.6   --    --    INR  1.0   --    --    APTT  26.2   < >  48.1*    < > = values in this interval not displayed.     Cardiac markers:   Recent Labs   Lab  09/29/18   0126   TROPONINI  0.310*

## 2018-09-29 NOTE — ASSESSMENT & PLAN NOTE
Patient dx with NSTEMI s/p sleeve gastrectomy  -Troponin 0.262>1.102>1.005  -Heparin gtt started   -ASA, bb, Stain   -2D echo pending  -Reports recent LHC by Dr. Martinez at [facility] with no intervention, no change from prior angiograms, medical mgmt recommended  9/28/18  -cont current tx

## 2018-09-29 NOTE — ASSESSMENT & PLAN NOTE
Robotic sleeve gastrectomy POD2  Pain controlled. Will DC PCA and start PO pain control.  Tolerating clears, will plan for full liquids today  Dietician consult for post sleeve diet  OOB, ambulation encouraged

## 2018-09-29 NOTE — SUBJECTIVE & OBJECTIVE
Interval History: c/o headache    Objective:     Vital Signs (Most Recent):  Temp: 98.7 °F (37.1 °C) (09/29/18 1115)  Pulse: 61 (09/29/18 1200)  Resp: 18 (09/29/18 1200)  BP: (!) 113/59 (09/29/18 1200)  SpO2: (!) 94 % (09/29/18 1200) Vital Signs (24h Range):  Temp:  [98.2 °F (36.8 °C)-99.4 °F (37.4 °C)] 98.7 °F (37.1 °C)  Pulse:  [57-82] 61  Resp:  [9-18] 18  SpO2:  [91 %-97 %] 94 %  BP: (103-169)/() 113/59     Weight: 119.6 kg (263 lb 10.7 oz)  Body mass index is 34.79 kg/m².      Intake/Output Summary (Last 24 hours) at 9/29/2018 1308  Last data filed at 9/29/2018 1200  Gross per 24 hour   Intake 1317.92 ml   Output 1450 ml   Net -132.08 ml       Physical Exam   Constitutional: He is oriented to person, place, and time. He appears well-developed and well-nourished.   HENT:   Head: Normocephalic and atraumatic.   Eyes: Conjunctivae are normal. Pupils are equal, round, and reactive to light.   Neck: Neck supple. No JVD present. No tracheal deviation present. No thyromegaly present.   Cardiovascular: Normal rate, regular rhythm and normal heart sounds.   Pulmonary/Chest: Effort normal and breath sounds normal.   Abdominal: Soft.   Scar from recent surgery   Musculoskeletal: Normal range of motion.   Lymphadenopathy:     He has no cervical adenopathy.   Neurological: He is alert and oriented to person, place, and time.   Skin: Skin is warm and dry.   Nursing note and vitals reviewed.      Vents:  Oxygen Concentration (%): 28 (09/28/18 1954)    Lines/Drains/Airways     Peripheral Intravenous Line                 Peripheral IV - Single Lumen Anterior;Left Antecubital -- days         Peripheral IV - Single Lumen 09/27/18 0800 Left Hand 2 days                Significant Labs:    CBC/Anemia Profile:  Recent Labs   Lab  09/27/18 1952 09/28/18   0215  09/28/18   0553   WBC  7.67  7.26  7.89  7.89   HGB  15.0  14.7  14.6  14.6   HCT  43.0  41.8  43.4  43.4   PLT  194  193  180  180   MCV  92  91  93  93   RDW   14.1  14.2  14.5  14.5        Chemistries:  Recent Labs   Lab  09/27/18   1842  09/28/18   0553   NA  141  139   K  4.4  4.0   CL  103  105   CO2  26  23   BUN  26*  19   CREATININE  0.8  0.8   CALCIUM  9.4  9.2   MG  1.8   --        Troponin:   Recent Labs   Lab  09/28/18   1125  09/28/18   1739  09/29/18   0126   TROPONINI  0.793*  0.567*  0.310*     All pertinent labs within the past 24 hours have been reviewed.    Significant Imaging:  I have reviewed all pertinent imaging results/findings within the past 24 hours.  I have reviewed and interpreted all pertinent imaging results/findings within the past 24 hours.

## 2018-09-29 NOTE — ASSESSMENT & PLAN NOTE
Cardiology consulted  Will follow recommendations, including telemetry monitoring and anticoagulation

## 2018-09-29 NOTE — PLAN OF CARE
Problem: Patient Care Overview  Goal: Plan of Care Review  Outcome: Ongoing (interventions implemented as appropriate)  Patient remains free from falls or injury this shift, safety measures in place. VS stable, normal sinus rhythm on the monitor. Denies any pain or shortness of breath. Heparin drip running per order, APTT within therapeutic range. Abdominal incisions clean, dry and intact. Patient tolerating clear liquid diet. Denies any other needs or complaints at this time, call light and belongings within reach. Will continue to monitor.

## 2018-09-29 NOTE — PROGRESS NOTES
[facility] Hospital Medicine  Progress Note    Patient Name: Cole Doan  MRN: [id number]  Patient Class: IP- Inpatient   Admission Date: 9/27/2018  Length of Stay: 2 days  Attending Physician: Mario Cesar MD  Primary Care Provider: Yoly Spring MD        Subjective:     Principal Problem:NSTEMI (non-ST elevated myocardial infarction)    HPI:  Cole Doan is a 68 year old with a PMHx of DM II, SATYA on CPAP, HLD, CAD, and BPH admitted by General surgery s/p robotic sleeve gastrectomy performed by Dr. Cesar on today, 09/27/18. EBL 15 mL.  consulted for medical management.     Hospital Course:  Patient admitted s/p robotic sleeve gastrectomy by Dr. Cesar. Post-procedure, patient complained of substernal chest pressure/heaviness associated with flushing. Troponin 0.262 and Cardiology consulted to assist with management. Patient diagnosed with NSTEMI s/p sleeve gastrectomy. Patient started on heparin gtt. Continue ASA, BB, and statin. Of note, patient underwent LHC at [facility] by Dr. Martinez with no intervention and was told no significant changes from prior angiograms. Troponin 0.262>1.102>1.005. EKG reviewed. 2D echo pending. 9/28/18-Patient complains of recurrent chest pain this AM, unrelieved by sublingual nitroglycerin and nitro paste. Patient transferred to ICU on Tridil gtt for critical care management.       Interval History: Pt was seen and examined at bedside . He denies any chest pain at this time    Review of Systems   Constitutional: Positive for activity change. Negative for chills and fever.   HENT: Negative.    Eyes: Negative.    Respiratory: Negative for apnea, cough, choking, chest tightness, shortness of breath, wheezing and stridor.    Cardiovascular: Negative for chest pain, palpitations and leg swelling.   Gastrointestinal: Negative for abdominal pain, constipation, diarrhea, nausea and vomiting.   Endocrine: Negative.    Genitourinary: Negative for decreased urine  volume, difficulty urinating, dysuria, frequency, hematuria and urgency.   Musculoskeletal: Negative for arthralgias, back pain, myalgias, neck pain and neck stiffness.   Allergic/Immunologic: Negative.    Neurological: Negative for dizziness, tremors, seizures, syncope, speech difficulty, weakness and headaches.   Hematological: Negative.    Psychiatric/Behavioral: Negative.      Objective:     Vital Signs (Most Recent):  Temp: 98.7 °F (37.1 °C) (09/29/18 1115)  Pulse: 61 (09/29/18 1200)  Resp: 18 (09/29/18 1200)  BP: (!) 113/59 (09/29/18 1200)  SpO2: (!) 94 % (09/29/18 1200) Vital Signs (24h Range):  Temp:  [98.2 °F (36.8 °C)-99.4 °F (37.4 °C)] 98.7 °F (37.1 °C)  Pulse:  [57-82] 61  Resp:  [9-18] 18  SpO2:  [91 %-97 %] 94 %  BP: (103-164)/() 113/59     Weight: 119.6 kg (263 lb 10.7 oz)  Body mass index is 34.79 kg/m².    Intake/Output Summary (Last 24 hours) at 9/29/2018 1452  Last data filed at 9/29/2018 1200  Gross per 24 hour   Intake 1317.92 ml   Output 1450 ml   Net -132.08 ml      Physical Exam   Constitutional: He is oriented to person, place, and time. He appears well-developed and well-nourished. He is cooperative. He is easily aroused.   HENT:   Head: Normocephalic and atraumatic.   Eyes: EOM are normal.   Neck: Neck supple.   Cardiovascular: Normal rate, regular rhythm, normal heart sounds and intact distal pulses.   No murmur heard.  Pulmonary/Chest: Effort normal and breath sounds normal. No stridor. No respiratory distress. He has no wheezes. He has no rales. He exhibits no tenderness.   Abdominal: Normal appearance. Bowel sounds are decreased. There is generalized tenderness.   x4 surgical sites   Genitourinary:   Genitourinary Comments: Deferred   Musculoskeletal: He exhibits no edema, tenderness or deformity.   Neurological: He is alert, oriented to person, place, and time and easily aroused. No sensory deficit.   Skin: Skin is warm and dry. Capillary refill takes less than 2 seconds.    Psychiatric: He has a normal mood and affect. His behavior is normal. Judgment and thought content normal.   Nursing note and vitals reviewed.      Significant Labs:   BMP:   Recent Labs   Lab  09/27/18   1842  09/28/18   0553   GLU  128*  118*   NA  141  139   K  4.4  4.0   CL  103  105   CO2  26  23   BUN  26*  19   CREATININE  0.8  0.8   CALCIUM  9.4  9.2   MG  1.8   --      CBC:   Recent Labs   Lab  09/27/18   1952  09/28/18   0215  09/28/18   0553   WBC  7.67  7.26  7.89  7.89   HGB  15.0  14.7  14.6  14.6   HCT  43.0  41.8  43.4  43.4   PLT  194  193  180  180       Significant Imaging: I have reviewed all pertinent imaging results/findings within the past 24 hours.    Assessment/Plan:      * NSTEMI (non-ST elevated myocardial infarction)    Patient dx with NSTEMI s/p sleeve gastrectomy  -Troponin 0.262>1.102>1.005  -Heparin gtt started   -ASA, bb, Stain   -2D echo pending  -Reports recent LHC by Dr. Martinez at [facility] with no intervention, no change from prior angiograms, medical mgmt recommended  9/28/18  -cont current tx             Mixed restrictive and obstructive lung disease    - Supplemental oxygen prn, keep O2 sats > 88%  - Duonebs PRN          Type 2 diabetes mellitus without complication    - Accuchecks and low dose SSI  - Continue Metformin upon discharge          CAD S/P percutaneous coronary angioplasty    - Resume Statin once diet advanced. Will defer ASA and Pradaxa to primary on when to restart these medications post op          S/P laparoscopic sleeve gastrectomy    - General surgery primary -- medical management per primary team  - S/p robotic sleeve gastrectomy today, 09/27/18, by Dr. Cesar  - Analgesics/Antiemetics PRN  - Encourage IS use  -Nutritionist consult   -Pain controlled           PTSD (post-traumatic stress disorder)    - Continue home medication, PO Wellbutrin          SATYA on CPAP    - Resume nightly CPAP -- titrate to patient's comfort and O2 saturation           Hyperlipidemia    - Resume Statin             HTN (hypertension)    - Resume home medication, including Amlodipine, once diet advanced  - IV Hydralazine PRN  -Blood pressure likely elevated 2/2 pain             VTE Risk Mitigation (From admission, onward)        Ordered     heparin 25,000 units in dextrose 5% 250 mL (100 units/mL) infusion LOW INTENSITY nomogram - [facility]  Continuous      09/28/18 0149     heparin 25,000 units in dextrose 5% (100 units/ml) IV bolus from bag - ADDITIONAL PRN BOLUS - 60 units/kg (max bolus 4000 units)  As needed (PRN)      09/28/18 0149     heparin 25,000 units in dextrose 5% (100 units/ml) IV bolus from bag - ADDITIONAL PRN BOLUS - 30 units/kg (max bolus 4000 units)  As needed (PRN)      09/28/18 0149            Jaylon Tsang MD  Department of Hospital Medicine   [facility]

## 2018-09-29 NOTE — NURSING TRANSFER
Nursing Transfer Note      9/29/2018     Transfer To: 243    Pt ambulated to room.    Transfer with cardiac monitoring    Transported by JANELLE Chandler RN    Medicines sent: Nozin anf Heparin gtt    Chart send with patient: Yes    Notified: spouse @ bedside    Bedside report given to Adelia on telemetry

## 2018-09-29 NOTE — HOSPITAL COURSE
09/29 No chest pain, off NTG GTT in am, no dyspnea. C/o headcahe    09/30 no chest pain., no arrhythmia    10/1/18- Patient denies any cheat pain or angina equivalent this morning. Troponin flat

## 2018-09-29 NOTE — NURSING
"Pt with sharp, stabbing left chest pain. The pain is random and quickly subsides.  PT says his stomach feels "bubbly" and it may just be gas. eICU contacted, EKG and troponin levels ordered. A GI cocktail was also given. VSS and pt is not complaining of any other symptoms. Will continue to monitor.   "

## 2018-09-29 NOTE — SUBJECTIVE & OBJECTIVE
ROS  Objective:     Vital Signs (Most Recent):  Temp: 98.2 °F (36.8 °C) (09/29/18 0700)  Pulse: 61 (09/29/18 1000)  Resp: 16 (09/29/18 1000)  BP: 103/64 (09/29/18 1000)  SpO2: (!) 94 % (09/29/18 1000) Vital Signs (24h Range):  Temp:  [98.2 °F (36.8 °C)-99.4 °F (37.4 °C)] 98.2 °F (36.8 °C)  Pulse:  [57-82] 61  Resp:  [9-18] 16  SpO2:  [93 %-98 %] 94 %  BP: (103-178)/() 103/64     Weight: 119.6 kg (263 lb 10.7 oz)  Body mass index is 34.79 kg/m².     SpO2: (!) 94 %  O2 Device (Oxygen Therapy): (S) room air      Intake/Output Summary (Last 24 hours) at 9/29/2018 1024  Last data filed at 9/29/2018 1000  Gross per 24 hour   Intake 1050.12 ml   Output 1450 ml   Net -399.88 ml       Lines/Drains/Airways     Peripheral Intravenous Line                 Peripheral IV - Single Lumen Anterior;Left Antecubital -- days         Peripheral IV - Single Lumen 09/27/18 0800 Left Hand 2 days                Physical Exam   Constitutional: He is oriented to person, place, and time. He appears well-developed and well-nourished. No distress.   Flushed     HENT:   Head: Normocephalic and atraumatic.   Eyes: Pupils are equal, round, and reactive to light. Right eye exhibits no discharge. Left eye exhibits no discharge.   Neck: Neck supple. No JVD present.   Cardiovascular: Normal rate, regular rhythm, S1 normal and S2 normal.   No murmur heard.  Pulmonary/Chest: Effort normal and breath sounds normal. No respiratory distress. He has no wheezes. He has no rales.   PPM site well-healed   Abdominal: Soft.   Mild distention and tenderness   Musculoskeletal: He exhibits no edema.   Neurological: He is alert and oriented to person, place, and time.   Skin: Skin is warm and dry. He is not diaphoretic. No erythema.   Psychiatric: He has a normal mood and affect. His behavior is normal. Thought content normal.   Nursing note and vitals reviewed.      Significant Labs:   ABG: No results for input(s): PH, PCO2, HCO3, POCSATURATED, BE in the  last 48 hours., Blood Culture: No results for input(s): LABBLOO in the last 48 hours., BMP:   Recent Labs   Lab  09/27/18   1842  09/28/18   0553   GLU  128*  118*   NA  141  139   K  4.4  4.0   CL  103  105   CO2  26  23   BUN  26*  19   CREATININE  0.8  0.8   CALCIUM  9.4  9.2   MG  1.8   --    , CMP   Recent Labs   Lab  09/27/18   1842  09/28/18   0553   NA  141  139   K  4.4  4.0   CL  103  105   CO2  26  23   GLU  128*  118*   BUN  26*  19   CREATININE  0.8  0.8   CALCIUM  9.4  9.2   ANIONGAP  12  11   ESTGFRAFRICA  >60  >60   EGFRNONAA  >60  >60   , CBC   Recent Labs   Lab  09/27/18   1952  09/28/18   0215  09/28/18   0553   WBC  7.67  7.26  7.89  7.89   HGB  15.0  14.7  14.6  14.6   HCT  43.0  41.8  43.4  43.4   PLT  194  193  180  180   , INR   Recent Labs   Lab  09/28/18   0215   INR  1.0   , Lipid Panel No results for input(s): CHOL, HDL, LDLCALC, TRIG, CHOLHDL in the last 48 hours. and Troponin   Recent Labs   Lab  09/28/18   1125  09/28/18   1739  09/29/18   0126   TROPONINI  0.793*  0.567*  0.310*       Significant Imaging:

## 2018-09-29 NOTE — PROGRESS NOTES
[facility]   Cardiology  Progress Note    Patient Name: Cole Doan  MRN: [id number]  Admission Date: 9/27/2018  Hospital Length of Stay: 2 days  Code Status: Prior   Attending Physician: Mario Cesar MD   Primary Care Physician: Yoly Spring MD  Expected Discharge Date:   Principal Problem:NSTEMI (non-ST elevated myocardial infarction)    Subjective:     Hospital Course:   09/29 No chest pain, off NTG GTT in am, no dyspnea. C/o headcahe      ROS  Objective:     Vital Signs (Most Recent):  Temp: 98.2 °F (36.8 °C) (09/29/18 0700)  Pulse: 61 (09/29/18 1000)  Resp: 16 (09/29/18 1000)  BP: 103/64 (09/29/18 1000)  SpO2: (!) 94 % (09/29/18 1000) Vital Signs (24h Range):  Temp:  [98.2 °F (36.8 °C)-99.4 °F (37.4 °C)] 98.2 °F (36.8 °C)  Pulse:  [57-82] 61  Resp:  [9-18] 16  SpO2:  [93 %-98 %] 94 %  BP: (103-178)/() 103/64     Weight: 119.6 kg (263 lb 10.7 oz)  Body mass index is 34.79 kg/m².     SpO2: (!) 94 %  O2 Device (Oxygen Therapy): (S) room air      Intake/Output Summary (Last 24 hours) at 9/29/2018 1024  Last data filed at 9/29/2018 1000  Gross per 24 hour   Intake 1050.12 ml   Output 1450 ml   Net -399.88 ml       Lines/Drains/Airways     Peripheral Intravenous Line                 Peripheral IV - Single Lumen Anterior;Left Antecubital -- days         Peripheral IV - Single Lumen 09/27/18 0800 Left Hand 2 days                Physical Exam   Constitutional: He is oriented to person, place, and time. He appears well-developed and well-nourished. No distress.   Flushed     HENT:   Head: Normocephalic and atraumatic.   Eyes: Pupils are equal, round, and reactive to light. Right eye exhibits no discharge. Left eye exhibits no discharge.   Neck: Neck supple. No JVD present.   Cardiovascular: Normal rate, regular rhythm, S1 normal and S2 normal.   No murmur heard.  Pulmonary/Chest: Effort normal and breath sounds normal. No respiratory distress. He has no wheezes. He has no rales.   PPM site  well-healed   Abdominal: Soft.   Mild distention and tenderness   Musculoskeletal: He exhibits no edema.   Neurological: He is alert and oriented to person, place, and time.   Skin: Skin is warm and dry. He is not diaphoretic. No erythema.   Psychiatric: He has a normal mood and affect. His behavior is normal. Thought content normal.   Nursing note and vitals reviewed.      Significant Labs:   ABG: No results for input(s): PH, PCO2, HCO3, POCSATURATED, BE in the last 48 hours., Blood Culture: No results for input(s): LABBLOO in the last 48 hours., BMP:   Recent Labs   Lab  09/27/18 1842 09/28/18   0553   GLU  128*  118*   NA  141  139   K  4.4  4.0   CL  103  105   CO2  26  23   BUN  26*  19   CREATININE  0.8  0.8   CALCIUM  9.4  9.2   MG  1.8   --    , CMP   Recent Labs   Lab  09/27/18 1842 09/28/18   0553   NA  141  139   K  4.4  4.0   CL  103  105   CO2  26  23   GLU  128*  118*   BUN  26*  19   CREATININE  0.8  0.8   CALCIUM  9.4  9.2   ANIONGAP  12  11   ESTGFRAFRICA  >60  >60   EGFRNONAA  >60  >60   , CBC   Recent Labs   Lab  09/27/18   1952  09/28/18   0215  09/28/18   0553   WBC  7.67  7.26  7.89  7.89   HGB  15.0  14.7  14.6  14.6   HCT  43.0  41.8  43.4  43.4   PLT  194  193  180  180   , INR   Recent Labs   Lab  09/28/18   0215   INR  1.0   , Lipid Panel No results for input(s): CHOL, HDL, LDLCALC, TRIG, CHOLHDL in the last 48 hours. and Troponin   Recent Labs   Lab  09/28/18   1125  09/28/18   1739  09/29/18   0126   TROPONINI  0.793*  0.567*  0.310*       Significant Imaging:      Assessment and Plan:       * NSTEMI (non-ST elevated myocardial infarction)    -Patient who presents with NSTEMI s/p sleeve gastrectomy  -Complains of recurrent/refractory chest pain this AM, unrelieved by sublingual nitroglycerin  -Reports recent LHC by Dr. Martinez at [facility] with no intervention, no change from prior angiograms, medical mgmt recommended  -Records have been requested  -Previous LHC at our  facility in 2016 showed patent stents, 50-70% diagonal lesion, and small non-dominant  RCA    -ok to transfer to tele  -continue heparin gtt now  -add Imdur 30 mg daily  -continue ASA, Plavix, Metoprolol, amlodipine, and statin        CAD S/P percutaneous coronary angioplasty    -See plan under NSTEMI        S/P laparoscopic sleeve gastrectomy    -s/p sleeve gastrectomy on 9/27/18 by Dr. Cesar  -chloe as per surgery team        Hyperlipidemia    -Continue statin        HTN (hypertension)    -Continue Metoprolol  -Tridil gtt initiated due to refractory chest pain            VTE Risk Mitigation (From admission, onward)        Ordered     heparin 25,000 units in dextrose 5% 250 mL (100 units/mL) infusion LOW INTENSITY nomogram - [facility]  Continuous      09/28/18 0149     heparin 25,000 units in dextrose 5% (100 units/ml) IV bolus from bag - ADDITIONAL PRN BOLUS - 60 units/kg (max bolus 4000 units)  As needed (PRN)      09/28/18 0149     heparin 25,000 units in dextrose 5% (100 units/ml) IV bolus from bag - ADDITIONAL PRN BOLUS - 30 units/kg (max bolus 4000 units)  As needed (PRN)      09/28/18 0149          Scott Garner MD  Cardiology  [facility]

## 2018-09-29 NOTE — PLAN OF CARE
Problem: Patient Care Overview  Goal: Plan of Care Review  Outcome: Ongoing (interventions implemented as appropriate)  No acute events overnight. Pt AAO x 4. VSS on 2L NC. Pt has +2 generalized edema. Requesting Lasix. Tmax 99.4. UO adequate. Pt voiding per urinal. No appetite, but swallowing well. CBGs WNL. Abdominal lap sites approximated with no drainage. Pt turned independently throughout the night. POC reviewed with pt and family. All questions and concerns addressed. '

## 2018-09-29 NOTE — ASSESSMENT & PLAN NOTE
Hospital Med consulted  Cardiology consulted after pt experienced chest pain, elevated enzymes, ekg changes with NSTEMI  Follow recommendations including telemetry and anticoagulation

## 2018-09-29 NOTE — PROGRESS NOTES
[facility] -   Critical Care Medicine  Progress Note    Patient Name: Cole Doan  MRN: [id number]  Admission Date: 9/27/2018  Hospital Length of Stay: 2 days  Code Status: Prior  Attending Provider: Mario Cesar MD  Primary Care Provider: Yoly Spring MD   Principal Problem: NSTEMI (non-ST elevated myocardial infarction)    Subjective:     HPI:  68 year old obese male with PMH of HTN, CAD post PTCA with PPM, DM2, PTSD, mild COPD s/t former 40pack year smoking, and SATYA with home CPAP  Presented to [facility] on 9/27 for planned robotic sleeve gastrectomy; post operatively reported substernal chest pain and troponin +0.262 which increased 1.1 and throughout night pain has decreased but not subsided with trials of SL NTG and NTG paste  He is being transferred to ICU for tridil infusion and medical management of NSTEMI    Of note he recently underwent LHC and PPM placement @ [facility] with reported unchanged CAD, he reports that since this his endurance has improved in addition to decreased episodes of CP although he did have episode of substernal CP requiring SL nitro on 9/23 (prior to surgery)    Hospital/ICU Course:  Arrived to ICU AAO x 3, tridil infusion initiated at 5mcg/min and is CP free remains hypertensive; discussed non use of CPAP last night and he reports feeling he did not need s/t NC oxygen   9/29 Feeling well , sitting up in bed and chair    Interval History: c/o headache    Objective:     Vital Signs (Most Recent):  Temp: 98.7 °F (37.1 °C) (09/29/18 1115)  Pulse: 61 (09/29/18 1200)  Resp: 18 (09/29/18 1200)  BP: (!) 113/59 (09/29/18 1200)  SpO2: (!) 94 % (09/29/18 1200) Vital Signs (24h Range):  Temp:  [98.2 °F (36.8 °C)-99.4 °F (37.4 °C)] 98.7 °F (37.1 °C)  Pulse:  [57-82] 61  Resp:  [9-18] 18  SpO2:  [91 %-97 %] 94 %  BP: (103-169)/() 113/59     Weight: 119.6 kg (263 lb 10.7 oz)  Body mass index is 34.79 kg/m².      Intake/Output Summary (Last 24 hours) at 9/29/2018 1308  Last data  filed at 9/29/2018 1200  Gross per 24 hour   Intake 1317.92 ml   Output 1450 ml   Net -132.08 ml       Physical Exam   Constitutional: He is oriented to person, place, and time. He appears well-developed and well-nourished.   HENT:   Head: Normocephalic and atraumatic.   Eyes: Conjunctivae are normal. Pupils are equal, round, and reactive to light.   Neck: Neck supple. No JVD present. No tracheal deviation present. No thyromegaly present.   Cardiovascular: Normal rate, regular rhythm and normal heart sounds.   Pulmonary/Chest: Effort normal and breath sounds normal.   Abdominal: Soft.   Scar from recent surgery   Musculoskeletal: Normal range of motion.   Lymphadenopathy:     He has no cervical adenopathy.   Neurological: He is alert and oriented to person, place, and time.   Skin: Skin is warm and dry.   Nursing note and vitals reviewed.      Vents:  Oxygen Concentration (%): 28 (09/28/18 1954)    Lines/Drains/Airways     Peripheral Intravenous Line                 Peripheral IV - Single Lumen Anterior;Left Antecubital -- days         Peripheral IV - Single Lumen 09/27/18 0800 Left Hand 2 days                Significant Labs:    CBC/Anemia Profile:  Recent Labs   Lab  09/27/18   1952 09/28/18   0215  09/28/18   0553   WBC  7.67  7.26  7.89  7.89   HGB  15.0  14.7  14.6  14.6   HCT  43.0  41.8  43.4  43.4   PLT  194  193  180  180   MCV  92  91  93  93   RDW  14.1  14.2  14.5  14.5        Chemistries:  Recent Labs   Lab  09/27/18   1842  09/28/18   0553   NA  141  139   K  4.4  4.0   CL  103  105   CO2  26  23   BUN  26*  19   CREATININE  0.8  0.8   CALCIUM  9.4  9.2   MG  1.8   --        Troponin:   Recent Labs   Lab  09/28/18   1125  09/28/18   1739  09/29/18   0126   TROPONINI  0.793*  0.567*  0.310*     All pertinent labs within the past 24 hours have been reviewed.    Significant Imaging:  I have reviewed all pertinent imaging results/findings within the past 24 hours.  I have reviewed and interpreted  all pertinent imaging results/findings within the past 24 hours.    Assessment/Plan:     Pulmonary   Mixed restrictive and obstructive lung disease    Supplemental oxygen prn for sat 92 or greater while on PCA  Continue duoneb prn        Cardiac/Vascular   * NSTEMI (non-ST elevated myocardial infarction)    Medical management  Await [facility] record  Cardiology following  9/28 transfer to floor        HTN (hypertension)    Continue metoprolol  ICU hemodynamic monitoring with tridil infusion, titrate for CP and BP control        Endocrine   Morbid obesity with BMI of 40.0-44.9, adult    S/p sleeve gastrectomy  Diet teaching prior to discharge        Type 2 diabetes mellitus without complication    Currently controlled  Goal range 100-180 while critically ill  Monitor with SSI prn        S/P laparoscopic sleeve gastrectomy    Surgery following  POD1        Other   SATYA on CPAP    Apneic episodes potentially exacerbate cardiac stress/demand  Encourage consistent nocturnal CPAP use            Critical Care Daily Checklist:    A: Awake: RASS Goal/Actual Goal: RASS Goal: 0-->alert and calm  Actual: [place] Agitation Sedation Scale (RASS): Alert and calm   B: Spontaneous Breathing Trial Performed?     C: SAT & SBT Coordinated?  na                      D: Delirium: CAM-ICU Overall CAM-ICU: Negative   E: Early Mobility Performed? Yes   F: Feeding Goal: Goals: ...  Status: Nutrition Goal Status: new   Current Diet Order   Procedures    Diet clear liquid [facility]; Bariatric Regular     Order Specific Question:   Indicate patient location for additional diet options:     Answer:   [facility]     Order Specific Question:   Additional Diet Options:     Answer:   Bariatric Regular      AS: Analgesia/Sedation adequate   T: Thromboembolic Prophylaxis y   H: HOB > 300 Yes   U: Stress Ulcer Prophylaxis (if needed) y   G: Glucose Control adequate   B: Bowel Function     I: Indwelling Catheter (Lines & Paul) Necessity  Not needed   D: De-escalation of Antimicrobials/Pharmacotherapies na    Plan for the day/ETD Observe, transfer to floor    Code Status:  Family/Goals of Care: Prior  discussed      Time: 30 minutes  Critical secondary to Patient has a condition that poses threat to life and bodily function: Acute Myocardial Infarction      Critical care was time spent personally by me on the following activities: development of treatment plan with patient or surrogate and bedside caregivers, discussions with consultants, evaluation of patient's response to treatment, examination of patient, ordering and performing treatments and interventions, ordering and review of laboratory studies, ordering and review of radiographic studies, pulse oximetry, re-evaluation of patient's condition. This critical care time did not overlap with that of any other provider or involve time for any procedures.     Paul Adhikari MD  Critical Care Medicine  [facility]

## 2018-09-29 NOTE — SUBJECTIVE & OBJECTIVE
Interval History: Pt was seen and examined at bedside . He denies any chest pain at this time    Review of Systems   Constitutional: Positive for activity change. Negative for chills and fever.   HENT: Negative.    Eyes: Negative.    Respiratory: Negative for apnea, cough, choking, chest tightness, shortness of breath, wheezing and stridor.    Cardiovascular: Negative for chest pain, palpitations and leg swelling.   Gastrointestinal: Negative for abdominal pain, constipation, diarrhea, nausea and vomiting.   Endocrine: Negative.    Genitourinary: Negative for decreased urine volume, difficulty urinating, dysuria, frequency, hematuria and urgency.   Musculoskeletal: Negative for arthralgias, back pain, myalgias, neck pain and neck stiffness.   Allergic/Immunologic: Negative.    Neurological: Negative for dizziness, tremors, seizures, syncope, speech difficulty, weakness and headaches.   Hematological: Negative.    Psychiatric/Behavioral: Negative.      Objective:     Vital Signs (Most Recent):  Temp: 98.7 °F (37.1 °C) (09/29/18 1115)  Pulse: 61 (09/29/18 1200)  Resp: 18 (09/29/18 1200)  BP: (!) 113/59 (09/29/18 1200)  SpO2: (!) 94 % (09/29/18 1200) Vital Signs (24h Range):  Temp:  [98.2 °F (36.8 °C)-99.4 °F (37.4 °C)] 98.7 °F (37.1 °C)  Pulse:  [57-82] 61  Resp:  [9-18] 18  SpO2:  [91 %-97 %] 94 %  BP: (103-164)/() 113/59     Weight: 119.6 kg (263 lb 10.7 oz)  Body mass index is 34.79 kg/m².    Intake/Output Summary (Last 24 hours) at 9/29/2018 1452  Last data filed at 9/29/2018 1200  Gross per 24 hour   Intake 1317.92 ml   Output 1450 ml   Net -132.08 ml      Physical Exam   Constitutional: He is oriented to person, place, and time. He appears well-developed and well-nourished. He is cooperative. He is easily aroused.   HENT:   Head: Normocephalic and atraumatic.   Eyes: EOM are normal.   Neck: Neck supple.   Cardiovascular: Normal rate, regular rhythm, normal heart sounds and intact distal pulses.   No murmur  heard.  Pulmonary/Chest: Effort normal and breath sounds normal. No stridor. No respiratory distress. He has no wheezes. He has no rales. He exhibits no tenderness.   Abdominal: Normal appearance. Bowel sounds are decreased. There is generalized tenderness.   x4 surgical sites   Genitourinary:   Genitourinary Comments: Deferred   Musculoskeletal: He exhibits no edema, tenderness or deformity.   Neurological: He is alert, oriented to person, place, and time and easily aroused. No sensory deficit.   Skin: Skin is warm and dry. Capillary refill takes less than 2 seconds.   Psychiatric: He has a normal mood and affect. His behavior is normal. Judgment and thought content normal.   Nursing note and vitals reviewed.      Significant Labs:   BMP:   Recent Labs   Lab  09/27/18   1842  09/28/18   0553   GLU  128*  118*   NA  141  139   K  4.4  4.0   CL  103  105   CO2  26  23   BUN  26*  19   CREATININE  0.8  0.8   CALCIUM  9.4  9.2   MG  1.8   --      CBC:   Recent Labs   Lab  09/27/18   1952 09/28/18   0215  09/28/18   0553   WBC  7.67  7.26  7.89  7.89   HGB  15.0  14.7  14.6  14.6   HCT  43.0  41.8  43.4  43.4   PLT  194  193  180  180       Significant Imaging: I have reviewed all pertinent imaging results/findings within the past 24 hours.

## 2018-09-29 NOTE — ASSESSMENT & PLAN NOTE
-Patient who presents with NSTEMI s/p sleeve gastrectomy  -Complains of recurrent/refractory chest pain this AM, unrelieved by sublingual nitroglycerin  -Reports recent LHC by Dr. Martinez at [facility] with no intervention, no change from prior angiograms, medical mgmt recommended  -Records have been requested  -Previous LHC at our facility in 2016 showed patent stents, 50-70% diagonal lesion, and small non-dominant  RCA    -ok to transfer to tele  -continue heparin gtt now  -add Imdur 30 mg daily  -continue ASA, Plavix, Metoprolol, amlodipine, and statin

## 2018-09-29 NOTE — NURSING
Patient has chest pain unrelieved by SL nitroglycerin and nitrate paste. Pt is being transferred to ICU. Gave report to SUSANNA Moyer.

## 2018-09-29 NOTE — ASSESSMENT & PLAN NOTE
Medical management  Await [facility] record  Cardiology following  9/28 transfer to floor

## 2018-09-29 NOTE — PROGRESS NOTES
"[facility]  General Surgery  Progress Note    Subjective:     History of Present Illness:  Cole Doan presented for robotic sleeve gastrectomy    Post-Op Info:  Procedure(s) (LRB):  XI ROBOTIC SLEEVE GASTRECTOMY (N/A)   2 Days Post-Op     Interval History: CP resolved. Tolerating clears. Pain well controlled. Denies nausea or vomiting. HD stable.    Medications:  Continuous Infusions:   heparin (porcine) in D5W 14.962 Units/kg/hr (09/29/18 0700)    hydromorphone in 0.9 % NaCl 6 mg/30 ml      nitroGLYCERIN Stopped (09/28/18 1600)     Scheduled Meds:   ALPRAZolam  0.5 mg Oral QHS    ALPRAZolam  1 mg Oral Daily    amLODIPine  2.5 mg Oral Daily    aspirin  81 mg Oral Daily    aspirin  325 mg Oral Once    atorvastatin  80 mg Oral QHS    buPROPion  200 mg Oral Daily    chlorhexidine  10 mL Mouth/Throat BID    clopidogrel  75 mg Oral Daily    metoprolol tartrate  25 mg Oral BID    nozaseptin   Each Nare BID    pantoprazole  40 mg Intravenous Before breakfast    ranolazine  500 mg Oral BID     PRN Meds:albuterol-ipratropium, dextrose 50%, diphenhydrAMINE, glucagon (human recombinant), heparin (PORCINE), heparin (PORCINE), hydrALAZINE, influenza, insulin aspart U-100, naloxone, nitroGLYCERIN, ondansetron, promethazine (PHENERGAN) IVPB, sodium chloride 0.9%     Review of patient's allergies indicates:   Allergen Reactions    Iodinated contrast- oral and iv dye      States, "My arteries started shutting down on me" pt states only to iv dye- was specifically told that oral dye does not have the same reaction     Objective:     Vital Signs (Most Recent):  Temp: 98.2 °F (36.8 °C) (09/29/18 0700)  Pulse: (!) 57 (09/29/18 0700)  Resp: 10 (09/29/18 0700)  BP: (!) 155/111 (09/29/18 0700)  SpO2: (S) 97 % (09/29/18 0754) Vital Signs (24h Range):  Temp:  [98.2 °F (36.8 °C)-99.4 °F (37.4 °C)] 98.2 °F (36.8 °C)  Pulse:  [57-82] 57  Resp:  [9-18] 10  SpO2:  [93 %-98 %] 97 %  BP: (128-178)/() " 155/111     Weight: 119.6 kg (263 lb 10.7 oz)  Body mass index is 34.79 kg/m².    Intake/Output - Last 3 Shifts       09/27 0700 - 09/28 0659 09/28 0700 - 09/29 0659 09/29 0700 - 09/30 0659    P.O.  480 60    I.V. (mL/kg) 1734.3 (15.4) 314.6 (2.6) 13.9 (0.1)    IV Piggyback 290 140     Total Intake(mL/kg) 2024.3 (18) 934.5 (7.8) 73.9 (0.6)    Urine (mL/kg/hr) 300 1025 (0.4)     Blood 15      Total Output 315 1025     Net +1709.3 -90.5 +73.9           Urine Occurrence 1 x            Physical Exam   Constitutional: He is oriented to person, place, and time. He appears well-developed.   HENT:   Head: Normocephalic and atraumatic.   Eyes: Conjunctivae and EOM are normal.   Neck: Normal range of motion. No thyromegaly present.   Cardiovascular: Normal rate and regular rhythm.   Pulmonary/Chest: Effort normal. No respiratory distress.   Abdominal:   Soft, nondistended; appropriately TTP; incisions c/d/i without erythema or drainage   Musculoskeletal: Normal range of motion. He exhibits no edema or tenderness.   Neurological: He is alert and oriented to person, place, and time.   Skin: Skin is warm and dry. Capillary refill takes less than 2 seconds. No rash noted.   Psychiatric: He has a normal mood and affect.       Significant Labs:  CBC:   Recent Labs   Lab  09/28/18   0553   WBC  7.89  7.89   RBC  4.66  4.66   HGB  14.6  14.6   HCT  43.4  43.4   PLT  180  180   MCV  93  93   MCH  31.3*  31.3*   MCHC  33.6  33.6     BMP:   Recent Labs   Lab  09/27/18   1842  09/28/18   0553   GLU  128*  118*   NA  141  139   K  4.4  4.0   CL  103  105   CO2  26  23   BUN  26*  19   CREATININE  0.8  0.8   CALCIUM  9.4  9.2   MG  1.8   --      Coagulation:   Recent Labs   Lab  09/28/18   0215   09/29/18   0815   LABPROT  10.6   --    --    INR  1.0   --    --    APTT  26.2   < >  48.1*    < > = values in this interval not displayed.     Cardiac markers:   Recent Labs   Lab  09/29/18   0126   TROPONINI  0.310*     Assessment/Plan:      * NSTEMI (non-ST elevated myocardial infarction)    Cardiology consulted  Will follow recommendations, including telemetry monitoring and anticoagulation        Type 2 diabetes mellitus without complication    Hospital Med consulted  Will follow recommendations        CAD S/P percutaneous coronary angioplasty    Hospital Med consulted  Cardiology consulted after pt experienced chest pain, elevated enzymes, ekg changes with NSTEMI  Follow recommendations including telemetry and anticoagulation        S/P laparoscopic sleeve gastrectomy    Robotic sleeve gastrectomy POD2  Pain controlled. Will DC PCA and start PO pain control.  Tolerating clears, will plan for full liquids today  Dietician consult for post sleeve diet  OOB, ambulation encouraged        SATYA on CPAP    Hospital Med consulted  Will follow recommendations        Hyperlipidemia    Hospital Med consulted  Will follow recommendations        HTN (hypertension)    Hospital Med consulted.  Will follow recommendations              Jason Moore MD  General Surgery  [facility]

## 2018-09-30 LAB
ANION GAP SERPL CALC-SCNC: 8 MMOL/L
APTT BLDCRRT: 35.9 SEC
APTT BLDCRRT: 61.5 SEC
BASOPHILS # BLD AUTO: 0.01 K/UL
BASOPHILS NFR BLD: 0.1 %
BUN SERPL-MCNC: 17 MG/DL
CALCIUM SERPL-MCNC: 9 MG/DL
CHLORIDE SERPL-SCNC: 104 MMOL/L
CO2 SERPL-SCNC: 29 MMOL/L
CREAT SERPL-MCNC: 0.8 MG/DL
DIFFERENTIAL METHOD: ABNORMAL
EOSINOPHIL # BLD AUTO: 0.1 K/UL
EOSINOPHIL NFR BLD: 1.6 %
ERYTHROCYTE [DISTWIDTH] IN BLOOD BY AUTOMATED COUNT: 14.7 %
EST. GFR  (AFRICAN AMERICAN): >60 ML/MIN/1.73 M^2
EST. GFR  (NON AFRICAN AMERICAN): >60 ML/MIN/1.73 M^2
GLUCOSE SERPL-MCNC: 101 MG/DL
HCT VFR BLD AUTO: 38.7 %
HGB BLD-MCNC: 13.2 G/DL
INR PPP: 1.1
LYMPHOCYTES # BLD AUTO: 1.5 K/UL
LYMPHOCYTES NFR BLD: 20.7 %
MCH RBC QN AUTO: 31.6 PG
MCHC RBC AUTO-ENTMCNC: 34.1 G/DL
MCV RBC AUTO: 93 FL
MONOCYTES # BLD AUTO: 0.7 K/UL
MONOCYTES NFR BLD: 10.3 %
NEUTROPHILS # BLD AUTO: 4.7 K/UL
NEUTROPHILS NFR BLD: 67.3 %
PLATELET # BLD AUTO: 148 K/UL
PMV BLD AUTO: 9 FL
POCT GLUCOSE: 128 MG/DL (ref 70–110)
POCT GLUCOSE: 90 MG/DL (ref 70–110)
POCT GLUCOSE: 94 MG/DL (ref 70–110)
POCT GLUCOSE: 95 MG/DL (ref 70–110)
POTASSIUM SERPL-SCNC: 4.2 MMOL/L
PROTHROMBIN TIME: 10.9 SEC
RBC # BLD AUTO: 4.18 M/UL
SODIUM SERPL-SCNC: 141 MMOL/L
WBC # BLD AUTO: 7.01 K/UL

## 2018-09-30 PROCEDURE — 80048 BASIC METABOLIC PNL TOTAL CA: CPT

## 2018-09-30 PROCEDURE — 36415 COLL VENOUS BLD VENIPUNCTURE: CPT

## 2018-09-30 PROCEDURE — 85610 PROTHROMBIN TIME: CPT

## 2018-09-30 PROCEDURE — 25000003 PHARM REV CODE 250: Performed by: HOSPITALIST

## 2018-09-30 PROCEDURE — G8979 MOBILITY GOAL STATUS: HCPCS | Mod: CI

## 2018-09-30 PROCEDURE — 99024 POSTOP FOLLOW-UP VISIT: CPT | Mod: ,,, | Performed by: COLON & RECTAL SURGERY

## 2018-09-30 PROCEDURE — 25000003 PHARM REV CODE 250: Performed by: INTERNAL MEDICINE

## 2018-09-30 PROCEDURE — G8978 MOBILITY CURRENT STATUS: HCPCS | Mod: CI

## 2018-09-30 PROCEDURE — 25000003 PHARM REV CODE 250: Performed by: SURGERY

## 2018-09-30 PROCEDURE — 99231 SBSQ HOSP IP/OBS SF/LOW 25: CPT | Mod: ,,, | Performed by: INTERNAL MEDICINE

## 2018-09-30 PROCEDURE — 25000003 PHARM REV CODE 250: Performed by: PHYSICIAN ASSISTANT

## 2018-09-30 PROCEDURE — 85730 THROMBOPLASTIN TIME PARTIAL: CPT

## 2018-09-30 PROCEDURE — 97161 PT EVAL LOW COMPLEX 20 MIN: CPT

## 2018-09-30 PROCEDURE — 94799 UNLISTED PULMONARY SVC/PX: CPT

## 2018-09-30 PROCEDURE — 85025 COMPLETE CBC W/AUTO DIFF WBC: CPT

## 2018-09-30 PROCEDURE — 63600175 PHARM REV CODE 636 W HCPCS: Performed by: SURGERY

## 2018-09-30 PROCEDURE — C9113 INJ PANTOPRAZOLE SODIUM, VIA: HCPCS | Performed by: SURGERY

## 2018-09-30 PROCEDURE — G8980 MOBILITY D/C STATUS: HCPCS | Mod: CI

## 2018-09-30 PROCEDURE — 25000003 PHARM REV CODE 250: Performed by: NURSE PRACTITIONER

## 2018-09-30 PROCEDURE — 21400001 HC TELEMETRY ROOM

## 2018-09-30 RX ADMIN — CHLORHEXIDINE GLUCONATE 10 ML: 1.2 RINSE ORAL at 08:09

## 2018-09-30 RX ADMIN — RANOLAZINE 500 MG: 500 TABLET, FILM COATED, EXTENDED RELEASE ORAL at 08:09

## 2018-09-30 RX ADMIN — METOPROLOL TARTRATE 25 MG: 25 TABLET ORAL at 08:09

## 2018-09-30 RX ADMIN — AMLODIPINE BESYLATE 2.5 MG: 2.5 TABLET ORAL at 08:09

## 2018-09-30 RX ADMIN — CLOPIDOGREL BISULFATE 75 MG: 75 TABLET ORAL at 08:09

## 2018-09-30 RX ADMIN — PANTOPRAZOLE SODIUM 40 MG: 40 INJECTION, POWDER, FOR SOLUTION INTRAVENOUS at 05:09

## 2018-09-30 RX ADMIN — BUPROPION HYDROCHLORIDE 200 MG: 100 TABLET, FILM COATED, EXTENDED RELEASE ORAL at 08:09

## 2018-09-30 RX ADMIN — ASPIRIN 81 MG: 81 TABLET, COATED ORAL at 08:09

## 2018-09-30 RX ADMIN — ATORVASTATIN CALCIUM 80 MG: 40 TABLET, FILM COATED ORAL at 08:09

## 2018-09-30 RX ADMIN — ALPRAZOLAM 0.5 MG: 0.5 TABLET ORAL at 08:09

## 2018-09-30 RX ADMIN — ALPRAZOLAM 1 MG: 1 TABLET ORAL at 08:09

## 2018-09-30 RX ADMIN — ISOSORBIDE MONONITRATE 30 MG: 30 TABLET, EXTENDED RELEASE ORAL at 08:09

## 2018-09-30 NOTE — PROGRESS NOTES
"[facility]  General Surgery  Progress Note    Subjective:     History of Present Illness:  Cole Doan presented for robotic sleeve gastrectomy    Post-Op Info:  Procedure(s) (LRB):  XI ROBOTIC SLEEVE GASTRECTOMY (N/A)   3 Days Post-Op     Interval History: Transfer to floor yesterday.  Hemodynamically stable.  Denies chest pain or shortness of breath.  Endorses flatus but no bowel movement.  Tolerating clear liquids.  Pain well controlled.  Ambulated in the hallway yesterday.    Medications:  Continuous Infusions:   heparin (porcine) in D5W 14 Units/kg/hr (09/30/18 0709)     Scheduled Meds:   ALPRAZolam  0.5 mg Oral QHS    ALPRAZolam  1 mg Oral Daily    amLODIPine  2.5 mg Oral Daily    aspirin  81 mg Oral Daily    aspirin  325 mg Oral Once    atorvastatin  80 mg Oral QHS    buPROPion  200 mg Oral Daily    chlorhexidine  10 mL Mouth/Throat BID    clopidogrel  75 mg Oral Daily    isosorbide mononitrate  30 mg Oral Daily    metoprolol tartrate  25 mg Oral BID    nozaseptin   Each Nare BID    pantoprazole  40 mg Intravenous Before breakfast    ranolazine  500 mg Oral BID     PRN Meds:albuterol-ipratropium, dextrose 50%, diphenhydrAMINE, glucagon (human recombinant), heparin (PORCINE), heparin (PORCINE), hydrALAZINE, influenza, insulin aspart U-100, naloxone, nitroGLYCERIN, ondansetron, oxyCODONE, oxyCODONE, promethazine (PHENERGAN) IVPB, sodium chloride 0.9%     Review of patient's allergies indicates:   Allergen Reactions    Iodinated contrast- oral and iv dye      States, "My arteries started shutting down on me" pt states only to iv dye- was specifically told that oral dye does not have the same reaction     Objective:     Vital Signs (Most Recent):  Temp: 98.5 °F (36.9 °C) (09/30/18 0757)  Pulse: 62 (09/30/18 0757)  Resp: 18 (09/30/18 0757)  BP: 109/64 (09/30/18 0757)  SpO2: 97 % (09/30/18 0757) Vital Signs (24h Range):  Temp:  [97.2 °F (36.2 °C)-98.7 °F (37.1 °C)] 98.5 °F " (36.9 °C)  Pulse:  [59-82] 62  Resp:  [16-18] 18  SpO2:  [91 %-99 %] 97 %  BP: (103-134)/(59-78) 109/64     Weight: 116.5 kg (256 lb 13.4 oz)  Body mass index is 33.89 kg/m².    Intake/Output - Last 3 Shifts       09/28 0700 - 09/29 0659 09/29 0700 - 09/30 0659 09/30 0700 - 10/01 0659    P.O. 480 780     I.V. (mL/kg) 314.6 (2.6) 125.1 (1.1) 184.6 (1.6)    IV Piggyback 140  27.9    Total Intake(mL/kg) 934.5 (7.8) 905.1 (7.8) 212.5 (1.8)    Urine (mL/kg/hr) 1025 (0.4) 1325 (0.5)     Blood       Total Output 1025 1325     Net -90.5 -419.9 +212.5                 Physical Exam   Constitutional: He is oriented to person, place, and time. He appears well-developed.   HENT:   Head: Normocephalic and atraumatic.   Eyes: Conjunctivae and EOM are normal.   Neck: Normal range of motion. No thyromegaly present.   Cardiovascular: Normal rate and regular rhythm.   Pulmonary/Chest: Effort normal. No respiratory distress.   Abdominal:   Soft, mild distention; appropriately TTP; incisions c/d/i without erythema or drainage   Musculoskeletal: Normal range of motion. He exhibits no edema or tenderness.   Neurological: He is alert and oriented to person, place, and time.   Skin: Skin is warm and dry. Capillary refill takes less than 2 seconds. No rash noted.   Psychiatric: He has a normal mood and affect.       Significant Labs:  CBC:   Recent Labs   Lab  09/30/18   0900   WBC  7.01   RBC  4.18*   HGB  13.2*   HCT  38.7*   PLT  148*   MCV  93   MCH  31.6*   MCHC  34.1     BMP:   Recent Labs   Lab  09/27/18   1842   09/30/18   0900   GLU  128*   < >  101   NA  141   < >  141   K  4.4   < >  4.2   CL  103   < >  104   CO2  26   < >  29   BUN  26*   < >  17   CREATININE  0.8   < >  0.8   CALCIUM  9.4   < >  9.0   MG  1.8   --    --     < > = values in this interval not displayed.         Assessment/Plan:     * NSTEMI (non-ST elevated myocardial infarction)    Cardiology consulted  Will follow recommendations, including telemetry monitoring  and anticoagulation        Type 2 diabetes mellitus without complication    Hospital Med consulted  Will follow recommendations        CAD S/P percutaneous coronary angioplasty    Hospital Med consulted  Cardiology consulted after pt experienced chest pain, elevated enzymes, ekg changes with NSTEMI  Follow recommendations including telemetry and anticoagulation        S/P laparoscopic sleeve gastrectomy    Robotic sleeve gastrectomy POD3  Continue PO pain control  Tolerating clears, advanced to full liquids today  Dietician consult for post sleeve diet  OOB, ambulation encouraged        SATYA on CPAP    Hospital Med consulted  Will follow recommendations        Hyperlipidemia    Hospital Med consulted  Will follow recommendations        HTN (hypertension)    Hospital Med consulted.  Will follow recommendations              Jason Moore MD  General Surgery  [facility]

## 2018-09-30 NOTE — ASSESSMENT & PLAN NOTE
-Patient who presents with NSTEMI s/p sleeve gastrectomy  -Reports recent LHC by Dr. Martinez at [facility] with no intervention, no change from prior angiograms, medical mgmt recommended  -Records have been requested  -Previous LHC at our facility in 2016 showed patent stents, 50-70% diagonal lesion, and small non-dominant  RCA    -ok to d/c home today and f/u with Dr. Rutledge as op  -continue ASA, Plavix, Metoprolol, Imdur amlodipine, and statin

## 2018-09-30 NOTE — PROGRESS NOTES
Pt does not have an end tidal co2 monitor in the room at this time.pt is on room air currently.pt does have a home cpap machine that his wife is bringing for him to wear at night.

## 2018-09-30 NOTE — PROGRESS NOTES
[facility] Hospital Medicine  Progress Note    Patient Name: Cole Doan  MRN: [id number]  Patient Class: IP- Inpatient   Admission Date: 9/27/2018  Length of Stay: 3 days  Attending Physician: Mario Cesar MD  Primary Care Provider: Yoly Spring MD        Subjective:     Principal Problem:NSTEMI (non-ST elevated myocardial infarction)    HPI:  Cole Doan is a 68 year old with a PMHx of DM II, SATYA on CPAP, HLD, CAD, and BPH admitted by General surgery s/p robotic sleeve gastrectomy performed by Dr. Cesar on today, 09/27/18. EBL 15 mL.  consulted for medical management.     Hospital Course:  Patient admitted s/p robotic sleeve gastrectomy by Dr. Cesar. Post-procedure, patient complained of substernal chest pressure/heaviness associated with flushing. Troponin 0.262 and Cardiology consulted to assist with management. Patient diagnosed with NSTEMI s/p sleeve gastrectomy. Patient started on heparin gtt. Continue ASA, BB, and statin. Of note, patient underwent LHC at [facility] by Dr. Martinez with no intervention and was told no significant changes from prior angiograms. Troponin 0.262>1.102>1.005. EKG reviewed. 2D echo pending. 9/28/18-Patient complains of recurrent chest pain this AM, unrelieved by sublingual nitroglycerin and nitro paste. Patient transferred to ICU on Tridil gtt for critical care management.  Weaned Tridil and transferred to floor.  Chest pain resolved and cardiac enzymes trended toward normal.  Surgical sites remained clean and intact without sign of infection.    Interval History:  Pt was seen and examined at bedside . He denies any chest pain.  Tolerating small meals with as expected early satiety.  No nausea or vomiting.    Review of Systems   Constitutional: Negative for chills and fever.   HENT: Negative.    Eyes: Negative.    Respiratory: Negative for apnea, cough, choking, chest tightness, shortness of breath, wheezing and stridor.    Cardiovascular: Negative for  chest pain, palpitations and leg swelling.   Gastrointestinal: Negative for abdominal pain, constipation, diarrhea, nausea and vomiting.   Endocrine: Negative.    Genitourinary: Negative for decreased urine volume, difficulty urinating, dysuria, frequency, hematuria and urgency.   Musculoskeletal: Negative for arthralgias, back pain, myalgias, neck pain and neck stiffness.   Allergic/Immunologic: Negative.    Neurological: Negative for dizziness, tremors, seizures, syncope, speech difficulty, weakness and headaches.   Hematological: Negative.    Psychiatric/Behavioral: Negative.      Objective:     Vital Signs (Most Recent):  Temp: 98.5 °F (36.9 °C) (09/30/18 1132)  Pulse: 60 (09/30/18 1300)  Resp: 18 (09/30/18 1132)  BP: 111/67 (09/30/18 1132)  SpO2: 98 % (09/30/18 1132) Vital Signs (24h Range):  Temp:  [97.2 °F (36.2 °C)-98.5 °F (36.9 °C)] 98.5 °F (36.9 °C)  Pulse:  [60-82] 60  Resp:  [18] 18  SpO2:  [94 %-99 %] 98 %  BP: (109-134)/(64-78) 111/67     Weight: 116.5 kg (256 lb 13.4 oz)  Body mass index is 33.89 kg/m².    Intake/Output Summary (Last 24 hours) at 9/30/2018 1404  Last data filed at 9/30/2018 1333  Gross per 24 hour   Intake 1334.2 ml   Output 900 ml   Net 434.2 ml      Physical Exam   Constitutional: He is oriented to person, place, and time. He appears well-developed and well-nourished. He is cooperative. He is easily aroused.   HENT:   Head: Normocephalic and atraumatic.   Eyes: EOM are normal.   Neck: Neck supple.   Cardiovascular: Normal rate, regular rhythm, normal heart sounds and intact distal pulses.   No murmur heard.  Pulmonary/Chest: Effort normal and breath sounds normal. No stridor. No respiratory distress. He has no wheezes. He has no rales. He exhibits no tenderness.   Abdominal: Normal appearance. Bowel sounds are decreased. There is generalized tenderness.   Surgical sites clean and intact x5.   Genitourinary:   Genitourinary Comments: Deferred   Musculoskeletal: He exhibits no edema,  tenderness or deformity.   Neurological: He is alert, oriented to person, place, and time and easily aroused. No sensory deficit.   Skin: Skin is warm and dry. Capillary refill takes less than 2 seconds.   Psychiatric: He has a normal mood and affect. His behavior is normal. Judgment and thought content normal.   Nursing note and vitals reviewed.      Significant Labs:   BMP:   Recent Labs   Lab  09/30/18   0900   GLU  101   NA  141   K  4.2   CL  104   CO2  29   BUN  17   CREATININE  0.8   CALCIUM  9.0     CBC:   Recent Labs   Lab  09/30/18   0900   WBC  7.01   HGB  13.2*   HCT  38.7*   PLT  148*       Significant Imaging: I have reviewed all pertinent imaging results/findings within the past 24 hours.    Assessment/Plan:      * NSTEMI (non-ST elevated myocardial infarction)    Patient dx with NSTEMI s/p sleeve gastrectomy  -Troponin 0.262>1.102>1.005  -Heparin gtt started   -ASA, bb, Stain   -2D echo pending  -Reports recent LHC by Dr. Martinez at [facility] with no intervention, no change from prior angiograms, medical mgmt recommended.  Cardiology evaluated with recommendations.          Mixed restrictive and obstructive lung disease    - Supplemental oxygen prn, keep O2 sats > 88%  - Duonebs PRN          Type 2 diabetes mellitus without complication    - Accuchecks and low dose SSI  - Continue Metformin upon discharge          CAD S/P percutaneous coronary angioplasty    - Resume Statin once diet advanced. Will defer ASA and Pradaxa to primary on when to restart these medications post op          S/P laparoscopic sleeve gastrectomy    - General surgery primary -- medical management per primary team  - S/p robotic sleeve gastrectomy today, 09/27/18, by Dr. Cesar  - Analgesics/Antiemetics PRN  - Encourage IS use  -Nutritionist consult   -Pain controlled           PTSD (post-traumatic stress disorder)    - Continue home medication, PO Wellbutrin          SATYA on CPAP    - Resume nightly CPAP -- titrate to  patient's comfort and O2 saturation          Hyperlipidemia    - Resume Statin             HTN (hypertension)    - Resume home medication, including Amlodipine, once diet advanced  - IV Hydralazine PRN  -Blood pressure likely elevated 2/2 pain             VTE Risk Mitigation (From admission, onward)        Ordered     heparin 25,000 units in dextrose 5% 250 mL (100 units/mL) infusion LOW INTENSITY nomogram - [facility]  Continuous      09/28/18 0149     heparin 25,000 units in dextrose 5% (100 units/ml) IV bolus from bag - ADDITIONAL PRN BOLUS - 60 units/kg (max bolus 4000 units)  As needed (PRN)      09/28/18 0149     heparin 25,000 units in dextrose 5% (100 units/ml) IV bolus from bag - ADDITIONAL PRN BOLUS - 30 units/kg (max bolus 4000 units)  As needed (PRN)      09/28/18 0149              Alistair Warren MD  Department of Hospital Medicine   [facility]

## 2018-09-30 NOTE — PLAN OF CARE
Problem: Patient Care Overview  Goal: Plan of Care Review  Outcome: Ongoing (interventions implemented as appropriate)  The patient has been sinus rhythm on the monitor. Blood glucose monitored. Heparin gtt infusing and therapeutic, next aPTT with AM labs. Lap sites clean, dry, intact. Headache managed with prn medication. Pt has had an uneventful night and is resting quietly, will continue to monitor.

## 2018-09-30 NOTE — ASSESSMENT & PLAN NOTE
Patient dx with NSTEMI s/p sleeve gastrectomy  -Troponin 0.262>1.102>1.005  -Heparin gtt started   -ASA, bb, Stain   -2D echo pending  -Reports recent LHC by Dr. Martinez at [facility] with no intervention, no change from prior angiograms, medical mgmt recommended.  Cardiology evaluated with recommendations.

## 2018-09-30 NOTE — PROGRESS NOTES
Pulmonary Note    History reviewed , patient examined, lab and radiographic studies reviewed.  Exam stable.  No chest pina   On Continuous Positive Airway Pressure       Assessment:  Patient Active Problem List   Diagnosis    Special screening for malignant neoplasms, colon    Atherosclerotic heart disease of native coronary artery with angina pectoris    S/P PTCA (percutaneous transluminal coronary angioplasty)    HTN (hypertension)    Hyperlipidemia    RBBB    SATYA on CPAP    Obesity    PTSD (post-traumatic stress disorder)    Shortness of breath    S/P laparoscopic sleeve gastrectomy    Personal history of colonic polyps    Angina effort    CAD S/P percutaneous coronary angioplasty    Type 2 diabetes mellitus without complication    Obesity (BMI 30-39.9)    Chest pain    Tobacco use disorder, moderate, in sustained remission    Morbid obesity with BMI of 40.0-44.9, adult    Mixed restrictive and obstructive lung disease    Physical deconditioning    Diverticulosis of large intestine without hemorrhage    Benign neoplasm of transverse colon    NSTEMI (non-ST elevated myocardial infarction)       Plan:  No new suggestions  Will sign off  Please re consult if any new problems develop    Paul Adhikari MD  Pulmonary / Critical Care Medicine

## 2018-09-30 NOTE — PT/OT/SLP EVAL
Physical Therapy Evaluation    Patient Name:  Cole Doan   MRN:  [id number]    Recommendations:     Discharge Recommendations:  home   Discharge Equipment Recommendations: none   Barriers to discharge: None    Assessment:     Cole Doan is a 68 y.o. male admitted with a medical diagnosis of NSTEMI (non-ST elevated myocardial infarction).  He presents with the following impairments/functional limitations:  impaired endurance> PT IND with transfers with SBA for IV pole with gait. Will benefit from people movers program      Recent Surgery: Procedure(s) (LRB):  XI ROBOTIC SLEEVE GASTRECTOMY (N/A) 3 Days Post-Op    Plan:     During this hospitalization, patient to be seen   to address the above listed problems via    · Plan of Care Expires:      Plan of Care Reviewed with: patient    Subjective     Communicated with nursing prior to session.  Patient found supine upon PT entry to room, agreeable to evaluation.      Chief Complaint: none  Patient comments/goals: return to home  Pain/Comfort:  · Pain Rating 1: 0/10    Patients cultural, spiritual, Christianity conflicts given the current situation:      Living Environment:  Lives in one story house with wife in one story house. No steps  Prior to admission, patients level of function was IND.  Patient has the following equipment: cane, straight.  DME owned (not currently used): none.  Upon discharge, patient will have assistance from wife.    Objective:     Patient found with:       General Precautions: Standard,     Orthopedic Precautions:    Braces:       Exams:  · RLE ROM: WFL  · RLE Strength: WFL  · LLE ROM: WFL  · LLE Strength: WFL    Functional Mobility:  · Bed Mobility:     · Supine to Sit: independence  · Transfers:     · Bed to Chair: independence with  no AD  using  Stand Pivot  · Gait: 250 feet with IV pole    AM-PAC 6 CLICK MOBILITY  Total Score:23       Therapeutic Activities and Exercises:       Patient left supine with all lines intact and call  button in reach.    GOALS:   Multidisciplinary Problems     Physical Therapy Goals        Problem: Physical Therapy Goal    Goal Priority Disciplines Outcome Goal Variances Interventions   Physical Therapy Goal     PT, PT/OT Ongoing (interventions implemented as appropriate)                     History:     Past Medical History:   Diagnosis Date    Anticoagulant long-term use     Plavix    BPH (benign prostatic hypertrophy)     CAD (coronary artery disease)     HTN (hypertension) 5/15/2014    Hyperlipidemia     Obesity 5/15/2014    SATYA on CPAP     Pneumonia     PTSD (post-traumatic stress disorder) 5/15/2014    RBBB 5/15/2014    S/P PTCA (percutaneous transluminal coronary angioplasty) 5/15/2014    Special screening for malignant neoplasms, colon 1/22/2014    Type 2 diabetes mellitus without complication 9/28/2015       Past Surgical History:   Procedure Laterality Date    APPENDECTOMY      at age 14    ATRIAL CARDIAC PACEMAKER INSERTION      COLONOSCOPY Left 4/27/2018    Procedure: COLONOSCOPY;  Surgeon: Artem Medeiros MD;  Location: [facility];  Service: Endoscopy;  Laterality: Left;    COLONOSCOPY Left 4/27/2018    Performed by Artem Medeiros MD at [facility]    COLONOSCOPY N/A 5/14/2015    Performed by Travis Miguel MD at [facility]    COLONOSCOPY N/A 1/22/2014    Performed by Artem Medeiros MD at [facility]    CORONARY ANGIOPLASTY WITH STENT PLACEMENT      1990, 2008, 2012    ESOPHAGOGASTRODUODENOSCOPY N/A 8/16/2018    Procedure: ESOPHAGOGASTRODUODENOSCOPY (EGD);  Surgeon: Mario Cesar MD;  Location: [facility];  Service: General;  Laterality: N/A;    ESOPHAGOGASTRODUODENOSCOPY (EGD) N/A 8/16/2018    Performed by [facility] MD at [facility] ENDO    HEART CATH-LEFT Left 9/28/2015    Performed by Eric Rutledge MD at [facility] CATH LAB    ROBOT-ASSISTED LAPAROSCOPIC SLEEVE GASTRECTOMY USING DA LURDES XI N/A 9/27/2018    Procedure: XI ROBOTIC SLEEVE GASTRECTOMY;  Surgeon: Mario Cesar MD;   Location: [facility] OR;  Service: General;  Laterality: N/A;    VASECTOMY      XI ROBOTIC SLEEVE GASTRECTOMY N/A 9/27/2018    Performed by Mario Cesar MD at [facility] OR       Clinical Decision Making:     History  Co-morbidities and personal factors that may impact the plan of care Examination  Body Structures and Functions, activity limitations and participation restrictions that may impact the plan of care Clinical Presentation   Decision Making/ Complexity Score   Co-morbidities:   [] Time since onset of injury / illness / exacerbation  [] Status of current condition  []Patient's cognitive status and safety concerns    [] Multiple Medical Problems (see med hx)  Personal Factors:   [] Patient's age  [] Prior Level of function   [] Patient's home situation (environment and family support)  [] Patient's level of motivation  [] Expected progression of patient      HISTORY:(criteria)    [] 84267 - no personal factors/history    [] 41318 - has 1-2 personal factor/comorbidity     [] 39577 - has >3 personal factor/comorbidity     Body Regions:  [] Objective examination findings  [] Head     []  Neck  [] Trunk   [] Upper Extremity  [] Lower Extremity    Body Systems:  [] For communication ability, affect, cognition, language, and learning style: the assessment of the ability to make needs known, consciousness, orientation (person, place, and time), expected emotional /behavioral responses, and learning preferences (eg, learning barriers, education  needs)  [] For the neuromuscular system: a general assessment of gross coordinated movement (eg, balance, gait, locomotion, transfers, and transitions) and motor function  (motor control and motor learning)  [] For the musculoskeletal system: the assessment of gross symmetry, gross range of motion, gross strength, height, and weight  [] For the integumentary system: the assessment of pliability(texture), presence of scar formation, skin color, and skin integrity  [] For  cardiovascular/pulmonary system: the assessment of heart rate, respiratory rate, blood pressure, and edema     Activity limitations:    [] Patient's cognitive status and saf ety concerns          [] Status of current condition      [] Weight bearing restriction  [] Cardiopulmunary Restriction    Participation Restrictions:   [] Goals and goal agreement with the patient     [] Rehab potential (prognosis) and probable outcome      Examination of Body System: (criteria)    [] 53922 - addressing 1-2 elements    [] 08322 - addressing a total of 3 or more elements     [] 35141 -  Addressing a total of 4 or more elements         Clinical Presentation: (criteria)  Choose one     On examination of body system using standardized tests and measures patient presents with (CHOOSE ONE) elements from any of the following: body structures and functions, activity limitations, and/or participation restrictions.  Leading to a clinical presentation that is considered (CHOOSE ONE)                              Clinical Decision Making  (Eval Complexity):  Choose One     Time Tracking:     PT Received On: 09/30/18  PT Start Time: 0840     PT Stop Time: 0855  PT Total Time (min): 15 min     Billable Minutes: Evaluation 15      Mukul Ballard PT  09/30/2018

## 2018-09-30 NOTE — PLAN OF CARE
Problem: Physical Therapy Goal  Goal: Physical Therapy Goal  Outcome: Ongoing (interventions implemented as appropriate)  PT eval complete.  Pt IND with transfers and bed mob and SBA for IV pole with gait. Will place on people movers for daily ambulation

## 2018-09-30 NOTE — PROGRESS NOTES
[facility]   Cardiology  Progress Note    Patient Name: Cole Doan  MRN: [id number]  Admission Date: 9/27/2018  Hospital Length of Stay: 3 days  Code Status: Prior   Attending Physician: Mario Cesar MD   Primary Care Physician: Yoly Spring MD  Expected Discharge Date:   Principal Problem:NSTEMI (non-ST elevated myocardial infarction)    Subjective:     Hospital Course:   09/29 No chest pain, off NTG GTT in am, no dyspnea. C/o headcahe  09/30 no chest pain., no arrhythmia            ROS  Objective:     Vital Signs (Most Recent):  Temp: 98.5 °F (36.9 °C) (09/30/18 1132)  Pulse: 60 (09/30/18 1132)  Resp: 18 (09/30/18 1132)  BP: 111/67 (09/30/18 1132)  SpO2: 98 % (09/30/18 1132) Vital Signs (24h Range):  Temp:  [97.2 °F (36.2 °C)-98.5 °F (36.9 °C)] 98.5 °F (36.9 °C)  Pulse:  [60-82] 60  Resp:  [18] 18  SpO2:  [94 %-99 %] 98 %  BP: (109-134)/(64-78) 111/67     Weight: 116.5 kg (256 lb 13.4 oz)  Body mass index is 33.89 kg/m².     SpO2: 98 %  O2 Device (Oxygen Therapy): room air      Intake/Output Summary (Last 24 hours) at 9/30/2018 1255  Last data filed at 9/30/2018 0709  Gross per 24 hour   Intake 734.2 ml   Output 900 ml   Net -165.8 ml       Lines/Drains/Airways     Peripheral Intravenous Line                 Peripheral IV - Single Lumen Anterior;Left Antecubital -- days         Peripheral IV - Single Lumen 09/27/18 0800 Left Hand 3 days                Physical Exam   Constitutional: He is oriented to person, place, and time. He appears well-developed and well-nourished. No distress.   Flushed     HENT:   Head: Normocephalic and atraumatic.   Eyes: Pupils are equal, round, and reactive to light. Right eye exhibits no discharge. Left eye exhibits no discharge.   Neck: Neck supple. No JVD present.   Cardiovascular: Normal rate, regular rhythm, S1 normal and S2 normal.   No murmur heard.  Pulmonary/Chest: Effort normal and breath sounds normal. No respiratory distress. He has no wheezes. He has  no rales.   PPM site well-healed   Abdominal: Soft.   Mild distention and tenderness   Musculoskeletal: He exhibits no edema.   Neurological: He is alert and oriented to person, place, and time.   Skin: Skin is warm and dry. He is not diaphoretic. No erythema.   Psychiatric: He has a normal mood and affect. His behavior is normal. Thought content normal.   Nursing note and vitals reviewed.      Significant Labs:   ABG: No results for input(s): PH, PCO2, HCO3, POCSATURATED, BE in the last 48 hours., Blood Culture: No results for input(s): LABBLOO in the last 48 hours., BMP:   Recent Labs   Lab  09/30/18   0900   GLU  101   NA  141   K  4.2   CL  104   CO2  29   BUN  17   CREATININE  0.8   CALCIUM  9.0   , CMP   Recent Labs   Lab  09/30/18   0900   NA  141   K  4.2   CL  104   CO2  29   GLU  101   BUN  17   CREATININE  0.8   CALCIUM  9.0   ANIONGAP  8   ESTGFRAFRICA  >60   EGFRNONAA  >60   , CBC   Recent Labs   Lab  09/30/18   0900   WBC  7.01   HGB  13.2*   HCT  38.7*   PLT  148*   , INR   Recent Labs   Lab  09/30/18   0900   INR  1.1   , Lipid Panel No results for input(s): CHOL, HDL, LDLCALC, TRIG, CHOLHDL in the last 48 hours. and Troponin   Recent Labs   Lab  09/29/18   0126  09/29/18   1444  09/29/18   2218   TROPONINI  0.310*  0.173*  0.131*       Significant Imaging:      Assessment and Plan:       * NSTEMI (non-ST elevated myocardial infarction)    -Patient who presents with NSTEMI s/p sleeve gastrectomy  -Reports recent LHC by Dr. Martinez at [facility] with no intervention, no change from prior angiograms, medical mgmt recommended  -Records have been requested  -Previous LHC at our facility in 2016 showed patent stents, 50-70% diagonal lesion, and small non-dominant  RCA    -ok to d/c home today and f/u with Dr. Rutledge as op  -continue ASA, Plavix, Metoprolol, Imdur, Ranexa, amlodipine, and statin        CAD S/P percutaneous coronary angioplasty    -See plan under NSTEMI        S/P laparoscopic sleeve  gastrectomy    -s/p sleeve gastrectomy on 9/27/18 by Dr. Cesar  -[facility] as per surgery team        Hyperlipidemia    -Continue statin        HTN (hypertension)    -Continue Metoprolol  -Tridil gtt initiated due to refractory chest pain            VTE Risk Mitigation (From admission, onward)        Ordered     heparin 25,000 units in dextrose 5% 250 mL (100 units/mL) infusion LOW INTENSITY nomogram - [facility]  Continuous      09/28/18 0149     heparin 25,000 units in dextrose 5% (100 units/ml) IV bolus from bag - ADDITIONAL PRN BOLUS - 60 units/kg (max bolus 4000 units)  As needed (PRN)      09/28/18 0149     heparin 25,000 units in dextrose 5% (100 units/ml) IV bolus from bag - ADDITIONAL PRN BOLUS - 30 units/kg (max bolus 4000 units)  As needed (PRN)      09/28/18 0149          Scott Garner MD  Cardiology  [facility]

## 2018-09-30 NOTE — SUBJECTIVE & OBJECTIVE
Interval History:  Pt was seen and examined at bedside . He denies any chest pain.  Tolerating small meals with as expected early satiety.  No nausea or vomiting.    Review of Systems   Constitutional: Negative for chills and fever.   HENT: Negative.    Eyes: Negative.    Respiratory: Negative for apnea, cough, choking, chest tightness, shortness of breath, wheezing and stridor.    Cardiovascular: Negative for chest pain, palpitations and leg swelling.   Gastrointestinal: Negative for abdominal pain, constipation, diarrhea, nausea and vomiting.   Endocrine: Negative.    Genitourinary: Negative for decreased urine volume, difficulty urinating, dysuria, frequency, hematuria and urgency.   Musculoskeletal: Negative for arthralgias, back pain, myalgias, neck pain and neck stiffness.   Allergic/Immunologic: Negative.    Neurological: Negative for dizziness, tremors, seizures, syncope, speech difficulty, weakness and headaches.   Hematological: Negative.    Psychiatric/Behavioral: Negative.      Objective:     Vital Signs (Most Recent):  Temp: 98.5 °F (36.9 °C) (09/30/18 1132)  Pulse: 60 (09/30/18 1300)  Resp: 18 (09/30/18 1132)  BP: 111/67 (09/30/18 1132)  SpO2: 98 % (09/30/18 1132) Vital Signs (24h Range):  Temp:  [97.2 °F (36.2 °C)-98.5 °F (36.9 °C)] 98.5 °F (36.9 °C)  Pulse:  [60-82] 60  Resp:  [18] 18  SpO2:  [94 %-99 %] 98 %  BP: (109-134)/(64-78) 111/67     Weight: 116.5 kg (256 lb 13.4 oz)  Body mass index is 33.89 kg/m².    Intake/Output Summary (Last 24 hours) at 9/30/2018 1404  Last data filed at 9/30/2018 1333  Gross per 24 hour   Intake 1334.2 ml   Output 900 ml   Net 434.2 ml      Physical Exam   Constitutional: He is oriented to person, place, and time. He appears well-developed and well-nourished. He is cooperative. He is easily aroused.   HENT:   Head: Normocephalic and atraumatic.   Eyes: EOM are normal.   Neck: Neck supple.   Cardiovascular: Normal rate, regular rhythm, normal heart sounds and intact  distal pulses.   No murmur heard.  Pulmonary/Chest: Effort normal and breath sounds normal. No stridor. No respiratory distress. He has no wheezes. He has no rales. He exhibits no tenderness.   Abdominal: Normal appearance. Bowel sounds are decreased. There is generalized tenderness.   Surgical sites clean and intact x5.   Genitourinary:   Genitourinary Comments: Deferred   Musculoskeletal: He exhibits no edema, tenderness or deformity.   Neurological: He is alert, oriented to person, place, and time and easily aroused. No sensory deficit.   Skin: Skin is warm and dry. Capillary refill takes less than 2 seconds.   Psychiatric: He has a normal mood and affect. His behavior is normal. Judgment and thought content normal.   Nursing note and vitals reviewed.      Significant Labs:   BMP:   Recent Labs   Lab  09/30/18   0900   GLU  101   NA  141   K  4.2   CL  104   CO2  29   BUN  17   CREATININE  0.8   CALCIUM  9.0     CBC:   Recent Labs   Lab  09/30/18   0900   WBC  7.01   HGB  13.2*   HCT  38.7*   PLT  148*       Significant Imaging: I have reviewed all pertinent imaging results/findings within the past 24 hours.

## 2018-09-30 NOTE — SUBJECTIVE & OBJECTIVE
ROS  Objective:     Vital Signs (Most Recent):  Temp: 98.5 °F (36.9 °C) (09/30/18 1132)  Pulse: 60 (09/30/18 1132)  Resp: 18 (09/30/18 1132)  BP: 111/67 (09/30/18 1132)  SpO2: 98 % (09/30/18 1132) Vital Signs (24h Range):  Temp:  [97.2 °F (36.2 °C)-98.5 °F (36.9 °C)] 98.5 °F (36.9 °C)  Pulse:  [60-82] 60  Resp:  [18] 18  SpO2:  [94 %-99 %] 98 %  BP: (109-134)/(64-78) 111/67     Weight: 116.5 kg (256 lb 13.4 oz)  Body mass index is 33.89 kg/m².     SpO2: 98 %  O2 Device (Oxygen Therapy): room air      Intake/Output Summary (Last 24 hours) at 9/30/2018 1255  Last data filed at 9/30/2018 0709  Gross per 24 hour   Intake 734.2 ml   Output 900 ml   Net -165.8 ml       Lines/Drains/Airways     Peripheral Intravenous Line                 Peripheral IV - Single Lumen Anterior;Left Antecubital -- days         Peripheral IV - Single Lumen 09/27/18 0800 Left Hand 3 days                Physical Exam   Constitutional: He is oriented to person, place, and time. He appears well-developed and well-nourished. No distress.   Flushed     HENT:   Head: Normocephalic and atraumatic.   Eyes: Pupils are equal, round, and reactive to light. Right eye exhibits no discharge. Left eye exhibits no discharge.   Neck: Neck supple. No JVD present.   Cardiovascular: Normal rate, regular rhythm, S1 normal and S2 normal.   No murmur heard.  Pulmonary/Chest: Effort normal and breath sounds normal. No respiratory distress. He has no wheezes. He has no rales.   PPM site well-healed   Abdominal: Soft.   Mild distention and tenderness   Musculoskeletal: He exhibits no edema.   Neurological: He is alert and oriented to person, place, and time.   Skin: Skin is warm and dry. He is not diaphoretic. No erythema.   Psychiatric: He has a normal mood and affect. His behavior is normal. Thought content normal.   Nursing note and vitals reviewed.      Significant Labs:   ABG: No results for input(s): PH, PCO2, HCO3, POCSATURATED, BE in the last 48 hours.,  Blood Culture: No results for input(s): LABBLOO in the last 48 hours., BMP:   Recent Labs   Lab  09/30/18   0900   GLU  101   NA  141   K  4.2   CL  104   CO2  29   BUN  17   CREATININE  0.8   CALCIUM  9.0   , CMP   Recent Labs   Lab  09/30/18   0900   NA  141   K  4.2   CL  104   CO2  29   GLU  101   BUN  17   CREATININE  0.8   CALCIUM  9.0   ANIONGAP  8   ESTGFRAFRICA  >60   EGFRNONAA  >60   , CBC   Recent Labs   Lab  09/30/18   0900   WBC  7.01   HGB  13.2*   HCT  38.7*   PLT  148*   , INR   Recent Labs   Lab  09/30/18   0900   INR  1.1   , Lipid Panel No results for input(s): CHOL, HDL, LDLCALC, TRIG, CHOLHDL in the last 48 hours. and Troponin   Recent Labs   Lab  09/29/18   0126  09/29/18   1444  09/29/18   2218   TROPONINI  0.310*  0.173*  0.131*       Significant Imaging:

## 2018-09-30 NOTE — SUBJECTIVE & OBJECTIVE
"Interval History: Transfer to floor yesterday.  Hemodynamically stable.  Denies chest pain or shortness of breath.  Endorses flatus but no bowel movement.  Tolerating clear liquids.  Pain well controlled.  Ambulated in the hallway yesterday.    Medications:  Continuous Infusions:   heparin (porcine) in D5W 14 Units/kg/hr (09/30/18 0709)     Scheduled Meds:   ALPRAZolam  0.5 mg Oral QHS    ALPRAZolam  1 mg Oral Daily    amLODIPine  2.5 mg Oral Daily    aspirin  81 mg Oral Daily    aspirin  325 mg Oral Once    atorvastatin  80 mg Oral QHS    buPROPion  200 mg Oral Daily    chlorhexidine  10 mL Mouth/Throat BID    clopidogrel  75 mg Oral Daily    isosorbide mononitrate  30 mg Oral Daily    metoprolol tartrate  25 mg Oral BID    nozaseptin   Each Nare BID    pantoprazole  40 mg Intravenous Before breakfast    ranolazine  500 mg Oral BID     PRN Meds:albuterol-ipratropium, dextrose 50%, diphenhydrAMINE, glucagon (human recombinant), heparin (PORCINE), heparin (PORCINE), hydrALAZINE, influenza, insulin aspart U-100, naloxone, nitroGLYCERIN, ondansetron, oxyCODONE, oxyCODONE, promethazine (PHENERGAN) IVPB, sodium chloride 0.9%     Review of patient's allergies indicates:   Allergen Reactions    Iodinated contrast- oral and iv dye      States, "My arteries started shutting down on me" pt states only to iv dye- was specifically told that oral dye does not have the same reaction     Objective:     Vital Signs (Most Recent):  Temp: 98.5 °F (36.9 °C) (09/30/18 0757)  Pulse: 62 (09/30/18 0757)  Resp: 18 (09/30/18 0757)  BP: 109/64 (09/30/18 0757)  SpO2: 97 % (09/30/18 0757) Vital Signs (24h Range):  Temp:  [97.2 °F (36.2 °C)-98.7 °F (37.1 °C)] 98.5 °F (36.9 °C)  Pulse:  [59-82] 62  Resp:  [16-18] 18  SpO2:  [91 %-99 %] 97 %  BP: (103-134)/(59-78) 109/64     Weight: 116.5 kg (256 lb 13.4 oz)  Body mass index is 33.89 kg/m².    Intake/Output - Last 3 Shifts       09/28 0700 - 09/29 0659 09/29 0700 - 09/30 0659 " 09/30 0700 - 10/01 0659    P.O. 480 780     I.V. (mL/kg) 314.6 (2.6) 125.1 (1.1) 184.6 (1.6)    IV Piggyback 140  27.9    Total Intake(mL/kg) 934.5 (7.8) 905.1 (7.8) 212.5 (1.8)    Urine (mL/kg/hr) 1025 (0.4) 1325 (0.5)     Blood       Total Output 1025 1325     Net -90.5 -419.9 +212.5                 Physical Exam   Constitutional: He is oriented to person, place, and time. He appears well-developed.   HENT:   Head: Normocephalic and atraumatic.   Eyes: Conjunctivae and EOM are normal.   Neck: Normal range of motion. No thyromegaly present.   Cardiovascular: Normal rate and regular rhythm.   Pulmonary/Chest: Effort normal. No respiratory distress.   Abdominal:   Soft, mild distention; appropriately TTP; incisions c/d/i without erythema or drainage   Musculoskeletal: Normal range of motion. He exhibits no edema or tenderness.   Neurological: He is alert and oriented to person, place, and time.   Skin: Skin is warm and dry. Capillary refill takes less than 2 seconds. No rash noted.   Psychiatric: He has a normal mood and affect.       Significant Labs:  CBC:   Recent Labs   Lab  09/30/18   0900   WBC  7.01   RBC  4.18*   HGB  13.2*   HCT  38.7*   PLT  148*   MCV  93   MCH  31.6*   MCHC  34.1     BMP:   Recent Labs   Lab  09/27/18   1842   09/30/18   0900   GLU  128*   < >  101   NA  141   < >  141   K  4.4   < >  4.2   CL  103   < >  104   CO2  26   < >  29   BUN  26*   < >  17   CREATININE  0.8   < >  0.8   CALCIUM  9.4   < >  9.0   MG  1.8   --    --     < > = values in this interval not displayed.        CBC/hcg day 7/CMP/HCG

## 2018-09-30 NOTE — ASSESSMENT & PLAN NOTE
Robotic sleeve gastrectomy POD3  Continue PO pain control  Tolerating clears, advanced to full liquids today  Dietician consult for post sleeve diet  OOB, ambulation encouraged

## 2018-10-01 VITALS
SYSTOLIC BLOOD PRESSURE: 122 MMHG | WEIGHT: 254.88 LBS | HEART RATE: 80 BPM | HEIGHT: 73 IN | TEMPERATURE: 97 F | DIASTOLIC BLOOD PRESSURE: 77 MMHG | BODY MASS INDEX: 33.78 KG/M2 | RESPIRATION RATE: 16 BRPM | OXYGEN SATURATION: 96 %

## 2018-10-01 LAB — POCT GLUCOSE: 100 MG/DL (ref 70–110)

## 2018-10-01 PROCEDURE — 63600175 PHARM REV CODE 636 W HCPCS: Performed by: SURGERY

## 2018-10-01 PROCEDURE — 25000003 PHARM REV CODE 250: Performed by: INTERNAL MEDICINE

## 2018-10-01 PROCEDURE — 25000003 PHARM REV CODE 250: Performed by: HOSPITALIST

## 2018-10-01 PROCEDURE — 90471 IMMUNIZATION ADMIN: CPT | Performed by: SURGERY

## 2018-10-01 PROCEDURE — 90662 IIV NO PRSV INCREASED AG IM: CPT | Performed by: SURGERY

## 2018-10-01 PROCEDURE — 25000003 PHARM REV CODE 250: Performed by: SURGERY

## 2018-10-01 PROCEDURE — 25000003 PHARM REV CODE 250: Performed by: PHYSICIAN ASSISTANT

## 2018-10-01 PROCEDURE — C9113 INJ PANTOPRAZOLE SODIUM, VIA: HCPCS | Performed by: SURGERY

## 2018-10-01 PROCEDURE — 94760 N-INVAS EAR/PLS OXIMETRY 1: CPT

## 2018-10-01 PROCEDURE — 25000003 PHARM REV CODE 250: Performed by: NURSE PRACTITIONER

## 2018-10-01 PROCEDURE — 99233 SBSQ HOSP IP/OBS HIGH 50: CPT | Mod: ,,, | Performed by: INTERNAL MEDICINE

## 2018-10-01 RX ORDER — METOPROLOL TARTRATE 25 MG/1
25 TABLET, FILM COATED ORAL 2 TIMES DAILY
Qty: 60 TABLET | Refills: 11 | Status: SHIPPED | OUTPATIENT
Start: 2018-10-01 | End: 2019-05-23

## 2018-10-01 RX ORDER — HYDROCODONE BITARTRATE AND ACETAMINOPHEN 5; 325 MG/1; MG/1
1 TABLET ORAL EVERY 6 HOURS PRN
Qty: 20 TABLET | Refills: 0 | Status: SHIPPED | OUTPATIENT
Start: 2018-10-01 | End: 2018-10-11

## 2018-10-01 RX ADMIN — RANOLAZINE 500 MG: 500 TABLET, FILM COATED, EXTENDED RELEASE ORAL at 08:10

## 2018-10-01 RX ADMIN — ISOSORBIDE MONONITRATE 30 MG: 30 TABLET, EXTENDED RELEASE ORAL at 08:10

## 2018-10-01 RX ADMIN — INFLUENZA A VIRUS A/MICHIGAN/45/2015 X-275 (H1N1) ANTIGEN (FORMALDEHYDE INACTIVATED), INFLUENZA A VIRUS A/SINGAPORE/INFIMH-16-0019/2016 IVR-186 (H3N2) ANTIGEN (FORMALDEHYDE INACTIVATED), AND INFLUENZA B VIRUS B/MARYLAND/15/2016 BX-69A (A B/COLORADO/6/2017-LIKE VIRUS) ANTIGEN (FORMALDEHYDE INACTIVATED) 0.5 ML: 60; 60; 60 INJECTION, SUSPENSION INTRAMUSCULAR at 10:10

## 2018-10-01 RX ADMIN — BUPROPION HYDROCHLORIDE 200 MG: 100 TABLET, FILM COATED, EXTENDED RELEASE ORAL at 08:10

## 2018-10-01 RX ADMIN — CHLORHEXIDINE GLUCONATE 10 ML: 1.2 RINSE ORAL at 08:10

## 2018-10-01 RX ADMIN — METOPROLOL TARTRATE 25 MG: 25 TABLET ORAL at 08:10

## 2018-10-01 RX ADMIN — ASPIRIN 81 MG: 81 TABLET, COATED ORAL at 08:10

## 2018-10-01 RX ADMIN — ALPRAZOLAM 1 MG: 1 TABLET ORAL at 08:10

## 2018-10-01 RX ADMIN — PANTOPRAZOLE SODIUM 40 MG: 40 INJECTION, POWDER, FOR SOLUTION INTRAVENOUS at 06:10

## 2018-10-01 RX ADMIN — AMLODIPINE BESYLATE 2.5 MG: 2.5 TABLET ORAL at 08:10

## 2018-10-01 RX ADMIN — CLOPIDOGREL BISULFATE 75 MG: 75 TABLET ORAL at 08:10

## 2018-10-01 NOTE — ASSESSMENT & PLAN NOTE
-Patient who presents with NSTEMI s/p sleeve gastrectomy  -Reports  LHC in May 2018 by Dr. Martinez at [facility] with no intervention, no change from prior angiograms, medical mgmt recommended  -Records have been requested  -Previous LHC at our facility in 2016 showed patent stents, 50-70% diagonal lesion, and small non-dominant  RCA    10/1/18  -Has been examined and is clear for discharge from Cardiology standpoint  -Will have patient  f/u with Dr. Rutledge as OP in 1-2 weeks.   -Cath films have been requested for Dr. Rutledge's review   -Continue ASA, Plavix, Metoprolol, Ranexa, Imdur, amlodipine, and statin

## 2018-10-01 NOTE — PLAN OF CARE
Problem: Patient Care Overview  Goal: Plan of Care Review  Outcome: Ongoing (interventions implemented as appropriate)  Pt on room air tolerating well. No distress noted at this time. Pt has home cpap machine and uses QHS. Pt is currently on room air and does not have a current end tidal co2 monitor in use.

## 2018-10-01 NOTE — PLAN OF CARE
10/01/18 1536   Final Note   Assessment Type Final Discharge Note   Discharge Disposition Home   Hospital Follow Up  Appt(s) scheduled? Yes   Discharge plans and expectations educations in teach back method with documentation complete? Yes   Right Care Referral Info   Post Acute Recommendation No Care

## 2018-10-01 NOTE — PLAN OF CARE
Problem: Patient Care Overview  Goal: Plan of Care Review  Outcome: Ongoing (interventions implemented as appropriate)  Pt AAO x4.  VSS.  Pt remained afebrile throughout this shift.   IV heparin gtt d/c this shift per order.   Pt remained free of falls this shift.   Pt has no complaints of pain this shift.  Blood glucose monitored, corrected via SSI.  Plan of care reviewed. Patient verbalizes understanding.   Pt moving/turing independently. Frequent weight shifting encouraged.  Patient paced on monitor.   Bed low, side rails up x 2, wheels locked, call light in reach.   Patient instructed to call for assistance.   Hourly rounding completed.   Will continue to monitor.

## 2018-10-01 NOTE — SUBJECTIVE & OBJECTIVE
Interval History:  Pt was seen and examined at bedside . No acute problems or complaints.  He denies any chest pain.  Tolerating small meals with as expected early satiety.  No nausea or vomiting.    Review of Systems   Constitutional: Negative for chills and fever.   HENT: Negative.    Eyes: Negative.    Respiratory: Negative for apnea, cough, choking, chest tightness, shortness of breath, wheezing and stridor.    Cardiovascular: Negative for chest pain, palpitations and leg swelling.   Gastrointestinal: Negative for abdominal pain, constipation, diarrhea, nausea and vomiting.   Endocrine: Negative.    Genitourinary: Negative for decreased urine volume, difficulty urinating, dysuria, frequency, hematuria and urgency.   Musculoskeletal: Negative for arthralgias, back pain, myalgias, neck pain and neck stiffness.   Allergic/Immunologic: Negative.    Neurological: Negative for dizziness, tremors, seizures, syncope, speech difficulty, weakness and headaches.   Hematological: Negative.    Psychiatric/Behavioral: Negative.      Objective:     Vital Signs (Most Recent):  Temp: 97.1 °F (36.2 °C) (10/01/18 0749)  Pulse: 60 (10/01/18 0850)  Resp: 16 (10/01/18 0757)  BP: 122/77 (10/01/18 0749)  SpO2: 96 % (10/01/18 0757) Vital Signs (24h Range):  Temp:  [97.1 °F (36.2 °C)-98.5 °F (36.9 °C)] 97.1 °F (36.2 °C)  Pulse:  [60-68] 60  Resp:  [16-18] 16  SpO2:  [96 %-98 %] 96 %  BP: (104-149)/(60-91) 122/77     Weight: 115.6 kg (254 lb 13.6 oz)  Body mass index is 33.62 kg/m².    Intake/Output Summary (Last 24 hours) at 10/1/2018 1028  Last data filed at 9/30/2018 1745  Gross per 24 hour   Intake 990 ml   Output --   Net 990 ml      Physical Exam   Constitutional: He is oriented to person, place, and time. He appears well-developed and well-nourished. He is cooperative. He is easily aroused.   HENT:   Head: Normocephalic and atraumatic.   Eyes: EOM are normal.   Neck: Neck supple.   Cardiovascular: Normal rate, regular rhythm,  normal heart sounds and intact distal pulses.   No murmur heard.  Pulmonary/Chest: Effort normal and breath sounds normal. No stridor. No respiratory distress. He has no wheezes. He has no rales. He exhibits no tenderness.   Abdominal: Normal appearance. Bowel sounds are decreased. There is generalized tenderness.   Surgical sites clean and intact x5.   Genitourinary:   Genitourinary Comments: Deferred   Musculoskeletal: He exhibits no edema, tenderness or deformity.   Neurological: He is alert, oriented to person, place, and time and easily aroused. No sensory deficit.   Skin: Skin is warm and dry. Capillary refill takes less than 2 seconds.   Psychiatric: He has a normal mood and affect. His behavior is normal. Judgment and thought content normal.   Nursing note and vitals reviewed.      Significant Labs:   BMP:   Recent Labs   Lab  09/30/18   0900   GLU  101   NA  141   K  4.2   CL  104   CO2  29   BUN  17   CREATININE  0.8   CALCIUM  9.0     CBC:   Recent Labs   Lab  09/30/18   0900   WBC  7.01   HGB  13.2*   HCT  38.7*   PLT  148*       Significant Imaging: I have reviewed all pertinent imaging results/findings within the past 24 hours.

## 2018-10-01 NOTE — NURSING
Went over discharge instructions with patient.   Stressed importance of making and keeping all follow ups and starting new prescriptions.  Patient verbalized understanding and has no questions in regards to discharge.  IV removed, catheter intact.  Telemetry box removed.  Patient discharged to personal transportation with wife.

## 2018-10-01 NOTE — PROGRESS NOTES
[facility] Hospital Medicine  Progress Note    Patient Name: Cole Doan  MRN: [id number]  Patient Class: IP- Inpatient   Admission Date: 9/27/2018  Length of Stay: 4 days  Attending Physician: Mario Cesar MD  Primary Care Provider: Yoly Spring MD        Subjective:     Principal Problem:NSTEMI (non-ST elevated myocardial infarction)    HPI:  Cole Doan is a 68 year old with a PMHx of DM II, SATYA on CPAP, HLD, CAD, and BPH admitted by General surgery s/p robotic sleeve gastrectomy performed by Dr. Cesar on today, 09/27/18. EBL 15 mL.  consulted for medical management.     Hospital Course:  Patient admitted s/p robotic sleeve gastrectomy by Dr. Cesar. Post-procedure, patient complained of substernal chest pressure/heaviness associated with flushing. Troponin 0.262 and Cardiology consulted to assist with management. Patient diagnosed with NSTEMI s/p sleeve gastrectomy. Patient started on heparin gtt. Continue ASA, BB, and statin. Of note, patient underwent LHC at [facility] by Dr. Martinez with no intervention and was told no significant changes from prior angiograms. Troponin 0.262>1.102>1.005. EKG reviewed. 2D echo pending. 9/28/18-Patient complains of recurrent chest pain this AM, unrelieved by sublingual nitroglycerin and nitro paste. Patient transferred to ICU on Tridil gtt for critical care management.  Weaned Tridil and transferred to floor.  Chest pain resolved and cardiac enzymes trended toward normal.  Surgical sites remained clean and intact without sign of infection.    Interval History:  Pt was seen and examined at bedside . No acute problems or complaints.  He denies any chest pain.  Tolerating small meals with as expected early satiety.  No nausea or vomiting.    Review of Systems   Constitutional: Negative for chills and fever.   HENT: Negative.    Eyes: Negative.    Respiratory: Negative for apnea, cough, choking, chest tightness, shortness of breath, wheezing and stridor.     Cardiovascular: Negative for chest pain, palpitations and leg swelling.   Gastrointestinal: Negative for abdominal pain, constipation, diarrhea, nausea and vomiting.   Endocrine: Negative.    Genitourinary: Negative for decreased urine volume, difficulty urinating, dysuria, frequency, hematuria and urgency.   Musculoskeletal: Negative for arthralgias, back pain, myalgias, neck pain and neck stiffness.   Allergic/Immunologic: Negative.    Neurological: Negative for dizziness, tremors, seizures, syncope, speech difficulty, weakness and headaches.   Hematological: Negative.    Psychiatric/Behavioral: Negative.      Objective:     Vital Signs (Most Recent):  Temp: 97.1 °F (36.2 °C) (10/01/18 0749)  Pulse: 60 (10/01/18 0850)  Resp: 16 (10/01/18 0757)  BP: 122/77 (10/01/18 0749)  SpO2: 96 % (10/01/18 0757) Vital Signs (24h Range):  Temp:  [97.1 °F (36.2 °C)-98.5 °F (36.9 °C)] 97.1 °F (36.2 °C)  Pulse:  [60-68] 60  Resp:  [16-18] 16  SpO2:  [96 %-98 %] 96 %  BP: (104-149)/(60-91) 122/77     Weight: 115.6 kg (254 lb 13.6 oz)  Body mass index is 33.62 kg/m².    Intake/Output Summary (Last 24 hours) at 10/1/2018 1028  Last data filed at 9/30/2018 1745  Gross per 24 hour   Intake 990 ml   Output --   Net 990 ml      Physical Exam   Constitutional: He is oriented to person, place, and time. He appears well-developed and well-nourished. He is cooperative. He is easily aroused.   HENT:   Head: Normocephalic and atraumatic.   Eyes: EOM are normal.   Neck: Neck supple.   Cardiovascular: Normal rate, regular rhythm, normal heart sounds and intact distal pulses.   No murmur heard.  Pulmonary/Chest: Effort normal and breath sounds normal. No stridor. No respiratory distress. He has no wheezes. He has no rales. He exhibits no tenderness.   Abdominal: Normal appearance. Bowel sounds are decreased. There is generalized tenderness.   Surgical sites clean and intact x5.   Genitourinary:   Genitourinary Comments: Deferred    Musculoskeletal: He exhibits no edema, tenderness or deformity.   Neurological: He is alert, oriented to person, place, and time and easily aroused. No sensory deficit.   Skin: Skin is warm and dry. Capillary refill takes less than 2 seconds.   Psychiatric: He has a normal mood and affect. His behavior is normal. Judgment and thought content normal.   Nursing note and vitals reviewed.      Significant Labs:   BMP:   Recent Labs   Lab  09/30/18   0900   GLU  101   NA  141   K  4.2   CL  104   CO2  29   BUN  17   CREATININE  0.8   CALCIUM  9.0     CBC:   Recent Labs   Lab  09/30/18   0900   WBC  7.01   HGB  13.2*   HCT  38.7*   PLT  148*       Significant Imaging: I have reviewed all pertinent imaging results/findings within the past 24 hours.    Assessment/Plan:      * NSTEMI (non-ST elevated myocardial infarction)    Patient dx with NSTEMI s/p sleeve gastrectomy  -Troponin 0.262>1.102>1.005  -Heparin gtt started   -ASA, bb, Stain   -2D echo pending  -Reports recent LHC by Dr. Martinez at [facility] with no intervention, no change from prior angiograms, medical mgmt recommended.  Cardiology evaluated with recommendations.          Mixed restrictive and obstructive lung disease    - Supplemental oxygen prn, keep O2 sats > 88%  - Duonebs PRN          Type 2 diabetes mellitus without complication    - Accuchecks and low dose SSI  - Continue Metformin upon discharge          CAD S/P percutaneous coronary angioplasty    - Resume Statin once diet advanced. Will defer ASA and Pradaxa to primary on when to restart these medications post op          S/P laparoscopic sleeve gastrectomy    - General surgery primary -- medical management per primary team  - S/p robotic sleeve gastrectomy today, 09/27/18, by Dr. Cesar  - Analgesics/Antiemetics PRN  - Encourage IS use  -Nutritionist consult   -Pain controlled           PTSD (post-traumatic stress disorder)    - Continue home medication, PO Wellbutrin          SATYA on CPAP     - Resume nightly CPAP -- titrate to patient's comfort and O2 saturation          Hyperlipidemia    - Resume Statin             HTN (hypertension)    - Resume home medication, including Amlodipine, once diet advanced  - IV Hydralazine PRN  -Blood pressure likely elevated 2/2 pain             VTE Risk Mitigation (From admission, onward)    None              Alistair Warren MD  Department of Hospital Medicine   [facility]

## 2018-10-01 NOTE — PLAN OF CARE
Problem: Patient Care Overview  Goal: Plan of Care Review  Outcome: Ongoing (interventions implemented as appropriate)   10/01/18 4613   Coping/Psychosocial   Plan Of Care Reviewed With patient     Cardiac, vital signs, and lab monitoring. Possible discharge in am. MD consult in am. Increase activity as tolerated. IS while awake; clear liquid bariatric diet; Accuchecks per order. Breathing treatments and adjust oxygen as needed. Strict I&O.

## 2018-10-01 NOTE — PROGRESS NOTES
[facility] -   Cardiology  Progress Note    Patient Name: Cole Doan  MRN: [id number]  Admission Date: 9/27/2018  Hospital Length of Stay: 4 days  Code Status: Prior   Attending Physician: No att. providers found   Primary Care Physician: Yoly Spring MD  Expected Discharge Date: 10/1/2018  Principal Problem:NSTEMI (non-ST elevated myocardial infarction)    Subjective:   Brief HPI:    Mr. Doan is a 68 year old male patient with a PMHx of CAD s/p PTCA and previous stents, HTN, hyperlipidemia, DM, obesity, and SATYA who presented to [facility] on 9/27/18  for robotic sleeve gastrectomy by Dr. Cesar. Post-procedure, patient complained of substernal chest pressure/heaviness associated with flushing.  Troponin was drawn and came back at 0.262. Repeat troponin continued to trend up and cardiology consulted to assist with management. Patient seen and examined today. Feels ok. Still having bouts of substernal chest heaviness/pressure. Reports nitroglycerin generally helps but symptoms remained refractory this AM. Denies any associated SOB, nausea, vomiting, or diaphoresis. Reports chronic history of chest pain symptoms that recently improved after PPM placement. Of note, patient also underwent LHC at [facility] by Dr. Martinez with no intervention and was told no significant changes from prior angiograms. Patient is compliant with his medications. Chart reviewed. Troponin 0.262>1.102>1.005. EKG reviewed. 2D echo pending.     Hospital Course:   09/29 No chest pain, off NTG GTT in am, no dyspnea. C/o headcahe    09/30 no chest pain., no arrhythmia    10/1/18- Patient denies any cheat pain or angina equivalent this morning. Troponin flat           Review of Systems   Constitution: Negative for diaphoresis, weakness, malaise/fatigue, weight gain and weight loss.   HENT: Negative for congestion and nosebleeds.    Cardiovascular: Negative for chest pain, claudication, cyanosis, dyspnea on exertion, irregular  heartbeat, leg swelling, near-syncope, orthopnea, palpitations, paroxysmal nocturnal dyspnea and syncope.   Respiratory: Negative for cough, hemoptysis, shortness of breath, sleep disturbances due to breathing, snoring, sputum production and wheezing.    Hematologic/Lymphatic: Negative for bleeding problem. Does not bruise/bleed easily.   Skin: Negative for rash.   Musculoskeletal: Negative for arthritis, back pain, falls, joint pain, muscle cramps and muscle weakness.   Gastrointestinal: Negative for abdominal pain, constipation, diarrhea, heartburn, hematemesis, hematochezia, melena and nausea.   Genitourinary: Negative for dysuria, hematuria and nocturia.   Neurological: Negative for excessive daytime sleepiness, dizziness, headaches, light-headedness, loss of balance, numbness and vertigo.     Objective:     Vital Signs (Most Recent):  Temp: 97.1 °F (36.2 °C) (10/01/18 0749)  Pulse: 80 (10/01/18 1100)  Resp: 16 (10/01/18 0757)  BP: 122/77 (10/01/18 0749)  SpO2: 96 % (10/01/18 0757) Vital Signs (24h Range):  Temp:  [97.1 °F (36.2 °C)-97.8 °F (36.6 °C)] 97.1 °F (36.2 °C)  Pulse:  [60-80] 80  Resp:  [16-18] 16  SpO2:  [96 %-97 %] 96 %  BP: (104-149)/(60-91) 122/77     Weight: 115.6 kg (254 lb 13.6 oz)  Body mass index is 33.62 kg/m².     SpO2: 96 %  O2 Device (Oxygen Therapy): room air      Intake/Output Summary (Last 24 hours) at 10/1/2018 1352  Last data filed at 9/30/2018 1745  Gross per 24 hour   Intake 390 ml   Output --   Net 390 ml       Lines/Drains/Airways          None          Physical Exam   Constitutional: He is oriented to person, place, and time. He appears well-developed and well-nourished.   Neck: Neck supple. No JVD present.   Cardiovascular: Normal rate, regular rhythm, normal heart sounds and normal pulses. Exam reveals no friction rub.   No murmur heard.  Left PPM pocket WNL    Pulmonary/Chest: Effort normal and breath sounds normal. No respiratory distress. He has no wheezes. He has no rales.    Abdominal: Soft. Bowel sounds are normal. He exhibits no distension.   Musculoskeletal: He exhibits no edema or tenderness.   Neurological: He is alert and oriented to person, place, and time.   Skin: Skin is warm and dry. No rash noted.   Psychiatric: He has a normal mood and affect. His behavior is normal.   Nursing note and vitals reviewed.      Significant Labs:   All pertinent lab results from the last 24 hours have been reviewed. and   Recent Lab Results       10/01/18  0615 09/30/18  2101 09/30/18  1805      POCT Glucose 100 90 128           Significant Imaging: Echocardiogram:   2D echo with color flow doppler:   Results for orders placed or performed in visit on 08/21/17   2D echo with color flow doppler   Result Value Ref Range    EF 60 55 - 65    Diastolic Dysfunction No     Est. PA Systolic Pressure 33.47     Mitral Valve Mobility NORMAL     Tricuspid Valve Regurgitation MILD     and X-Ray: CXR: X-Ray Chest 1 View (CXR):   Results for orders placed or performed during the hospital encounter of 09/27/18   X-Ray Chest 1 View    Narrative    EXAMINATION:  XR CHEST 1 VIEW    CLINICAL HISTORY:  chest pain;    COMPARISON:  Chest x-ray, 09/12/2017.    FINDINGS:  The lungs are clear. The heart size is top normal.  There is a left-sided dual lead AICD with leads projecting in the right atrium and right ventricle.  No pleural effusion or pneumothorax.      Impression    No acute cardiopulmonary disease.      Electronically signed by: Nawaf Rubio MD  Date:    09/27/2018  Time:    21:00    and X-Ray Chest PA and Lateral (CXR): No results found for this visit on 09/27/18.    Assessment and Plan:       * NSTEMI (non-ST elevated myocardial infarction)    -Patient who presents with NSTEMI s/p sleeve gastrectomy  -Reports  LHC in May 2018 by Dr. Martinez at [facility] with no intervention, no change from prior angiograms, medical mgmt recommended  -Records have been requested  -Previous LHC at our facility in 2016  showed patent stents, 50-70% diagonal lesion, and small non-dominant  RCA    10/1/18  -Has been examined and is clear for discharge from Cardiology standpoint  -Will have patient  f/u with Dr. Rutledge as OP in 1-2 weeks.   -Cath films have been requested for Dr. Rutledge's review   -Continue ASA, Plavix, Metoprolol, Ranexa, Imdur, amlodipine, and statin            CAD S/P percutaneous coronary angioplasty    -See plan under NSTEMI        S/P laparoscopic sleeve gastrectomy    -s/p sleeve gastrectomy on 9/27/18 by Dr. Cesar  -mgmt as per surgery team        Hyperlipidemia    -Continue statin        HTN (hypertension)    -Continue Metoprolol  -Tridil gtt initiated due to refractory chest pain    10/1/18  -BP improved with adjustment of home meds  -Continue BB, amlodipine, Imdur  -will follow up with Cardiology in 1 week            VTE Risk Mitigation (From admission, onward)    None        Chart reviewed. Patient examined by Dr. Raymond and agrees with plan that has been outlined.     MICHELLE Joyner  Cardiology  [facility]

## 2018-10-01 NOTE — ASSESSMENT & PLAN NOTE
-Continue Metoprolol  -Tridil gtt initiated due to refractory chest pain    10/1/18  -BP improved with adjustment of home meds  -Continue BB, amlodipine, Imdur  -will follow up with Cardiology in 1 week

## 2018-10-01 NOTE — SUBJECTIVE & OBJECTIVE
Review of Systems   Constitution: Negative for diaphoresis, weakness, malaise/fatigue, weight gain and weight loss.   HENT: Negative for congestion and nosebleeds.    Cardiovascular: Negative for chest pain, claudication, cyanosis, dyspnea on exertion, irregular heartbeat, leg swelling, near-syncope, orthopnea, palpitations, paroxysmal nocturnal dyspnea and syncope.   Respiratory: Negative for cough, hemoptysis, shortness of breath, sleep disturbances due to breathing, snoring, sputum production and wheezing.    Hematologic/Lymphatic: Negative for bleeding problem. Does not bruise/bleed easily.   Skin: Negative for rash.   Musculoskeletal: Negative for arthritis, back pain, falls, joint pain, muscle cramps and muscle weakness.   Gastrointestinal: Negative for abdominal pain, constipation, diarrhea, heartburn, hematemesis, hematochezia, melena and nausea.   Genitourinary: Negative for dysuria, hematuria and nocturia.   Neurological: Negative for excessive daytime sleepiness, dizziness, headaches, light-headedness, loss of balance, numbness and vertigo.     Objective:     Vital Signs (Most Recent):  Temp: 97.1 °F (36.2 °C) (10/01/18 0749)  Pulse: 80 (10/01/18 1100)  Resp: 16 (10/01/18 0757)  BP: 122/77 (10/01/18 0749)  SpO2: 96 % (10/01/18 0757) Vital Signs (24h Range):  Temp:  [97.1 °F (36.2 °C)-97.8 °F (36.6 °C)] 97.1 °F (36.2 °C)  Pulse:  [60-80] 80  Resp:  [16-18] 16  SpO2:  [96 %-97 %] 96 %  BP: (104-149)/(60-91) 122/77     Weight: 115.6 kg (254 lb 13.6 oz)  Body mass index is 33.62 kg/m².     SpO2: 96 %  O2 Device (Oxygen Therapy): room air      Intake/Output Summary (Last 24 hours) at 10/1/2018 1352  Last data filed at 9/30/2018 1745  Gross per 24 hour   Intake 390 ml   Output --   Net 390 ml       Lines/Drains/Airways          None          Physical Exam   Constitutional: He is oriented to person, place, and time. He appears well-developed and well-nourished.   Neck: Neck supple. No JVD present.    Cardiovascular: Normal rate, regular rhythm, normal heart sounds and normal pulses. Exam reveals no friction rub.   No murmur heard.  Left PPM pocket WNL    Pulmonary/Chest: Effort normal and breath sounds normal. No respiratory distress. He has no wheezes. He has no rales.   Abdominal: Soft. Bowel sounds are normal. He exhibits no distension.   Musculoskeletal: He exhibits no edema or tenderness.   Neurological: He is alert and oriented to person, place, and time.   Skin: Skin is warm and dry. No rash noted.   Psychiatric: He has a normal mood and affect. His behavior is normal.   Nursing note and vitals reviewed.      Significant Labs:   All pertinent lab results from the last 24 hours have been reviewed. and   Recent Lab Results       10/01/18  0615 09/30/18  2101 09/30/18  1805      POCT Glucose 100 90 128           Significant Imaging: Echocardiogram:   2D echo with color flow doppler:   Results for orders placed or performed in visit on 08/21/17   2D echo with color flow doppler   Result Value Ref Range    EF 60 55 - 65    Diastolic Dysfunction No     Est. PA Systolic Pressure 33.47     Mitral Valve Mobility NORMAL     Tricuspid Valve Regurgitation MILD     and X-Ray: CXR: X-Ray Chest 1 View (CXR):   Results for orders placed or performed during the hospital encounter of 09/27/18   X-Ray Chest 1 View    Narrative    EXAMINATION:  XR CHEST 1 VIEW    CLINICAL HISTORY:  chest pain;    COMPARISON:  Chest x-ray, 09/12/2017.    FINDINGS:  The lungs are clear. The heart size is top normal.  There is a left-sided dual lead AICD with leads projecting in the right atrium and right ventricle.  No pleural effusion or pneumothorax.      Impression    No acute cardiopulmonary disease.      Electronically signed by: Nawaf Rubio MD  Date:    09/27/2018  Time:    21:00    and X-Ray Chest PA and Lateral (CXR): No results found for this visit on 09/27/18.

## 2018-10-01 NOTE — DISCHARGE SUMMARY
[facility] -   General Surgery  Discharge Summary      Patient Name: Cole Doan  MRN: [id number]  Admission Date: 9/27/2018  Hospital Length of Stay: 4 days  Discharge Date and Time: 10/1/2018 11:22 AM  Attending Physician: No att. providers found   Discharging Provider: Adelia Romero PA-C  Primary Care Provider: Yoly Spring MD    HPI:   Cole Doan presented for robotic sleeve gastrectomy    Procedure(s) (LRB):  XI ROBOTIC SLEEVE GASTRECTOMY (N/A)      Indwelling Lines/Drains at time of discharge:   Lines/Drains/Airways          None        Hospital Course: POD1: Pt with chest pain, abnormal EKG, elevated troponin overnight. He was started on heparin gtts. Cardiology and Hospital Medicine were consulted. From surgery standpoint doing well with good pain control. He c/o nausea.     POD2: HD stable in ICU. Chest pain resolved. On hep gtt. Off nitro gtt. Troponins trending down. Tolerating clears well. Pain well controlled. Denies nausea. No BM/flatus yet.    POD3: Transferred to floor yesterday. No CP/SOB. Tolerating clears. No nausea or emesis. +flatus, no BM.      Consults:   Consults (From admission, onward)        Status Ordering Provider     Consult to Case Management/Social Work  Once     Provider:  (Not yet assigned)    Completed VILLA CHAMBERLAIN     Inpatient consult to Cardiology  Once     Provider:  Herson Kenny MD    Completed SOLITARIO TAN     Inpatient consult to Internal Medicine  Once     Provider:  (Not yet assigned)    Completed VILLA CHAMBERLAIN     Inpatient consult to Pulmonology  Once     Provider:  Brielle Heredia NP    Completed ADELAI ROMERO          Significant Diagnostic Studies: Labs:   BMP:   Recent Labs   Lab  09/30/18   0900   GLU  101   NA  141   K  4.2   CL  104   CO2  29   BUN  17   CREATININE  0.8   CALCIUM  9.0   , CMP   Recent Labs   Lab  09/30/18   0900   NA  141   K  4.2   CL  104   CO2  29   GLU  101   BUN  17   CREATININE  0.8   CALCIUM  9.0   ANIONGAP   8   ESTGFRAFRICA  >60   EGFRNONAA  >60   , CBC   Recent Labs   Lab  09/30/18   0900   WBC  7.01   HGB  13.2*   HCT  38.7*   PLT  148*    and Troponin   Recent Labs   Lab  09/29/18   2218   TROPONINI  0.131*       Pending Diagnostic Studies:     None        Final Active Diagnoses:    Diagnosis Date Noted POA    PRINCIPAL PROBLEM:  NSTEMI (non-ST elevated myocardial infarction) [I21.4] 09/28/2018 No    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, 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] 09/18/2017 Yes    Morbid obesity with BMI of 40.0-44.9, adult [E66.01, Z68.41] 09/18/2017 Not Applicable    Type 2 diabetes mellitus without complication [E11.9] 09/28/2015 Yes    CAD S/P percutaneous coronary angioplasty [I25.10, Z98.61] 09/28/2015 Not Applicable    S/P laparoscopic sleeve gastrectomy [Z98.84] 05/30/2014 Not Applicable    HTN (hypertension) [I10] 05/15/2014 Yes    SATYA on CPAP [G47.33, Z99.89] 05/15/2014 Not Applicable    Hyperlipidemia [E78.5] 05/15/2014 Yes    PTSD (post-traumatic stress disorder) [F43.10] 05/15/2014 Yes      Problems Resolved During this Admission:      Discharged Condition: fair    Disposition: Home or Self Care    Follow Up:  Follow-up Information     Dinh Rutledge MD In 1 week.    Specialty:  Cardiology  Why:  hosp f/u NSTEMI post op  Contact information:  [address]  [place] [address] [phone]             Yoly Spring MD In 2 weeks.    Specialty:  Cardiology  Why:  hospital follow up  Contact information:  [address] [facility] [address] [phone]                 Patient Instructions:      Diet clear liquid     Lifting restrictions   Order Comments: 20lb limit     No driving until:   Order Comments: No longer taking narcotic pain medication     Notify your health care provider if you experience any of the following:  temperature >100.4     Notify your health care provider if you experience any of the following:  persistent nausea and vomiting or diarrhea      Notify your health care provider if you experience any of the following:  severe uncontrolled pain     Notify your health care provider if you experience any of the following:  redness, tenderness, or signs of infection (pain, swelling, redness, odor or green/yellow discharge around incision site)     Notify your health care provider if you experience any of the following:  difficulty breathing or increased cough     Notify your health care provider if you experience any of the following:  persistent dizziness, light-headedness, or visual disturbances     No dressing needed     Activity as tolerated     Medications:  Reconciled Home Medications:      Medication List      START taking these medications    HYDROcodone-acetaminophen 5-325 mg per tablet  Commonly known as:  NORCO  Take 1 tablet by mouth every 6 (six) hours as needed for Pain.     metoprolol tartrate 25 MG tablet  Commonly known as:  LOPRESSOR  Take 1 tablet (25 mg total) by mouth 2 (two) times daily.     nozaseptin nasal   Commonly known as:  NOZIN  1 each by Each Nare route 2 (two) times daily.        CHANGE how you take these medications    furosemide 40 MG tablet  Commonly known as:  LASIX  Takes one-half tablet by mouth daily  What changed:    · how much to take  · how to take this  · when to take this  · additional instructions     isosorbide mononitrate 60 MG 24 hr tablet  Commonly known as:  IMDUR  Take 1 tablet (60 mg total) by mouth 2 (two) times daily.  What changed:  when to take this        CONTINUE taking these medications    ALPRAZolam 0.5 MG tablet  Commonly known as:  XANAX  Take 0.5 mg by mouth 3 (three) times daily as needed.     amLODIPine 2.5 MG tablet  Commonly known as:  NORVASC  TAKE 1 TABLET BY MOUTH ONCE DAILY.     aspirin 81 MG EC tablet  Commonly known as:  ECOTRIN  Take 81 mg by mouth once daily.     atorvastatin 80 MG tablet  Commonly known as:  LIPITOR  Take 80 mg by mouth once daily. Take a half tablet daily    "  buPROPion 200 MG Tb12  Commonly known as:  WELLBUTRIN SR  Take 200 mg by mouth once daily.     clopidogrel 75 mg tablet  Commonly known as:  PLAVIX  TAKE 1 TABLET BY MOUTH EVERY DAY     cyanocobalamin 1000 MCG tablet  Commonly known as:  VITAMIN B-12  Take 100 mcg by mouth once daily.     gabapentin 300 MG capsule  Commonly known as:  NEURONTIN  Take 300 mg by mouth 3 (three) times daily.     ipratropium-albuterol  mcg/actuation inhaler  Commonly known as:  COMBIVENT RESPIMAT  Inhale 1 puff into the lungs 4 (four) times daily.     metFORMIN 1000 MG tablet  Commonly known as:  GLUCOPHAGE  Take 1,000 mg by mouth 2 (two) times daily with meals.     pantoprazole 40 MG tablet  Commonly known as:  PROTONIX  Take 1 tablet (40 mg total) by mouth 2 (two) times daily. for 14 days     PRADAXA 150 mg Cap  Generic drug:  dabigatran etexilate  Take 150 mg by mouth once daily. "Do NOT break, chew, or open capsules."     ranolazine 500 MG Tb12  Commonly known as:  RANEXA  Take 500 mg by mouth 2 (two) times daily.     SAXENDA 3 mg/0.5 mL (18 mg/3 mL) Pnij  Generic drug:  liraglutide  Inject into the skin once daily.     vitamin D 1000 units Tab  Commonly known as:  VITAMIN D3  Take 2,000 Units by mouth once daily.        STOP taking these medications    terbinafine HCl 250 mg tablet  Commonly known as:  LAMISIL          Time spent on the discharge of patient: 25 minutes    Adelia Worley PA-C  General Surgery  [facility]  "

## 2018-10-02 NOTE — PHYSICIAN QUERY
"PT Name: Cole Doan  MR #: [id number]    Physician Query Form - Postoperative Relationship Clarification     Nikki Tao RN, CCDS  Contact Info: [phone]  [email or web address]      This form is a permanent document in the medical record.     Query Date: October 2, 2018    Dear Provider,    By submitting this query, we are merely seeking further clarification of documentation. Please utilize your independent clinical judgment when addressing the question(s) below.    The Medical Record contains the following:  Supporting Clinical Findings Location in Medical Record   XI ROBOTIC SLEEVE GASTRECTOMY     Technical Procedures Used:  Robotic sleeve gastrectomy     Description of the Findings of the Procedure:  Gastric sleeve Op Note 9/27   NSTEMI overnight transferred to ICU Gen Sx Pn 9/28  POD 1   -Reports recent LHC by Dr. Martinez at [facility] with no intervention, no change from prior angiograms, medical mgmt recommended Hosp Med Pn 9/28   H/O CAD WITH MULTIPLE S/P PCI AND S/P PPM, DM, HTN, HLD    ECHO SHOWED NORMAL EF AND NO WMA    NSTEMI/ACS POPST OP    Patient who presents with NSTEMI likely triggered by stress of surgery   Cards CN 9/28   -Previous LHC at our facility in 2016 showed patent stents, 50-70% diagonal lesion, and small non-dominant  RCA   Cards Pn 9/29     Please clarify the term post operative" ("NSTEMI/ACS POPST OP")     [  ] Condition occurred in the post operative period (not a complication of surgery)      [  ] Condition is a complication of surgery = (Surgery: Robotic Sleeve Gastrectomy 9/27/18)     [ X ] Other intended meaning (please specify) ______ NSTEMI occurred in the acute postoperative.  ______________________              "

## 2018-10-02 NOTE — PHYSICIAN QUERY
"PT Name: Cole Doan  MR #: [id number]    Physician Query Form - Heart  Condition Clarification     Nikki Tao RN, CCDS  Contact Info: [phone]  [email or web address]    This form is a permanent document in the medical record.     Query Date: October 2, 2018    By submitting this query, we are merely seeking further clarification of documentation. Please utilize your independent clinical judgment when addressing the question(s) below.    The medical record contains the following   Indicators   Supporting Clinical Findings Location in   Medical Record   x BNP 38 on 9/27/18 Lab   x EF EF 60-65% Echo 9/28   x Radiology findings No pleural effusion or pneumothorax  No acute cardiopulmonary disease. CXR 9/27   x Echo Results ECHO SHOWED NORMAL EF AND NO WMA    08/21/17  2D echo with color flow doppler  EF 60%     Diastolic Dysfunction:  No     CONCLUSIONS     1 - Concentric hypertrophy.     2 - No wall motion abnormalities.     3 - Normal left ventricular systolic function (EF 60-65%).     4 - Normal left ventricular diastolic function.     5 - Normal right ventricular systolic function .     6 - The estimated PA systolic pressure is 24 mmHg.  Cards CN 9/28        Cards Pn 10/1                  Echo 9/28/18    "Ascites" documented      "SOB" or "HERRON" documented      "Hypoxia" documented     x Heart Failure documented H/O of CAD and CHF.    Hosp Med Pn 9/27   x "Edema" documented edema (trace lower extremity).  Pt has +2 generalized edema.  Pulm CN 9/28  RN PN 9/29    x Diuretics/Meds NTG SL  NTG GTT  Metoprolol  Amlodipine  ASA  Heparin gtt Cards CN 9/28   x Treatment: Cards CN  Requesting Lasix  Echo   Troponins  Transferred to ICU ORders  RN PN 9/29  Orders   x Other:  NSTEMI  s/p sleeve gastrectomy    pmhx:  BPH  CAD  HTN  HLD  RBBB  s/p ptca  Sleep Apnea, CPAP  DMT2    COPD  PTSD  hx of afib occurances; pacemaker placed 3 months ago Gen Sx PN 9/28  Hosp Med Pn 9/28          H&P View " "Only                  Anesthesia     Heart failure (HF) can be acute, chronic or both. It is generally further specificed as systolic, diastolic, or combined. Lastly, it is important to identify an underlying etiology if known or suspected.     Common clues to acute exacerbation:  Rapidly progressive symptoms (w/in 2 weeks of presentation), using IV diuretics to treat, using supplemental O2, pulmonary edema on Xray, MI w/in 4 weeks, and/or BNP >500  Systolic Heart Failure: is defined as chart documentation of a left ventricular ejection fraction (LVEF) less than 40%   Diastolic Heart Failure: is defined as a left ventricular ejection fraction (LVEF) greater than 40%   +      Evidence of diastolic dysfunction on echocardiography OR    Right heart catheterization wedge pressure above 12 mm Hg OR    Left heart catheterization left ventricular end diastolic pressure 18 mm Hg or above.  References: *American Heart Association    The clinical guidelines noted below are only system guidelines, and do not replace the providers clinical judgment.                    Provider, please specify the diagnosis associated with above clinical findings                  Provider,  Please clarify "CHF".     [   ] Acute on Chronic Diastolic Heart Failure -              Pre-existing diastoic HF diagnosis.  EF > 40%  and acute HF symptoms documented                                   [   ] Chronic Diastolic Heart Failure - Pre-existing diastolic HF diagnosis.  EF > 40%  without  acute HF symptoms documented     [   ] Other Type of Heart Failure (please specify type): _________________________    [   ] Heart Failure Ruled Out    [  X ] Other (please specify): _________ history of heart failure no current heart failure __________________________    [   ] Clinically Undetermined                          Please document in your progress notes daily for the duration of treatment until resolved and include in your discharge " summary.

## 2018-10-03 ENCOUNTER — PATIENT OUTREACH (OUTPATIENT)
Dept: ADMINISTRATIVE | Facility: CLINIC | Age: 68
End: 2018-10-03

## 2018-10-03 NOTE — PATIENT INSTRUCTIONS
Discharge Instructions for Gastrectomy  You had a gastrectomy. During this surgery, some or all of your stomach was removed. As you heal from surgery, heres what youll need to know to care for yourself.  Eating and drinking  · Follow the diet that was prescribed for you in the hospital. Eat pureed foods and liquids for 3 weeks after the surgery.  · Drink liquids in smaller amounts than you used to. This will make it easier for your body to digest liquids. But, it is important that you continue to drink liquids (in small amounts) so that you do not become dehydrated. Some signs of dehydration include dry mouth and dark urine.  · Eat slowly. Eating too much or too fast will cause nausea and vomiting.  · Liquids and solids may need to be eaten separately.  · Use liquid nutritional supplements recommended by a health care provider to make sure you get enough calories.  · Try to eat small, frequent high protein low carbohydrate meals when you are eating solids again.  Activity  · Remember, recovery takes several weeks. It is common to feel tired. Rest as needed.  · Walk as often as you feel able. Increase your activity slowly.  · Do not lift anything heavier than 10 pounds until the healthcare provider says it's OK.  · Avoid strenuous chores, such as vacuuming or lifting full bags of garbage, until the healthcare provider says its OK.  · Climb stairs slowly and pause after every few steps.  · Do not drive for 2 weeks after surgery.  · Start an exercise program 1 week after discharge. You can benefit from simple activities such as walking or gardening. Ask your healthcare provider how to get started.  · Ask your healthcare provider when you can expect to return to work.  Other home care  · Continue the coughing and deep breathing exercises that you learned in the hospital.  · Shower as needed. But avoid baths, swimming pools, and hot tubs until your healthcare provider says they are OK. This helps prevent infection  of the incision site.  · Keep the incision clean and dry. Wash the incision gently with mild soap and warm water. Then gently pat the incision dry with a towel.  · Follow your healthcare providers instructions about caring for the dressing covering your incisions.  · If your healthcare provider used small white adhesive strips to close the incision, do not remove them. Let the strips fall off on their own. If they dont come off within 2 weeks after you were sent home, call your healthcare provider.  · Take your medicines in crushed or liquid form for 3 weeks after surgery.  · Take a chewable vitamin 2 times a day. Ask your healthcare provider if you also need to take a supplement for vitamin B12.  · Take all medicines as directed by your healthcare provider.  Follow-up care  Follow up with your healthcare provider, or as advised.     When to call your healthcare provider  Call your healthcare provider right away if you have any of the following:  · Cloudy or smelly drainage from the incision site  · Fever of 100.4°F (38°C) or higher, or as directed by your healthcare provider  · Shaking chills  · Fast pulse  · Night sweats  · Pain, nausea, or vomiting that keeps occurring after you eat  · Diarrhea beyond the first week after discharge  · Pain in your upper back, chest, or left shoulder  · Hiccups that wont stop or that keep coming back  · Confusion, depression, or unusual fatigue  · Signs of bladder infection. These include urinating more often than usual, and burning, pain, bleeding, or hesitancy when you urinate.   Date Last Reviewed: 10/1/2016  © 9104-3155 [employer]. [address] [place] [address], [place] [address]. All rights reserved. This information is not intended as a substitute for professional medical care. Always follow your healthcare professional's instructions.

## 2018-10-10 ENCOUNTER — OFFICE VISIT (OUTPATIENT)
Dept: SURGERY | Facility: CLINIC | Age: 68
End: 2018-10-10
Payer: OTHER GOVERNMENT

## 2018-10-10 ENCOUNTER — NUTRITION (OUTPATIENT)
Dept: SURGERY | Facility: CLINIC | Age: 68
End: 2018-10-10
Payer: MEDICARE

## 2018-10-10 VITALS
SYSTOLIC BLOOD PRESSURE: 100 MMHG | TEMPERATURE: 99 F | DIASTOLIC BLOOD PRESSURE: 68 MMHG | TEMPERATURE: 99 F | WEIGHT: 246.94 LBS | BODY MASS INDEX: 32.58 KG/M2 | SYSTOLIC BLOOD PRESSURE: 100 MMHG | HEART RATE: 65 BPM | HEART RATE: 65 BPM | BODY MASS INDEX: 32.58 KG/M2 | WEIGHT: 246.94 LBS | DIASTOLIC BLOOD PRESSURE: 68 MMHG

## 2018-10-10 DIAGNOSIS — I21.4 NSTEMI (NON-ST ELEVATED MYOCARDIAL INFARCTION): ICD-10-CM

## 2018-10-10 DIAGNOSIS — G47.33 OSA ON CPAP: Primary | ICD-10-CM

## 2018-10-10 DIAGNOSIS — E66.9 OBESITY (BMI 30-39.9): Primary | ICD-10-CM

## 2018-10-10 DIAGNOSIS — E11.9 TYPE 2 DIABETES MELLITUS WITHOUT COMPLICATION, WITHOUT LONG-TERM CURRENT USE OF INSULIN: ICD-10-CM

## 2018-10-10 DIAGNOSIS — E78.5 HYPERLIPIDEMIA, UNSPECIFIED HYPERLIPIDEMIA TYPE: ICD-10-CM

## 2018-10-10 DIAGNOSIS — I10 ESSENTIAL HYPERTENSION: ICD-10-CM

## 2018-10-10 DIAGNOSIS — I25.10 CAD S/P PERCUTANEOUS CORONARY ANGIOPLASTY: ICD-10-CM

## 2018-10-10 DIAGNOSIS — E66.9 OBESITY (BMI 30-39.9): ICD-10-CM

## 2018-10-10 DIAGNOSIS — Z98.84 S/P LAPAROSCOPIC SLEEVE GASTRECTOMY: ICD-10-CM

## 2018-10-10 DIAGNOSIS — Z98.61 CAD S/P PERCUTANEOUS CORONARY ANGIOPLASTY: ICD-10-CM

## 2018-10-10 PROBLEM — E66.01 MORBID OBESITY WITH BMI OF 40.0-44.9, ADULT: Status: RESOLVED | Noted: 2017-09-18 | Resolved: 2018-10-10

## 2018-10-10 PROCEDURE — 99213 OFFICE O/P EST LOW 20 MIN: CPT | Mod: PBBFAC,27,PO | Performed by: SURGERY

## 2018-10-10 PROCEDURE — 97802 MEDICAL NUTRITION INDIV IN: CPT | Mod: PBBFAC,PO,59

## 2018-10-10 PROCEDURE — 99999 PR PBB SHADOW E&M-EST. PATIENT-LVL II: CPT | Mod: PBBFAC,,, | Performed by: DIETITIAN, REGISTERED

## 2018-10-10 PROCEDURE — 99024 POSTOP FOLLOW-UP VISIT: CPT | Mod: POP,,, | Performed by: SURGERY

## 2018-10-10 PROCEDURE — 99212 OFFICE O/P EST SF 10 MIN: CPT | Mod: PBBFAC,PO | Performed by: DIETITIAN, REGISTERED

## 2018-10-10 PROCEDURE — 99999 PR PBB SHADOW E&M-EST. PATIENT-LVL III: CPT | Mod: PBBFAC,,, | Performed by: SURGERY

## 2018-10-10 RX ORDER — BISMUTH SUBCITRATE POTASSIUM, METRONIDAZOLE AND TETRACYCLINE HYDROCHLORIDE 140; 125; 125 MG/1; MG/1; MG/1
3 CAPSULE ORAL
Qty: 168 CAPSULE | Refills: 0 | OUTPATIENT
Start: 2018-10-10 | End: 2018-10-10 | Stop reason: SDUPTHER

## 2018-10-10 RX ORDER — BISMUTH SUBCITRATE POTASSIUM, METRONIDAZOLE AND TETRACYCLINE HYDROCHLORIDE 140; 125; 125 MG/1; MG/1; MG/1
3 CAPSULE ORAL
Qty: 168 CAPSULE | Refills: 0 | Status: SHIPPED | OUTPATIENT
Start: 2018-10-10 | End: 2018-10-24

## 2018-10-10 RX ORDER — BISMUTH SUBCITRATE POTASSIUM, METRONIDAZOLE AND TETRACYCLINE HYDROCHLORIDE 140; 125; 125 MG/1; MG/1; MG/1
3 CAPSULE ORAL
Qty: 168 CAPSULE | Refills: 0 | Status: SHIPPED | OUTPATIENT
Start: 2018-10-10 | End: 2018-10-10 | Stop reason: SDUPTHER

## 2018-10-10 RX ORDER — BISMUTH SUBCITRATE POTASSIUM, METRONIDAZOLE AND TETRACYCLINE HYDROCHLORIDE 140; 125; 125 MG/1; MG/1; MG/1
3 CAPSULE ORAL
Qty: 168 CAPSULE | Refills: 0 | Status: SHIPPED | OUTPATIENT
Start: 2018-10-10 | End: 2018-10-10

## 2018-10-10 NOTE — PROGRESS NOTES
Subjective:       Patient ID: Cole Doan is a 68 y.o. male.    Chief Complaint: Post-op Evaluation    HPI   Status post robotic sleeve gastrectomy 09/27/2018 with NSTEMI postoperatively presents for postop.  He is doing well today tolerating his diet well.  He reports minimal chest pain since hospital discharge and has cardiology follow-up next week.  BMI 35.9->32.58 weight 123->112 kg  Review of Systems    Objective:      Physical Exam   Constitutional: He appears well-nourished. No distress.   Abdominal: Soft. He exhibits no distension. There is no tenderness.   Incisions healing well no signs of infection   Vitals reviewed.      FINAL PATHOLOGIC DIAGNOSIS  Greater curvature of stomach, partial gastrectomy:  Moderate to marked chronic gastritis.  Positive for Helicobacter on routine stain.  Negative for dysplasia and malignancy  Assessment:     status post robotic sleeve gastrectomy  Plan:       Pathology reviewed  H pylori quadruple triple therapy  Continue diet and exercise  Keep cardiology follow-up next week  Follow-up in 3 months will check nutritional labs at that time

## 2018-10-10 NOTE — PROGRESS NOTES
NUTRITION NOTE    Referring Physician: Dr Cesar    Reason for MNT Referral: Follow-up 2 Weeks s/p Gastric Sleeve    PAST MEDICAL HISTORY:  Denies nausea, vomiting and constipation.  Reports problems, including some diarrhea.    Past Medical History:   Diagnosis Date    Anticoagulant long-term use     Plavix    BPH (benign prostatic hypertrophy)     CAD (coronary artery disease)     HTN (hypertension) 5/15/2014    Hyperlipidemia     Obesity 5/15/2014    SATYA on CPAP     Pneumonia     PTSD (post-traumatic stress disorder) 5/15/2014    RBBB 5/15/2014    S/P PTCA (percutaneous transluminal coronary angioplasty) 5/15/2014    Special screening for malignant neoplasms, colon 1/22/2014    Type 2 diabetes mellitus without complication 9/28/2015       CLINICAL DATA:  68 y.o. male.    Vitals:    10/10/18 0856   BP: 100/68   Pulse: 65   Temp: 98.6 °F (37 °C)       Current Weight: 246 lbs .  BMI: 32.58  Total Weight Loss: 20 lbs since surgery but 60 lbs overall      LABS:  Reviewed.    CURRENT DIET:  Bariatric Liquid Diet    Diet Recall: 90 grams of protein/day; 64 oz of fluids/day    Diet Includes:   Premier protein shakes 3/day, soup, SF jello  Protein Supplements: 3/day    EXERCISE:  None.    Restrictions to Exercise: None.    VITAMINS/MINERALS:  Multivitamins: starting today  B-Complex: starting today  Calcium Citrate + Vitamin D: starting today  Vitamin B12: starting today    ASSESSMENT:  Doing well overall.  Adequate protein intake.  Adequate fluid intake.    BARIATRIC DIET DISCUSSION:  Instructed and provided written materials on bariatric pureed diet plan.  Reinforced post-op nutrition guidelines.    PLAN/RECOMMONDATIONS:  Advance to bariatric pureed diet.  Maintain protein intake.  Maintain fluid intake.  Avoid light exercise until clearance from MD since had a heart attack after surgery in the hospital  Begin appropriate vitamins & minerals.  Adjust vitamins & minerals by: NA    Return to clinic in 3  months.    SESSION TIME: 15 minutes

## 2018-10-10 NOTE — PROGRESS NOTES
NUTRITION NOTE    Referring Physician: Dr Cesar    Reason for MNT Referral: Follow-up 2 Weeks s/p Gastric Sleeve    PAST MEDICAL HISTORY:  Denies nausea, vomiting and constipation.  Reports problems, including some diarrhea.    Past Medical History:   Diagnosis Date    Anticoagulant long-term use     Plavix    BPH (benign prostatic hypertrophy)     CAD (coronary artery disease)     HTN (hypertension) 5/15/2014    Hyperlipidemia     Obesity 5/15/2014    SATYA on CPAP     Pneumonia     PTSD (post-traumatic stress disorder) 5/15/2014    RBBB 5/15/2014    S/P PTCA (percutaneous transluminal coronary angioplasty) 5/15/2014    Special screening for malignant neoplasms, colon 1/22/2014    Type 2 diabetes mellitus without complication 9/28/2015       CLINICAL DATA:  68 y.o. male.    Vitals:    10/10/18 0852   BP: 100/68   Pulse: 65   Temp: 98.6 °F (37 °C)       Current Weight: 246 lbs .  BMI: 32.58  Total Weight Loss: *** lbs  Excess Weight Loss: *** lbs    LABS:  Reviewed.    CURRENT DIET:  Bariatric Liquid Diet    Diet Recall: *** grams of protein/day; *** oz of fluids/day    Diet Includes:   Premier protein shake  Small bowl of soup  Premier shake  Soup  Premier shake  SF popsicle  gatorade sugar free    Protein Supplements: ***    EXERCISE:  None.    Restrictions to Exercise: None.    VITAMINS/MINERALS:  Multivitamins: ***  B-Complex: {Included with MVI:20361}  Calcium Citrate + Vitamin D: ***  Vitamin B12: {B12:41550}    ASSESSMENT:  Doing {DESC; WELL/FAIRLY WELL/POORLY:79207} overall.  {Adequate:98248} protein intake.  {Adequate:31857} fluid intake.    BARIATRIC DIET DISCUSSION:  Instructed and provided written materials on bariatric {Bariatric Diets:85219} diet plan.  Reinforced post-op nutrition guidelines.    PLAN/RECOMMONDATIONS:  {Advance to / Continue:32109} bariatric {Bariatric Diets:84515} diet.  {Maintain / Increase:70637} protein intake.  {Maintain / Increase:11061} fluid intake.  {Continue /  Begin:97029} light exercise.  {Continue / Begin:38741} appropriate vitamins & minerals.  Adjust vitamins & minerals by: ***    Return to clinic in {NUMBERS 0 TO 5:50750} weeks.    SESSION TIME: {MINUTES:34872} minutes

## 2018-10-18 ENCOUNTER — OFFICE VISIT (OUTPATIENT)
Dept: CARDIOLOGY | Facility: CLINIC | Age: 68
End: 2018-10-18
Payer: MEDICARE

## 2018-10-18 VITALS
SYSTOLIC BLOOD PRESSURE: 98 MMHG | WEIGHT: 244.94 LBS | BODY MASS INDEX: 32.46 KG/M2 | DIASTOLIC BLOOD PRESSURE: 68 MMHG | HEART RATE: 70 BPM | HEIGHT: 73 IN

## 2018-10-18 DIAGNOSIS — Z98.61 S/P PTCA (PERCUTANEOUS TRANSLUMINAL CORONARY ANGIOPLASTY): ICD-10-CM

## 2018-10-18 DIAGNOSIS — J43.9 MIXED RESTRICTIVE AND OBSTRUCTIVE LUNG DISEASE: ICD-10-CM

## 2018-10-18 DIAGNOSIS — I20.0 UNSTABLE ANGINA PECTORIS: ICD-10-CM

## 2018-10-18 DIAGNOSIS — E66.01 MORBID OBESITY WITH BMI OF 40.0-44.9, ADULT: ICD-10-CM

## 2018-10-18 DIAGNOSIS — E11.9 TYPE 2 DIABETES MELLITUS WITHOUT COMPLICATION, WITHOUT LONG-TERM CURRENT USE OF INSULIN: ICD-10-CM

## 2018-10-18 DIAGNOSIS — I25.10 CAD S/P PERCUTANEOUS CORONARY ANGIOPLASTY: ICD-10-CM

## 2018-10-18 DIAGNOSIS — G47.33 OSA ON CPAP: ICD-10-CM

## 2018-10-18 DIAGNOSIS — J98.4 MIXED RESTRICTIVE AND OBSTRUCTIVE LUNG DISEASE: ICD-10-CM

## 2018-10-18 DIAGNOSIS — I25.119 ATHEROSCLEROSIS OF NATIVE CORONARY ARTERY WITH ANGINA PECTORIS, UNSPECIFIED WHETHER NATIVE OR TRANSPLANTED HEART: Primary | ICD-10-CM

## 2018-10-18 DIAGNOSIS — R06.02 SHORTNESS OF BREATH: ICD-10-CM

## 2018-10-18 DIAGNOSIS — E66.9 OBESITY (BMI 30-39.9): ICD-10-CM

## 2018-10-18 DIAGNOSIS — Z98.61 CAD S/P PERCUTANEOUS CORONARY ANGIOPLASTY: ICD-10-CM

## 2018-10-18 DIAGNOSIS — E78.5 HYPERLIPIDEMIA, UNSPECIFIED HYPERLIPIDEMIA TYPE: ICD-10-CM

## 2018-10-18 DIAGNOSIS — I45.10 RBBB: ICD-10-CM

## 2018-10-18 DIAGNOSIS — Z98.84 S/P LAPAROSCOPIC SLEEVE GASTRECTOMY: ICD-10-CM

## 2018-10-18 DIAGNOSIS — I10 ESSENTIAL HYPERTENSION: ICD-10-CM

## 2018-10-18 PROCEDURE — 99999 PR PBB SHADOW E&M-EST. PATIENT-LVL III: CPT | Mod: PBBFAC,,, | Performed by: INTERNAL MEDICINE

## 2018-10-18 PROCEDURE — 99213 OFFICE O/P EST LOW 20 MIN: CPT | Mod: PBBFAC | Performed by: INTERNAL MEDICINE

## 2018-10-18 PROCEDURE — 99214 OFFICE O/P EST MOD 30 MIN: CPT | Mod: S$PBB,,, | Performed by: INTERNAL MEDICINE

## 2018-10-18 RX ORDER — NITROGLYCERIN 0.4 MG/1
0.4 TABLET SUBLINGUAL EVERY 5 MIN PRN
Qty: 60 TABLET | Refills: 12 | Status: SHIPPED | OUTPATIENT
Start: 2018-10-18

## 2018-10-18 RX ORDER — SILDENAFIL 100 MG/1
100 TABLET, FILM COATED ORAL DAILY PRN
Qty: 6 TABLET | Refills: 3 | Status: SHIPPED | OUTPATIENT
Start: 2018-10-18 | End: 2021-10-14

## 2018-10-18 NOTE — PROGRESS NOTES
Subjective:   Patient ID:  Cole Doan is a 68 y.o. male who presents for follow up of Carotid Artery Disease (2 wk hosp f/u, c/o intermit chest pain, sob)      HPI  A 67 yo male with cad diabtes obesity sleep apnea s/p multyiple stents  htn hlp is here for f/u had sleeve placed and subsequently had  nstemi I reviewed his cath films he has no new findings on his cath for any intervention to be performd. He is currently  asymptomatic compliant with meds. Ha sno palpitation his bp is on the low side. He does not feel tired he feels lightheaded. He has no other issues clinically no palpitation.   Past Medical History:   Diagnosis Date    Acute coronary syndrome     Anticoagulant long-term use     Plavix    BPH (benign prostatic hypertrophy)     CAD (coronary artery disease)     HTN (hypertension) 5/15/2014    Hyperlipidemia     Obesity 5/15/2014    SATYA on CPAP     Pneumonia     PTSD (post-traumatic stress disorder) 5/15/2014    RBBB 5/15/2014    S/P PTCA (percutaneous transluminal coronary angioplasty) 5/15/2014    Special screening for malignant neoplasms, colon 1/22/2014    Type 2 diabetes mellitus without complication 9/28/2015       Past Surgical History:   Procedure Laterality Date    APPENDECTOMY      at age 14    ATRIAL CARDIAC PACEMAKER INSERTION      COLONOSCOPY Left 4/27/2018    Procedure: COLONOSCOPY;  Surgeon: Artem Medeiros MD;  Location: [facility];  Service: Endoscopy;  Laterality: Left;    COLONOSCOPY Left 4/27/2018    Performed by [facility]ros [facility] at [facility]    COLONOSCOPY N/A 5/14/2015    Performed by Travis Miguel MD at [facility]    COLONOSCOPY N/A 1/22/2014    Performed by [facility]em [facility]ros MD at [facility]    CORONARY ANGIOPLASTY WITH STENT PLACEMENT      1990, 2008, 2012    ESOPHAGOGASTRODUODENOSCOPY N/A 8/16/2018    Procedure: ESOPHAGOGASTRODUODENOSCOPY (EGD);  Surgeon: Mario Cesar MD;  Location: [facility];  Service: General;  Laterality: N/A;     "ESOPHAGOGASTRODUODENOSCOPY (EGD) N/A 2018    Performed by [facility] at [facility] ENDO    HEART CATH-LEFT Left 2015    Performed by Eric Rutledge MD at [facility] CATH LAB    ROBOT-ASSISTED LAPAROSCOPIC SLEEVE GASTRECTOMY USING DA LURDES XI N/A 2018    Procedure: XI ROBOTIC SLEEVE GASTRECTOMY;  Surgeon: Mario Cesar MD;  Location: [facility] OR;  Service: General;  Laterality: N/A;    VASECTOMY      XI ROBOTIC SLEEVE GASTRECTOMY N/A 2018    Performed by Mario Cesar MD at [facility] OR       Social History     Tobacco Use    Smoking status: Former Smoker     Packs/day: 1.00     Years: 40.00     Pack years: 40.00     Types: Cigarettes     Start date:      Last attempt to quit: 2009     Years since quittin.8    Smokeless tobacco: Never Used   Substance Use Topics    Alcohol use: No    Drug use: No       Family History   Problem Relation Age of Onset    Cataracts Father     Heart disease Father     Hypertension Father     Heart disease Mother     Hypertension Mother     Colon cancer Neg Hx        Current Outpatient Medications   Medication Sig    alprazolam (XANAX) 0.5 MG tablet Take 0.5 mg by mouth 3 (three) times daily as needed.     amLODIPine (NORVASC) 2.5 MG tablet TAKE 1 TABLET BY MOUTH ONCE DAILY.    aspirin (ECOTRIN) 81 MG EC tablet Take 81 mg by mouth once daily.    atorvastatin (LIPITOR) 80 MG tablet Take 80 mg by mouth once daily. Take a half tablet daily     bismuth-metronidazole-tetracycline (PLYERA) 140-125-125 mg per capsule Take 3 capsules by mouth 4 (four) times daily before meals and nightly. for 14 days    buPROPion (WELLBUTRIN SR) 200 MG TbSR Take 200 mg by mouth once daily.    clopidogrel (PLAVIX) 75 mg tablet TAKE 1 TABLET BY MOUTH EVERY DAY    cyanocobalamin (VITAMIN B-12) 1000 MCG tablet Take 100 mcg by mouth once daily.    dabigatran etexilate (PRADAXA) 150 mg Cap Take 150 mg by mouth once daily. "Do NOT break, chew, or open capsules."     furosemide " (LASIX) 40 MG tablet Takes one-half tablet by mouth daily (Patient taking differently: Take 40 mg by mouth 2 (two) times daily. Takes 2 tablet by mouth daily)    gabapentin (NEURONTIN) 300 MG capsule Take 300 mg by mouth 3 (three) times daily.     ipratropium-albuterol (COMBIVENT RESPIMAT)  mcg/actuation inhaler Inhale 1 puff into the lungs 4 (four) times daily.    isosorbide mononitrate (IMDUR) 60 MG 24 hr tablet Take 1 tablet (60 mg total) by mouth 2 (two) times daily. (Patient taking differently: Take 60 mg by mouth once daily. )    liraglutide (SAXENDA) 3 mg/0.5 mL (18 mg/3 mL) PnIj Inject into the skin once daily.    metformin (GLUCOPHAGE) 1000 MG tablet Take 1,000 mg by mouth 2 (two) times daily with meals.    metoprolol tartrate (LOPRESSOR) 25 MG tablet Take 1 tablet (25 mg total) by mouth 2 (two) times daily.    ranolazine (RANEXA) 500 MG Tb12 Take 500 mg by mouth 2 (two) times daily.    vitamin D 1000 units Tab Take 2,000 Units by mouth once daily.    nozaseptin (NOZIN) nasal  1 each by Each Nare route 2 (two) times daily.    pantoprazole (PROTONIX) 40 MG tablet Take 1 tablet (40 mg total) by mouth 2 (two) times daily. for 14 days     No current facility-administered medications for this visit.      Current Outpatient Medications on File Prior to Visit   Medication Sig    alprazolam (XANAX) 0.5 MG tablet Take 0.5 mg by mouth 3 (three) times daily as needed.     amLODIPine (NORVASC) 2.5 MG tablet TAKE 1 TABLET BY MOUTH ONCE DAILY.    aspirin (ECOTRIN) 81 MG EC tablet Take 81 mg by mouth once daily.    atorvastatin (LIPITOR) 80 MG tablet Take 80 mg by mouth once daily. Take a half tablet daily     bismuth-metronidazole-tetracycline (PLYERA) 140-125-125 mg per capsule Take 3 capsules by mouth 4 (four) times daily before meals and nightly. for 14 days    buPROPion (WELLBUTRIN SR) 200 MG TbSR Take 200 mg by mouth once daily.    clopidogrel (PLAVIX) 75 mg tablet TAKE 1 TABLET BY  "MOUTH EVERY DAY    cyanocobalamin (VITAMIN B-12) 1000 MCG tablet Take 100 mcg by mouth once daily.    dabigatran etexilate (PRADAXA) 150 mg Cap Take 150 mg by mouth once daily. "Do NOT break, chew, or open capsules."     furosemide (LASIX) 40 MG tablet Takes one-half tablet by mouth daily (Patient taking differently: Take 40 mg by mouth 2 (two) times daily. Takes 2 tablet by mouth daily)    gabapentin (NEURONTIN) 300 MG capsule Take 300 mg by mouth 3 (three) times daily.     ipratropium-albuterol (COMBIVENT RESPIMAT)  mcg/actuation inhaler Inhale 1 puff into the lungs 4 (four) times daily.    isosorbide mononitrate (IMDUR) 60 MG 24 hr tablet Take 1 tablet (60 mg total) by mouth 2 (two) times daily. (Patient taking differently: Take 60 mg by mouth once daily. )    liraglutide (SAXENDA) 3 mg/0.5 mL (18 mg/3 mL) PnIj Inject into the skin once daily.    metformin (GLUCOPHAGE) 1000 MG tablet Take 1,000 mg by mouth 2 (two) times daily with meals.    metoprolol tartrate (LOPRESSOR) 25 MG tablet Take 1 tablet (25 mg total) by mouth 2 (two) times daily.    ranolazine (RANEXA) 500 MG Tb12 Take 500 mg by mouth 2 (two) times daily.    vitamin D 1000 units Tab Take 2,000 Units by mouth once daily.    nozaseptin (NOZIN) nasal  1 each by Each Nare route 2 (two) times daily.    pantoprazole (PROTONIX) 40 MG tablet Take 1 tablet (40 mg total) by mouth 2 (two) times daily. for 14 days     No current facility-administered medications on file prior to visit.      Review of patient's allergies indicates:   Allergen Reactions    Iodinated contrast- oral and iv dye      States, "My arteries started shutting down on me" pt states only to iv dye- was specifically told that oral dye does not have the same reaction       Review of Systems   Constitution: Negative for weakness and malaise/fatigue.   Eyes: Negative for blurred vision.   Cardiovascular: Negative for chest pain, claudication, cyanosis, dyspnea on " exertion, irregular heartbeat, leg swelling, near-syncope, orthopnea, palpitations and paroxysmal nocturnal dyspnea.   Respiratory: Negative for cough, hemoptysis and shortness of breath.    Hematologic/Lymphatic: Negative for bleeding problem. Does not bruise/bleed easily.   Skin: Negative for dry skin and itching.   Musculoskeletal: Negative for falls, muscle weakness and myalgias.   Gastrointestinal: Negative for abdominal pain, diarrhea, heartburn, hematemesis, hematochezia and melena.   Genitourinary: Negative for flank pain and hematuria.   Neurological: Positive for dizziness and light-headedness. Negative for focal weakness, headaches, numbness, paresthesias and seizures.   Psychiatric/Behavioral: Negative for altered mental status and memory loss. The patient is not nervous/anxious.    Allergic/Immunologic: Negative for hives.       Objective:   Physical Exam   Constitutional: He is oriented to person, place, and time. He appears well-developed and well-nourished. No distress.   HENT:   Head: Normocephalic and atraumatic.   Eyes: EOM are normal. Pupils are equal, round, and reactive to light. Right eye exhibits no discharge. Left eye exhibits no discharge.   Neck: Neck supple. No JVD present. No thyromegaly present.   Cardiovascular: Normal rate, regular rhythm, normal heart sounds and intact distal pulses. Exam reveals no gallop and no friction rub.   No murmur heard.  Pulmonary/Chest: Effort normal and breath sounds normal. No respiratory distress. He has no wheezes. He has no rales. He exhibits no tenderness.   Pacer site well healed.   Abdominal: Soft. Bowel sounds are normal. He exhibits no distension. There is no tenderness.   Obese    Musculoskeletal: Normal range of motion. He exhibits no edema.   Neurological: He is alert and oriented to person, place, and time. No cranial nerve deficit.   Skin: Skin is warm and dry. No rash noted. He is not diaphoretic. No erythema.   Psychiatric: He has a normal  "mood and affect. His behavior is normal.   Nursing note and vitals reviewed.    Vitals:    10/18/18 1355   BP: 98/68   Patient Position: Sitting   BP Method: Large (Manual)   Pulse: 70   Weight: 111.1 kg (244 lb 14.9 oz)   Height: 6' 1" (1.854 m)     Lab Results   Component Value Date    CHOL 161 01/19/2017    CHOL 171 08/03/2016    CHOL 147 07/14/2014     Lab Results   Component Value Date    HDL 32 (L) 01/19/2017    HDL 25 (L) 08/03/2016    HDL 31 (L) 07/14/2014     Lab Results   Component Value Date    LDLCALC 89.2 01/19/2017    LDLCALC 106.4 08/03/2016    LDLCALC 86.6 07/14/2014     Lab Results   Component Value Date    TRIG 199 (H) 01/19/2017    TRIG 198 (H) 08/03/2016    TRIG 147 07/14/2014     Lab Results   Component Value Date    CHOLHDL 19.9 (L) 01/19/2017    CHOLHDL 14.6 (L) 08/03/2016    CHOLHDL 21.1 07/14/2014       Chemistry        Component Value Date/Time     09/30/2018 0900    K 4.2 09/30/2018 0900     09/30/2018 0900    CO2 29 09/30/2018 0900    BUN 17 09/30/2018 0900    CREATININE 0.8 09/30/2018 0900     09/30/2018 0900        Component Value Date/Time    CALCIUM 9.0 09/30/2018 0900    ALKPHOS 66 09/19/2018 1423    AST 14 09/19/2018 1423    ALT 18 09/19/2018 1423    BILITOT 1.0 09/19/2018 1423    ESTGFRAFRICA >60 09/30/2018 0900    EGFRNONAA >60 09/30/2018 0900          Lab Results   Component Value Date    TSH 1.139 09/23/2016     Lab Results   Component Value Date    INR 1.1 09/30/2018    INR 1.0 09/28/2018    INR 1.0 08/03/2016     Lab Results   Component Value Date    WBC 7.01 09/30/2018    HGB 13.2 (L) 09/30/2018    HCT 38.7 (L) 09/30/2018    MCV 93 09/30/2018     (L) 09/30/2018     BMP  Sodium   Date Value Ref Range Status   09/30/2018 141 136 - 145 mmol/L Final     Potassium   Date Value Ref Range Status   09/30/2018 4.2 3.5 - 5.1 mmol/L Final     Chloride   Date Value Ref Range Status   09/30/2018 104 95 - 110 mmol/L Final     CO2   Date Value Ref Range Status "   09/30/2018 29 23 - 29 mmol/L Final     BUN, Bld   Date Value Ref Range Status   09/30/2018 17 8 - 23 mg/dL Final     Creatinine   Date Value Ref Range Status   09/30/2018 0.8 0.5 - 1.4 mg/dL Final     Calcium   Date Value Ref Range Status   09/30/2018 9.0 8.7 - 10.5 mg/dL Final     Anion Gap   Date Value Ref Range Status   09/30/2018 8 8 - 16 mmol/L Final     eGFR if    Date Value Ref Range Status   09/30/2018 >60 >60 mL/min/1.73 m^2 Final     eGFR if non    Date Value Ref Range Status   09/30/2018 >60 >60 mL/min/1.73 m^2 Final     Comment:     Calculation used to obtain the estimated glomerular filtration  rate (eGFR) is the CKD-EPI equation.        CrCl cannot be calculated (Patient's most recent lab result is older than the maximum 7 days allowed.).    Assessment:     1. Atherosclerosis of native coronary artery with angina pectoris, unspecified whether native or transplanted heart    2. S/P PTCA (percutaneous transluminal coronary angioplasty)    3. Essential hypertension    4. Hyperlipidemia, unspecified hyperlipidemia type    5. RBBB    6. SATYA on CPAP    7. Shortness of breath    8. S/P laparoscopic sleeve gastrectomy    9. CAD S/P percutaneous coronary angioplasty    10. Type 2 diabetes mellitus without complication, without long-term current use of insulin    11. Obesity (BMI 30-39.9)    12. Unstable angina pectoris    13. Mixed restrictive and obstructive lung disease    14. Morbid obesity with BMI of 40.0-44.9, adult      Stable clinically has no angina he is stable bp is low side will stop amlodipine.  He wants viagra I am ok with that as long he will not use it in conjunction with imdur.  Plan:   viagra 100 mg po prn  Nitro s/l  Stop amlodipine.  Continue current therapy  Cardiac low salt diet.  Risk factor modification and excercise program.  F/u in 6 months with lipid cmp

## 2019-01-14 ENCOUNTER — PATIENT MESSAGE (OUTPATIENT)
Dept: SURGERY | Facility: CLINIC | Age: 69
End: 2019-01-14

## 2019-01-14 ENCOUNTER — TELEPHONE (OUTPATIENT)
Dept: SURGERY | Facility: CLINIC | Age: 69
End: 2019-01-14

## 2019-01-14 DIAGNOSIS — E66.9 OBESITY (BMI 30-39.9): ICD-10-CM

## 2019-01-14 DIAGNOSIS — Z98.84 S/P LAPAROSCOPIC SLEEVE GASTRECTOMY: Primary | ICD-10-CM

## 2019-01-14 NOTE — TELEPHONE ENCOUNTER
Returned call in regards to order is needed for lab. Informed pt Dr. Cesar is not in clinic today,and a msg will be sent to him in rg to this matter. Pt verbalized an understanding

## 2019-01-14 NOTE — TELEPHONE ENCOUNTER
----- Message from Merced Rodriguez sent at 1/14/2019  8:56 AM CST -----  Contact: pt  The pt request a call concerning a lab appt, no orders int he system, the pt can be reached at [phone]///[id number]

## 2019-01-15 ENCOUNTER — LAB VISIT (OUTPATIENT)
Dept: LAB | Facility: HOSPITAL | Age: 69
End: 2019-01-15
Attending: SURGERY
Payer: MEDICARE

## 2019-01-15 DIAGNOSIS — Z98.84 S/P LAPAROSCOPIC SLEEVE GASTRECTOMY: ICD-10-CM

## 2019-01-15 DIAGNOSIS — E66.9 OBESITY (BMI 30-39.9): ICD-10-CM

## 2019-01-15 LAB
25(OH)D3+25(OH)D2 SERPL-MCNC: 42 NG/ML
ALBUMIN SERPL BCP-MCNC: 3.7 G/DL
ALP SERPL-CCNC: 70 U/L
ALT SERPL W/O P-5'-P-CCNC: 13 U/L
ANION GAP SERPL CALC-SCNC: 9 MMOL/L
AST SERPL-CCNC: 13 U/L
BASOPHILS # BLD AUTO: 0.02 K/UL
BASOPHILS NFR BLD: 0.4 %
BILIRUB SERPL-MCNC: 0.9 MG/DL
BUN SERPL-MCNC: 19 MG/DL
CALCIUM SERPL-MCNC: 9.8 MG/DL
CHLORIDE SERPL-SCNC: 105 MMOL/L
CO2 SERPL-SCNC: 31 MMOL/L
CREAT SERPL-MCNC: 0.9 MG/DL
DIFFERENTIAL METHOD: ABNORMAL
EOSINOPHIL # BLD AUTO: 0.1 K/UL
EOSINOPHIL NFR BLD: 1.1 %
ERYTHROCYTE [DISTWIDTH] IN BLOOD BY AUTOMATED COUNT: 14.2 %
EST. GFR  (AFRICAN AMERICAN): >60 ML/MIN/1.73 M^2
EST. GFR  (NON AFRICAN AMERICAN): >60 ML/MIN/1.73 M^2
ESTIMATED AVG GLUCOSE: 105 MG/DL
GLUCOSE SERPL-MCNC: 93 MG/DL
HBA1C MFR BLD HPLC: 5.3 %
HCT VFR BLD AUTO: 46.6 %
HGB BLD-MCNC: 15.2 G/DL
IMM GRANULOCYTES # BLD AUTO: 0.01 K/UL
IMM GRANULOCYTES NFR BLD AUTO: 0.2 %
IRON SERPL-MCNC: 110 UG/DL
LYMPHOCYTES # BLD AUTO: 1.6 K/UL
LYMPHOCYTES NFR BLD: 30 %
MCH RBC QN AUTO: 31.3 PG
MCHC RBC AUTO-ENTMCNC: 32.6 G/DL
MCV RBC AUTO: 96 FL
MONOCYTES # BLD AUTO: 0.5 K/UL
MONOCYTES NFR BLD: 9.3 %
NEUTROPHILS # BLD AUTO: 3.2 K/UL
NEUTROPHILS NFR BLD: 59 %
NRBC BLD-RTO: 0 /100 WBC
PLATELET # BLD AUTO: 210 K/UL
PMV BLD AUTO: 10.3 FL
POTASSIUM SERPL-SCNC: 4.2 MMOL/L
PREALB SERPL-MCNC: 22 MG/DL
PROT SERPL-MCNC: 7.1 G/DL
RBC # BLD AUTO: 4.85 M/UL
SATURATED IRON: 31 %
SODIUM SERPL-SCNC: 145 MMOL/L
T4 FREE SERPL-MCNC: 0.97 NG/DL
TOTAL IRON BINDING CAPACITY: 355 UG/DL
TRANSFERRIN SERPL-MCNC: 240 MG/DL
TSH SERPL DL<=0.005 MIU/L-ACNC: 2.05 UIU/ML
VIT B12 SERPL-MCNC: 1307 PG/ML
WBC # BLD AUTO: 5.36 K/UL

## 2019-01-15 PROCEDURE — 82607 VITAMIN B-12: CPT

## 2019-01-15 PROCEDURE — 83540 ASSAY OF IRON: CPT

## 2019-01-15 PROCEDURE — 83036 HEMOGLOBIN GLYCOSYLATED A1C: CPT

## 2019-01-15 PROCEDURE — 84439 ASSAY OF FREE THYROXINE: CPT

## 2019-01-15 PROCEDURE — 82306 VITAMIN D 25 HYDROXY: CPT

## 2019-01-15 PROCEDURE — 85025 COMPLETE CBC W/AUTO DIFF WBC: CPT

## 2019-01-15 PROCEDURE — 36415 COLL VENOUS BLD VENIPUNCTURE: CPT

## 2019-01-15 PROCEDURE — 84443 ASSAY THYROID STIM HORMONE: CPT

## 2019-01-15 PROCEDURE — 80053 COMPREHEN METABOLIC PANEL: CPT

## 2019-01-15 PROCEDURE — 84425 ASSAY OF VITAMIN B-1: CPT

## 2019-01-15 PROCEDURE — 84134 ASSAY OF PREALBUMIN: CPT

## 2019-01-16 ENCOUNTER — NUTRITION (OUTPATIENT)
Dept: SURGERY | Facility: CLINIC | Age: 69
End: 2019-01-16
Payer: OTHER GOVERNMENT

## 2019-01-16 ENCOUNTER — OFFICE VISIT (OUTPATIENT)
Dept: SURGERY | Facility: CLINIC | Age: 69
End: 2019-01-16
Payer: OTHER GOVERNMENT

## 2019-01-16 VITALS
BODY MASS INDEX: 31.18 KG/M2 | WEIGHT: 235.25 LBS | SYSTOLIC BLOOD PRESSURE: 97 MMHG | DIASTOLIC BLOOD PRESSURE: 72 MMHG | HEIGHT: 73 IN | TEMPERATURE: 98 F | HEART RATE: 62 BPM

## 2019-01-16 DIAGNOSIS — E11.9 TYPE 2 DIABETES MELLITUS WITHOUT COMPLICATION, WITHOUT LONG-TERM CURRENT USE OF INSULIN: ICD-10-CM

## 2019-01-16 DIAGNOSIS — I25.10 CAD S/P PERCUTANEOUS CORONARY ANGIOPLASTY: ICD-10-CM

## 2019-01-16 DIAGNOSIS — E66.01 MORBID OBESITY: Primary | ICD-10-CM

## 2019-01-16 DIAGNOSIS — Z98.61 CAD S/P PERCUTANEOUS CORONARY ANGIOPLASTY: ICD-10-CM

## 2019-01-16 DIAGNOSIS — G47.33 OSA ON CPAP: ICD-10-CM

## 2019-01-16 DIAGNOSIS — I10 ESSENTIAL HYPERTENSION: ICD-10-CM

## 2019-01-16 DIAGNOSIS — Z98.84 S/P LAPAROSCOPIC SLEEVE GASTRECTOMY: ICD-10-CM

## 2019-01-16 DIAGNOSIS — E78.5 HYPERLIPIDEMIA, UNSPECIFIED HYPERLIPIDEMIA TYPE: ICD-10-CM

## 2019-01-16 DIAGNOSIS — I25.119 ATHEROSCLEROSIS OF NATIVE CORONARY ARTERY WITH ANGINA PECTORIS, UNSPECIFIED WHETHER NATIVE OR TRANSPLANTED HEART: ICD-10-CM

## 2019-01-16 DIAGNOSIS — E66.9 OBESITY (BMI 30-39.9): Primary | ICD-10-CM

## 2019-01-16 PROCEDURE — 99213 OFFICE O/P EST LOW 20 MIN: CPT | Mod: PBBFAC,PN | Performed by: SURGERY

## 2019-01-16 PROCEDURE — 99213 OFFICE O/P EST LOW 20 MIN: CPT | Mod: S$PBB,,, | Performed by: SURGERY

## 2019-01-16 PROCEDURE — 97803 MED NUTRITION INDIV SUBSEQ: CPT | Mod: PBBFAC,PN | Performed by: DIETITIAN, REGISTERED

## 2019-01-16 PROCEDURE — 99213 PR OFFICE/OUTPT VISIT, EST, LEVL III, 20-29 MIN: ICD-10-PCS | Mod: S$PBB,,, | Performed by: SURGERY

## 2019-01-16 PROCEDURE — 99999 PR PBB SHADOW E&M-EST. PATIENT-LVL III: ICD-10-PCS | Mod: PBBFAC,,, | Performed by: SURGERY

## 2019-01-16 PROCEDURE — 99999 PR PBB SHADOW E&M-EST. PATIENT-LVL III: CPT | Mod: PBBFAC,,, | Performed by: SURGERY

## 2019-01-16 NOTE — PROGRESS NOTES
NUTRITION NOTE    Referring Physician: Dr. Cesar    Reason for MNT Referral: Follow-up 3 months s/p Gastric Sleeve    PAST MEDICAL HISTORY:  Denies nausea, vomiting and diarrhea.  Reports problems, including constipation. Suggested adding fiber supplement e.g. benefiber or metamucil to daily protein shakes and/or increasing vegetables at dinner based on pt current food intake.    Past Medical History:   Diagnosis Date    Acute coronary syndrome     Anticoagulant long-term use     Plavix    BPH (benign prostatic hypertrophy)     CAD (coronary artery disease)     HTN (hypertension) 5/15/2014    Hyperlipidemia     Obesity 5/15/2014    SATYA on CPAP     Pneumonia     PTSD (post-traumatic stress disorder) 5/15/2014    RBBB 5/15/2014    S/P PTCA (percutaneous transluminal coronary angioplasty) 5/15/2014    Special screening for malignant neoplasms, colon 1/22/2014    Type 2 diabetes mellitus without complication 9/28/2015       CLINICAL DATA:  68 y.o. male.    There were no vitals filed for this visit.    Current Weight: 234 lbs  BMI: 31.03  Total Weight Loss: 36 lbs; approximately 4 lbs prior to sx 9/27/18, 32 lbs post sx  Excess Weight Loss:  49%    LABS:  (1/15/18) B12 1307H HgbA1c 5.3 WNL PAB 22 WNL    CURRENT DIET:  Bariatric Regular Diet    Diet Recall:  grams of protein/day; 64 oz of fluids/day    Diet Includes:   Breakfast: premier protein shake and oatmeal (1/2 cup) with almond milk only  Lunch: premier protein shake  Snack: protein bar (pt couldn't recall brand, reports at least 20 g of protein in bar)  Dinner: 3-4 oz piece of meat (chicken, fish, or beef) with vegetables (green beans, broccoli, or asparagus)  Meal Pattern: 3 meal(s) + 1-2 snack(s) + 3 protein supplement(s)  Adequate protein supplement intake.  Adequate dairy intake.  Adequate vegetable intake.  Inadequate fruit intake.  Starchy CHO: trying to avoid      EXERCISE:  Walking, pt reports plans to start cardiac rehab next  week.    Restrictions to Exercise: Pt with cardiac hx.    VITAMINS/MINERALS:  Multivitamins: Taking  B-Complex: Included with multivitamin.  Calcium Citrate + Vitamin D: Taking  Vitamin B12: Included with multivitamin.    Pt reports taking additional B12 prior to sx. High B12 noted, discussed with MD.    ASSESSMENT:  Doing well overall.  Adequate protein intake.  Adequate fluid intake.  Exercising.  Adequate vitamins & minerals excluding B12.    BARIATRIC DIET DISCUSSION:  Educated pt on bariatric regular diet plan.  Reinforced post-op nutrition guidelines.    PLAN / RECOMMENDATIONS:  Continue bariatric regular diet.  Maintain protein intake.  Maintain fluid intake. Consider increasing to help with constipation.  Continue light exercise.  Continue appropriate vitamins & minerals except, take multivitamin with B12 and d/c individual B12.      Return to clinic in 3 months.    SESSION TIME: 15 minutes

## 2019-01-17 NOTE — PROGRESS NOTES
Subjective:       Patient ID: Cole Doan is a 68 y.o. male.    Chief Complaint: Follow-up (post-op)    HPI   Status post robotic sleeve gastrectomy 09/27/2018 with NSTEMI postoperatively presents for 6 month follow up.  He is doing well today tolerating his diet well.  Reports having his weight slightly increased after stopping a medication but has still lost weight since his last visit.  BMI 35.9->32.58->31 weight 123->112->106 kg  Past Medical History:   Diagnosis Date    Acute coronary syndrome     Anticoagulant long-term use     Plavix    BPH (benign prostatic hypertrophy)     CAD (coronary artery disease)     HTN (hypertension) 5/15/2014    Hyperlipidemia     Obesity 5/15/2014    SATYA on CPAP     Pneumonia     PTSD (post-traumatic stress disorder) 5/15/2014    RBBB 5/15/2014    S/P PTCA (percutaneous transluminal coronary angioplasty) 5/15/2014    Special screening for malignant neoplasms, colon 1/22/2014    Type 2 diabetes mellitus without complication 9/28/2015     Past Surgical History:   Procedure Laterality Date    APPENDECTOMY      at age 14    ATRIAL CARDIAC PACEMAKER INSERTION      COLONOSCOPY Left 4/27/2018    [facility] ENDO    COLONOSCOPY N/A 5/14/2015    Performed by Travis Miguel [facility] at [facility] ENDO    COLONOSCOPY N/A 1/22/2014    [facility] ENDO    CORONARY ANGIOPLASTY WITH STENT PLACEMENT      1990, 2008, 2012    ESOPHAGOGASTRODUODENOSCOPY (EGD) N/A 8/16/2018    Performed by Mario Cesar [facility] at [facility] ENDO    HEART CATH-LEFT Left 9/28/2015    Performed by Eric Rutledge MD at [facility] CATH LAB    VASECTOMY      XI ROBOTIC SLEEVE GASTRECTOMY N/A 9/27/2018    Performed by Mario Cesar MD at [facility] OR     Current Outpatient Medications on File Prior to Visit   Medication Sig Dispense Refill    alprazolam (XANAX) 0.5 MG tablet Take 0.5 mg by mouth 3 (three) times daily as needed.       amLODIPine (NORVASC) 2.5 MG  "tablet TAKE 1 TABLET BY MOUTH ONCE DAILY. 90 tablet 3    aspirin (ECOTRIN) 81 MG EC tablet Take 81 mg by mouth once daily.      atorvastatin (LIPITOR) 80 MG tablet Take 80 mg by mouth once daily. Take a half tablet daily       buPROPion (WELLBUTRIN SR) 200 MG TbSR Take 200 mg by mouth once daily.      clopidogrel (PLAVIX) 75 mg tablet TAKE 1 TABLET BY MOUTH EVERY DAY 30 tablet 6    cyanocobalamin (VITAMIN B-12) 1000 MCG tablet Take 100 mcg by mouth once daily.      dabigatran etexilate (PRADAXA) 150 mg Cap Take 150 mg by mouth once daily. "Do NOT break, chew, or open capsules."       furosemide (LASIX) 40 MG tablet Takes one-half tablet by mouth daily (Patient taking differently: Take 40 mg by mouth 2 (two) times daily. Takes 2 tablet by mouth daily) 30 tablet 1    gabapentin (NEURONTIN) 300 MG capsule Take 300 mg by mouth 3 (three) times daily.       ipratropium-albuterol (COMBIVENT RESPIMAT)  mcg/actuation inhaler Inhale 1 puff into the lungs 4 (four) times daily. 4 g 11    isosorbide mononitrate (IMDUR) 60 MG 24 hr tablet Take 1 tablet (60 mg total) by mouth 2 (two) times daily. (Patient taking differently: Take 60 mg by mouth once daily. ) 60 tablet 6    liraglutide (SAXENDA) 3 mg/0.5 mL (18 mg/3 mL) PnIj Inject into the skin once daily.      metformin (GLUCOPHAGE) 1000 MG tablet Take 1,000 mg by mouth 2 (two) times daily with meals.      metoprolol tartrate (LOPRESSOR) 25 MG tablet Take 1 tablet (25 mg total) by mouth 2 (two) times daily. 60 tablet 11    nitroGLYCERIN (NITROSTAT) 0.4 MG SL tablet Place 1 tablet (0.4 mg total) under the tongue every 5 (five) minutes as needed for Chest pain. 60 tablet 12    nozaseptin (NOZIN) nasal  1 each by Each Nare route 2 (two) times daily. 1 applicator 0    ranolazine (RANEXA) 500 MG Tb12 Take 500 mg by mouth 2 (two) times daily.      sildenafil (VIAGRA) 100 MG tablet Take 1 tablet (100 mg total) by mouth daily as needed for Erectile " "Dysfunction. 6 tablet 3    vitamin D 1000 units Tab Take 2,000 Units by mouth once daily.      pantoprazole (PROTONIX) 40 MG tablet Take 1 tablet (40 mg total) by mouth 2 (two) times daily. for 14 days 28 tablet 0     No current facility-administered medications on file prior to visit.      Review of patient's allergies indicates:   Allergen Reactions    Iodinated contrast- oral and iv dye      States, "My arteries started shutting down on me" pt states only to iv dye- was specifically told that oral dye does not have the same reaction       Review of Systems   Constitutional: Negative for chills, fever and unexpected weight change.   HENT: Negative for congestion.    Eyes: Negative for visual disturbance.   Respiratory: Negative for shortness of breath.    Cardiovascular: Negative for chest pain.   Gastrointestinal: Negative for abdominal distention, abdominal pain, constipation, nausea, rectal pain and vomiting.   Genitourinary: Negative for dysuria.   Musculoskeletal: Negative for arthralgias.   Skin: Negative for rash.   Neurological: Negative for light-headedness.   Hematological: Negative for adenopathy.       Objective:      Physical Exam   Constitutional: He is oriented to person, place, and time. He appears well-developed and well-nourished. No distress.   HENT:   Head: Normocephalic and atraumatic.   Eyes: EOM are normal.   Neck: Normal range of motion. Neck supple.   Cardiovascular: Normal rate and regular rhythm.   Pulmonary/Chest: Effort normal and breath sounds normal.   Abdominal: Soft. Bowel sounds are normal. He exhibits no distension. There is no tenderness.   Well-healed surgical scars   Neurological: He is alert and oriented to person, place, and time.   Skin: Skin is warm and dry.   Vitals reviewed.        Assessment:     status post robotic sleeve gastrectomy  Plan:       Continue diet and exercise  Follow-up in 6 months  "

## 2019-01-18 LAB — VIT B1 BLD-MCNC: 128 UG/L (ref 38–122)

## 2019-02-08 ENCOUNTER — PATIENT MESSAGE (OUTPATIENT)
Dept: DIABETES | Facility: CLINIC | Age: 69
End: 2019-02-08

## 2019-03-12 ENCOUNTER — TELEPHONE (OUTPATIENT)
Dept: SURGERY | Facility: CLINIC | Age: 69
End: 2019-03-12

## 2019-03-12 NOTE — TELEPHONE ENCOUNTER
----- Message from Rose Liu sent at 3/12/2019  1:20 PM CDT -----  Contact: self [phone]  Type:  Patient Returning Call    Who Called:Cole Doan  Who Left Message for Patient:Brielle  Does the patient know what this is regarding?:unk  Would the patient rather a call back or a response via [facility]? Call back  Best Call Back Number:[phone]  Additional Information:

## 2019-03-12 NOTE — TELEPHONE ENCOUNTER
Called to reschedule appointment with another provider for nutrition. Patient states that he will [facility]. He will have them fax the notes for this appointment over or will bring a copy to our office

## 2019-04-16 ENCOUNTER — PATIENT MESSAGE (OUTPATIENT)
Dept: CARDIOLOGY | Facility: CLINIC | Age: 69
End: 2019-04-16

## 2019-05-17 DIAGNOSIS — I10 ESSENTIAL HYPERTENSION: Primary | ICD-10-CM

## 2019-05-23 ENCOUNTER — CLINICAL SUPPORT (OUTPATIENT)
Dept: CARDIOLOGY | Facility: CLINIC | Age: 69
End: 2019-05-23
Payer: MEDICARE

## 2019-05-23 ENCOUNTER — OFFICE VISIT (OUTPATIENT)
Dept: CARDIOLOGY | Facility: CLINIC | Age: 69
End: 2019-05-23
Payer: MEDICARE

## 2019-05-23 VITALS
HEART RATE: 60 BPM | BODY MASS INDEX: 29.69 KG/M2 | WEIGHT: 224 LBS | SYSTOLIC BLOOD PRESSURE: 96 MMHG | DIASTOLIC BLOOD PRESSURE: 70 MMHG | HEIGHT: 73 IN

## 2019-05-23 DIAGNOSIS — G47.33 OSA ON CPAP: ICD-10-CM

## 2019-05-23 DIAGNOSIS — E78.5 HYPERLIPIDEMIA, UNSPECIFIED HYPERLIPIDEMIA TYPE: ICD-10-CM

## 2019-05-23 DIAGNOSIS — I10 ESSENTIAL HYPERTENSION: ICD-10-CM

## 2019-05-23 DIAGNOSIS — Z98.84 S/P LAPAROSCOPIC SLEEVE GASTRECTOMY: ICD-10-CM

## 2019-05-23 DIAGNOSIS — J98.4 MIXED RESTRICTIVE AND OBSTRUCTIVE LUNG DISEASE: ICD-10-CM

## 2019-05-23 DIAGNOSIS — E66.9 OBESITY (BMI 30-39.9): ICD-10-CM

## 2019-05-23 DIAGNOSIS — I45.10 RBBB: ICD-10-CM

## 2019-05-23 DIAGNOSIS — Z98.61 CAD S/P PERCUTANEOUS CORONARY ANGIOPLASTY: ICD-10-CM

## 2019-05-23 DIAGNOSIS — Z98.61 S/P PTCA (PERCUTANEOUS TRANSLUMINAL CORONARY ANGIOPLASTY): ICD-10-CM

## 2019-05-23 DIAGNOSIS — I25.10 CAD S/P PERCUTANEOUS CORONARY ANGIOPLASTY: ICD-10-CM

## 2019-05-23 DIAGNOSIS — R06.02 SHORTNESS OF BREATH: ICD-10-CM

## 2019-05-23 DIAGNOSIS — F43.10 PTSD (POST-TRAUMATIC STRESS DISORDER): ICD-10-CM

## 2019-05-23 DIAGNOSIS — I20.0 UNSTABLE ANGINA PECTORIS: ICD-10-CM

## 2019-05-23 DIAGNOSIS — I25.119 ATHEROSCLEROSIS OF NATIVE CORONARY ARTERY WITH ANGINA PECTORIS, UNSPECIFIED WHETHER NATIVE OR TRANSPLANTED HEART: Primary | ICD-10-CM

## 2019-05-23 DIAGNOSIS — E11.9 TYPE 2 DIABETES MELLITUS WITHOUT COMPLICATION, WITHOUT LONG-TERM CURRENT USE OF INSULIN: ICD-10-CM

## 2019-05-23 DIAGNOSIS — J43.9 MIXED RESTRICTIVE AND OBSTRUCTIVE LUNG DISEASE: ICD-10-CM

## 2019-05-23 PROCEDURE — 93010 ELECTROCARDIOGRAM REPORT: CPT | Mod: S$PBB,,, | Performed by: INTERNAL MEDICINE

## 2019-05-23 PROCEDURE — 99213 OFFICE O/P EST LOW 20 MIN: CPT | Mod: PBBFAC,25 | Performed by: INTERNAL MEDICINE

## 2019-05-23 PROCEDURE — 99214 OFFICE O/P EST MOD 30 MIN: CPT | Mod: S$PBB,,, | Performed by: INTERNAL MEDICINE

## 2019-05-23 PROCEDURE — 93005 ELECTROCARDIOGRAM TRACING: CPT | Mod: PBBFAC | Performed by: INTERNAL MEDICINE

## 2019-05-23 PROCEDURE — 93010 EKG 12-LEAD: ICD-10-PCS | Mod: S$PBB,,, | Performed by: INTERNAL MEDICINE

## 2019-05-23 PROCEDURE — 99999 PR PBB SHADOW E&M-EST. PATIENT-LVL III: CPT | Mod: PBBFAC,,, | Performed by: INTERNAL MEDICINE

## 2019-05-23 PROCEDURE — 99214 PR OFFICE/OUTPT VISIT, EST, LEVL IV, 30-39 MIN: ICD-10-PCS | Mod: S$PBB,,, | Performed by: INTERNAL MEDICINE

## 2019-05-23 PROCEDURE — 99999 PR PBB SHADOW E&M-EST. PATIENT-LVL III: ICD-10-PCS | Mod: PBBFAC,,, | Performed by: INTERNAL MEDICINE

## 2019-05-23 RX ORDER — METOPROLOL TARTRATE 100 MG/1
100 TABLET ORAL 2 TIMES DAILY
COMMUNITY
End: 2020-10-15

## 2019-05-23 NOTE — PROGRESS NOTES
Subjective:   Patient ID:  Cole Doan is a 68 y.o. male who presents for follow up of Coronary Artery Disease (6 mo f/u); Hypertension; and Hyperlipidemia      HPI  A 67 yo male with cad s/p ptca diabetes rbbb s/p pacer  Diabetes s/p sleeve procedure has lost 90 lbs he is a little shaky at times. Has orthostatic symptoms he is not exercising  At all taking meds regularily. HaS no chest pain shortness of breath palpitation. No syncope no chf symptoms. [facility]   Past Medical History:   Diagnosis Date    Acute coronary syndrome     Anticoagulant long-term use     Plavix    BPH (benign prostatic hypertrophy)     CAD (coronary artery disease)     HTN (hypertension) 5/15/2014    Hyperlipidemia     Obesity 5/15/2014    SATYA on CPAP     Pneumonia     PTSD (post-traumatic stress disorder) 5/15/2014    RBBB 5/15/2014    S/P PTCA (percutaneous transluminal coronary angioplasty) 5/15/2014    Special screening for malignant neoplasms, colon 1/22/2014    Type 2 diabetes mellitus without complication 9/28/2015       Past Surgical History:   Procedure Laterality Date    APPENDECTOMY      at age 14    ATRIAL CARDIAC PACEMAKER INSERTION      COLONOSCOPY Left 4/27/2018    [facility] ENDO    COLONOSCOPY N/A 5/14/2015    [facility] by Travis Miguel [facility] ENDO    COLONOSCOPY N/A 1/22/2014    [facility] END[facility]    CORONARY ANGIOPLASTY WITH STENT PLACEMENT      1990, 2008, 2012    ESOPHAGOGASTRODUODENOSCOPY (EGD) N/A 8/16/2018    [facility]formed by [facility] ENDO    HEART CATH-LEFT Left 9/28/2015    Performed by Eric Rutledge [facility] at [facility] CATH LAB    VASECTOMY      XI ROBOTIC SLEEVE GASTRECTOMY N/A 9/27/2018    [facility] OR       Social History     Tobacco Use    Smoking status: Former Smoker     Packs/day: 1.00     Years: 40.00     Pack years: 40.00     Types: Cigarettes      "Start date:      Last attempt to quit: 2009     Years since quittin.3    Smokeless tobacco: Never Used   Substance Use Topics    Alcohol use: No    Drug use: No       Family History   Problem Relation Age of Onset    Cataracts Father     Heart disease Father     Hypertension Father     Heart disease Mother     Hypertension Mother     Colon cancer Neg Hx        Current Outpatient Medications   Medication Sig    alprazolam (XANAX) 0.5 MG tablet Take 0.5 mg by mouth 3 (three) times daily as needed.     amLODIPine (NORVASC) 2.5 MG tablet TAKE 1 TABLET BY MOUTH ONCE DAILY.    aspirin (ECOTRIN) 81 MG EC tablet Take 81 mg by mouth once daily.    atorvastatin (LIPITOR) 80 MG tablet Take 80 mg by mouth once daily.     buPROPion (WELLBUTRIN SR) 200 MG TbSR Take 200 mg by mouth once daily.    clopidogrel (PLAVIX) 75 mg tablet TAKE 1 TABLET BY MOUTH EVERY DAY    cyanocobalamin (VITAMIN B-12) 1000 MCG tablet Take 100 mcg by mouth once daily.    dabigatran etexilate (PRADAXA) 150 mg Cap Take 150 mg by mouth once daily. "Do NOT break, chew, or open capsules."     furosemide (LASIX) 40 MG tablet Takes one-half tablet by mouth daily (Patient taking differently: Take 40 mg by mouth as needed. Takes 2 tablet by mouth daily)    gabapentin (NEURONTIN) 300 MG capsule Take 300 mg by mouth 3 (three) times daily.     ipratropium-albuterol (COMBIVENT RESPIMAT)  mcg/actuation inhaler Inhale 1 puff into the lungs 4 (four) times daily.    metoprolol tartrate (LOPRESSOR) 100 MG tablet Take 100 mg by mouth 2 (two) times daily.    ranolazine (RANEXA) 500 MG Tb12 Take 500 mg by mouth 2 (two) times daily.    vitamin D 1000 units Tab Take 2,000 Units by mouth once daily.    nitroGLYCERIN (NITROSTAT) 0.4 MG SL tablet Place 1 tablet (0.4 mg total) under the tongue every 5 (five) minutes as needed for Chest pain.    nozaseptin (NOZIN) nasal  1 each by Each Nare route 2 (two) times daily.    " "sildenafil (VIAGRA) 100 MG tablet Take 1 tablet (100 mg total) by mouth daily as needed for Erectile Dysfunction.     No current facility-administered medications for this visit.      Current Outpatient Medications on File Prior to Visit   Medication Sig    alprazolam (XANAX) 0.5 MG tablet Take 0.5 mg by mouth 3 (three) times daily as needed.     amLODIPine (NORVASC) 2.5 MG tablet TAKE 1 TABLET BY MOUTH ONCE DAILY.    aspirin (ECOTRIN) 81 MG EC tablet Take 81 mg by mouth once daily.    atorvastatin (LIPITOR) 80 MG tablet Take 80 mg by mouth once daily.     buPROPion (WELLBUTRIN SR) 200 MG TbSR Take 200 mg by mouth once daily.    clopidogrel (PLAVIX) 75 mg tablet TAKE 1 TABLET BY MOUTH EVERY DAY    cyanocobalamin (VITAMIN B-12) 1000 MCG tablet Take 100 mcg by mouth once daily.    dabigatran etexilate (PRADAXA) 150 mg Cap Take 150 mg by mouth once daily. "Do NOT break, chew, or open capsules."     furosemide (LASIX) 40 MG tablet Takes one-half tablet by mouth daily (Patient taking differently: Take 40 mg by mouth as needed. Takes 2 tablet by mouth daily)    gabapentin (NEURONTIN) 300 MG capsule Take 300 mg by mouth 3 (three) times daily.     ipratropium-albuterol (COMBIVENT RESPIMAT)  mcg/actuation inhaler Inhale 1 puff into the lungs 4 (four) times daily.    metoprolol tartrate (LOPRESSOR) 100 MG tablet Take 100 mg by mouth 2 (two) times daily.    ranolazine (RANEXA) 500 MG Tb12 Take 500 mg by mouth 2 (two) times daily.    vitamin D 1000 units Tab Take 2,000 Units by mouth once daily.    nitroGLYCERIN (NITROSTAT) 0.4 MG SL tablet Place 1 tablet (0.4 mg total) under the tongue every 5 (five) minutes as needed for Chest pain.    nozaseptin (NOZIN) nasal  1 each by Each Nare route 2 (two) times daily.    sildenafil (VIAGRA) 100 MG tablet Take 1 tablet (100 mg total) by mouth daily as needed for Erectile Dysfunction.    [DISCONTINUED] isosorbide mononitrate (IMDUR) 60 MG 24 hr tablet Take " "1 tablet (60 mg total) by mouth 2 (two) times daily. (Patient taking differently: Take 60 mg by mouth once daily. )    [DISCONTINUED] liraglutide (SAXENDA) 3 mg/0.5 mL (18 mg/3 mL) PnIj Inject into the skin once daily.     [DISCONTINUED] metformin (GLUCOPHAGE) 1000 MG tablet Take 1,000 mg by mouth 2 (two) times daily with meals.    [DISCONTINUED] metoprolol tartrate (LOPRESSOR) 25 MG tablet Take 1 tablet (25 mg total) by mouth 2 (two) times daily.    [DISCONTINUED] pantoprazole (PROTONIX) 40 MG tablet Take 1 tablet (40 mg total) by mouth 2 (two) times daily. for 14 days     No current facility-administered medications on file prior to visit.      Review of patient's allergies indicates:   Allergen Reactions    Iodinated contrast- oral and iv dye      States, "My arteries started shutting down on me" pt states only to iv dye- was specifically told that oral dye does not have the same reaction     Review of Systems   Constitution: Negative for malaise/fatigue.   Eyes: Negative for blurred vision.   Cardiovascular: Negative for chest pain, claudication, cyanosis, dyspnea on exertion, irregular heartbeat, leg swelling, near-syncope, orthopnea, palpitations and paroxysmal nocturnal dyspnea.   Respiratory: Negative for cough, hemoptysis and shortness of breath.    Hematologic/Lymphatic: Negative for bleeding problem. Does not bruise/bleed easily.   Skin: Negative for dry skin and itching.   Musculoskeletal: Negative for falls, muscle weakness and myalgias.   Gastrointestinal: Negative for abdominal pain, diarrhea, heartburn, hematemesis, hematochezia and melena.   Genitourinary: Negative for flank pain and hematuria.   Neurological: Positive for disturbances in coordination, light-headedness and weakness. Negative for dizziness, focal weakness, headaches, numbness, paresthesias and seizures.   Psychiatric/Behavioral: Negative for altered mental status and memory loss. The patient is not nervous/anxious.  " "  Allergic/Immunologic: Negative for hives.       Objective:   Physical Exam   Constitutional: He is oriented to person, place, and time. He appears well-developed and well-nourished. No distress.   HENT:   Head: Normocephalic and atraumatic.   Eyes: Pupils are equal, round, and reactive to light. EOM are normal. Right eye exhibits no discharge. Left eye exhibits no discharge.   Neck: Neck supple. No JVD present. No thyromegaly present.   Cardiovascular: Normal rate, regular rhythm, normal heart sounds and intact distal pulses. Exam reveals no gallop and no friction rub.   No murmur heard.  Pulses:       Carotid pulses are 2+ on the right side, and 2+ on the left side.       Radial pulses are 2+ on the right side, and 2+ on the left side.        Femoral pulses are 2+ on the right side, and 2+ on the left side.       Popliteal pulses are 2+ on the right side, and 2+ on the left side.        Dorsalis pedis pulses are 2+ on the right side, and 2+ on the left side.        Posterior tibial pulses are 2+ on the right side, and 2+ on the left side.   Pulmonary/Chest: Effort normal and breath sounds normal. No respiratory distress. He has no wheezes. He has no rales. He exhibits no tenderness.   Abdominal: Soft. Bowel sounds are normal. He exhibits no distension. There is no tenderness.   Musculoskeletal: Normal range of motion. He exhibits no edema.   Neurological: He is alert and oriented to person, place, and time. No cranial nerve deficit.   Skin: Skin is warm and dry. No rash noted. He is not diaphoretic. No erythema.   Psychiatric: He has a normal mood and affect. His behavior is normal.   Nursing note and vitals reviewed.    Vitals:    05/23/19 0926   BP: 96/70   BP Method: Large (Manual)   Pulse: 60   Weight: 101.6 kg (223 lb 15.8 oz)   Height: 6' 1" (1.854 m)     Lab Results   Component Value Date    CHOL 161 01/19/2017    CHOL 171 08/03/2016    CHOL 147 07/14/2014     Lab Results   Component Value Date    HDL 32 " (L) 01/19/2017    HDL 25 (L) 08/03/2016    HDL 31 (L) 07/14/2014     Lab Results   Component Value Date    LDLCALC 89.2 01/19/2017    LDLCALC 106.4 08/03/2016    LDLCALC 86.6 07/14/2014     Lab Results   Component Value Date    TRIG 199 (H) 01/19/2017    TRIG 198 (H) 08/03/2016    TRIG 147 07/14/2014     Lab Results   Component Value Date    CHOLHDL 19.9 (L) 01/19/2017    CHOLHDL 14.6 (L) 08/03/2016    CHOLHDL 21.1 07/14/2014       Chemistry        Component Value Date/Time     01/15/2019 0953    K 4.2 01/15/2019 0953     01/15/2019 0953    CO2 31 (H) 01/15/2019 0953    BUN 19 01/15/2019 0953    CREATININE 0.9 01/15/2019 0953    GLU 93 01/15/2019 0953        Component Value Date/Time    CALCIUM 9.8 01/15/2019 0953    ALKPHOS 70 01/15/2019 0953    AST 13 01/15/2019 0953    ALT 13 01/15/2019 0953    BILITOT 0.9 01/15/2019 0953    ESTGFRAFRICA >60.0 01/15/2019 0953    EGFRNONAA >60.0 01/15/2019 0953          Lab Results   Component Value Date    TSH 2.051 01/15/2019     Lab Results   Component Value Date    INR 1.1 09/30/2018    INR 1.0 09/28/2018    INR 1.0 08/03/2016     Lab Results   Component Value Date    WBC 5.36 01/15/2019    HGB 15.2 01/15/2019    HCT 46.6 01/15/2019    MCV 96 01/15/2019     01/15/2019     BMP  Sodium   Date Value Ref Range Status   01/15/2019 145 136 - 145 mmol/L Final     Potassium   Date Value Ref Range Status   01/15/2019 4.2 3.5 - 5.1 mmol/L Final     Chloride   Date Value Ref Range Status   01/15/2019 105 95 - 110 mmol/L Final     CO2   Date Value Ref Range Status   01/15/2019 31 (H) 23 - 29 mmol/L Final     BUN, Bld   Date Value Ref Range Status   01/15/2019 19 8 - 23 mg/dL Final     Creatinine   Date Value Ref Range Status   01/15/2019 0.9 0.5 - 1.4 mg/dL Final     Calcium   Date Value Ref Range Status   01/15/2019 9.8 8.7 - 10.5 mg/dL Final     Anion Gap   Date Value Ref Range Status   01/15/2019 9 8 - 16 mmol/L Final     eGFR if    Date Value Ref  Range Status   01/15/2019 >60.0 >60 mL/min/1.73 m^2 Final     eGFR if non    Date Value Ref Range Status   01/15/2019 >60.0 >60 mL/min/1.73 m^2 Final     Comment:     Calculation used to obtain the estimated glomerular filtration  rate (eGFR) is the CKD-EPI equation.        CrCl cannot be calculated (Patient's most recent lab result is older than the maximum 7 days allowed.).    Assessment:     1. Atherosclerosis of native coronary artery with angina pectoris, unspecified whether native or transplanted heart    2. S/P PTCA (percutaneous transluminal coronary angioplasty)    3. Essential hypertension    4. Hyperlipidemia, unspecified hyperlipidemia type    5. RBBB    6. SATYA on CPAP    7. PTSD (post-traumatic stress disorder)    8. Shortness of breath    9. S/P laparoscopic sleeve gastrectomy    10. CAD S/P percutaneous coronary angioplasty    11. Type 2 diabetes mellitus without complication, without long-term current use of insulin    12. Obesity (BMI 30-39.9)    13. Unstable angina pectoris    14. Mixed restrictive and obstructive lung disease      STABLE CARDIAC WISE IS ASYMPTOMATIC   NEEDS TO START EXERCISING AND BUILD UP HIS STAMINA AND HIS MUSCLE MASS.   Plan:   Continue current therapy  Cardiac low salt diet.  Risk factor modification and excercise program.  F/u in 6 months with lipid cmp   [facility]  WILL STOP AMLODIPINE AND MAKE SURE RANEXA  MG PO BID.THIS WILL HELP WITH ORTOSTASIS.   COUNSELED ABOUT HYDRATION AND NOT GETTING OVER HEATED.

## 2019-06-19 ENCOUNTER — OFFICE VISIT (OUTPATIENT)
Dept: INTERNAL MEDICINE | Facility: CLINIC | Age: 69
End: 2019-06-19
Payer: MEDICARE

## 2019-06-19 VITALS
SYSTOLIC BLOOD PRESSURE: 114 MMHG | TEMPERATURE: 101 F | HEIGHT: 73 IN | BODY MASS INDEX: 29.51 KG/M2 | DIASTOLIC BLOOD PRESSURE: 78 MMHG | OXYGEN SATURATION: 97 % | WEIGHT: 222.69 LBS | HEART RATE: 74 BPM

## 2019-06-19 DIAGNOSIS — J06.9 ACUTE URI: Primary | ICD-10-CM

## 2019-06-19 PROCEDURE — 99213 PR OFFICE/OUTPT VISIT, EST, LEVL III, 20-29 MIN: ICD-10-PCS | Mod: 25,S$PBB,, | Performed by: NURSE PRACTITIONER

## 2019-06-19 PROCEDURE — 99999 PR PBB SHADOW E&M-EST. PATIENT-LVL IV: CPT | Mod: PBBFAC,,, | Performed by: NURSE PRACTITIONER

## 2019-06-19 PROCEDURE — 99213 OFFICE O/P EST LOW 20 MIN: CPT | Mod: 25,S$PBB,, | Performed by: NURSE PRACTITIONER

## 2019-06-19 PROCEDURE — 99214 OFFICE O/P EST MOD 30 MIN: CPT | Mod: PBBFAC,PO,25 | Performed by: NURSE PRACTITIONER

## 2019-06-19 PROCEDURE — 99999 PR PBB SHADOW E&M-EST. PATIENT-LVL IV: ICD-10-PCS | Mod: PBBFAC,,, | Performed by: NURSE PRACTITIONER

## 2019-06-19 PROCEDURE — 96372 THER/PROPH/DIAG INJ SC/IM: CPT | Mod: PBBFAC,PO

## 2019-06-19 RX ORDER — HYDROCODONE POLISTIREX AND CHLORPHENIRAMINE POLISTIREX 10; 8 MG/5ML; MG/5ML
5 SUSPENSION, EXTENDED RELEASE ORAL EVERY 12 HOURS PRN
Qty: 115 ML | Refills: 0 | Status: SHIPPED | OUTPATIENT
Start: 2019-06-19 | End: 2020-10-09

## 2019-06-19 RX ORDER — METHYLPREDNISOLONE ACETATE 80 MG/ML
80 INJECTION, SUSPENSION INTRA-ARTICULAR; INTRALESIONAL; INTRAMUSCULAR; SOFT TISSUE ONCE
Qty: 1 ML | Refills: 0 | Status: SHIPPED | OUTPATIENT
Start: 2019-06-19 | End: 2019-06-19

## 2019-06-19 RX ORDER — METHYLPREDNISOLONE ACETATE 80 MG/ML
80 INJECTION, SUSPENSION INTRA-ARTICULAR; INTRALESIONAL; INTRAMUSCULAR; SOFT TISSUE ONCE
Status: COMPLETED | OUTPATIENT
Start: 2019-06-19 | End: 2019-06-19

## 2019-06-19 RX ORDER — DOXYCYCLINE 100 MG/1
100 CAPSULE ORAL EVERY 12 HOURS
Qty: 14 CAPSULE | Refills: 0 | Status: SHIPPED | OUTPATIENT
Start: 2019-06-19 | End: 2020-10-08

## 2019-06-19 RX ADMIN — METHYLPREDNISOLONE ACETATE 80 MG: 80 INJECTION, SUSPENSION INTRALESIONAL; INTRAMUSCULAR; INTRASYNOVIAL; SOFT TISSUE at 01:06

## 2019-06-19 NOTE — PROGRESS NOTES
Subjective:      Patient ID: Cole Doan is a 68 y.o. male.    Chief Complaint: Cough and Chest Congestion    HPI:  Patient states for the last 3 days or more he has been congested, coughing, brining up mucus at times.  Has a hx of sob, uses a cpap normally, couldn't use it due to his coughing. Temp of 100.6 today. Has used OTC lozenges for his throat.      Past Medical History:   Diagnosis Date    Acute coronary syndrome     Anticoagulant long-term use     Plavix    BPH (benign prostatic hypertrophy)     CAD (coronary artery disease)     HTN (hypertension) 5/15/2014    Hyperlipidemia     Obesity 5/15/2014    SATYA on CPAP     Pneumonia     PTSD (post-traumatic stress disorder) 5/15/2014    RBBB 5/15/2014    S/P PTCA (percutaneous transluminal coronary angioplasty) 5/15/2014    Special screening for malignant neoplasms, colon 1/22/2014    Type 2 diabetes mellitus without complication 9/28/2015       Past Surgical History:   Procedure Laterality Date    APPENDECTOMY      at age 14    ATRIAL CARDIAC PACEMAKER INSERTION      COLONOSCOPY Left 4/27/2018    [facility]    COLONOSCOPY N/A 5/14/2015    Performed by Travis Miguel [facility]    COLONOSCOPY N/A 1/22/2014    Performed by [facility] at [facility]    CORONARY ANGIOPLASTY WITH STENT PLACEMENT      1990, 2008, 2012    ESOPHAGOGASTRODUODENOSCOPY (EGD) N/A 8/16/2018    Performed by Mario Cesar [facility] at [facility] ENDO    HEART CATH-LEFT Left 9/28/2015    Performed by Eric Rutledge MD at [facility] CATH LAB    VASECTOMY      XI ROBOTIC SLEEVE GASTRECTOMY N/A 9/27/2018    Performed by Mario Cesar MD at [facility] OR       Lab Results   Component Value Date    WBC 5.36 01/15/2019    HGB 15.2 01/15/2019    HCT 46.6 01/15/2019     01/15/2019    CHOL 161 01/19/2017    TRIG 199 (H) 01/19/2017    HDL 32 (L) 01/19/2017    ALT 13 01/15/2019    AST 13 01/15/2019     01/15/2019    K 4.2 01/15/2019    CL  "105 01/15/2019    CREATININE 0.9 01/15/2019    BUN 19 01/15/2019    CO2 31 (H) 01/15/2019    TSH 2.051 01/15/2019    INR 1.1 09/30/2018    HGBA1C 5.3 01/15/2019       /78   Pulse 74   Temp (!) 100.6 °F (38.1 °C) (Tympanic)   Ht 6' 1" (1.854 m)   Wt 101 kg (222 lb 10.6 oz)   SpO2 97%   BMI 29.38 kg/m²       Review of Systems   Constitutional: Positive for fatigue and fever. Negative for appetite change, chills and diaphoresis.   HENT: Positive for congestion and sore throat. Negative for ear pain, postnasal drip, rhinorrhea, sneezing and trouble swallowing.    Eyes: Negative for photophobia, pain and visual disturbance.   Respiratory: Positive for cough. Negative for apnea, choking, chest tightness, shortness of breath and wheezing.    Cardiovascular: Negative for chest pain, palpitations and leg swelling.   Gastrointestinal: Negative for abdominal pain, constipation, diarrhea, nausea and vomiting.   Genitourinary: Negative for decreased urine volume, difficulty urinating, dysuria, hematuria and urgency.   Musculoskeletal: Negative for arthralgias, gait problem, joint swelling and myalgias.   Skin: Negative for rash.   Neurological: Negative for dizziness, tremors, seizures, syncope, weakness, light-headedness, numbness and headaches.   Psychiatric/Behavioral: Negative for agitation, confusion, decreased concentration, hallucinations and sleep disturbance. The patient is not nervous/anxious.       Objective:     Physical Exam   Constitutional: He is oriented to person, place, and time. He appears well-developed and well-nourished. No distress.   HENT:   Head: Normocephalic and atraumatic.   Mouth/Throat: No oropharyngeal exudate.   Posterior pharynx is red, no exudate   Cardiovascular: Normal rate, regular rhythm and normal heart sounds. Exam reveals no gallop and no friction rub.   No murmur heard.  Pulmonary/Chest: Effort normal and breath sounds normal. No respiratory distress. He has no wheezes. He " "has no rales. He exhibits no tenderness.   Bilateral coarse lungs, cleared a little with coughing,    Musculoskeletal: Normal range of motion. He exhibits no edema or tenderness.   Neurological: He is alert and oriented to person, place, and time. No cranial nerve deficit.   Skin: Skin is warm and dry. He is not diaphoretic.   Psychiatric: He has a normal mood and affect. His behavior is normal.     Assessment:      1. Acute URI      Plan:   Acute URI    Other orders  -     hydrocodone-chlorpheniramine (TUSSIONEX) 10-8 mg/5 mL suspension; Take 5 mLs by mouth every 12 (twelve) hours as needed for Cough.  Dispense: 115 mL; Refill: 0  -     methylPREDNISolone acetate injection 80 mg  -     doxycycline (VIBRAMYCIN) 100 MG Cap; Take 1 capsule (100 mg total) by mouth every 12 (twelve) hours.  Dispense: 14 capsule; Refill: 0          Current Outpatient Medications:     alprazolam (XANAX) 0.5 MG tablet, Take 0.5 mg by mouth 3 (three) times daily as needed. , Disp: , Rfl:     amLODIPine (NORVASC) 2.5 MG tablet, TAKE 1 TABLET BY MOUTH ONCE DAILY., Disp: 90 tablet, Rfl: 3    aspirin (ECOTRIN) 81 MG EC tablet, Take 81 mg by mouth once daily., Disp: , Rfl:     atorvastatin (LIPITOR) 80 MG tablet, Take 80 mg by mouth once daily. , Disp: , Rfl:     buPROPion (WELLBUTRIN SR) 200 MG TbSR, Take 200 mg by mouth once daily., Disp: , Rfl:     clopidogrel (PLAVIX) 75 mg tablet, TAKE 1 TABLET BY MOUTH EVERY DAY, Disp: 30 tablet, Rfl: 6    cyanocobalamin (VITAMIN B-12) 1000 MCG tablet, Take 100 mcg by mouth once daily., Disp: , Rfl:     dabigatran etexilate (PRADAXA) 150 mg Cap, Take 150 mg by mouth once daily. "Do NOT break, chew, or open capsules." , Disp: , Rfl:     furosemide (LASIX) 40 MG tablet, Takes one-half tablet by mouth daily (Patient taking differently: Take 40 mg by mouth as needed. Takes 2 tablet by mouth daily), Disp: 30 tablet, Rfl: 1    gabapentin (NEURONTIN) 300 MG capsule, Take 300 mg by mouth 3 (three) times " daily. , Disp: , Rfl:     ipratropium-albuterol (COMBIVENT RESPIMAT)  mcg/actuation inhaler, Inhale 1 puff into the lungs 4 (four) times daily., Disp: 4 g, Rfl: 11    metoprolol tartrate (LOPRESSOR) 100 MG tablet, Take 100 mg by mouth 2 (two) times daily., Disp: , Rfl:     nitroGLYCERIN (NITROSTAT) 0.4 MG SL tablet, Place 1 tablet (0.4 mg total) under the tongue every 5 (five) minutes as needed for Chest pain., Disp: 60 tablet, Rfl: 12    nozaseptin (NOZIN) nasal , 1 each by Each Nare route 2 (two) times daily., Disp: 1 applicator, Rfl: 0    ranolazine (RANEXA) 500 MG Tb12, Take 500 mg by mouth 2 (two) times daily., Disp: , Rfl:     sildenafil (VIAGRA) 100 MG tablet, Take 1 tablet (100 mg total) by mouth daily as needed for Erectile Dysfunction., Disp: 6 tablet, Rfl: 3    vitamin D 1000 units Tab, Take 2,000 Units by mouth once daily., Disp: , Rfl:     doxycycline (VIBRAMYCIN) 100 MG Cap, Take 1 capsule (100 mg total) by mouth every 12 (twelve) hours., Disp: 14 capsule, Rfl: 0    hydrocodone-chlorpheniramine (TUSSIONEX) 10-8 mg/5 mL suspension, Take 5 mLs by mouth every 12 (twelve) hours as needed for Cough., Disp: 115 mL, Rfl: 0  No current facility-administered medications for this visit.

## 2019-11-13 NOTE — TELEPHONE ENCOUNTER
FINAL PATHOLOGIC DIAGNOSIS  Colon polyp:  Tubular adenoma.    Biopsy results sent to the patient.    adriana     74.8

## 2019-12-02 DIAGNOSIS — I10 ESSENTIAL HYPERTENSION: Primary | ICD-10-CM

## 2019-12-05 ENCOUNTER — OFFICE VISIT (OUTPATIENT)
Dept: CARDIOLOGY | Facility: CLINIC | Age: 69
End: 2019-12-05
Payer: MEDICARE

## 2019-12-05 ENCOUNTER — CLINICAL SUPPORT (OUTPATIENT)
Dept: CARDIOLOGY | Facility: CLINIC | Age: 69
End: 2019-12-05
Payer: MEDICARE

## 2019-12-05 VITALS
SYSTOLIC BLOOD PRESSURE: 92 MMHG | OXYGEN SATURATION: 99 % | HEIGHT: 73 IN | WEIGHT: 208.31 LBS | BODY MASS INDEX: 27.61 KG/M2 | HEART RATE: 59 BPM | DIASTOLIC BLOOD PRESSURE: 68 MMHG

## 2019-12-05 DIAGNOSIS — I10 ESSENTIAL HYPERTENSION: ICD-10-CM

## 2019-12-05 DIAGNOSIS — Z98.84 S/P LAPAROSCOPIC SLEEVE GASTRECTOMY: ICD-10-CM

## 2019-12-05 DIAGNOSIS — G47.33 OSA ON CPAP: ICD-10-CM

## 2019-12-05 DIAGNOSIS — I25.10 CAD S/P PERCUTANEOUS CORONARY ANGIOPLASTY: ICD-10-CM

## 2019-12-05 DIAGNOSIS — R53.81 PHYSICAL DECONDITIONING: ICD-10-CM

## 2019-12-05 DIAGNOSIS — F43.10 PTSD (POST-TRAUMATIC STRESS DISORDER): ICD-10-CM

## 2019-12-05 DIAGNOSIS — Z98.61 CAD S/P PERCUTANEOUS CORONARY ANGIOPLASTY: ICD-10-CM

## 2019-12-05 DIAGNOSIS — E78.5 HYPERLIPIDEMIA, UNSPECIFIED HYPERLIPIDEMIA TYPE: ICD-10-CM

## 2019-12-05 DIAGNOSIS — I25.119 ATHEROSCLEROSIS OF NATIVE CORONARY ARTERY WITH ANGINA PECTORIS, UNSPECIFIED WHETHER NATIVE OR TRANSPLANTED HEART: Primary | ICD-10-CM

## 2019-12-05 DIAGNOSIS — E11.9 TYPE 2 DIABETES MELLITUS WITHOUT COMPLICATION, WITHOUT LONG-TERM CURRENT USE OF INSULIN: ICD-10-CM

## 2019-12-05 DIAGNOSIS — J43.9 MIXED RESTRICTIVE AND OBSTRUCTIVE LUNG DISEASE: ICD-10-CM

## 2019-12-05 DIAGNOSIS — J98.4 MIXED RESTRICTIVE AND OBSTRUCTIVE LUNG DISEASE: ICD-10-CM

## 2019-12-05 DIAGNOSIS — R06.02 SHORTNESS OF BREATH: ICD-10-CM

## 2019-12-05 DIAGNOSIS — Z98.61 S/P PTCA (PERCUTANEOUS TRANSLUMINAL CORONARY ANGIOPLASTY): ICD-10-CM

## 2019-12-05 DIAGNOSIS — I45.10 RBBB: ICD-10-CM

## 2019-12-05 PROCEDURE — 1126F PR PAIN SEVERITY QUANTIFIED, NO PAIN PRESENT: ICD-10-PCS | Mod: ,,, | Performed by: INTERNAL MEDICINE

## 2019-12-05 PROCEDURE — 99999 PR PBB SHADOW E&M-EST. PATIENT-LVL III: CPT | Mod: PBBFAC,,, | Performed by: INTERNAL MEDICINE

## 2019-12-05 PROCEDURE — 99213 OFFICE O/P EST LOW 20 MIN: CPT | Mod: PBBFAC | Performed by: INTERNAL MEDICINE

## 2019-12-05 PROCEDURE — 99999 PR PBB SHADOW E&M-EST. PATIENT-LVL III: ICD-10-PCS | Mod: PBBFAC,,, | Performed by: INTERNAL MEDICINE

## 2019-12-05 PROCEDURE — 1159F PR MEDICATION LIST DOCUMENTED IN MEDICAL RECORD: ICD-10-PCS | Mod: ,,, | Performed by: INTERNAL MEDICINE

## 2019-12-05 PROCEDURE — 99214 OFFICE O/P EST MOD 30 MIN: CPT | Mod: S$PBB,,, | Performed by: INTERNAL MEDICINE

## 2019-12-05 PROCEDURE — 93005 ELECTROCARDIOGRAM TRACING: CPT | Mod: PBBFAC | Performed by: INTERNAL MEDICINE

## 2019-12-05 PROCEDURE — 93010 ELECTROCARDIOGRAM REPORT: CPT | Mod: S$PBB,,, | Performed by: INTERNAL MEDICINE

## 2019-12-05 PROCEDURE — 1126F AMNT PAIN NOTED NONE PRSNT: CPT | Mod: ,,, | Performed by: INTERNAL MEDICINE

## 2019-12-05 PROCEDURE — 99214 PR OFFICE/OUTPT VISIT, EST, LEVL IV, 30-39 MIN: ICD-10-PCS | Mod: S$PBB,,, | Performed by: INTERNAL MEDICINE

## 2019-12-05 PROCEDURE — 93010 EKG 12-LEAD: ICD-10-PCS | Mod: S$PBB,,, | Performed by: INTERNAL MEDICINE

## 2019-12-05 PROCEDURE — 1159F MED LIST DOCD IN RCRD: CPT | Mod: ,,, | Performed by: INTERNAL MEDICINE

## 2019-12-05 RX ORDER — THIAMINE HCL 50 MG
50 TABLET ORAL DAILY
COMMUNITY

## 2019-12-05 RX ORDER — FERROUS SULFATE, DRIED 160(50) MG
1 TABLET, EXTENDED RELEASE ORAL 2 TIMES DAILY WITH MEALS
COMMUNITY

## 2019-12-05 RX ORDER — OMEPRAZOLE 20 MG/1
20 CAPSULE, DELAYED RELEASE ORAL DAILY
COMMUNITY

## 2019-12-05 RX ORDER — CETIRIZINE HYDROCHLORIDE 10 MG/1
10 TABLET ORAL
COMMUNITY

## 2019-12-05 RX ORDER — METOPROLOL SUCCINATE 100 MG/1
100 TABLET, EXTENDED RELEASE ORAL DAILY
COMMUNITY

## 2019-12-05 RX ORDER — FLUTICASONE PROPIONATE 50 MCG
1 SPRAY, SUSPENSION (ML) NASAL DAILY
COMMUNITY

## 2019-12-05 RX ORDER — KETOTIFEN FUMARATE 0.35 MG/ML
1 SOLUTION/ DROPS OPHTHALMIC 2 TIMES DAILY
COMMUNITY

## 2019-12-05 RX ORDER — DICLOFENAC SODIUM 1 MG/ML
1 SOLUTION/ DROPS OPHTHALMIC 4 TIMES DAILY
COMMUNITY

## 2019-12-05 RX ORDER — ROSUVASTATIN CALCIUM 40 MG/1
40 TABLET, COATED ORAL NIGHTLY
COMMUNITY

## 2019-12-05 RX ORDER — METRONIDAZOLE 7.5 MG/G
CREAM TOPICAL 2 TIMES DAILY
COMMUNITY

## 2019-12-05 RX ORDER — TALC
6 POWDER (GRAM) TOPICAL NIGHTLY
COMMUNITY
End: 2022-07-18

## 2019-12-05 NOTE — PROGRESS NOTES
Subjective:   Patient ID:  Cole Doan is a 69 y.o. male who presents for follow up of Follow-up      HPI  A 68 yo male with cad s/p pacer htn hlp satya is here fro f/u had sleeve surgery lost a lot of weight gfeels great no angina no shortness of breath eh is fairly active works a lot at the [employer]. Has no chf no tia claudication his pacer site is ok checked  [facility]  Past Medical History:   Diagnosis Date    Acute coronary syndrome     Anticoagulant long-term use     Plavix    BPH (benign prostatic hypertrophy)     CAD (coronary artery disease)     HTN (hypertension) 5/15/2014    Hyperlipidemia     Obesity 5/15/2014    SATYA on CPAP     Pneumonia     PTSD (post-traumatic stress disorder) 5/15/2014    RBBB 5/15/2014    S/P PTCA (percutaneous transluminal coronary angioplasty) 5/15/2014    Special screening for malignant neoplasms, colon 2014    Type 2 diabetes mellitus without complication 2015       Past Surgical History:   Procedure Laterality Date    APPENDECTOMY      at age 14    ATRIAL CARDIAC PACEMAKER INSERTION      COLONOSCOPY Left 2018    Procedure: COLONOSCOPY;  Surgeon: Artem Medeiros MD;  Location: [facility] ENDO;  Service: Endoscopy;  Laterality: Left;    CORONARY ANGIOPLASTY WITH STENT PLACEMENT      , ,     ESOPHAGOGASTRODUODENOSCOPY N/A 2018    Procedure: ESOPHAGOGASTRODUODENOSCOPY (EGD);  Surgeon: Mario Cesar MD;  Location: [facility] ENDO;  Service: General;  Laterality: N/A;    ROBOT-ASSISTED LAPAROSCOPIC SLEEVE GASTRECTOMY USING DA LURDES XI N/A 2018    Procedure: XI ROBOTIC SLEEVE GASTRECTOMY;  Surgeon: Mario Cesar MD;  Location: [facility] OR;  Service: General;  Laterality: N/A;    VASECTOMY         Social History     Tobacco Use    Smoking status: Former Smoker     Packs/day: 1.00     Years: 40.00     Pack years: 40.00     Types: Cigarettes     Start date:      Last attempt to quit: 2009     Years since quittin.9  "   Smokeless tobacco: Never Used   Substance Use Topics    Alcohol use: No    Drug use: No       Family History   Problem Relation Age of Onset    Cataracts Father     Heart disease Father     Hypertension Father     Heart disease Mother     Hypertension Mother     Colon cancer Neg Hx        Current Outpatient Medications   Medication Sig    ALBUTEROL INHL Inhale into the lungs.    alprazolam (XANAX) 0.5 MG tablet Take 0.5 mg by mouth 3 (three) times daily as needed.     amLODIPine (NORVASC) 2.5 MG tablet TAKE 1 TABLET BY MOUTH ONCE DAILY.    aspirin (ECOTRIN) 81 MG EC tablet Take 81 mg by mouth once daily.    atorvastatin (LIPITOR) 80 MG tablet Take 80 mg by mouth once daily.     buPROPion (WELLBUTRIN SR) 200 MG TbSR Take 200 mg by mouth once daily.    calcium-vitamin D3 (CALCIUM 500 + D) 500 mg(1,250mg) -200 unit per tablet Take 1 tablet by mouth 2 (two) times daily with meals.    cetirizine (ZYRTEC) 10 MG tablet Take 10 mg by mouth once daily.    cyanocobalamin (VITAMIN B-12) 1000 MCG tablet Take 100 mcg by mouth once daily.    dabigatran etexilate (PRADAXA) 150 mg Cap Take 150 mg by mouth once daily. "Do NOT break, chew, or open capsules."     diclofenac (VOLTAREN) 0.1 % ophthalmic solution 1 drop 4 (four) times daily.    doxycycline (VIBRAMYCIN) 100 MG Cap Take 1 capsule (100 mg total) by mouth every 12 (twelve) hours.    fluticasone propionate (FLONASE) 50 mcg/actuation nasal spray 1 spray by Each Nostril route once daily.    furosemide (LASIX) 40 MG tablet Takes one-half tablet by mouth daily (Patient taking differently: Take 40 mg by mouth as needed. Takes 2 tablet by mouth daily)    ketotifen (ZADITOR) 0.025 % (0.035 %) ophthalmic solution 1 drop 2 (two) times daily.    liraglutide 0.6 mg/0.1 mL, 18 mg/3 mL, subq PNIJ (VICTOZA 2-ZACHARY) 0.6 mg/0.1 mL (18 mg/3 mL) PnIj Inject 0.6 mg into the skin once daily.    melatonin (MELATIN) Take 6 mg by mouth every evening.    metoprolol " succinate (TOPROL-XL) 100 MG 24 hr tablet Take 100 mg by mouth once daily.    metronidazole 0.75% (METROCREAM) 0.75 % Crea Apply topically 2 (two) times daily.    nitroGLYCERIN (NITROSTAT) 0.4 MG SL tablet Place 1 tablet (0.4 mg total) under the tongue every 5 (five) minutes as needed for Chest pain.    nozaseptin (NOZIN) nasal  1 each by Each Nare route 2 (two) times daily.    omeprazole (PRILOSEC) 20 MG capsule Take 20 mg by mouth once daily.    ranolazine (RANEXA) 500 MG Tb12 Take 500 mg by mouth 2 (two) times daily.    rosuvastatin (CRESTOR) 40 MG Tab Take 10 mg by mouth every evening.    sildenafil (VIAGRA) 100 MG tablet Take 1 tablet (100 mg total) by mouth daily as needed for Erectile Dysfunction.    thiamine (VITAMIN B-1) 50 MG tablet Take 50 mg by mouth once daily.    vitamin D 1000 units Tab Take 2,000 Units by mouth once daily.    white petrolatum (ALOE VESTA) 43 % Oint Apply topically.    clopidogrel (PLAVIX) 75 mg tablet TAKE 1 TABLET BY MOUTH EVERY DAY    gabapentin (NEURONTIN) 300 MG capsule Take 300 mg by mouth 3 (three) times daily.     hydrocodone-chlorpheniramine (TUSSIONEX) 10-8 mg/5 mL suspension Take 5 mLs by mouth every 12 (twelve) hours as needed for Cough.    ipratropium-albuterol (COMBIVENT RESPIMAT)  mcg/actuation inhaler Inhale 1 puff into the lungs 4 (four) times daily.    metoprolol tartrate (LOPRESSOR) 100 MG tablet Take 100 mg by mouth 2 (two) times daily.     No current facility-administered medications for this visit.      Current Outpatient Medications on File Prior to Visit   Medication Sig    ALBUTEROL INHL Inhale into the lungs.    alprazolam (XANAX) 0.5 MG tablet Take 0.5 mg by mouth 3 (three) times daily as needed.     amLODIPine (NORVASC) 2.5 MG tablet TAKE 1 TABLET BY MOUTH ONCE DAILY.    aspirin (ECOTRIN) 81 MG EC tablet Take 81 mg by mouth once daily.    atorvastatin (LIPITOR) 80 MG tablet Take 80 mg by mouth once daily.     buPROPion  "(WELLBUTRIN SR) 200 MG TbSR Take 200 mg by mouth once daily.    calcium-vitamin D3 (CALCIUM 500 + D) 500 mg(1,250mg) -200 unit per tablet Take 1 tablet by mouth 2 (two) times daily with meals.    cetirizine (ZYRTEC) 10 MG tablet Take 10 mg by mouth once daily.    cyanocobalamin (VITAMIN B-12) 1000 MCG tablet Take 100 mcg by mouth once daily.    dabigatran etexilate (PRADAXA) 150 mg Cap Take 150 mg by mouth once daily. "Do NOT break, chew, or open capsules."     diclofenac (VOLTAREN) 0.1 % ophthalmic solution 1 drop 4 (four) times daily.    doxycycline (VIBRAMYCIN) 100 MG Cap Take 1 capsule (100 mg total) by mouth every 12 (twelve) hours.    fluticasone propionate (FLONASE) 50 mcg/actuation nasal spray 1 spray by Each Nostril route once daily.    furosemide (LASIX) 40 MG tablet Takes one-half tablet by mouth daily (Patient taking differently: Take 40 mg by mouth as needed. Takes 2 tablet by mouth daily)    ketotifen (ZADITOR) 0.025 % (0.035 %) ophthalmic solution 1 drop 2 (two) times daily.    liraglutide 0.6 mg/0.1 mL, 18 mg/3 mL, subq PNIJ (VICTOZA 2-ZACHARY) 0.6 mg/0.1 mL (18 mg/3 mL) PnIj Inject 0.6 mg into the skin once daily.    melatonin (MELATIN) Take 6 mg by mouth every evening.    metoprolol succinate (TOPROL-XL) 100 MG 24 hr tablet Take 100 mg by mouth once daily.    metronidazole 0.75% (METROCREAM) 0.75 % Crea Apply topically 2 (two) times daily.    nitroGLYCERIN (NITROSTAT) 0.4 MG SL tablet Place 1 tablet (0.4 mg total) under the tongue every 5 (five) minutes as needed for Chest pain.    nozaseptin (NOZIN) nasal  1 each by Each Nare route 2 (two) times daily.    omeprazole (PRILOSEC) 20 MG capsule Take 20 mg by mouth once daily.    ranolazine (RANEXA) 500 MG Tb12 Take 500 mg by mouth 2 (two) times daily.    rosuvastatin (CRESTOR) 40 MG Tab Take 10 mg by mouth every evening.    sildenafil (VIAGRA) 100 MG tablet Take 1 tablet (100 mg total) by mouth daily as needed for Erectile " "Dysfunction.    thiamine (VITAMIN B-1) 50 MG tablet Take 50 mg by mouth once daily.    vitamin D 1000 units Tab Take 2,000 Units by mouth once daily.    white petrolatum (ALOE VESTA) 43 % Oint Apply topically.    clopidogrel (PLAVIX) 75 mg tablet TAKE 1 TABLET BY MOUTH EVERY DAY    gabapentin (NEURONTIN) 300 MG capsule Take 300 mg by mouth 3 (three) times daily.     hydrocodone-chlorpheniramine (TUSSIONEX) 10-8 mg/5 mL suspension Take 5 mLs by mouth every 12 (twelve) hours as needed for Cough.    ipratropium-albuterol (COMBIVENT RESPIMAT)  mcg/actuation inhaler Inhale 1 puff into the lungs 4 (four) times daily.    metoprolol tartrate (LOPRESSOR) 100 MG tablet Take 100 mg by mouth 2 (two) times daily.     No current facility-administered medications on file prior to visit.      Review of patient's allergies indicates:   Allergen Reactions    Iodinated contrast media      States, "My arteries started shutting down on me" pt states only to iv dye- was specifically told that oral dye does not have the same reaction     Review of Systems   Constitution: Negative for malaise/fatigue.   Eyes: Negative for blurred vision.   Cardiovascular: Negative for chest pain, claudication, cyanosis, dyspnea on exertion, irregular heartbeat, leg swelling, near-syncope, orthopnea, palpitations and paroxysmal nocturnal dyspnea.   Respiratory: Negative for cough, hemoptysis and shortness of breath.    Hematologic/Lymphatic: Negative for bleeding problem. Does not bruise/bleed easily.   Skin: Negative for dry skin and itching.   Musculoskeletal: Negative for falls, muscle weakness and myalgias.   Gastrointestinal: Negative for abdominal pain, diarrhea, heartburn, hematemesis, hematochezia and melena.   Genitourinary: Negative for flank pain and hematuria.   Neurological: Negative for dizziness, focal weakness, headaches, light-headedness, numbness, paresthesias, seizures and weakness.   Psychiatric/Behavioral: Negative for " altered mental status and memory loss. The patient is not nervous/anxious.    Allergic/Immunologic: Negative for hives.       Objective:   Physical Exam   Constitutional: He is oriented to person, place, and time. He appears well-developed and well-nourished. No distress.   HENT:   Head: Normocephalic and atraumatic.   Eyes: Pupils are equal, round, and reactive to light. EOM are normal. Right eye exhibits no discharge. Left eye exhibits no discharge.   Neck: Neck supple. No JVD present. No thyromegaly present.   Cardiovascular: Normal rate, regular rhythm, normal heart sounds and intact distal pulses. Exam reveals no gallop and no friction rub.   No murmur heard.  Pulses:       Carotid pulses are 2+ on the right side, and 2+ on the left side.       Radial pulses are 2+ on the right side, and 2+ on the left side.        Femoral pulses are 2+ on the right side, and 2+ on the left side.       Popliteal pulses are 2+ on the right side, and 2+ on the left side.        Dorsalis pedis pulses are 2+ on the right side, and 2+ on the left side.        Posterior tibial pulses are 2+ on the right side, and 2+ on the left side.   Pulmonary/Chest: Effort normal and breath sounds normal. No respiratory distress. He has no wheezes. He has no rales. He exhibits no tenderness.   Pacer site well healed   Abdominal: Soft. Bowel sounds are normal. He exhibits no distension. There is no tenderness.   Musculoskeletal: Normal range of motion. He exhibits no edema.   Neurological: He is alert and oriented to person, place, and time. No cranial nerve deficit.   Skin: Skin is warm and dry. No rash noted. He is not diaphoretic. No erythema.   Psychiatric: He has a normal mood and affect. His behavior is normal.   Nursing note and vitals reviewed.    Vitals:    12/05/19 0919 12/05/19 0921   BP: 98/64 92/68   BP Location: Left arm Right arm   Patient Position: Sitting    Pulse: (!) 59    SpO2: 99%    Weight: 94.5 kg (208 lb 5.4 oz)    Height:  "6' 1" (1.854 m)      Lab Results   Component Value Date    CHOL 161 01/19/2017    CHOL 171 08/03/2016    CHOL 147 07/14/2014     Lab Results   Component Value Date    HDL 32 (L) 01/19/2017    HDL 25 (L) 08/03/2016    HDL 31 (L) 07/14/2014     Lab Results   Component Value Date    LDLCALC 89.2 01/19/2017    LDLCALC 106.4 08/03/2016    LDLCALC 86.6 07/14/2014     Lab Results   Component Value Date    TRIG 199 (H) 01/19/2017    TRIG 198 (H) 08/03/2016    TRIG 147 07/14/2014     Lab Results   Component Value Date    CHOLHDL 19.9 (L) 01/19/2017    CHOLHDL 14.6 (L) 08/03/2016    CHOLHDL 21.1 07/14/2014       Chemistry        Component Value Date/Time     01/15/2019 0953    K 4.2 01/15/2019 0953     01/15/2019 0953    CO2 31 (H) 01/15/2019 0953    BUN 19 01/15/2019 0953    CREATININE 0.9 01/15/2019 0953    GLU 93 01/15/2019 0953        Component Value Date/Time    CALCIUM 9.8 01/15/2019 0953    ALKPHOS 70 01/15/2019 0953    AST 13 01/15/2019 0953    ALT 13 01/15/2019 0953    BILITOT 0.9 01/15/2019 0953    ESTGFRAFRICA >60.0 01/15/2019 0953    EGFRNONAA >60.0 01/15/2019 0953          Lab Results   Component Value Date    TSH 2.051 01/15/2019     Lab Results   Component Value Date    INR 1.1 09/30/2018    INR 1.0 09/28/2018    INR 1.0 08/03/2016     Lab Results   Component Value Date    WBC 5.36 01/15/2019    HGB 15.2 01/15/2019    HCT 46.6 01/15/2019    MCV 96 01/15/2019     01/15/2019     BMP  Sodium   Date Value Ref Range Status   01/15/2019 145 136 - 145 mmol/L Final     Potassium   Date Value Ref Range Status   01/15/2019 4.2 3.5 - 5.1 mmol/L Final     Chloride   Date Value Ref Range Status   01/15/2019 105 95 - 110 mmol/L Final     CO2   Date Value Ref Range Status   01/15/2019 31 (H) 23 - 29 mmol/L Final     BUN, Bld   Date Value Ref Range Status   01/15/2019 19 8 - 23 mg/dL Final     Creatinine   Date Value Ref Range Status   01/15/2019 0.9 0.5 - 1.4 mg/dL Final     Calcium   Date Value Ref Range " Status   01/15/2019 9.8 8.7 - 10.5 mg/dL Final     Anion Gap   Date Value Ref Range Status   01/15/2019 9 8 - 16 mmol/L Final     eGFR if    Date Value Ref Range Status   01/15/2019 >60.0 >60 mL/min/1.73 m^2 Final     eGFR if non    Date Value Ref Range Status   01/15/2019 >60.0 >60 mL/min/1.73 m^2 Final     Comment:     Calculation used to obtain the estimated glomerular filtration  rate (eGFR) is the CKD-EPI equation.        CrCl cannot be calculated (Patient's most recent lab result is older than the maximum 7 days allowed.).      [facility]  [facility] a1c 5.2 cr 1  Sgot sgpt normal  tsh normal no lipids available,.,  Assessment:     1. Atherosclerosis of native coronary artery with angina pectoris, unspecified whether native or transplanted heart    2. S/P PTCA (percutaneous transluminal coronary angioplasty)    3. Essential hypertension    4. Hyperlipidemia, unspecified hyperlipidemia type    5. RBBB    6. SATYA on CPAP    7. PTSD (post-traumatic stress disorder)    8. Shortness of breath    9. S/P laparoscopic sleeve gastrectomy    10. CAD S/P percutaneous coronary angioplasty    11. Type 2 diabetes mellitus without complication, without long-term current use of insulin    12. Mixed restrictive and obstructive lung disease    13. Physical deconditioning      Asymptomatic cardiac wise. Has no recent lipids will check today.  a1c is normal.  Plan:     Continue current therapy  Cardiac low salt diet.  Risk factor modification and excercise program./weight loss  F/u in 6 months [facility]

## 2019-12-15 ENCOUNTER — PATIENT MESSAGE (OUTPATIENT)
Dept: CARDIOLOGY | Facility: CLINIC | Age: 69
End: 2019-12-15

## 2020-09-03 ENCOUNTER — TELEPHONE (OUTPATIENT)
Dept: CARDIOLOGY | Facility: CLINIC | Age: 70
End: 2020-09-03

## 2020-10-08 ENCOUNTER — OFFICE VISIT (OUTPATIENT)
Dept: FAMILY MEDICINE | Facility: CLINIC | Age: 70
End: 2020-10-08
Payer: MEDICARE

## 2020-10-08 VITALS
BODY MASS INDEX: 28.16 KG/M2 | OXYGEN SATURATION: 96 % | HEIGHT: 73 IN | HEART RATE: 67 BPM | DIASTOLIC BLOOD PRESSURE: 78 MMHG | WEIGHT: 212.5 LBS | SYSTOLIC BLOOD PRESSURE: 118 MMHG | TEMPERATURE: 97 F

## 2020-10-08 DIAGNOSIS — L02.415 ABSCESS OF KNEE, RIGHT: Primary | ICD-10-CM

## 2020-10-08 PROCEDURE — 99213 OFFICE O/P EST LOW 20 MIN: CPT | Mod: S$PBB,,, | Performed by: NURSE PRACTITIONER

## 2020-10-08 PROCEDURE — 99215 OFFICE O/P EST HI 40 MIN: CPT | Mod: PBBFAC,PO | Performed by: NURSE PRACTITIONER

## 2020-10-08 PROCEDURE — 87186 SC STD MICRODIL/AGAR DIL: CPT

## 2020-10-08 PROCEDURE — 99999 PR PBB SHADOW E&M-EST. PATIENT-LVL V: ICD-10-PCS | Mod: PBBFAC,,, | Performed by: NURSE PRACTITIONER

## 2020-10-08 PROCEDURE — 99999 PR PBB SHADOW E&M-EST. PATIENT-LVL V: CPT | Mod: PBBFAC,,, | Performed by: NURSE PRACTITIONER

## 2020-10-08 PROCEDURE — 99213 PR OFFICE/OUTPT VISIT, EST, LEVL III, 20-29 MIN: ICD-10-PCS | Mod: S$PBB,,, | Performed by: NURSE PRACTITIONER

## 2020-10-08 PROCEDURE — 87077 CULTURE AEROBIC IDENTIFY: CPT

## 2020-10-08 PROCEDURE — 87070 CULTURE OTHR SPECIMN AEROBIC: CPT

## 2020-10-08 RX ORDER — SULFAMETHOXAZOLE AND TRIMETHOPRIM 800; 160 MG/1; MG/1
1 TABLET ORAL 2 TIMES DAILY
Qty: 14 TABLET | Refills: 0 | Status: SHIPPED | OUTPATIENT
Start: 2020-10-08 | End: 2020-10-09 | Stop reason: SDUPTHER

## 2020-10-08 RX ORDER — HYDROCODONE BITARTRATE AND ACETAMINOPHEN 5; 325 MG/1; MG/1
1 TABLET ORAL EVERY 8 HOURS PRN
Qty: 20 TABLET | Refills: 0 | Status: SHIPPED | OUTPATIENT
Start: 2020-10-08 | End: 2020-10-09 | Stop reason: SDUPTHER

## 2020-10-08 NOTE — PATIENT INSTRUCTIONS
Abscess (Antibiotic Treatment Only)  An abscess (sometimes called a boil) happens when bacteria get trapped under the skin and start to grow. Pus forms inside the abscess as the body responds to the bacteria. An abscess can happen with an insect bite, ingrown hair, blocked oil gland, pimple, cyst, or puncture wound.  In the early stages, your wound may be red and tender. For this stage, you may get antibiotics. If the abscess does not get better with antibiotics, it will need to be drained with a small cut.  Home care  These tips will help you care for your abscess at home:  · Soak the wound in hot water or apply hot packs (small towel soaked in hot water) to the area for 20 minutes at a time. Do this 3 to 4 times a day.  · Do not cut, squeeze, or pop the boil yourself.  · Apply antibiotic cream or ointment to the skin 3 to 4 times a day, unless something else was prescribed. Some ointments include an antibiotic plus a pain reliever.  · If your doctor prescribed antibiotics, do not stop taking them until you have finished the medicine or the doctor tells you to stop.  · You may use an over-the-counter pain medicine to control pain, unless another pain medicine was prescribed. If you have chronic liver or kidney disease or ever had a stomach ulcer or gastrointestinal bleeding, talk with your doctor before using these any of these.  Follow-up care  Follow up with your healthcare provider, or as advised. Check your wound each day for the signs of worsening infection listed below.  When to seek medical advice  Get prompt medical attention if any of these occur:  · An increase in redness or swelling  · Red streaks in the skin leading away from the abscess  · An increase in local pain or swelling  · Fever of 100.4ºF (38ºC) or higher, or as directed by your healthcare provider  · Pus or fluid coming from the abscess  · Boil returns after getting better  Date Last Reviewed: 9/1/2016  © 9720-7175 [employer] [address] [place] [id number]. All rights reserved. This information is not intended as a substitute for professional medical care. Always follow your healthcare professional's instructions.

## 2020-10-09 DIAGNOSIS — L02.415 ABSCESS OF KNEE, RIGHT: ICD-10-CM

## 2020-10-09 RX ORDER — RANOLAZINE 1000 MG/1
1000 TABLET, EXTENDED RELEASE ORAL 2 TIMES DAILY
COMMUNITY
Start: 2020-05-06

## 2020-10-09 RX ORDER — PYRIDOXINE HCL (VITAMIN B6) 100 MG
TABLET ORAL
COMMUNITY
Start: 2020-08-17

## 2020-10-09 RX ORDER — SULFAMETHOXAZOLE AND TRIMETHOPRIM 800; 160 MG/1; MG/1
1 TABLET ORAL 2 TIMES DAILY
Qty: 14 TABLET | Refills: 0 | Status: SHIPPED | OUTPATIENT
Start: 2020-10-09 | End: 2020-10-15 | Stop reason: SDUPTHER

## 2020-10-09 RX ORDER — ALPRAZOLAM 1 MG/1
1 TABLET ORAL 2 TIMES DAILY PRN
COMMUNITY
Start: 2020-04-14 | End: 2022-07-18

## 2020-10-09 RX ORDER — PREGABALIN 75 MG/1
75 CAPSULE ORAL 2 TIMES DAILY
COMMUNITY
Start: 2020-08-10 | End: 2022-07-18

## 2020-10-09 RX ORDER — DONEPEZIL HYDROCHLORIDE 10 MG/1
TABLET, FILM COATED ORAL
COMMUNITY
Start: 2020-08-10 | End: 2022-07-18

## 2020-10-09 RX ORDER — EZETIMIBE 10 MG/1
TABLET ORAL
COMMUNITY
Start: 2020-09-21

## 2020-10-09 RX ORDER — TRAZODONE HYDROCHLORIDE 100 MG/1
TABLET ORAL
COMMUNITY
Start: 2020-04-14 | End: 2022-07-28

## 2020-10-09 RX ORDER — HYDROCODONE BITARTRATE AND ACETAMINOPHEN 5; 325 MG/1; MG/1
1 TABLET ORAL EVERY 8 HOURS PRN
Qty: 20 TABLET | Refills: 0 | Status: SHIPPED | OUTPATIENT
Start: 2020-10-09 | End: 2020-10-19

## 2020-10-09 NOTE — PROGRESS NOTES
Subjective:       Patient ID: Cole Doan is a 70 y.o. male.    Chief Complaint: Recurrent Skin Infections  Pt reports to clinic with chief complaint of wound to right knee.  Has been present for 1 week.  Reports he has been squeezing clear fluid from area.  Also applying OTC antimicrobial ointment to area.  No fever or chill.  No loss of motion of extremity.  PMH of DM  Abscess  Chronicity:  New  Onset:  7 days or greater  Progression Since Onset: gradually worsening  Size:  3-5cm  Characteristics: redness    Pain Scale:  7/10  Treatments Tried:  Warm compresses and draining/squeezing  Relieved by:  Nothing  Worsened by:  Nothing    Review of Systems   Constitutional: Negative.    HENT: Negative.    Respiratory: Negative.    Cardiovascular: Negative.    Skin: Positive for color change and wound.       Objective:      Physical Exam  Vitals signs reviewed.   Constitutional:       Appearance: Normal appearance.   HENT:      Head: Normocephalic.      Nose: Nose normal.   Eyes:      Extraocular Movements: Extraocular movements intact.   Neck:      Musculoskeletal: Normal range of motion.   Cardiovascular:      Rate and Rhythm: Normal rate.      Heart sounds: Normal heart sounds.   Pulmonary:      Effort: Pulmonary effort is normal.      Breath sounds: Normal breath sounds.   Skin:            Comments: Slight induration, erythematous pustule, pus drainage.    Neurological:      General: No focal deficit present.      Mental Status: He is alert and oriented to person, place, and time.         Assessment:       1. Abscess of knee, right        Plan:   Abscess of knee, right  -     CULTURE, AEROBIC  (SPECIFY SOURCE)  -     Discontinue: sulfamethoxazole-trimethoprim 800-160mg (BACTRIM DS) 800-160 mg Tab; Take 1 tablet by mouth 2 (two) times daily.  Dispense: 14 tablet; Refill: 0  -     Discontinue: HYDROcodone-acetaminophen (NORCO) 5-325 mg per tablet; Take 1 tablet by mouth every 8 (eight) hours as needed for Pain.   Dispense: 20 tablet; Refill: 0      No follow-ups on file.

## 2020-10-09 NOTE — TELEPHONE ENCOUNTER
Patient was seen by Naya yesterday and his medicine went to [facility] it needs to be cancelled I called and left message to cancel. Refill sent to Naya to send to [facility]

## 2020-10-11 LAB — BACTERIA SPEC AEROBE CULT: ABNORMAL

## 2020-10-12 ENCOUNTER — PATIENT MESSAGE (OUTPATIENT)
Dept: FAMILY MEDICINE | Facility: CLINIC | Age: 70
End: 2020-10-12

## 2020-10-15 ENCOUNTER — OFFICE VISIT (OUTPATIENT)
Dept: FAMILY MEDICINE | Facility: CLINIC | Age: 70
End: 2020-10-15
Payer: MEDICARE

## 2020-10-15 VITALS
HEIGHT: 73 IN | SYSTOLIC BLOOD PRESSURE: 98 MMHG | DIASTOLIC BLOOD PRESSURE: 64 MMHG | WEIGHT: 206.25 LBS | BODY MASS INDEX: 27.33 KG/M2 | TEMPERATURE: 99 F | HEART RATE: 68 BPM | OXYGEN SATURATION: 97 %

## 2020-10-15 DIAGNOSIS — L02.415 ABSCESS OF KNEE, RIGHT: Primary | ICD-10-CM

## 2020-10-15 PROCEDURE — 99215 OFFICE O/P EST HI 40 MIN: CPT | Mod: PBBFAC,PO | Performed by: FAMILY MEDICINE

## 2020-10-15 PROCEDURE — 99213 PR OFFICE/OUTPT VISIT, EST, LEVL III, 20-29 MIN: ICD-10-PCS | Mod: S$PBB,,, | Performed by: FAMILY MEDICINE

## 2020-10-15 PROCEDURE — 99999 PR PBB SHADOW E&M-EST. PATIENT-LVL V: ICD-10-PCS | Mod: PBBFAC,,, | Performed by: FAMILY MEDICINE

## 2020-10-15 PROCEDURE — 99999 PR PBB SHADOW E&M-EST. PATIENT-LVL V: CPT | Mod: PBBFAC,,, | Performed by: FAMILY MEDICINE

## 2020-10-15 PROCEDURE — 99213 OFFICE O/P EST LOW 20 MIN: CPT | Mod: S$PBB,,, | Performed by: FAMILY MEDICINE

## 2020-10-15 RX ORDER — SULFAMETHOXAZOLE AND TRIMETHOPRIM 800; 160 MG/1; MG/1
1 TABLET ORAL 2 TIMES DAILY
Qty: 6 TABLET | Refills: 0 | Status: SHIPPED | OUTPATIENT
Start: 2020-10-15 | End: 2021-08-25

## 2020-10-15 NOTE — PROGRESS NOTES
Chief Complaint:    Chief Complaint   Patient presents with    Follow-up       History of Present Illness:  He is here for follow-up of any abscess which was treated with Bactrim he has been on it.  He was found to be MRSA positive which was sensitive to Bactrim.  He says the abscesses gotten a lot smaller and there is no more pus drainage from there pain is less.      ROS:  Review of Systems    Past Medical History:   Diagnosis Date    Acute coronary syndrome     Anticoagulant long-term use     Plavix    BPH (benign prostatic hypertrophy)     CAD (coronary artery disease)     HTN (hypertension) 5/15/2014    Hyperlipidemia     Obesity 5/15/2014    SATYA on CPAP     Pneumonia     PTSD (post-traumatic stress disorder) 5/15/2014    RBBB 5/15/2014    S/P PTCA (percutaneous transluminal coronary angioplasty) 5/15/2014    Special screening for malignant neoplasms, colon 1/22/2014    Type 2 diabetes mellitus without complication 9/28/2015       Social History:  Social History     Socioeconomic History    Marital status:      Spouse name: Not on file    Number of children: Not on file    Years of education: Not on file    Highest education level: Not on file   Occupational History    Not on file   Social Needs    Financial resource strain: Not on file    Food insecurity     Worry: Not on file     Inability: Not on file    Transportation needs     Medical: Not on file     Non-medical: Not on file   Tobacco Use    Smoking status: Former Smoker     Packs/day: 1.00     Years: 40.00     Pack years: 40.00     Types: Cigarettes     Start date: 1965     Quit date: 12/31/2009     Years since quitting: 10.7    Smokeless tobacco: Never Used   Substance and Sexual Activity    Alcohol use: No    Drug use: No    Sexual activity: Not on file   Lifestyle    Physical activity     Days per week: Not on file     Minutes per session: Not on file    Stress: Not on file   Relationships    Social connections  "    Talks on phone: Not on file     Gets together: Not on file     Attends [employer] service: Not on file     Active member of club or organization: Not on file     Attends meetings of clubs or organizations: Not on file     Relationship status: Not on file   Other Topics Concern    Not on file   Social History Narrative           Family History:   family history includes Cataracts in his father; Heart disease in his father and mother; Hypertension in his father and mother.    Health Maintenance   Topic Date Due    Hepatitis C Screening  1950    Foot Exam  09/26/1959    Urine Microalbumin  09/26/1959    TETANUS VACCINE  09/26/1967    Abdominal Aortic Aneurysm Screening  09/26/2014    Eye Exam  10/08/2015    Lipid Panel  01/19/2018    Pneumococcal Vaccine (65+ Low/Medium Risk) (2 of 2 - PPSV23) 02/14/2018    LDCT Lung Screen  09/12/2018    Hemoglobin A1c  07/15/2019    High Dose Statin  10/15/2021       Physical Exam:    Vital Signs  Temp: 98.8 °F (37.1 °C)  Temp src: Temporal  Pulse: 68  SpO2: 97 %  BP: 98/64  BP Location: Left arm  Patient Position: Sitting  Pain Score:   5  Pain Loc: Knee  Height and Weight  Height: 6' 1" (185.4 cm)  Weight: 93.6 kg (206 lb 3.9 oz)  BSA (Calculated - sq m): 2.19 sq meters  BMI (Calculated): 27.2  Weight in (lb) to have BMI = 25: 189.1]    Body mass index is 27.21 kg/m².    Physical Exam          Assessment:      ICD-10-CM ICD-9-CM   1. Abscess of knee, right  L02.415 682.6         Plan:         Appears to be healing well at this point, extend the Bactrim for 3 more days local antibiotic dressing is recommended.    [facility]      No orders of the defined types were placed in this encounter.      Current Outpatient Medications   Medication Sig Dispense Refill    ALBUTEROL INHL Inhale into the lungs.      ALPRAZolam (XANAX) 1 MG tablet Take 1 mg by mouth 2 (two) times daily as needed.      amLODIPine (NORVASC) 2.5 MG " "tablet TAKE 1 TABLET BY MOUTH ONCE DAILY. 90 tablet 3    aspirin (ECOTRIN) 81 MG EC tablet Take 81 mg by mouth once daily.      atorvastatin (LIPITOR) 80 MG tablet Take 80 mg by mouth once daily.       buPROPion (WELLBUTRIN SR) 200 MG TbSR Take 200 mg by mouth once daily.      calcium-vitamin D3 (CALCIUM 500 + D) 500 mg(1,250mg) -200 unit per tablet Take 1 tablet by mouth 2 (two) times daily with meals.      cetirizine (ZYRTEC) 10 MG tablet Take 10 mg by mouth once daily.      clopidogrel (PLAVIX) 75 mg tablet TAKE 1 TABLET BY MOUTH EVERY DAY 30 tablet 6    cyanocobalamin (VITAMIN B-12) 1000 MCG tablet Take 100 mcg by mouth once daily.      dabigatran etexilate (PRADAXA) 150 mg Cap Take 150 mg by mouth once daily. "Do NOT break, chew, or open capsules."       diclofenac (VOLTAREN) 0.1 % ophthalmic solution 1 drop 4 (four) times daily.      donepeziL (ARICEPT) 10 MG tablet TAKE ONE-HALF TABLET BY MOUTH EVERY DAY FOR MEMORY      ezetimibe (ZETIA) 10 mg tablet TAKE ONE TABLET BY MOUTH EVERY DAY TO LOWER CHOLESTEROL      fluticasone propionate (FLONASE) 50 mcg/actuation nasal spray 1 spray by Each Nostril route once daily.      furosemide (LASIX) 40 MG tablet Takes one-half tablet by mouth daily (Patient taking differently: Take 40 mg by mouth as needed. Takes 2 tablet by mouth daily) 30 tablet 1    HYDROcodone-acetaminophen (NORCO) 5-325 mg per tablet Take 1 tablet by mouth every 8 (eight) hours as needed for Pain. 20 tablet 0    ipratropium-albuterol (COMBIVENT RESPIMAT)  mcg/actuation inhaler Inhale 1 puff into the lungs 4 (four) times daily. 4 g 11    ketotifen (ZADITOR) 0.025 % (0.035 %) ophthalmic solution 1 drop 2 (two) times daily.      liraglutide 0.6 mg/0.1 mL, 18 mg/3 mL, subq PNIJ (VICTOZA 2-ZACHARY) 0.6 mg/0.1 mL (18 mg/3 mL) PnIj Inject 0.6 mg into the skin once daily.      melatonin (MELATIN) Take 6 mg by mouth every evening.      metoprolol succinate (TOPROL-XL) 100 MG 24 hr tablet " Take 100 mg by mouth once daily.      metronidazole 0.75% (METROCREAM) 0.75 % Crea Apply topically 2 (two) times daily.      nitroGLYCERIN (NITROSTAT) 0.4 MG SL tablet Place 1 tablet (0.4 mg total) under the tongue every 5 (five) minutes as needed for Chest pain. 60 tablet 12    nozaseptin (NOZIN) nasal  1 each by Each Nare route 2 (two) times daily. 1 applicator 0    omeprazole (PRILOSEC) 20 MG capsule Take 20 mg by mouth once daily.      pregabalin (LYRICA) 75 MG capsule Take 75 mg by mouth 2 (two) times daily.      pyridoxine, vitamin B6, (B-6) 100 MG Tab TAKE ONE TABLET BY MOUTH ONCE DAILY AS A VITAMIN SUPPLEMENT      ranolazine (RANEXA) 1,000 mg Tb12 Take 1,000 mg by mouth 2 (two) times daily.      rosuvastatin (CRESTOR) 40 MG Tab Take 10 mg by mouth every evening.      sulfamethoxazole-trimethoprim 800-160mg (BACTRIM DS) 800-160 mg Tab Take 1 tablet by mouth 2 (two) times daily. 14 tablet 0    thiamine (VITAMIN B-1) 50 MG tablet Take 50 mg by mouth once daily.      traZODone (DESYREL) 100 MG tablet TAKE ONE-HALF TABLET BY MOUTH AT BEDTIME AS NEEDED      vitamin D 1000 units Tab Take 2,000 Units by mouth once daily.      white petrolatum (ALOE VESTA) 43 % Oint Apply topically.      sildenafil (VIAGRA) 100 MG tablet Take 1 tablet (100 mg total) by mouth daily as needed for Erectile Dysfunction. 6 tablet 3     No current facility-administered medications for this visit.        Medications Discontinued During This Encounter   Medication Reason    metoprolol tartrate (LOPRESSOR) 100 MG tablet Patient no longer taking       No follow-ups on file.      Kiley Fishman MD

## 2021-01-09 ENCOUNTER — NURSE TRIAGE (OUTPATIENT)
Dept: ADMINISTRATIVE | Facility: CLINIC | Age: 71
End: 2021-01-09

## 2021-01-12 ENCOUNTER — TELEPHONE (OUTPATIENT)
Dept: FAMILY MEDICINE | Facility: CLINIC | Age: 71
End: 2021-01-12

## 2021-01-12 ENCOUNTER — OFFICE VISIT (OUTPATIENT)
Dept: FAMILY MEDICINE | Facility: CLINIC | Age: 71
End: 2021-01-12
Payer: MEDICARE

## 2021-01-12 DIAGNOSIS — U07.1 LAB TEST POSITIVE FOR DETECTION OF COVID-19 VIRUS: Primary | ICD-10-CM

## 2021-01-12 DIAGNOSIS — U07.1 COVID-19 VIRUS INFECTION: ICD-10-CM

## 2021-01-12 DIAGNOSIS — R05.9 COUGH: Primary | ICD-10-CM

## 2021-01-12 PROCEDURE — 99214 PR OFFICE/OUTPT VISIT, EST, LEVL IV, 30-39 MIN: ICD-10-PCS | Mod: CR,95,, | Performed by: FAMILY MEDICINE

## 2021-01-12 PROCEDURE — 99214 OFFICE O/P EST MOD 30 MIN: CPT | Mod: CR,95,, | Performed by: FAMILY MEDICINE

## 2021-01-13 ENCOUNTER — TELEPHONE (OUTPATIENT)
Dept: ADMINISTRATIVE | Facility: CLINIC | Age: 71
End: 2021-01-13

## 2021-01-14 ENCOUNTER — TELEPHONE (OUTPATIENT)
Dept: ADMINISTRATIVE | Facility: CLINIC | Age: 71
End: 2021-01-14

## 2021-01-14 ENCOUNTER — NURSE TRIAGE (OUTPATIENT)
Dept: ADMINISTRATIVE | Facility: CLINIC | Age: 71
End: 2021-01-14

## 2021-01-14 ENCOUNTER — PATIENT MESSAGE (OUTPATIENT)
Dept: ADMINISTRATIVE | Facility: OTHER | Age: 71
End: 2021-01-14

## 2021-01-15 ENCOUNTER — PATIENT MESSAGE (OUTPATIENT)
Dept: ADMINISTRATIVE | Facility: OTHER | Age: 71
End: 2021-01-15

## 2021-01-16 ENCOUNTER — PATIENT MESSAGE (OUTPATIENT)
Dept: ADMINISTRATIVE | Facility: OTHER | Age: 71
End: 2021-01-16

## 2021-01-16 ENCOUNTER — INFUSION (OUTPATIENT)
Dept: INFECTIOUS DISEASES | Facility: HOSPITAL | Age: 71
End: 2021-01-16
Attending: INTERNAL MEDICINE
Payer: MEDICARE

## 2021-01-16 ENCOUNTER — NURSE TRIAGE (OUTPATIENT)
Dept: ADMINISTRATIVE | Facility: CLINIC | Age: 71
End: 2021-01-16

## 2021-01-16 ENCOUNTER — PATIENT MESSAGE (OUTPATIENT)
Dept: ADMINISTRATIVE | Facility: CLINIC | Age: 71
End: 2021-01-16

## 2021-01-16 VITALS
TEMPERATURE: 100 F | SYSTOLIC BLOOD PRESSURE: 120 MMHG | OXYGEN SATURATION: 98 % | DIASTOLIC BLOOD PRESSURE: 62 MMHG | RESPIRATION RATE: 18 BRPM | HEART RATE: 63 BPM

## 2021-01-16 DIAGNOSIS — U07.1 LAB TEST POSITIVE FOR DETECTION OF COVID-19 VIRUS: Primary | ICD-10-CM

## 2021-01-16 PROCEDURE — M0239 BAMLANIVIMAB-XXXX INFUSION: HCPCS | Performed by: INTERNAL MEDICINE

## 2021-01-16 PROCEDURE — 63600175 PHARM REV CODE 636 W HCPCS: Performed by: INTERNAL MEDICINE

## 2021-01-16 PROCEDURE — 25000003 PHARM REV CODE 250: Performed by: INTERNAL MEDICINE

## 2021-01-16 RX ORDER — SODIUM CHLORIDE 0.9 % (FLUSH) 0.9 %
10 SYRINGE (ML) INJECTION
Status: DISCONTINUED | OUTPATIENT
Start: 2021-01-16 | End: 2021-08-25

## 2021-01-16 RX ORDER — ONDANSETRON 4 MG/1
4 TABLET, ORALLY DISINTEGRATING ORAL ONCE AS NEEDED
Status: DISCONTINUED | OUTPATIENT
Start: 2021-01-16 | End: 2022-07-18

## 2021-01-16 RX ORDER — EPINEPHRINE 0.1 MG/ML
0.3 INJECTION INTRAVENOUS
Status: DISCONTINUED | OUTPATIENT
Start: 2021-01-16 | End: 2021-08-25

## 2021-01-16 RX ORDER — ACETAMINOPHEN 325 MG/1
650 TABLET ORAL ONCE AS NEEDED
Status: DISCONTINUED | OUTPATIENT
Start: 2021-01-16 | End: 2021-08-25

## 2021-01-16 RX ORDER — ALBUTEROL SULFATE 90 UG/1
2 AEROSOL, METERED RESPIRATORY (INHALATION)
Status: DISCONTINUED | OUTPATIENT
Start: 2021-01-16 | End: 2021-08-25

## 2021-01-16 RX ORDER — DIPHENHYDRAMINE HYDROCHLORIDE 50 MG/ML
25 INJECTION INTRAMUSCULAR; INTRAVENOUS ONCE AS NEEDED
Status: DISCONTINUED | OUTPATIENT
Start: 2021-01-16 | End: 2021-08-25

## 2021-01-16 RX ADMIN — SODIUM CHLORIDE 700 MG: 0.9 INJECTION, SOLUTION INTRAVENOUS at 09:01

## 2021-01-17 ENCOUNTER — NURSE TRIAGE (OUTPATIENT)
Dept: ADMINISTRATIVE | Facility: CLINIC | Age: 71
End: 2021-01-17

## 2021-01-17 ENCOUNTER — PATIENT MESSAGE (OUTPATIENT)
Dept: ADMINISTRATIVE | Facility: OTHER | Age: 71
End: 2021-01-17

## 2021-01-18 ENCOUNTER — PATIENT MESSAGE (OUTPATIENT)
Dept: ADMINISTRATIVE | Facility: OTHER | Age: 71
End: 2021-01-18

## 2021-01-18 ENCOUNTER — PATIENT MESSAGE (OUTPATIENT)
Dept: ADMINISTRATIVE | Facility: CLINIC | Age: 71
End: 2021-01-18

## 2021-01-19 ENCOUNTER — PATIENT MESSAGE (OUTPATIENT)
Dept: ADMINISTRATIVE | Facility: OTHER | Age: 71
End: 2021-01-19

## 2021-01-20 ENCOUNTER — PATIENT MESSAGE (OUTPATIENT)
Dept: ADMINISTRATIVE | Facility: OTHER | Age: 71
End: 2021-01-20

## 2021-01-21 ENCOUNTER — NURSE TRIAGE (OUTPATIENT)
Dept: ADMINISTRATIVE | Facility: CLINIC | Age: 71
End: 2021-01-21

## 2021-01-21 ENCOUNTER — PATIENT MESSAGE (OUTPATIENT)
Dept: ADMINISTRATIVE | Facility: CLINIC | Age: 71
End: 2021-01-21

## 2021-01-21 ENCOUNTER — PATIENT MESSAGE (OUTPATIENT)
Dept: ADMINISTRATIVE | Facility: OTHER | Age: 71
End: 2021-01-21

## 2021-01-22 ENCOUNTER — PATIENT MESSAGE (OUTPATIENT)
Dept: ADMINISTRATIVE | Facility: OTHER | Age: 71
End: 2021-01-22

## 2021-01-22 ENCOUNTER — PATIENT MESSAGE (OUTPATIENT)
Dept: ADMINISTRATIVE | Facility: CLINIC | Age: 71
End: 2021-01-22

## 2021-01-23 ENCOUNTER — PATIENT MESSAGE (OUTPATIENT)
Dept: ADMINISTRATIVE | Facility: OTHER | Age: 71
End: 2021-01-23

## 2021-01-23 ENCOUNTER — NURSE TRIAGE (OUTPATIENT)
Dept: ADMINISTRATIVE | Facility: CLINIC | Age: 71
End: 2021-01-23

## 2021-01-24 ENCOUNTER — PATIENT MESSAGE (OUTPATIENT)
Dept: ADMINISTRATIVE | Facility: OTHER | Age: 71
End: 2021-01-24

## 2021-01-24 ENCOUNTER — PATIENT MESSAGE (OUTPATIENT)
Dept: ADMINISTRATIVE | Facility: CLINIC | Age: 71
End: 2021-01-24

## 2021-01-25 ENCOUNTER — PATIENT MESSAGE (OUTPATIENT)
Dept: ADMINISTRATIVE | Facility: OTHER | Age: 71
End: 2021-01-25

## 2021-01-26 ENCOUNTER — PATIENT MESSAGE (OUTPATIENT)
Dept: ADMINISTRATIVE | Facility: OTHER | Age: 71
End: 2021-01-26

## 2021-01-26 ENCOUNTER — PATIENT MESSAGE (OUTPATIENT)
Dept: ADMINISTRATIVE | Facility: CLINIC | Age: 71
End: 2021-01-26

## 2021-04-30 ENCOUNTER — TELEPHONE (OUTPATIENT)
Dept: FAMILY MEDICINE | Facility: CLINIC | Age: 71
End: 2021-04-30

## 2021-05-04 ENCOUNTER — LAB VISIT (OUTPATIENT)
Dept: LAB | Facility: HOSPITAL | Age: 71
End: 2021-05-04
Attending: FAMILY MEDICINE
Payer: MEDICARE

## 2021-05-04 ENCOUNTER — OFFICE VISIT (OUTPATIENT)
Dept: FAMILY MEDICINE | Facility: CLINIC | Age: 71
End: 2021-05-04
Payer: MEDICARE

## 2021-05-04 ENCOUNTER — TELEPHONE (OUTPATIENT)
Dept: FAMILY MEDICINE | Facility: CLINIC | Age: 71
End: 2021-05-04

## 2021-05-04 VITALS
HEART RATE: 80 BPM | TEMPERATURE: 99 F | WEIGHT: 217.06 LBS | OXYGEN SATURATION: 98 % | DIASTOLIC BLOOD PRESSURE: 78 MMHG | BODY MASS INDEX: 28.77 KG/M2 | SYSTOLIC BLOOD PRESSURE: 112 MMHG | HEIGHT: 73 IN

## 2021-05-04 DIAGNOSIS — Z09 HOSPITAL DISCHARGE FOLLOW-UP: Primary | ICD-10-CM

## 2021-05-04 DIAGNOSIS — J06.9 UPPER RESPIRATORY TRACT INFECTION, UNSPECIFIED TYPE: ICD-10-CM

## 2021-05-04 DIAGNOSIS — A41.9 SEPSIS, DUE TO UNSPECIFIED ORGANISM, UNSPECIFIED WHETHER ACUTE ORGAN DYSFUNCTION PRESENT: ICD-10-CM

## 2021-05-04 DIAGNOSIS — Q45.3 PANCREATIC ABNORMALITY: ICD-10-CM

## 2021-05-04 DIAGNOSIS — D69.6 THROMBOCYTOPENIA: ICD-10-CM

## 2021-05-04 DIAGNOSIS — Q45.3 PANCREATIC ABNORMALITY: Primary | ICD-10-CM

## 2021-05-04 PROCEDURE — 99214 PR OFFICE/OUTPT VISIT, EST, LEVL IV, 30-39 MIN: ICD-10-PCS | Mod: S$PBB,,, | Performed by: FAMILY MEDICINE

## 2021-05-04 PROCEDURE — 36415 COLL VENOUS BLD VENIPUNCTURE: CPT | Mod: PO | Performed by: FAMILY MEDICINE

## 2021-05-04 PROCEDURE — 99214 OFFICE O/P EST MOD 30 MIN: CPT | Mod: S$PBB,,, | Performed by: FAMILY MEDICINE

## 2021-05-04 PROCEDURE — 99999 PR PBB SHADOW E&M-EST. PATIENT-LVL V: ICD-10-PCS | Mod: PBBFAC,,, | Performed by: FAMILY MEDICINE

## 2021-05-04 PROCEDURE — 85025 COMPLETE CBC W/AUTO DIFF WBC: CPT | Performed by: FAMILY MEDICINE

## 2021-05-04 PROCEDURE — 99215 OFFICE O/P EST HI 40 MIN: CPT | Mod: PBBFAC,PO | Performed by: FAMILY MEDICINE

## 2021-05-04 PROCEDURE — 99999 PR PBB SHADOW E&M-EST. PATIENT-LVL V: CPT | Mod: PBBFAC,,, | Performed by: FAMILY MEDICINE

## 2021-05-04 RX ORDER — IPRATROPIUM BROMIDE 42 UG/1
2 SPRAY, METERED NASAL 4 TIMES DAILY
Qty: 15 ML | Refills: 0 | Status: SHIPPED | OUTPATIENT
Start: 2021-05-04 | End: 2021-08-25

## 2021-05-05 LAB
BASOPHILS # BLD AUTO: 0.03 K/UL (ref 0–0.2)
BASOPHILS NFR BLD: 0.5 % (ref 0–1.9)
DIFFERENTIAL METHOD: ABNORMAL
EOSINOPHIL # BLD AUTO: 0.1 K/UL (ref 0–0.5)
EOSINOPHIL NFR BLD: 1.4 % (ref 0–8)
ERYTHROCYTE [DISTWIDTH] IN BLOOD BY AUTOMATED COUNT: 16.1 % (ref 11.5–14.5)
HCT VFR BLD AUTO: 44.9 % (ref 40–54)
HGB BLD-MCNC: 14.4 G/DL (ref 14–18)
IMM GRANULOCYTES # BLD AUTO: 0.04 K/UL (ref 0–0.04)
IMM GRANULOCYTES NFR BLD AUTO: 0.7 % (ref 0–0.5)
LYMPHOCYTES # BLD AUTO: 1.2 K/UL (ref 1–4.8)
LYMPHOCYTES NFR BLD: 21 % (ref 18–48)
MCH RBC QN AUTO: 30.8 PG (ref 27–31)
MCHC RBC AUTO-ENTMCNC: 32.1 G/DL (ref 32–36)
MCV RBC AUTO: 96 FL (ref 82–98)
MONOCYTES # BLD AUTO: 1 K/UL (ref 0.3–1)
MONOCYTES NFR BLD: 16.2 % (ref 4–15)
NEUTROPHILS # BLD AUTO: 3.5 K/UL (ref 1.8–7.7)
NEUTROPHILS NFR BLD: 60.2 % (ref 38–73)
NRBC BLD-RTO: 0 /100 WBC
PLATELET # BLD AUTO: 224 K/UL (ref 150–450)
PMV BLD AUTO: 11.1 FL (ref 9.2–12.9)
RBC # BLD AUTO: 4.68 M/UL (ref 4.6–6.2)
WBC # BLD AUTO: 5.86 K/UL (ref 3.9–12.7)

## 2021-05-20 ENCOUNTER — TELEPHONE (OUTPATIENT)
Dept: FAMILY MEDICINE | Facility: CLINIC | Age: 71
End: 2021-05-20

## 2021-05-20 DIAGNOSIS — K86.89 PANCREATIC MASS: Primary | ICD-10-CM

## 2021-05-20 DIAGNOSIS — D49.0 IPMN (INTRADUCTAL PAPILLARY MUCINOUS NEOPLASM): Primary | ICD-10-CM

## 2021-05-21 ENCOUNTER — TELEPHONE (OUTPATIENT)
Dept: GASTROENTEROLOGY | Facility: CLINIC | Age: 71
End: 2021-05-21

## 2021-05-26 ENCOUNTER — OFFICE VISIT (OUTPATIENT)
Dept: GASTROENTEROLOGY | Facility: CLINIC | Age: 71
End: 2021-05-26
Payer: MEDICARE

## 2021-05-26 VITALS
BODY MASS INDEX: 29.42 KG/M2 | HEART RATE: 84 BPM | SYSTOLIC BLOOD PRESSURE: 124 MMHG | WEIGHT: 222 LBS | DIASTOLIC BLOOD PRESSURE: 78 MMHG | HEIGHT: 73 IN

## 2021-05-26 DIAGNOSIS — K86.89 PANCREATIC MASS: ICD-10-CM

## 2021-05-26 PROCEDURE — 99214 PR OFFICE/OUTPT VISIT, EST, LEVL IV, 30-39 MIN: ICD-10-PCS | Mod: S$PBB,,, | Performed by: INTERNAL MEDICINE

## 2021-05-26 PROCEDURE — 99215 OFFICE O/P EST HI 40 MIN: CPT | Mod: PBBFAC | Performed by: INTERNAL MEDICINE

## 2021-05-26 PROCEDURE — 99999 PR PBB SHADOW E&M-EST. PATIENT-LVL V: ICD-10-PCS | Mod: PBBFAC,,, | Performed by: INTERNAL MEDICINE

## 2021-05-26 PROCEDURE — 99999 PR PBB SHADOW E&M-EST. PATIENT-LVL V: CPT | Mod: PBBFAC,,, | Performed by: INTERNAL MEDICINE

## 2021-05-26 PROCEDURE — 99214 OFFICE O/P EST MOD 30 MIN: CPT | Mod: S$PBB,,, | Performed by: INTERNAL MEDICINE

## 2021-05-27 ENCOUNTER — TELEPHONE (OUTPATIENT)
Dept: GASTROENTEROLOGY | Facility: CLINIC | Age: 71
End: 2021-05-27

## 2021-06-23 ENCOUNTER — TELEPHONE (OUTPATIENT)
Dept: GASTROENTEROLOGY | Facility: CLINIC | Age: 71
End: 2021-06-23

## 2021-06-25 ENCOUNTER — TELEPHONE (OUTPATIENT)
Dept: ENDOSCOPY | Facility: HOSPITAL | Age: 71
End: 2021-06-25

## 2021-06-25 ENCOUNTER — TELEPHONE (OUTPATIENT)
Dept: SURGERY | Facility: CLINIC | Age: 71
End: 2021-06-25

## 2021-06-25 ENCOUNTER — TELEPHONE (OUTPATIENT)
Dept: GASTROENTEROLOGY | Facility: CLINIC | Age: 71
End: 2021-06-25

## 2021-07-07 ENCOUNTER — TELEPHONE (OUTPATIENT)
Dept: ENDOSCOPY | Facility: HOSPITAL | Age: 71
End: 2021-07-07

## 2021-07-07 DIAGNOSIS — Z01.818 PRE-OP TESTING: ICD-10-CM

## 2021-07-08 ENCOUNTER — PATIENT MESSAGE (OUTPATIENT)
Dept: ENDOSCOPY | Facility: HOSPITAL | Age: 71
End: 2021-07-08

## 2021-07-09 ENCOUNTER — TELEPHONE (OUTPATIENT)
Dept: ENDOSCOPY | Facility: HOSPITAL | Age: 71
End: 2021-07-09

## 2021-07-09 ENCOUNTER — TELEPHONE (OUTPATIENT)
Dept: GASTROENTEROLOGY | Facility: CLINIC | Age: 71
End: 2021-07-09

## 2021-07-12 ENCOUNTER — ANESTHESIA (OUTPATIENT)
Dept: ENDOSCOPY | Facility: HOSPITAL | Age: 71
End: 2021-07-12
Payer: MEDICARE

## 2021-07-12 ENCOUNTER — ANESTHESIA EVENT (OUTPATIENT)
Dept: ENDOSCOPY | Facility: HOSPITAL | Age: 71
End: 2021-07-12
Payer: MEDICARE

## 2021-07-12 ENCOUNTER — HOSPITAL ENCOUNTER (OUTPATIENT)
Facility: HOSPITAL | Age: 71
Discharge: HOME | End: 2021-07-12
Attending: INTERNAL MEDICINE | Admitting: INTERNAL MEDICINE
Payer: MEDICARE

## 2021-07-12 VITALS
BODY MASS INDEX: 29.32 KG/M2 | DIASTOLIC BLOOD PRESSURE: 94 MMHG | TEMPERATURE: 98 F | OXYGEN SATURATION: 97 % | WEIGHT: 222.25 LBS | RESPIRATION RATE: 16 BRPM | SYSTOLIC BLOOD PRESSURE: 137 MMHG | HEART RATE: 63 BPM

## 2021-07-12 DIAGNOSIS — K86.2 PANCREATIC CYST: Primary | ICD-10-CM

## 2021-07-12 PROCEDURE — 37000008 HC ANESTHESIA 1ST 15 MINUTES: Performed by: INTERNAL MEDICINE

## 2021-07-12 PROCEDURE — 43237 ENDOSCOPIC US EXAM ESOPH: CPT | Performed by: INTERNAL MEDICINE

## 2021-07-12 PROCEDURE — 43247 EGD REMOVE FOREIGN BODY: CPT | Performed by: INTERNAL MEDICINE

## 2021-07-12 PROCEDURE — 43247 PR EGD, FLEX, W/REMOVAL, FOREIGN BODY: ICD-10-PCS | Mod: ,,, | Performed by: INTERNAL MEDICINE

## 2021-07-12 PROCEDURE — 43237 PR ENDOSCOPIC US EXAM, ESOPH: ICD-10-PCS | Mod: 51,,, | Performed by: INTERNAL MEDICINE

## 2021-07-12 PROCEDURE — 37000009 HC ANESTHESIA EA ADD 15 MINS: Performed by: INTERNAL MEDICINE

## 2021-07-12 PROCEDURE — 63600175 PHARM REV CODE 636 W HCPCS: Performed by: NURSE ANESTHETIST, CERTIFIED REGISTERED

## 2021-07-12 PROCEDURE — 25000003 PHARM REV CODE 250: Performed by: NURSE ANESTHETIST, CERTIFIED REGISTERED

## 2021-07-12 PROCEDURE — 43237 ENDOSCOPIC US EXAM ESOPH: CPT | Mod: 51,,, | Performed by: INTERNAL MEDICINE

## 2021-07-12 PROCEDURE — 27201042 HC RETRIEVAL NET: Performed by: INTERNAL MEDICINE

## 2021-07-12 PROCEDURE — 43247 EGD REMOVE FOREIGN BODY: CPT | Mod: ,,, | Performed by: INTERNAL MEDICINE

## 2021-07-12 RX ORDER — PROPOFOL 10 MG/ML
VIAL (ML) INTRAVENOUS
Status: DISCONTINUED | OUTPATIENT
Start: 2021-07-12 | End: 2021-07-12

## 2021-07-12 RX ORDER — LIDOCAINE HYDROCHLORIDE 10 MG/ML
INJECTION, SOLUTION EPIDURAL; INFILTRATION; INTRACAUDAL; PERINEURAL
Status: DISCONTINUED | OUTPATIENT
Start: 2021-07-12 | End: 2021-07-12

## 2021-07-12 RX ADMIN — LIDOCAINE HYDROCHLORIDE 50 MG: 10 INJECTION, SOLUTION EPIDURAL; INFILTRATION; INTRACAUDAL; PERINEURAL at 10:07

## 2021-07-12 RX ADMIN — PROPOFOL 20 MG: 10 INJECTION, EMULSION INTRAVENOUS at 10:07

## 2021-07-12 RX ADMIN — PROPOFOL 50 MG: 10 INJECTION, EMULSION INTRAVENOUS at 10:07

## 2021-07-12 RX ADMIN — PROPOFOL 80 MG: 10 INJECTION, EMULSION INTRAVENOUS at 10:07

## 2021-07-12 RX ADMIN — SODIUM CHLORIDE, SODIUM LACTATE, POTASSIUM CHLORIDE, AND CALCIUM CHLORIDE: .6; .31; .03; .02 INJECTION, SOLUTION INTRAVENOUS at 10:07

## 2021-07-13 ENCOUNTER — PATIENT MESSAGE (OUTPATIENT)
Dept: HEPATOLOGY | Facility: CLINIC | Age: 71
End: 2021-07-13

## 2021-07-19 ENCOUNTER — HOSPITAL ENCOUNTER (EMERGENCY)
Facility: HOSPITAL | Age: 71
Discharge: HOME | End: 2021-07-19
Attending: EMERGENCY MEDICINE
Payer: MEDICARE

## 2021-07-19 VITALS
WEIGHT: 228.19 LBS | BODY MASS INDEX: 30.1 KG/M2 | TEMPERATURE: 98 F | DIASTOLIC BLOOD PRESSURE: 71 MMHG | HEART RATE: 65 BPM | OXYGEN SATURATION: 98 % | RESPIRATION RATE: 16 BRPM | SYSTOLIC BLOOD PRESSURE: 127 MMHG

## 2021-07-19 DIAGNOSIS — N30.00 ACUTE CYSTITIS WITHOUT HEMATURIA: Primary | ICD-10-CM

## 2021-07-19 DIAGNOSIS — R55 SYNCOPE: ICD-10-CM

## 2021-07-19 LAB
ALBUMIN SERPL BCP-MCNC: 3.7 G/DL (ref 3.5–5.2)
ALP SERPL-CCNC: 46 U/L (ref 55–135)
ALT SERPL W/O P-5'-P-CCNC: 24 U/L (ref 10–44)
ANION GAP SERPL CALC-SCNC: 11 MMOL/L (ref 8–16)
AST SERPL-CCNC: 24 U/L (ref 10–40)
BACTERIA #/AREA URNS HPF: ABNORMAL /HPF
BASOPHILS # BLD AUTO: 0.04 K/UL (ref 0–0.2)
BASOPHILS NFR BLD: 0.8 % (ref 0–1.9)
BILIRUB SERPL-MCNC: 0.9 MG/DL (ref 0.1–1)
BILIRUB UR QL STRIP: NEGATIVE
BUN SERPL-MCNC: 20 MG/DL (ref 8–23)
CALCIUM SERPL-MCNC: 9.5 MG/DL (ref 8.7–10.5)
CHLORIDE SERPL-SCNC: 105 MMOL/L (ref 95–110)
CK SERPL-CCNC: 75 U/L (ref 20–200)
CLARITY UR: CLEAR
CO2 SERPL-SCNC: 25 MMOL/L (ref 23–29)
COLOR UR: YELLOW
CREAT SERPL-MCNC: 0.8 MG/DL (ref 0.5–1.4)
DIFFERENTIAL METHOD: NORMAL
EOSINOPHIL # BLD AUTO: 0.1 K/UL (ref 0–0.5)
EOSINOPHIL NFR BLD: 1.3 % (ref 0–8)
ERYTHROCYTE [DISTWIDTH] IN BLOOD BY AUTOMATED COUNT: 13.4 % (ref 11.5–14.5)
EST. GFR  (AFRICAN AMERICAN): >60 ML/MIN/1.73 M^2
EST. GFR  (NON AFRICAN AMERICAN): >60 ML/MIN/1.73 M^2
GLUCOSE SERPL-MCNC: 75 MG/DL (ref 70–110)
GLUCOSE UR QL STRIP: NEGATIVE
HCT VFR BLD AUTO: 43.9 % (ref 40–54)
HGB BLD-MCNC: 14.9 G/DL (ref 14–18)
HGB UR QL STRIP: ABNORMAL
IMM GRANULOCYTES # BLD AUTO: 0.01 K/UL (ref 0–0.04)
IMM GRANULOCYTES NFR BLD AUTO: 0.2 % (ref 0–0.5)
KETONES UR QL STRIP: NEGATIVE
LEUKOCYTE ESTERASE UR QL STRIP: ABNORMAL
LYMPHOCYTES # BLD AUTO: 1.5 K/UL (ref 1–4.8)
LYMPHOCYTES NFR BLD: 29.2 % (ref 18–48)
MCH RBC QN AUTO: 30.9 PG (ref 27–31)
MCHC RBC AUTO-ENTMCNC: 33.9 G/DL (ref 32–36)
MCV RBC AUTO: 91 FL (ref 82–98)
MICROSCOPIC COMMENT: ABNORMAL
MONOCYTES # BLD AUTO: 0.6 K/UL (ref 0.3–1)
MONOCYTES NFR BLD: 12.3 % (ref 4–15)
NEUTROPHILS # BLD AUTO: 2.9 K/UL (ref 1.8–7.7)
NEUTROPHILS NFR BLD: 56.2 % (ref 38–73)
NITRITE UR QL STRIP: POSITIVE
NRBC BLD-RTO: 0 /100 WBC
PH UR STRIP: 7 [PH] (ref 5–8)
PLATELET # BLD AUTO: 154 K/UL (ref 150–450)
PMV BLD AUTO: 9.6 FL (ref 9.2–12.9)
POTASSIUM SERPL-SCNC: 4.5 MMOL/L (ref 3.5–5.1)
PROT SERPL-MCNC: 6.8 G/DL (ref 6–8.4)
PROT UR QL STRIP: NEGATIVE
RBC # BLD AUTO: 4.82 M/UL (ref 4.6–6.2)
RBC #/AREA URNS HPF: 1 /HPF (ref 0–4)
SODIUM SERPL-SCNC: 141 MMOL/L (ref 136–145)
SP GR UR STRIP: 1.02 (ref 1–1.03)
TROPONIN I SERPL DL<=0.01 NG/ML-MCNC: <0.006 NG/ML (ref 0–0.03)
URN SPEC COLLECT METH UR: ABNORMAL
UROBILINOGEN UR STRIP-ACNC: NEGATIVE EU/DL
WBC # BLD AUTO: 5.21 K/UL (ref 3.9–12.7)
WBC #/AREA URNS HPF: 30 /HPF (ref 0–5)
WBC CLUMPS URNS QL MICRO: ABNORMAL

## 2021-07-19 PROCEDURE — 99285 EMERGENCY DEPT VISIT HI MDM: CPT | Mod: 25

## 2021-07-19 PROCEDURE — 93005 ELECTROCARDIOGRAM TRACING: CPT

## 2021-07-19 PROCEDURE — 85025 COMPLETE CBC W/AUTO DIFF WBC: CPT | Performed by: EMERGENCY MEDICINE

## 2021-07-19 PROCEDURE — 87077 CULTURE AEROBIC IDENTIFY: CPT | Performed by: EMERGENCY MEDICINE

## 2021-07-19 PROCEDURE — 84484 ASSAY OF TROPONIN QUANT: CPT | Performed by: EMERGENCY MEDICINE

## 2021-07-19 PROCEDURE — 25000003 PHARM REV CODE 250: Performed by: EMERGENCY MEDICINE

## 2021-07-19 PROCEDURE — 63600175 PHARM REV CODE 636 W HCPCS: Performed by: EMERGENCY MEDICINE

## 2021-07-19 PROCEDURE — 82550 ASSAY OF CK (CPK): CPT | Performed by: EMERGENCY MEDICINE

## 2021-07-19 PROCEDURE — 81000 URINALYSIS NONAUTO W/SCOPE: CPT | Performed by: EMERGENCY MEDICINE

## 2021-07-19 PROCEDURE — 93010 EKG 12-LEAD: ICD-10-PCS | Mod: ,,, | Performed by: INTERNAL MEDICINE

## 2021-07-19 PROCEDURE — 87086 URINE CULTURE/COLONY COUNT: CPT | Performed by: EMERGENCY MEDICINE

## 2021-07-19 PROCEDURE — 96374 THER/PROPH/DIAG INJ IV PUSH: CPT

## 2021-07-19 PROCEDURE — 93010 ELECTROCARDIOGRAM REPORT: CPT | Mod: ,,, | Performed by: INTERNAL MEDICINE

## 2021-07-19 PROCEDURE — 80053 COMPREHEN METABOLIC PANEL: CPT | Performed by: EMERGENCY MEDICINE

## 2021-07-19 PROCEDURE — 87088 URINE BACTERIA CULTURE: CPT | Performed by: EMERGENCY MEDICINE

## 2021-07-19 PROCEDURE — 87186 SC STD MICRODIL/AGAR DIL: CPT | Performed by: EMERGENCY MEDICINE

## 2021-07-19 RX ORDER — CEFUROXIME AXETIL 500 MG/1
500 TABLET ORAL 2 TIMES DAILY
Qty: 14 TABLET | Refills: 0 | Status: SHIPPED | OUTPATIENT
Start: 2021-07-19 | End: 2021-07-26

## 2021-07-19 RX ADMIN — CEFTRIAXONE 1 G: 1 INJECTION, SOLUTION INTRAVENOUS at 05:07

## 2021-07-19 RX ADMIN — SODIUM CHLORIDE 1000 ML: 0.9 INJECTION, SOLUTION INTRAVENOUS at 04:07

## 2021-07-22 LAB — BACTERIA UR CULT: ABNORMAL

## 2021-07-23 ENCOUNTER — TELEPHONE (OUTPATIENT)
Dept: EMERGENCY MEDICINE | Facility: HOSPITAL | Age: 71
End: 2021-07-23

## 2021-07-30 ENCOUNTER — PATIENT OUTREACH (OUTPATIENT)
Dept: ADMINISTRATIVE | Facility: HOSPITAL | Age: 71
End: 2021-07-30

## 2021-08-02 ENCOUNTER — OFFICE VISIT (OUTPATIENT)
Dept: FAMILY MEDICINE | Facility: CLINIC | Age: 71
End: 2021-08-02
Payer: MEDICARE

## 2021-08-02 VITALS
WEIGHT: 213.38 LBS | OXYGEN SATURATION: 96 % | SYSTOLIC BLOOD PRESSURE: 122 MMHG | HEIGHT: 73 IN | BODY MASS INDEX: 28.28 KG/M2 | DIASTOLIC BLOOD PRESSURE: 78 MMHG | HEART RATE: 61 BPM | TEMPERATURE: 99 F

## 2021-08-02 DIAGNOSIS — I10 ESSENTIAL HYPERTENSION: ICD-10-CM

## 2021-08-02 DIAGNOSIS — R26.9 GAIT DISTURBANCE: ICD-10-CM

## 2021-08-02 DIAGNOSIS — R41.0 CONFUSION: Primary | ICD-10-CM

## 2021-08-02 PROCEDURE — 99215 OFFICE O/P EST HI 40 MIN: CPT | Mod: PBBFAC,PO | Performed by: FAMILY MEDICINE

## 2021-08-02 PROCEDURE — 99999 PR PBB SHADOW E&M-EST. PATIENT-LVL V: CPT | Mod: PBBFAC,,, | Performed by: FAMILY MEDICINE

## 2021-08-02 PROCEDURE — 99214 OFFICE O/P EST MOD 30 MIN: CPT | Mod: S$PBB,,, | Performed by: FAMILY MEDICINE

## 2021-08-02 PROCEDURE — 99999 PR PBB SHADOW E&M-EST. PATIENT-LVL V: ICD-10-PCS | Mod: PBBFAC,,, | Performed by: FAMILY MEDICINE

## 2021-08-02 PROCEDURE — 99214 PR OFFICE/OUTPT VISIT, EST, LEVL IV, 30-39 MIN: ICD-10-PCS | Mod: S$PBB,,, | Performed by: FAMILY MEDICINE

## 2021-08-04 ENCOUNTER — PATIENT MESSAGE (OUTPATIENT)
Dept: HEPATOLOGY | Facility: CLINIC | Age: 71
End: 2021-08-04

## 2021-08-18 ENCOUNTER — TELEPHONE (OUTPATIENT)
Dept: FAMILY MEDICINE | Facility: CLINIC | Age: 71
End: 2021-08-18

## 2021-08-25 ENCOUNTER — OFFICE VISIT (OUTPATIENT)
Dept: INTERNAL MEDICINE | Facility: CLINIC | Age: 71
End: 2021-08-25
Payer: MEDICARE

## 2021-08-25 VITALS
DIASTOLIC BLOOD PRESSURE: 75 MMHG | WEIGHT: 214.75 LBS | BODY MASS INDEX: 28.46 KG/M2 | OXYGEN SATURATION: 96 % | HEART RATE: 66 BPM | HEIGHT: 73 IN | SYSTOLIC BLOOD PRESSURE: 125 MMHG | TEMPERATURE: 99 F

## 2021-08-25 DIAGNOSIS — Z98.61 CAD S/P PERCUTANEOUS CORONARY ANGIOPLASTY: ICD-10-CM

## 2021-08-25 DIAGNOSIS — I15.2 HYPERTENSION ASSOCIATED WITH TYPE 2 DIABETES MELLITUS: ICD-10-CM

## 2021-08-25 DIAGNOSIS — F43.10 PTSD (POST-TRAUMATIC STRESS DISORDER): ICD-10-CM

## 2021-08-25 DIAGNOSIS — R53.81 PHYSICAL DECONDITIONING: ICD-10-CM

## 2021-08-25 DIAGNOSIS — R30.0 DYSURIA: ICD-10-CM

## 2021-08-25 DIAGNOSIS — K86.2 PANCREATIC CYST: ICD-10-CM

## 2021-08-25 DIAGNOSIS — G47.33 OSA ON CPAP: ICD-10-CM

## 2021-08-25 DIAGNOSIS — Z95.0 PACEMAKER: ICD-10-CM

## 2021-08-25 DIAGNOSIS — E11.9 TYPE 2 DIABETES MELLITUS WITHOUT COMPLICATION, WITHOUT LONG-TERM CURRENT USE OF INSULIN: Primary | ICD-10-CM

## 2021-08-25 DIAGNOSIS — E11.69 HYPERLIPIDEMIA ASSOCIATED WITH TYPE 2 DIABETES MELLITUS: ICD-10-CM

## 2021-08-25 DIAGNOSIS — J43.9 MIXED RESTRICTIVE AND OBSTRUCTIVE LUNG DISEASE: ICD-10-CM

## 2021-08-25 DIAGNOSIS — Z86.010 HISTORY OF COLON POLYPS: ICD-10-CM

## 2021-08-25 DIAGNOSIS — F02.80 LATE ONSET ALZHEIMER'S DISEASE WITHOUT BEHAVIORAL DISTURBANCE: ICD-10-CM

## 2021-08-25 DIAGNOSIS — E11.59 HYPERTENSION ASSOCIATED WITH TYPE 2 DIABETES MELLITUS: ICD-10-CM

## 2021-08-25 DIAGNOSIS — E78.5 HYPERLIPIDEMIA ASSOCIATED WITH TYPE 2 DIABETES MELLITUS: ICD-10-CM

## 2021-08-25 DIAGNOSIS — Z12.5 PROSTATE CANCER SCREENING: ICD-10-CM

## 2021-08-25 DIAGNOSIS — I25.10 CAD S/P PERCUTANEOUS CORONARY ANGIOPLASTY: ICD-10-CM

## 2021-08-25 DIAGNOSIS — G30.1 LATE ONSET ALZHEIMER'S DISEASE WITHOUT BEHAVIORAL DISTURBANCE: ICD-10-CM

## 2021-08-25 DIAGNOSIS — J98.4 MIXED RESTRICTIVE AND OBSTRUCTIVE LUNG DISEASE: ICD-10-CM

## 2021-08-25 LAB
BACTERIA #/AREA URNS AUTO: ABNORMAL /HPF
BILIRUB UR QL STRIP: NEGATIVE
CLARITY UR REFRACT.AUTO: ABNORMAL
COLOR UR AUTO: YELLOW
GLUCOSE UR QL STRIP: NEGATIVE
HGB UR QL STRIP: NEGATIVE
KETONES UR QL STRIP: NEGATIVE
LEUKOCYTE ESTERASE UR QL STRIP: ABNORMAL
MICROSCOPIC COMMENT: ABNORMAL
NITRITE UR QL STRIP: NEGATIVE
PH UR STRIP: 6 [PH] (ref 5–8)
PROT UR QL STRIP: NEGATIVE
RBC #/AREA URNS AUTO: 7 /HPF (ref 0–4)
SP GR UR STRIP: 1.02 (ref 1–1.03)
URN SPEC COLLECT METH UR: ABNORMAL
WBC #/AREA URNS AUTO: 71 /HPF (ref 0–5)
WBC CLUMPS UR QL AUTO: ABNORMAL

## 2021-08-25 PROCEDURE — 99215 OFFICE O/P EST HI 40 MIN: CPT | Mod: S$PBB,,, | Performed by: INTERNAL MEDICINE

## 2021-08-25 PROCEDURE — 99215 OFFICE O/P EST HI 40 MIN: CPT | Mod: PBBFAC,PO | Performed by: INTERNAL MEDICINE

## 2021-08-25 PROCEDURE — 81001 URINALYSIS AUTO W/SCOPE: CPT | Performed by: INTERNAL MEDICINE

## 2021-08-25 PROCEDURE — 87077 CULTURE AEROBIC IDENTIFY: CPT | Performed by: INTERNAL MEDICINE

## 2021-08-25 PROCEDURE — 99215 PR OFFICE/OUTPT VISIT, EST, LEVL V, 40-54 MIN: ICD-10-PCS | Mod: S$PBB,,, | Performed by: INTERNAL MEDICINE

## 2021-08-25 PROCEDURE — 87086 URINE CULTURE/COLONY COUNT: CPT | Performed by: INTERNAL MEDICINE

## 2021-08-25 PROCEDURE — 99999 PR PBB SHADOW E&M-EST. PATIENT-LVL V: CPT | Mod: PBBFAC,,, | Performed by: INTERNAL MEDICINE

## 2021-08-25 PROCEDURE — 99999 PR PBB SHADOW E&M-EST. PATIENT-LVL V: ICD-10-PCS | Mod: PBBFAC,,, | Performed by: INTERNAL MEDICINE

## 2021-08-25 RX ORDER — DONEPEZIL HYDROCHLORIDE 10 MG/1
10 TABLET, FILM COATED ORAL NIGHTLY
Qty: 90 TABLET | Refills: 3 | Status: SHIPPED | OUTPATIENT
Start: 2021-08-25 | End: 2022-07-18

## 2021-08-27 ENCOUNTER — LAB VISIT (OUTPATIENT)
Dept: LAB | Facility: HOSPITAL | Age: 71
End: 2021-08-27
Attending: INTERNAL MEDICINE
Payer: MEDICARE

## 2021-08-27 ENCOUNTER — PATIENT MESSAGE (OUTPATIENT)
Dept: INTERNAL MEDICINE | Facility: CLINIC | Age: 71
End: 2021-08-27

## 2021-08-27 ENCOUNTER — TELEPHONE (OUTPATIENT)
Dept: INTERNAL MEDICINE | Facility: CLINIC | Age: 71
End: 2021-08-27

## 2021-08-27 DIAGNOSIS — E11.9 TYPE 2 DIABETES MELLITUS WITHOUT COMPLICATION, WITHOUT LONG-TERM CURRENT USE OF INSULIN: ICD-10-CM

## 2021-08-27 DIAGNOSIS — Z12.5 PROSTATE CANCER SCREENING: ICD-10-CM

## 2021-08-27 PROCEDURE — 83036 HEMOGLOBIN GLYCOSYLATED A1C: CPT | Performed by: INTERNAL MEDICINE

## 2021-08-27 PROCEDURE — 85025 COMPLETE CBC W/AUTO DIFF WBC: CPT | Performed by: INTERNAL MEDICINE

## 2021-08-27 PROCEDURE — 84443 ASSAY THYROID STIM HORMONE: CPT | Performed by: INTERNAL MEDICINE

## 2021-08-27 PROCEDURE — 80061 LIPID PANEL: CPT | Performed by: INTERNAL MEDICINE

## 2021-08-27 PROCEDURE — 80053 COMPREHEN METABOLIC PANEL: CPT | Performed by: INTERNAL MEDICINE

## 2021-08-27 PROCEDURE — 36415 COLL VENOUS BLD VENIPUNCTURE: CPT | Mod: PO | Performed by: INTERNAL MEDICINE

## 2021-08-27 PROCEDURE — 84153 ASSAY OF PSA TOTAL: CPT | Performed by: INTERNAL MEDICINE

## 2021-08-28 ENCOUNTER — PATIENT MESSAGE (OUTPATIENT)
Dept: INTERNAL MEDICINE | Facility: CLINIC | Age: 71
End: 2021-08-28

## 2021-08-28 LAB
ALBUMIN SERPL BCP-MCNC: 3.9 G/DL (ref 3.5–5.2)
ALP SERPL-CCNC: 54 U/L (ref 55–135)
ALT SERPL W/O P-5'-P-CCNC: 36 U/L (ref 10–44)
ANION GAP SERPL CALC-SCNC: 10 MMOL/L (ref 8–16)
AST SERPL-CCNC: 29 U/L (ref 10–40)
BACTERIA UR CULT: ABNORMAL
BASOPHILS # BLD AUTO: 0.02 K/UL (ref 0–0.2)
BASOPHILS NFR BLD: 0.4 % (ref 0–1.9)
BILIRUB SERPL-MCNC: 1.1 MG/DL (ref 0.1–1)
BUN SERPL-MCNC: 14 MG/DL (ref 8–23)
CALCIUM SERPL-MCNC: 9.8 MG/DL (ref 8.7–10.5)
CHLORIDE SERPL-SCNC: 105 MMOL/L (ref 95–110)
CHOLEST SERPL-MCNC: 126 MG/DL (ref 120–199)
CHOLEST/HDLC SERPL: 2.5 {RATIO} (ref 2–5)
CO2 SERPL-SCNC: 27 MMOL/L (ref 23–29)
COMPLEXED PSA SERPL-MCNC: 5.2 NG/ML (ref 0–4)
CREAT SERPL-MCNC: 0.9 MG/DL (ref 0.5–1.4)
DIFFERENTIAL METHOD: ABNORMAL
EOSINOPHIL # BLD AUTO: 0.1 K/UL (ref 0–0.5)
EOSINOPHIL NFR BLD: 2 % (ref 0–8)
ERYTHROCYTE [DISTWIDTH] IN BLOOD BY AUTOMATED COUNT: 15.4 % (ref 11.5–14.5)
EST. GFR  (AFRICAN AMERICAN): >60 ML/MIN/1.73 M^2
EST. GFR  (NON AFRICAN AMERICAN): >60 ML/MIN/1.73 M^2
ESTIMATED AVG GLUCOSE: 103 MG/DL (ref 68–131)
GLUCOSE SERPL-MCNC: 77 MG/DL (ref 70–110)
HBA1C MFR BLD: 5.2 % (ref 4–5.6)
HCT VFR BLD AUTO: 45.9 % (ref 40–54)
HDLC SERPL-MCNC: 51 MG/DL (ref 40–75)
HDLC SERPL: 40.5 % (ref 20–50)
HGB BLD-MCNC: 15.2 G/DL (ref 14–18)
IMM GRANULOCYTES # BLD AUTO: 0.01 K/UL (ref 0–0.04)
IMM GRANULOCYTES NFR BLD AUTO: 0.2 % (ref 0–0.5)
LDLC SERPL CALC-MCNC: 61.8 MG/DL (ref 63–159)
LYMPHOCYTES # BLD AUTO: 1.4 K/UL (ref 1–4.8)
LYMPHOCYTES NFR BLD: 28.1 % (ref 18–48)
MCH RBC QN AUTO: 32.2 PG (ref 27–31)
MCHC RBC AUTO-ENTMCNC: 33.1 G/DL (ref 32–36)
MCV RBC AUTO: 97 FL (ref 82–98)
MONOCYTES # BLD AUTO: 0.5 K/UL (ref 0.3–1)
MONOCYTES NFR BLD: 10.1 % (ref 4–15)
NEUTROPHILS # BLD AUTO: 2.9 K/UL (ref 1.8–7.7)
NEUTROPHILS NFR BLD: 59.2 % (ref 38–73)
NONHDLC SERPL-MCNC: 75 MG/DL
NRBC BLD-RTO: 0 /100 WBC
PLATELET # BLD AUTO: 130 K/UL (ref 150–450)
PMV BLD AUTO: 10.3 FL (ref 9.2–12.9)
POTASSIUM SERPL-SCNC: 4.9 MMOL/L (ref 3.5–5.1)
PROT SERPL-MCNC: 6.8 G/DL (ref 6–8.4)
RBC # BLD AUTO: 4.72 M/UL (ref 4.6–6.2)
SODIUM SERPL-SCNC: 142 MMOL/L (ref 136–145)
TRIGL SERPL-MCNC: 66 MG/DL (ref 30–150)
TSH SERPL DL<=0.005 MIU/L-ACNC: 1.79 UIU/ML (ref 0.4–4)
WBC # BLD AUTO: 4.95 K/UL (ref 3.9–12.7)

## 2021-08-28 RX ORDER — AMOXICILLIN AND CLAVULANATE POTASSIUM 875; 125 MG/1; MG/1
1 TABLET, FILM COATED ORAL 2 TIMES DAILY
Qty: 20 TABLET | Refills: 0 | Status: SHIPPED | OUTPATIENT
Start: 2021-08-28 | End: 2022-07-18

## 2021-09-29 DIAGNOSIS — E11.9 TYPE 2 DIABETES MELLITUS WITHOUT COMPLICATION: ICD-10-CM

## 2021-10-12 ENCOUNTER — PATIENT OUTREACH (OUTPATIENT)
Dept: ADMINISTRATIVE | Facility: OTHER | Age: 71
End: 2021-10-12

## 2021-10-14 ENCOUNTER — OFFICE VISIT (OUTPATIENT)
Dept: GASTROENTEROLOGY | Facility: CLINIC | Age: 71
End: 2021-10-14
Payer: MEDICARE

## 2021-10-14 VITALS
SYSTOLIC BLOOD PRESSURE: 144 MMHG | RESPIRATION RATE: 20 BRPM | WEIGHT: 212.5 LBS | DIASTOLIC BLOOD PRESSURE: 84 MMHG | HEART RATE: 92 BPM | BODY MASS INDEX: 28.16 KG/M2 | HEIGHT: 73 IN

## 2021-10-14 DIAGNOSIS — R15.9 INCONTINENCE OF FECES, UNSPECIFIED FECAL INCONTINENCE TYPE: ICD-10-CM

## 2021-10-14 DIAGNOSIS — K86.89 PANCREATIC MASS: Primary | ICD-10-CM

## 2021-10-14 DIAGNOSIS — R19.7 DIARRHEA, UNSPECIFIED TYPE: ICD-10-CM

## 2021-10-14 PROCEDURE — 99214 PR OFFICE/OUTPT VISIT, EST, LEVL IV, 30-39 MIN: ICD-10-PCS | Mod: S$PBB,,, | Performed by: PHYSICIAN ASSISTANT

## 2021-10-14 PROCEDURE — 99214 OFFICE O/P EST MOD 30 MIN: CPT | Mod: S$PBB,,, | Performed by: PHYSICIAN ASSISTANT

## 2021-10-14 PROCEDURE — 99999 PR PBB SHADOW E&M-EST. PATIENT-LVL V: ICD-10-PCS | Mod: PBBFAC,,, | Performed by: PHYSICIAN ASSISTANT

## 2021-10-14 PROCEDURE — 99999 PR PBB SHADOW E&M-EST. PATIENT-LVL V: CPT | Mod: PBBFAC,,, | Performed by: PHYSICIAN ASSISTANT

## 2021-10-14 PROCEDURE — 99215 OFFICE O/P EST HI 40 MIN: CPT | Mod: PBBFAC | Performed by: PHYSICIAN ASSISTANT

## 2021-10-19 ENCOUNTER — LAB VISIT (OUTPATIENT)
Dept: LAB | Facility: HOSPITAL | Age: 71
End: 2021-10-19
Attending: INTERNAL MEDICINE
Payer: MEDICARE

## 2021-10-19 DIAGNOSIS — R19.7 DIARRHEA, UNSPECIFIED TYPE: ICD-10-CM

## 2021-10-19 DIAGNOSIS — K86.89 PANCREATIC MASS: ICD-10-CM

## 2021-10-19 DIAGNOSIS — R15.9 INCONTINENCE OF FECES, UNSPECIFIED FECAL INCONTINENCE TYPE: ICD-10-CM

## 2021-10-19 PROCEDURE — 82656 EL-1 FECAL QUAL/SEMIQ: CPT | Performed by: PHYSICIAN ASSISTANT

## 2021-10-19 PROCEDURE — 82705 FATS/LIPIDS FECES QUAL: CPT | Performed by: PHYSICIAN ASSISTANT

## 2021-10-19 PROCEDURE — 87046 STOOL CULTR AEROBIC BACT EA: CPT | Mod: 59 | Performed by: PHYSICIAN ASSISTANT

## 2021-10-19 PROCEDURE — 87449 NOS EACH ORGANISM AG IA: CPT | Performed by: PHYSICIAN ASSISTANT

## 2021-10-19 PROCEDURE — 87324 CLOSTRIDIUM AG IA: CPT | Performed by: PHYSICIAN ASSISTANT

## 2021-10-19 PROCEDURE — 87045 FECES CULTURE AEROBIC BACT: CPT | Performed by: PHYSICIAN ASSISTANT

## 2021-10-19 PROCEDURE — 87427 SHIGA-LIKE TOXIN AG IA: CPT | Mod: 59 | Performed by: PHYSICIAN ASSISTANT

## 2021-10-19 PROCEDURE — 87329 GIARDIA AG IA: CPT | Performed by: PHYSICIAN ASSISTANT

## 2021-10-21 LAB
CRYPTOSP AG STL QL IA: NEGATIVE
E COLI SXT1 STL QL IA: NEGATIVE
E COLI SXT2 STL QL IA: NEGATIVE
G LAMBLIA AG STL QL IA: NEGATIVE

## 2021-10-22 LAB — ELASTASE 1, FECAL: 380 MCG/G

## 2021-10-23 LAB
FAT STL QL: NORMAL
NEUTRAL FAT STL QL: NORMAL

## 2021-10-25 LAB
BACTERIA STL CULT: NORMAL
O+P STL MICRO: NORMAL

## 2021-10-26 DIAGNOSIS — K86.2 PANCREATIC CYST: Primary | ICD-10-CM

## 2021-10-26 DIAGNOSIS — R93.3 ABNORMAL FINDINGS ON DIAGNOSTIC IMAGING OF OTHER PARTS OF DIGESTIVE TRACT: ICD-10-CM

## 2021-10-26 LAB
ALBUMIN SERPL BCP-MCNC: 3.9 G/DL (ref 3.5–5)
ALP SERPL-CCNC: 45 U/L (ref 25–125)
ALT SERPL W P-5'-P-CCNC: 26 U/L (ref 10–40)
AST SERPL-CCNC: 24 U/L (ref 14–40)
BILIRUB SERPL-MCNC: 0.8 MG/DL (ref 0.1–1.4)
BUN SERPL-MCNC: 19 MG/DL
CALCIUM SERPL-MCNC: 9.7 MG/DL (ref 8.7–10.7)
CHLORIDE SERPL-SCNC: 104 MMOL/L (ref 99–108)
CHOLEST SERPL-MSCNC: 139 MG/DL (ref 0–200)
CO2 SERPL-SCNC: 32 MMOL/L (ref 13–22)
CREAT SERPL-MCNC: 0.8 MG/DL (ref 0.6–1.3)
EST. GFR  (NO RACE VARIABLE): 95
GLUCOSE SERPL-MCNC: 99 MG/DL
HBA1C MFR BLD: 5.1 % (ref 4–6)
HDLC SERPL-MCNC: 54 MG/DL
LDLC SERPL CALC-MCNC: 73 MG/DL
POTASSIUM SERPL-SCNC: 4.9 MMOL/L (ref 3.4–5.3)
PROT SERPL-MCNC: 6.8 G/DL
PSA: 2.55
SODIUM BLD-SCNC: 142 MMOL/L (ref 137–147)
TRIGL SERPL-MCNC: 75 MG/DL
TSH SERPL DL<=0.005 MIU/L-ACNC: 2.29 UIU/ML (ref 0.41–5.9)

## 2021-10-29 ENCOUNTER — HOSPITAL ENCOUNTER (OUTPATIENT)
Dept: RADIOLOGY | Facility: HOSPITAL | Age: 71
Discharge: HOME | End: 2021-10-29
Attending: INTERNAL MEDICINE
Payer: MEDICARE

## 2021-10-29 DIAGNOSIS — K86.2 PANCREATIC CYST: ICD-10-CM

## 2021-11-05 ENCOUNTER — PATIENT MESSAGE (OUTPATIENT)
Dept: CARDIOLOGY | Facility: CLINIC | Age: 71
End: 2021-11-05
Payer: MEDICARE

## 2021-12-02 ENCOUNTER — OFFICE VISIT (OUTPATIENT)
Dept: CARDIOLOGY | Facility: CLINIC | Age: 71
End: 2021-12-02
Payer: OTHER GOVERNMENT

## 2021-12-02 ENCOUNTER — HOSPITAL ENCOUNTER (OUTPATIENT)
Dept: CARDIOLOGY | Facility: HOSPITAL | Age: 71
Discharge: HOME | End: 2021-12-02
Attending: INTERNAL MEDICINE
Payer: OTHER GOVERNMENT

## 2021-12-02 VITALS
BODY MASS INDEX: 28.63 KG/M2 | OXYGEN SATURATION: 98 % | SYSTOLIC BLOOD PRESSURE: 105 MMHG | DIASTOLIC BLOOD PRESSURE: 71 MMHG | HEIGHT: 73 IN | HEART RATE: 64 BPM | WEIGHT: 216.06 LBS

## 2021-12-02 DIAGNOSIS — F02.80 LATE ONSET ALZHEIMER'S DEMENTIA WITHOUT BEHAVIORAL DISTURBANCE: ICD-10-CM

## 2021-12-02 DIAGNOSIS — R00.1 SYMPTOMATIC BRADYCARDIA: ICD-10-CM

## 2021-12-02 DIAGNOSIS — J98.4 MIXED RESTRICTIVE AND OBSTRUCTIVE LUNG DISEASE: ICD-10-CM

## 2021-12-02 DIAGNOSIS — Z95.0 PACEMAKER: ICD-10-CM

## 2021-12-02 DIAGNOSIS — R53.81 PHYSICAL DECONDITIONING: ICD-10-CM

## 2021-12-02 DIAGNOSIS — G47.33 OSA ON CPAP: ICD-10-CM

## 2021-12-02 DIAGNOSIS — E78.5 HYPERLIPIDEMIA ASSOCIATED WITH TYPE 2 DIABETES MELLITUS: ICD-10-CM

## 2021-12-02 DIAGNOSIS — G30.1 LATE ONSET ALZHEIMER'S DEMENTIA WITHOUT BEHAVIORAL DISTURBANCE: ICD-10-CM

## 2021-12-02 DIAGNOSIS — I25.10 CAD S/P PERCUTANEOUS CORONARY ANGIOPLASTY: Primary | ICD-10-CM

## 2021-12-02 DIAGNOSIS — Z95.0 CARDIAC PACEMAKER IN SITU: Primary | ICD-10-CM

## 2021-12-02 DIAGNOSIS — I49.5 SINUS NODE DYSFUNCTION: ICD-10-CM

## 2021-12-02 DIAGNOSIS — Z98.61 CAD S/P PERCUTANEOUS CORONARY ANGIOPLASTY: Primary | ICD-10-CM

## 2021-12-02 DIAGNOSIS — I45.10 RBBB: ICD-10-CM

## 2021-12-02 DIAGNOSIS — E11.59 HYPERTENSION ASSOCIATED WITH TYPE 2 DIABETES MELLITUS: ICD-10-CM

## 2021-12-02 DIAGNOSIS — E11.9 TYPE 2 DIABETES MELLITUS WITHOUT COMPLICATION, WITHOUT LONG-TERM CURRENT USE OF INSULIN: ICD-10-CM

## 2021-12-02 DIAGNOSIS — F43.10 PTSD (POST-TRAUMATIC STRESS DISORDER): ICD-10-CM

## 2021-12-02 DIAGNOSIS — Z95.0 CARDIAC PACEMAKER IN SITU: ICD-10-CM

## 2021-12-02 DIAGNOSIS — I15.2 HYPERTENSION ASSOCIATED WITH TYPE 2 DIABETES MELLITUS: ICD-10-CM

## 2021-12-02 DIAGNOSIS — J43.9 MIXED RESTRICTIVE AND OBSTRUCTIVE LUNG DISEASE: ICD-10-CM

## 2021-12-02 DIAGNOSIS — E66.9 OBESITY (BMI 30-39.9): ICD-10-CM

## 2021-12-02 DIAGNOSIS — E11.69 HYPERLIPIDEMIA ASSOCIATED WITH TYPE 2 DIABETES MELLITUS: ICD-10-CM

## 2021-12-02 PROCEDURE — 99214 PR OFFICE/OUTPT VISIT, EST, LEVL IV, 30-39 MIN: ICD-10-PCS | Mod: S$PBB,,, | Performed by: INTERNAL MEDICINE

## 2021-12-02 PROCEDURE — 93280 PM DEVICE PROGR EVAL DUAL: CPT

## 2021-12-02 PROCEDURE — 99214 OFFICE O/P EST MOD 30 MIN: CPT | Mod: S$PBB,,, | Performed by: INTERNAL MEDICINE

## 2021-12-02 PROCEDURE — 99215 OFFICE O/P EST HI 40 MIN: CPT | Mod: PBBFAC | Performed by: INTERNAL MEDICINE

## 2021-12-02 PROCEDURE — 99999 PR PBB SHADOW E&M-EST. PATIENT-LVL V: ICD-10-PCS | Mod: PBBFAC,,, | Performed by: INTERNAL MEDICINE

## 2021-12-02 PROCEDURE — 93280 CARDIAC DEVICE CHECK - IN CLINIC & HOSPITAL: ICD-10-PCS | Mod: 26,,, | Performed by: INTERNAL MEDICINE

## 2021-12-02 PROCEDURE — 99999 PR PBB SHADOW E&M-EST. PATIENT-LVL V: CPT | Mod: PBBFAC,,, | Performed by: INTERNAL MEDICINE

## 2021-12-02 PROCEDURE — 93280 PM DEVICE PROGR EVAL DUAL: CPT | Mod: 26,,, | Performed by: INTERNAL MEDICINE

## 2021-12-09 ENCOUNTER — PATIENT OUTREACH (OUTPATIENT)
Dept: ADMINISTRATIVE | Facility: HOSPITAL | Age: 71
End: 2021-12-09
Payer: MEDICARE

## 2022-01-01 NOTE — ANESTHESIA POSTPROCEDURE EVALUATION
"Anesthesia Post Evaluation    Patient: Cole Doan    Procedure(s) Performed: Procedure(s) (LRB):  ESOPHAGOGASTRODUODENOSCOPY (EGD) (N/A)    Final Anesthesia Type: MAC  Patient location during evaluation: PACU  Patient participation: Yes- Able to Participate  Level of consciousness: awake and alert and oriented  Post-procedure vital signs: reviewed and stable  Pain management: adequate  Airway patency: patent  PONV status at discharge: No PONV  Anesthetic complications: no      Cardiovascular status: blood pressure returned to baseline  Respiratory status: unassisted, room air and spontaneous ventilation  Hydration status: euvolemic  Follow-up not needed.        Visit Vitals  /66 (BP Location: Right leg, Patient Position: Lying)   Pulse 60   Temp 36.8 °C (98.2 °F) (Oral)   Resp 17   Ht 6' 1" (1.854 m)   Wt 121.1 kg (267 lb)   SpO2 95%   BMI 35.23 kg/m²       Pain/Lorena Score: Pain Assessment Performed: Yes (8/16/2018  6:50 AM)  Presence of Pain: denies (8/16/2018  6:50 AM)        " Reviewed breastfeeding basics with mother. Reviewed positions for deep latch. Mother cradling baby at the breast.  Mother assisted into prone position and some small adjustments made to positioning at breast.  Parents taught how to evaluate for swallowing. Reviewed adequate output expectations. Discussed with mother her plan for feeding. Reviewed the benefits of exclusive breast milk feeding during the hospital stay. Informed her of the risks of using formula to supplement in the first few days of life as well as the benefits of successful breast milk feeding; referred her to the Breastfeeding booklet about this information. She acknowledges understanding of information reviewed and states that it is her plan to breastfeed her infant. Will support her choice and offer additional information as needed. Reviewed risks associated with introducing an artificial nipple or pacifier and encouraged mother to wait  until breastfeeding is well established ( AAP guidelines suggest waiting until one month of age). Discussed that using artificial nipples may cause ineffective sucking at breast  in the , decreased breast stimulation/lowered breast milk production and  breastfeeding difficulties. Reviewed breastfeeding basics:  How milk is made and normal  breastfeeding behaviors discussed. Supply and demand,  stomach size, early feeding cues, skin to skin bonding with comfortable positioning and baby led latch-on reviewed. How to identify signs of successful breastfeeding sessions reviewed; education on asymetrical latch, signs of effective latching vs shallow, in-effective latching, normal  feeding frequency and duration and expected infant output discussed.   Normal course of breastfeeding discussed including the AAP's recommendation that children receive exclusive breast milk feedings for the first six months of life with breast milk feedings to continue through the first year of life and/or beyond as complimentary table foods are added. Breastfeeding Booklet and Warm line information provided with discussion. Discussed typical  weight loss and the importance of pediatrician appointment within 24-48 hours of discharge, at 2 weeks of life and normalcy of requesting pediatric weight checks as needed in between visits. Pt will successfully establish breastfeeding by feeding in response to early feeding cues   or wake every 3h, will obtain deep latch, and will keep log of feedings/output. Taught to BF at hunger cues and or q 2-3 hrs and to offer 10-20 drops of hand expressed colostrum at any non-feeds.       Breast Assessment  Left Breast: Medium  Left Nipple: Everted,Intact  Right Breast: Medium  Right Nipple: Everted,Intact  Breast- Feeding Assessment  Attends Breast-Feeding Classes: Yes ([facility], [facility])  Breast-Feeding Experience: No  Breast Trauma/Surgery: Yes (nipple piercings - removed)  Type/Quality: Good  Lactation Consultant Visits  Breast-Feedings: Good   Mother/Infant Observation  Mother Observation: Alignment,Breast comfortable,Close hold,Nipple round on release,Recognizes feeding cues  Infant Observation: Audible swallows,Breast tissue moves,Feeding cues,Latches nipple and aereolae,Lips flanged, lower,Lips flanged, upper,Opens mouth  LATCH Documentation  Latch: Grasps breast, tongue down, lips flanged, rhythmic sucking  Audible Swallowing: Spontaneous and intermittent (24 hours old)  Type of Nipple: Everted (after stimulation)  Comfort (Breast/Nipple): Soft/non-tender  Hold (Positioning): Full assist, teach one side, mother does other, staff holds  [facility] Score: 9

## 2022-01-13 ENCOUNTER — PATIENT MESSAGE (OUTPATIENT)
Dept: CARDIOLOGY | Facility: CLINIC | Age: 72
End: 2022-01-13
Payer: MEDICARE

## 2022-02-28 ENCOUNTER — TELEPHONE (OUTPATIENT)
Dept: ADMINISTRATIVE | Facility: HOSPITAL | Age: 72
End: 2022-02-28
Payer: MEDICARE

## 2022-03-28 ENCOUNTER — TELEPHONE (OUTPATIENT)
Dept: ADMINISTRATIVE | Facility: HOSPITAL | Age: 72
End: 2022-03-28
Payer: MEDICARE

## 2022-04-26 ENCOUNTER — PATIENT MESSAGE (OUTPATIENT)
Dept: ADMINISTRATIVE | Facility: HOSPITAL | Age: 72
End: 2022-04-26
Payer: MEDICARE

## 2022-05-31 ENCOUNTER — PATIENT OUTREACH (OUTPATIENT)
Dept: ADMINISTRATIVE | Facility: HOSPITAL | Age: 72
End: 2022-05-31
Payer: MEDICARE

## 2022-05-31 ENCOUNTER — TELEPHONE (OUTPATIENT)
Dept: INTERNAL MEDICINE | Facility: CLINIC | Age: 72
End: 2022-05-31
Payer: MEDICARE

## 2022-05-31 DIAGNOSIS — Z11.59 NEED FOR HEPATITIS C SCREENING TEST: Primary | ICD-10-CM

## 2022-05-31 LAB
CREATININE URINE: 115.9
MICROALB/CREAT RATIO: 118
MICROALBUMIN URINE: 1.4

## 2022-05-31 NOTE — PROGRESS NOTES
Working Diabetes.    Pt has lab appt scheduled, 6/03/21.    Care [facility] added.  Labs entered/scanned to chart.

## 2022-06-03 ENCOUNTER — LAB VISIT (OUTPATIENT)
Dept: LAB | Facility: HOSPITAL | Age: 72
End: 2022-06-03
Attending: INTERNAL MEDICINE
Payer: MEDICARE

## 2022-06-03 DIAGNOSIS — E11.9 TYPE 2 DIABETES MELLITUS WITHOUT COMPLICATION, WITHOUT LONG-TERM CURRENT USE OF INSULIN: ICD-10-CM

## 2022-06-03 DIAGNOSIS — I25.10 CAD S/P PERCUTANEOUS CORONARY ANGIOPLASTY: ICD-10-CM

## 2022-06-03 DIAGNOSIS — E11.9 TYPE 2 DIABETES MELLITUS WITHOUT COMPLICATION: ICD-10-CM

## 2022-06-03 DIAGNOSIS — Z98.61 CAD S/P PERCUTANEOUS CORONARY ANGIOPLASTY: ICD-10-CM

## 2022-06-03 DIAGNOSIS — Z11.59 NEED FOR HEPATITIS C SCREENING TEST: ICD-10-CM

## 2022-06-03 LAB
ALBUMIN SERPL BCP-MCNC: 4 G/DL (ref 3.5–5.2)
ALBUMIN/CREAT UR: 12.4 UG/MG (ref 0–30)
ALP SERPL-CCNC: 65 U/L (ref 55–135)
ALT SERPL W/O P-5'-P-CCNC: 17 U/L (ref 10–44)
ANION GAP SERPL CALC-SCNC: 8 MMOL/L (ref 8–16)
AST SERPL-CCNC: 18 U/L (ref 10–40)
BILIRUB SERPL-MCNC: 1 MG/DL (ref 0.1–1)
BUN SERPL-MCNC: 18 MG/DL (ref 8–23)
CALCIUM SERPL-MCNC: 9.5 MG/DL (ref 8.7–10.5)
CHLORIDE SERPL-SCNC: 109 MMOL/L (ref 95–110)
CHOLEST SERPL-MCNC: 202 MG/DL (ref 120–199)
CHOLEST/HDLC SERPL: 5.8 {RATIO} (ref 2–5)
CO2 SERPL-SCNC: 28 MMOL/L (ref 23–29)
CREAT SERPL-MCNC: 0.9 MG/DL (ref 0.5–1.4)
CREAT UR-MCNC: 178 MG/DL (ref 23–375)
EST. GFR  (AFRICAN AMERICAN): >60 ML/MIN/1.73 M^2
EST. GFR  (NON AFRICAN AMERICAN): >60 ML/MIN/1.73 M^2
ESTIMATED AVG GLUCOSE: 97 MG/DL (ref 68–131)
GLUCOSE SERPL-MCNC: 89 MG/DL (ref 70–110)
HBA1C MFR BLD: 5 % (ref 4–5.6)
HDLC SERPL-MCNC: 35 MG/DL (ref 40–75)
HDLC SERPL: 17.3 % (ref 20–50)
LDLC SERPL CALC-MCNC: 145.2 MG/DL (ref 63–159)
MICROALBUMIN UR DL<=1MG/L-MCNC: 22 UG/ML
NONHDLC SERPL-MCNC: 167 MG/DL
POTASSIUM SERPL-SCNC: 5 MMOL/L (ref 3.5–5.1)
PROT SERPL-MCNC: 6.7 G/DL (ref 6–8.4)
SODIUM SERPL-SCNC: 145 MMOL/L (ref 136–145)
TRIGL SERPL-MCNC: 109 MG/DL (ref 30–150)

## 2022-06-03 PROCEDURE — 86803 HEPATITIS C AB TEST: CPT | Performed by: INTERNAL MEDICINE

## 2022-06-03 PROCEDURE — 80053 COMPREHEN METABOLIC PANEL: CPT | Performed by: INTERNAL MEDICINE

## 2022-06-03 PROCEDURE — 36415 COLL VENOUS BLD VENIPUNCTURE: CPT | Performed by: INTERNAL MEDICINE

## 2022-06-03 PROCEDURE — 83036 HEMOGLOBIN GLYCOSYLATED A1C: CPT | Performed by: INTERNAL MEDICINE

## 2022-06-03 PROCEDURE — 80061 LIPID PANEL: CPT | Performed by: INTERNAL MEDICINE

## 2022-06-03 PROCEDURE — 82570 ASSAY OF URINE CREATININE: CPT | Performed by: INTERNAL MEDICINE

## 2022-06-06 LAB — HCV AB SERPL QL IA: NEGATIVE

## 2022-06-08 ENCOUNTER — TELEPHONE (OUTPATIENT)
Dept: CARDIOLOGY | Facility: CLINIC | Age: 72
End: 2022-06-08
Payer: MEDICARE

## 2022-06-08 NOTE — TELEPHONE ENCOUNTER
Patient contacted and was advised that the. Labs reviewed. CMP and A1C stable.     Lipids elevated/above goal. Is he taking crestor?    Keep f/u appt with Dr. Rutledge.      The patient stated understanding, he stated he was aware that his Lipids were above the goal.

## 2022-07-07 ENCOUNTER — OFFICE VISIT (OUTPATIENT)
Dept: CARDIOLOGY | Facility: CLINIC | Age: 72
End: 2022-07-07
Payer: MEDICARE

## 2022-07-07 VITALS
HEART RATE: 60 BPM | RESPIRATION RATE: 20 BRPM | DIASTOLIC BLOOD PRESSURE: 60 MMHG | HEIGHT: 74 IN | WEIGHT: 233.94 LBS | SYSTOLIC BLOOD PRESSURE: 94 MMHG | BODY MASS INDEX: 30.02 KG/M2

## 2022-07-07 DIAGNOSIS — E11.59 HYPERTENSION ASSOCIATED WITH TYPE 2 DIABETES MELLITUS: ICD-10-CM

## 2022-07-07 DIAGNOSIS — E78.5 HYPERLIPIDEMIA ASSOCIATED WITH TYPE 2 DIABETES MELLITUS: ICD-10-CM

## 2022-07-07 DIAGNOSIS — G30.1 LATE ONSET ALZHEIMER'S DEMENTIA WITHOUT BEHAVIORAL DISTURBANCE: ICD-10-CM

## 2022-07-07 DIAGNOSIS — E11.9 TYPE 2 DIABETES MELLITUS WITHOUT COMPLICATION, WITHOUT LONG-TERM CURRENT USE OF INSULIN: ICD-10-CM

## 2022-07-07 DIAGNOSIS — Z98.61 CAD S/P PERCUTANEOUS CORONARY ANGIOPLASTY: Primary | ICD-10-CM

## 2022-07-07 DIAGNOSIS — E66.9 OBESITY (BMI 30-39.9): ICD-10-CM

## 2022-07-07 DIAGNOSIS — Z95.0 PACEMAKER: ICD-10-CM

## 2022-07-07 DIAGNOSIS — J98.4 MIXED RESTRICTIVE AND OBSTRUCTIVE LUNG DISEASE: ICD-10-CM

## 2022-07-07 DIAGNOSIS — F43.10 PTSD (POST-TRAUMATIC STRESS DISORDER): ICD-10-CM

## 2022-07-07 DIAGNOSIS — I15.2 HYPERTENSION ASSOCIATED WITH TYPE 2 DIABETES MELLITUS: ICD-10-CM

## 2022-07-07 DIAGNOSIS — E11.69 HYPERLIPIDEMIA ASSOCIATED WITH TYPE 2 DIABETES MELLITUS: ICD-10-CM

## 2022-07-07 DIAGNOSIS — G47.33 OSA ON CPAP: ICD-10-CM

## 2022-07-07 DIAGNOSIS — J43.9 MIXED RESTRICTIVE AND OBSTRUCTIVE LUNG DISEASE: ICD-10-CM

## 2022-07-07 DIAGNOSIS — F02.80 LATE ONSET ALZHEIMER'S DEMENTIA WITHOUT BEHAVIORAL DISTURBANCE: ICD-10-CM

## 2022-07-07 DIAGNOSIS — I45.10 RBBB: ICD-10-CM

## 2022-07-07 DIAGNOSIS — I25.10 CAD S/P PERCUTANEOUS CORONARY ANGIOPLASTY: Primary | ICD-10-CM

## 2022-07-07 PROCEDURE — 99999 PR PBB SHADOW E&M-EST. PATIENT-LVL V: CPT | Mod: PBBFAC,,, | Performed by: INTERNAL MEDICINE

## 2022-07-07 PROCEDURE — 99999 PR PBB SHADOW E&M-EST. PATIENT-LVL V: ICD-10-PCS | Mod: PBBFAC,,, | Performed by: INTERNAL MEDICINE

## 2022-07-07 PROCEDURE — 99213 OFFICE O/P EST LOW 20 MIN: CPT | Mod: S$PBB,,, | Performed by: INTERNAL MEDICINE

## 2022-07-07 PROCEDURE — 99213 PR OFFICE/OUTPT VISIT, EST, LEVL III, 20-29 MIN: ICD-10-PCS | Mod: S$PBB,,, | Performed by: INTERNAL MEDICINE

## 2022-07-07 PROCEDURE — 99215 OFFICE O/P EST HI 40 MIN: CPT | Mod: PBBFAC | Performed by: INTERNAL MEDICINE

## 2022-07-07 NOTE — PROGRESS NOTES
Subjective:   Patient ID:  Cole Doan is a 71 y.o. male who presents for follow up of Follow-up (X 6 MONTHS)      HPI  12/5/2019  A 70 yo male with cad s/p pacer htn hlp elio is here fro f/u had sleeve surgery lost a lot of weight gfeels great no angina no shortness of breath eh is fairly active works a lot at the [employer]. Has no chf no tia claudication his pacer site is ok checked  [facility]     12/2/2021  HERE [facility] F [facility]  [facility]. HE HAD COVID HAD SEPSIS IN April WAS AT THE [place].HE IS BEING W/U FOR DEMENTIA HE HAS MULTIPLE ACCIDENTS. HAS ABNORMAL GAIT HE HAS VERY SELDOM CHEST PAIN. HE AHS BEEN COMPLIANT WITH MEDS. HE HAS BEEN TAKING HIS MEDS REGULARILY HE HAD NO RECENT CARDIAC EVENTS OR PROCEDURE/. HE FEELS  NO HEART PALPITATION. USES CPAP REGULARILY.     7/7/2022  Here fopr f/u no change in status no new complaints. Has some chest tightness when he lies down. Has no exertional symptoms. Uses cpap regularily . His lipids incraesed ? No change in diet or eating habits or meds intake.    Past Medical History:   Diagnosis Date    Acute coronary syndrome     Alzheimer's dementia     Anticoagulant long-term use     Plavix    BPH (benign prostatic hypertrophy)     CAD (coronary artery disease)     History of colon polyps     Hyperlipidemia associated with type 2 diabetes mellitus     Hypertension associated with type 2 diabetes mellitus 05/15/2014    Obesity 5/15/2014    ELIO on CPAP     Pacemaker     Pancreatic cyst     Pneumonia     PTSD (post-traumatic stress disorder) 5/15/2014    RBBB 5/15/2014    S/P PTCA (percutaneous transluminal coronary angioplasty) 5/15/2014    Type 2 diabetes mellitus without complication 9/28/2015       Past Surgical History:   Procedure Laterality Date    APPENDECTOMY      at age 14    ATRIAL CARDIAC PACEMAKER INSERTION      COLONOSCOPY Left 4/27/2018    Procedure: COLONOSCOPY;  Surgeon: Artem Medeiros MD;  Location: [facility] ENDO;   Service: Endoscopy;  Laterality: Left;    CORONARY ANGIOPLASTY WITH STENT PLACEMENT      , ,     ENDOSCOPIC ULTRASOUND OF UPPER GASTROINTESTINAL TRACT N/A 2021    Procedure: ULTRASOUND, UPPER GI TRACT, ENDOSCOPIC;  Surgeon: Popeye Terrell MD;  Location: [facility] ENDO;  Service: Endoscopy;  Laterality: N/A;  upper and linear    ESOPHAGOGASTRODUODENOSCOPY N/A 2018    Procedure: ESOPHAGOGASTRODUODENOSCOPY (EGD);  Surgeon: Mario Cesar MD;  Location: [facility] ENDO;  Service: General;  Laterality: N/A;    ROBOT-ASSISTED LAPAROSCOPIC SLEEVE GASTRECTOMY USING DA LURDES XI N/A 2018    Procedure: XI ROBOTIC SLEEVE GASTRECTOMY;  Surgeon: Mario Cesar MD;  Location: [facility] OR;  Service: General;  Laterality: N/A;    VASECTOMY         Social History     Tobacco Use    Smoking status: Former Smoker     Packs/day: 1.00     Years: 40.00     Pack years: 40.00     Types: Cigarettes     Start date:      Quit date: 2009     Years since quittin.5    Smokeless tobacco: Never Used   Substance Use Topics    Alcohol use: No    Drug use: No       Family History   Problem Relation Age of Onset    Cataracts Father     Heart disease Father     Hypertension Father     Heart disease Mother     Hypertension Mother     Colon cancer Neg Hx        Current Outpatient Medications   Medication Sig    ALBUTEROL INHL Inhale into the lungs.    ALPRAZolam (XANAX) 1 MG tablet Take 1 mg by mouth 2 (two) times daily as needed.    amLODIPine (NORVASC) 2.5 MG tablet TAKE 1 TABLET BY MOUTH ONCE DAILY.    amoxicillin-clavulanate 875-125mg (AUGMENTIN) 875-125 mg per tablet Take 1 tablet by mouth 2 (two) times daily.    aspirin (ECOTRIN) 81 MG EC tablet Take 81 mg by mouth once daily.    atorvastatin (LIPITOR) 80 MG tablet Take 80 mg by mouth once daily.     buPROPion (WELLBUTRIN SR) 200 MG TbSR Take 200 mg by mouth once daily.    calcium-vitamin D3 (OS-ARVIND 500 + D3) 500 mg-5 mcg (200 unit) per  "tablet Take 1 tablet by mouth 2 (two) times daily with meals.    cetirizine (ZYRTEC) 10 MG tablet Take 10 mg by mouth once daily.    clopidogrel (PLAVIX) 75 mg tablet TAKE 1 TABLET BY MOUTH EVERY DAY    cyanocobalamin (VITAMIN B-12) 1000 MCG tablet Take 100 mcg by mouth once daily.    dabigatran etexilate (PRADAXA) 150 mg Cap Take 150 mg by mouth once daily. "Do NOT break, chew, or open capsules."    diclofenac (VOLTAREN) 0.1 % ophthalmic solution 1 drop 4 (four) times daily.    donepeziL (ARICEPT) 10 MG tablet TAKE ONE-HALF TABLET BY MOUTH EVERY DAY FOR MEMORY    donepeziL (ARICEPT) 10 MG tablet Take 1 tablet (10 mg total) by mouth every evening.    ezetimibe (ZETIA) 10 mg tablet TAKE ONE TABLET BY MOUTH EVERY DAY TO LOWER CHOLESTEROL    fluticasone propionate (FLONASE) 50 mcg/actuation nasal spray 1 spray by Each Nostril route once daily.    furosemide (LASIX) 40 MG tablet Takes one-half tablet by mouth daily    ketotifen (ZADITOR) 0.025 % (0.035 %) ophthalmic solution 1 drop 2 (two) times daily.    liraglutide 0.6 mg/0.1 mL, 18 mg/3 mL, subq PNIJ (VICTOZA 2-ZACHARY) 0.6 mg/0.1 mL (18 mg/3 mL) PnIj Inject 0.6 mg into the skin once daily.    melatonin (MELATIN) Take 6 mg by mouth every evening.    metoprolol succinate (TOPROL-XL) 100 MG 24 hr tablet Take 100 mg by mouth once daily.    metronidazole 0.75% (METROCREAM) 0.75 % Crea Apply topically 2 (two) times daily.    nitroGLYCERIN (NITROSTAT) 0.4 MG SL tablet Place 1 tablet (0.4 mg total) under the tongue every 5 (five) minutes as needed for Chest pain.    nozaseptin (NOZIN) nasal  1 each by Each Nare route 2 (two) times daily.    omeprazole (PRILOSEC) 20 MG capsule Take 20 mg by mouth once daily.    pregabalin (LYRICA) 75 MG capsule Take 75 mg by mouth 2 (two) times daily.    pulse oximeter (PULSE OXIMETER) device by Apply Externally route 2 (two) times a day. Use twice daily at 8 AM and 3 PM and record the value in MyChart as directed. " "   pyridoxine, vitamin B6, (B-6) 100 MG Tab TAKE ONE TABLET BY MOUTH ONCE DAILY AS A VITAMIN SUPPLEMENT    ranolazine (RANEXA) 1,000 mg Tb12 Take 1,000 mg by mouth 2 (two) times daily.    rosuvastatin (CRESTOR) 40 MG Tab Take 10 mg by mouth every evening.    thiamine (VITAMIN B-1) 50 MG tablet Take 50 mg by mouth once daily.    traZODone (DESYREL) 100 MG tablet TAKE ONE-HALF TABLET BY MOUTH AT BEDTIME AS NEEDED    vitamin D 1000 units Tab Take 2,000 Units by mouth once daily.    white petrolatum 43 % Oint Apply topically.    sildenafil (VIAGRA) 100 MG tablet Take 1 tablet (100 mg total) by mouth daily as needed for Erectile Dysfunction.     Current Facility-Administered Medications   Medication    ondansetron disintegrating tablet 4 mg     Current Outpatient Medications on File Prior to Visit   Medication Sig    ALBUTEROL INHL Inhale into the lungs.    ALPRAZolam (XANAX) 1 MG tablet Take 1 mg by mouth 2 (two) times daily as needed.    amLODIPine (NORVASC) 2.5 MG tablet TAKE 1 TABLET BY MOUTH ONCE DAILY.    amoxicillin-clavulanate 875-125mg (AUGMENTIN) 875-125 mg per tablet Take 1 tablet by mouth 2 (two) times daily.    aspirin (ECOTRIN) 81 MG EC tablet Take 81 mg by mouth once daily.    atorvastatin (LIPITOR) 80 MG tablet Take 80 mg by mouth once daily.     buPROPion (WELLBUTRIN SR) 200 MG TbSR Take 200 mg by mouth once daily.    calcium-vitamin D3 (OS-ARVIND 500 + D3) 500 mg-5 mcg (200 unit) per tablet Take 1 tablet by mouth 2 (two) times daily with meals.    cetirizine (ZYRTEC) 10 MG tablet Take 10 mg by mouth once daily.    clopidogrel (PLAVIX) 75 mg tablet TAKE 1 TABLET BY MOUTH EVERY DAY    cyanocobalamin (VITAMIN B-12) 1000 MCG tablet Take 100 mcg by mouth once daily.    dabigatran etexilate (PRADAXA) 150 mg Cap Take 150 mg by mouth once daily. "Do NOT break, chew, or open capsules."    diclofenac (VOLTAREN) 0.1 % ophthalmic solution 1 drop 4 (four) times daily.    donepeziL (ARICEPT) 10 MG " tablet TAKE ONE-HALF TABLET BY MOUTH EVERY DAY FOR MEMORY    donepeziL (ARICEPT) 10 MG tablet Take 1 tablet (10 mg total) by mouth every evening.    ezetimibe (ZETIA) 10 mg tablet TAKE ONE TABLET BY MOUTH EVERY DAY TO LOWER CHOLESTEROL    fluticasone propionate (FLONASE) 50 mcg/actuation nasal spray 1 spray by Each Nostril route once daily.    furosemide (LASIX) 40 MG tablet Takes one-half tablet by mouth daily    ketotifen (ZADITOR) 0.025 % (0.035 %) ophthalmic solution 1 drop 2 (two) times daily.    liraglutide 0.6 mg/0.1 mL, 18 mg/3 mL, subq PNIJ (VICTOZA 2-ZACHARY) 0.6 mg/0.1 mL (18 mg/3 mL) PnIj Inject 0.6 mg into the skin once daily.    melatonin (MELATIN) Take 6 mg by mouth every evening.    metoprolol succinate (TOPROL-XL) 100 MG 24 hr tablet Take 100 mg by mouth once daily.    metronidazole 0.75% (METROCREAM) 0.75 % Crea Apply topically 2 (two) times daily.    nitroGLYCERIN (NITROSTAT) 0.4 MG SL tablet Place 1 tablet (0.4 mg total) under the tongue every 5 (five) minutes as needed for Chest pain.    nozaseptin (NOZIN) nasal  1 each by Each Nare route 2 (two) times daily.    omeprazole (PRILOSEC) 20 MG capsule Take 20 mg by mouth once daily.    pregabalin (LYRICA) 75 MG capsule Take 75 mg by mouth 2 (two) times daily.    pulse oximeter (PULSE OXIMETER) device by Apply Externally route 2 (two) times a day. Use twice daily at 8 AM and 3 PM and record the value in [email or web address] as directed.    pyridoxine, vitamin B6, (B-6) 100 MG Tab TAKE ONE TABLET BY MOUTH ONCE DAILY AS A VITAMIN SUPPLEMENT    ranolazine (RANEXA) 1,000 mg Tb12 Take 1,000 mg by mouth 2 (two) times daily.    rosuvastatin (CRESTOR) 40 MG Tab Take 10 mg by mouth every evening.    thiamine (VITAMIN B-1) 50 MG tablet Take 50 mg by mouth once daily.    traZODone (DESYREL) 100 MG tablet TAKE ONE-HALF TABLET BY MOUTH AT BEDTIME AS NEEDED    vitamin D 1000 units Tab Take 2,000 Units by mouth once daily.    white petrolatum 43 % Oint  "Apply topically.    sildenafil (VIAGRA) 100 MG tablet Take 1 tablet (100 mg total) by mouth daily as needed for Erectile Dysfunction.     Current Facility-Administered Medications on File Prior to Visit   Medication    ondansetron disintegrating tablet 4 mg     Review of patient's allergies indicates:   Allergen Reactions    Iodinated contrast media      States, "My arteries started shutting down on me" pt states only to iv dye- was specifically told that oral dye does not have the same reaction     Review of Systems   Constitutional: Negative for diaphoresis, malaise/fatigue and weight gain.   HENT: Negative for hoarse voice.    Eyes: Negative for double vision and visual disturbance.   Cardiovascular: Negative for chest pain, claudication, cyanosis, dyspnea on exertion, irregular heartbeat, leg swelling, near-syncope, orthopnea, palpitations, paroxysmal nocturnal dyspnea and syncope.   Respiratory: Negative for cough, hemoptysis, shortness of breath and snoring.    Hematologic/Lymphatic: Negative for bleeding problem. Does not bruise/bleed easily.   Skin: Negative for color change and poor wound healing.   Musculoskeletal: Negative for muscle cramps, muscle weakness and myalgias.   Gastrointestinal: Negative for bloating, abdominal pain, change in bowel habit, diarrhea, heartburn, hematemesis, hematochezia, melena and nausea.   Neurological: Negative for excessive daytime sleepiness, dizziness, headaches, light-headedness, loss of balance, numbness and weakness.   Psychiatric/Behavioral: Negative for memory loss. The patient does not have insomnia.    Allergic/Immunologic: Negative for hives.       Objective:   Physical Exam  Vitals and nursing note reviewed.   Constitutional:       General: He is not in acute distress.     Appearance: He is well-developed. He is not diaphoretic.   HENT:      Head: Normocephalic and atraumatic.   Eyes:      General:         Right eye: No discharge.         Left eye: No " "discharge.      Pupils: Pupils are equal, round, and reactive to light.   Neck:      Thyroid: No thyromegaly.      Vascular: No JVD.   Cardiovascular:      Rate and Rhythm: Normal rate and regular rhythm.      Pulses: Normal pulses and intact distal pulses.      Heart sounds: Normal heart sounds. No murmur heard.    No friction rub. No gallop.   Pulmonary:      Effort: Pulmonary effort is normal. No respiratory distress.      Breath sounds: Normal breath sounds. No wheezing or rales.      Comments: Pacer site well healed  Chest:      Chest wall: No tenderness.   Abdominal:      General: Bowel sounds are normal. There is no distension.      Palpations: Abdomen is soft.      Tenderness: There is no abdominal tenderness.   Musculoskeletal:         General: Normal range of motion.      Cervical back: Neck supple.      Right lower leg: No edema.      Left lower leg: No edema.   Skin:     General: Skin is warm and dry.      Findings: No erythema or rash.   Neurological:      Mental Status: He is alert and oriented to person, place, and time.      Cranial Nerves: No cranial nerve deficit.   Psychiatric:         Behavior: Behavior normal.         Judgment: Judgment normal.       Vitals:    07/07/22 1451 07/07/22 1457   BP: 98/66 94/60   BP Location: Left arm Right arm   Patient Position: Sitting Sitting   BP Method: Large (Automatic) Large (Automatic)   Pulse: 60 60   Resp: 20 20   Weight: 106.1 kg (233 lb 14.5 oz) 106.1 kg (233 lb 14.5 oz)   Height: 6' 2" (1.88 m) 6' 2" (1.88 m)     Lab Results   Component Value Date    CHOL 202 (H) 06/03/2022    CHOL 139 10/26/2021    CHOL 126 08/27/2021     Lab Results   Component Value Date    HDL 35 (L) 06/03/2022    HDL 54 10/26/2021    HDL 51 08/27/2021     Lab Results   Component Value Date    LDLCALC 145.2 06/03/2022    LDLCALC 73 10/26/2021    LDLCALC 61.8 (L) 08/27/2021     Lab Results   Component Value Date    TRIG 109 06/03/2022    TRIG 75 10/26/2021    TRIG 66 08/27/2021 "     Lab Results   Component Value Date    CHOLHDL 17.3 (L) 06/03/2022    CHOLHDL 40.5 08/27/2021    CHOLHDL 19.9 (L) 01/19/2017       Chemistry        Component Value Date/Time     06/03/2022 0911    K 5.0 06/03/2022 0911     06/03/2022 0911    CO2 28 06/03/2022 0911    BUN 18 06/03/2022 0911    CREATININE 0.9 06/03/2022 0911    GLU 89 06/03/2022 0911        Component Value Date/Time    CALCIUM 9.5 06/03/2022 0911    ALKPHOS 65 06/03/2022 0911    AST 18 06/03/2022 0911    ALT 17 06/03/2022 0911    BILITOT 1.0 06/03/2022 0911    ESTGFRAFRICA >60.0 06/03/2022 0911    EGFRNONAA >60.0 06/03/2022 0911        Lab Results   Component Value Date    HGBA1C 5.0 06/03/2022     Lab Results   Component Value Date    TSH 2.29 10/26/2021     Lab Results   Component Value Date    INR 1.1 09/30/2018    INR 1.0 09/28/2018    INR 1.0 08/03/2016     Lab Results   Component Value Date    WBC 4.95 08/27/2021    HGB 15.2 08/27/2021    HCT 45.9 08/27/2021    MCV 97 08/27/2021     (L) 08/27/2021     BMP  Sodium   Date Value Ref Range Status   06/03/2022 145 136 - 145 mmol/L Final     Potassium   Date Value Ref Range Status   06/03/2022 5.0 3.5 - 5.1 mmol/L Final     Chloride   Date Value Ref Range Status   06/03/2022 109 95 - 110 mmol/L Final     CO2   Date Value Ref Range Status   06/03/2022 28 23 - 29 mmol/L Final     BUN   Date Value Ref Range Status   06/03/2022 18 8 - 23 mg/dL Final     Creatinine   Date Value Ref Range Status   06/03/2022 0.9 0.5 - 1.4 mg/dL Final     Calcium   Date Value Ref Range Status   06/03/2022 9.5 8.7 - 10.5 mg/dL Final     Anion Gap   Date Value Ref Range Status   06/03/2022 8 8 - 16 mmol/L Final     eGFR if    Date Value Ref Range Status   06/03/2022 >60.0 >60 mL/min/1.73 m^2 Final     eGFR if non    Date Value Ref Range Status   06/03/2022 >60.0 >60 mL/min/1.73 m^2 Final     Comment:     Calculation used to obtain the estimated glomerular filtration  rate  (eGFR) is the CKD-EPI equation.        CrCl cannot be calculated (Patient's most recent lab result is older than the maximum 7 days allowed.).    Assessment:     1. CAD S/P percutaneous coronary angioplasty    2. Late onset Alzheimer's dementia without behavioral disturbance    3. PTSD (post-traumatic stress disorder)    4. Mixed restrictive and obstructive lung disease    5. Hyperlipidemia associated with type 2 diabetes mellitus    6. Pacemaker    7. RBBB    8. Hypertension associated with type 2 diabetes mellitus    9. Obesity (BMI 30-39.9)    10. Type 2 diabetes mellitus without complication, without long-term current use of insulin    11. SATYA on CPAP      Status quo unchanged compliant with diabetes meds and diet a1c on target bp controlled and no angina no arrythmias. Not sure why lipids are not on tarrget will repeat labs on f/u   counseled about rf modification diet exercise.   Plan:       Continue current therapy  Cardiac low salt diet.  Risk factor modification and excercise program./weight loss  F/u in 6 months with lipid cmp a1c

## 2022-07-08 ENCOUNTER — TELEPHONE (OUTPATIENT)
Dept: ADMINISTRATIVE | Facility: HOSPITAL | Age: 72
End: 2022-07-08
Payer: MEDICARE

## 2022-07-14 ENCOUNTER — OFFICE VISIT (OUTPATIENT)
Dept: INTERNAL MEDICINE | Facility: CLINIC | Age: 72
End: 2022-07-14
Payer: MEDICARE

## 2022-07-14 VITALS
BODY MASS INDEX: 29.93 KG/M2 | WEIGHT: 233.25 LBS | HEART RATE: 60 BPM | OXYGEN SATURATION: 98 % | SYSTOLIC BLOOD PRESSURE: 110 MMHG | HEIGHT: 74 IN | DIASTOLIC BLOOD PRESSURE: 72 MMHG

## 2022-07-14 DIAGNOSIS — R20.0 NUMBNESS AND TINGLING OF BOTH FEET: ICD-10-CM

## 2022-07-14 DIAGNOSIS — F02.80 LATE ONSET ALZHEIMER'S DEMENTIA WITHOUT BEHAVIORAL DISTURBANCE: ICD-10-CM

## 2022-07-14 DIAGNOSIS — I70.0 ATHEROSCLEROSIS OF AORTA: ICD-10-CM

## 2022-07-14 DIAGNOSIS — J43.9 MIXED RESTRICTIVE AND OBSTRUCTIVE LUNG DISEASE: ICD-10-CM

## 2022-07-14 DIAGNOSIS — E11.69 HYPERLIPIDEMIA ASSOCIATED WITH TYPE 2 DIABETES MELLITUS: ICD-10-CM

## 2022-07-14 DIAGNOSIS — R20.2 NUMBNESS AND TINGLING OF BOTH FEET: ICD-10-CM

## 2022-07-14 DIAGNOSIS — Z95.0 PACEMAKER: ICD-10-CM

## 2022-07-14 DIAGNOSIS — J98.4 MIXED RESTRICTIVE AND OBSTRUCTIVE LUNG DISEASE: ICD-10-CM

## 2022-07-14 DIAGNOSIS — G47.33 OSA (OBSTRUCTIVE SLEEP APNEA): ICD-10-CM

## 2022-07-14 DIAGNOSIS — G30.1 LATE ONSET ALZHEIMER'S DEMENTIA WITHOUT BEHAVIORAL DISTURBANCE: ICD-10-CM

## 2022-07-14 DIAGNOSIS — E78.5 HYPERLIPIDEMIA ASSOCIATED WITH TYPE 2 DIABETES MELLITUS: ICD-10-CM

## 2022-07-14 DIAGNOSIS — Z00.00 ENCOUNTER FOR PREVENTIVE HEALTH EXAMINATION: Primary | ICD-10-CM

## 2022-07-14 DIAGNOSIS — R69 MULTIPLE COMORBID CONDITIONS: ICD-10-CM

## 2022-07-14 DIAGNOSIS — I25.10 CORONARY ARTERY DISEASE, UNSPECIFIED VESSEL OR LESION TYPE, UNSPECIFIED WHETHER ANGINA PRESENT, UNSPECIFIED WHETHER NATIVE OR TRANSPLANTED HEART: ICD-10-CM

## 2022-07-14 DIAGNOSIS — F43.10 PTSD (POST-TRAUMATIC STRESS DISORDER): ICD-10-CM

## 2022-07-14 DIAGNOSIS — K86.2 PANCREATIC CYST: ICD-10-CM

## 2022-07-14 DIAGNOSIS — E66.9 OBESITY (BMI 30.0-34.9): ICD-10-CM

## 2022-07-14 PROCEDURE — 99215 OFFICE O/P EST HI 40 MIN: CPT | Mod: PBBFAC | Performed by: NURSE PRACTITIONER

## 2022-07-14 PROCEDURE — 99999 PR PBB SHADOW E&M-EST. PATIENT-LVL V: CPT | Mod: PBBFAC,,, | Performed by: NURSE PRACTITIONER

## 2022-07-14 PROCEDURE — 99999 PR PBB SHADOW E&M-EST. PATIENT-LVL V: ICD-10-PCS | Mod: PBBFAC,,, | Performed by: NURSE PRACTITIONER

## 2022-07-14 PROCEDURE — G0439 PPPS, SUBSEQ VISIT: HCPCS | Mod: ,,, | Performed by: NURSE PRACTITIONER

## 2022-07-14 PROCEDURE — G0439 PR MEDICARE ANNUAL WELLNESS SUBSEQUENT VISIT: ICD-10-PCS | Mod: ,,, | Performed by: NURSE PRACTITIONER

## 2022-07-14 RX ORDER — ZOLPIDEM TARTRATE 5 MG/1
5 TABLET ORAL NIGHTLY PRN
COMMUNITY
End: 2023-08-25

## 2022-07-14 NOTE — PATIENT INSTRUCTIONS
Counseling and Referral of Other Preventative  (Italic type indicates deductible and co-insurance are waived)    Patient Name: Cole Doan  Today's Date: 7/14/2022    Health Maintenance       Date Due Completion Date    Abdominal Aortic Aneurysm Screening Never done ---    Shingles Vaccine (2 of 3) 05/27/2015 4/1/2015    Eye Exam 10/08/2015 10/8/2014    Pneumococcal Vaccines (Age 65+) (2 - PPSV23 or PCV20) 02/14/2018 2/14/2017    LDCT Lung Screen 09/12/2018 9/12/2017    Foot Exam 08/25/2022 8/25/2021 (Done)    Override on 8/25/2021: Done    COVID-19 Vaccine (3 - Booster for Pfizer series) 07/14/2023 (Originally 7/25/2021) 2/25/2021    Influenza Vaccine (1) 09/01/2022 12/2/2021    Hemoglobin A1c 12/03/2022 6/3/2022    Colorectal Cancer Screening 04/27/2023 4/27/2018    Override on 1/22/2014: Done    Diabetes Urine Screening 06/03/2023 6/3/2022    Lipid Panel 06/03/2023 6/3/2022    High Dose Statin 07/14/2023 7/14/2022    TETANUS VACCINE 11/20/2027 11/20/2017        No orders of the defined types were placed in this encounter.    The following information is provided to all patients.  This information is to help you find resources for any of the problems found today that may be affecting your health:                Living healthy guide: www.[facility].[place].gov      Understanding Diabetes: www.diabetes.org      Eating healthy: www.cdc.gov/healthyweight      CDC home safety checklist: www.cdc.gov/steadi/patient.html      Agency on Aging: www.goea.[place].gov      Alcoholics anonymous (AA): www.aa.org      Physical Activity: www.[place].nih.gov/fp7wolv      Tobacco use: www.quitwithusla.org

## 2022-07-14 NOTE — PROGRESS NOTES
"  Cole Doan presented for a  Medicare AWV and comprehensive Health Risk Assessment today. The following components were reviewed and updated:    · Medical history  · Family History  · Social history  · Allergies and Current Medications  · Health Risk Assessment  · Health Maintenance  · Care Team         ** See Completed Assessments for Annual Wellness Visit within the encounter summary.**         The following assessments were completed:  · Living Situation  · CAGE  · Depression Screening  · Timed Get Up and Go  · Whisper Test-na  · Cognitive Function Screening-na due to Alzheimer diagnosis.  · Nutrition Screening  · ADL Screening  · PAQ Screening        Vitals:    07/14/22 1058   BP: 110/72   BP Location: Left arm   Patient Position: Sitting   Pulse: 60   SpO2: 98%   Weight: 105.8 kg (233 lb 4 oz)   Height: 6' 1.5" (1.867 m)     Body mass index is 30.36 kg/m².  Physical Exam  Vitals and nursing note reviewed.   Constitutional:       Appearance: He is well-developed.   HENT:      Head: Normocephalic.   Cardiovascular:      Rate and Rhythm: Normal rate and regular rhythm.      Heart sounds: Normal heart sounds.   Pulmonary:      Effort: Pulmonary effort is normal. No respiratory distress.      Breath sounds: Normal breath sounds.   Abdominal:      Palpations: Abdomen is soft. There is no mass.      Tenderness: There is no abdominal tenderness.   Musculoskeletal:         General: Normal range of motion.   Skin:     General: Skin is warm and dry.   Neurological:      Mental Status: He is alert and oriented to person, place, and time.      Motor: No abnormal muscle tone.   Psychiatric:         Speech: Speech normal.         Behavior: Behavior normal.               Diagnoses and health risks identified today and associated recommendations/orders:    1. Encounter for preventive health examination  Declines COVID booster  Signed RAFA for:  AAA screening  Shingrix  Eye exam  Pneumonia vaccines    MA to " schedule  PCP  Pulmonary  Gastroenterology     Reports he is at his respiratory baseline.     Normal timed get up and go test. Reports 2+ falls in the last 12 months, all around an acute illness. Denies recent falls.   Fall precautions reviewed with patient. Advised to follow up with PCP for further recommendations. Patient expressed understanding.       He will discuss lung cancer screening with pcp/pulmonary       2. Late onset Alzheimer's dementia without behavioral disturbance  Stable. Continue current treatment plan as previously prescribed with your  pcp     3. Hyperlipidemia associated with type 2 diabetes mellitus  a1c 5.0  Lipid-Not at goal  Continue current treatment plan as previously prescribed with your  pcp and cardiologist.     4. Atherosclerosis of aorta  Ct 9/17  Discussed diagnosis and risk reduction.   Advised to follow up with PCP/cardiologist for further recommendations. Patient expressed understanding.       5. Mixed restrictive and obstructive lung disease  Stable. Continue current treatment plan as previously prescribed with your  Pulmonologist.     6. Coronary artery disease, unspecified vessel or lesion type, unspecified whether angina present, unspecified whether native or transplanted heart  Continue current treatment plan as previously prescribed with your  Cardiologist.     7. Pancreatic cyst  Advised to follow up with gastroenterologist for further evaluation and recommendations. Patient expressed understanding.      8. PTSD (post-traumatic stress disorder)  Continue current treatment plan as previously prescribed with your  pcp     9. Pacemaker  Continue current treatment plan as previously prescribed with your  Cardiologist.     10. SATYA (obstructive sleep apnea)  Continue current treatment plan as previously prescribed with your  Pulmonologist.     11. Obesity (BMI 30.0-34.9)  Continue current treatment plan as previously prescribed with your  pcp     12. Numbness and tingling of  both feet  Chronic  [facility]  Encouraged daily foot inspections.   Continue current treatment plan as previously prescribed with your  Podiatrist.     13. Multiple comorbid conditions  - Ambulatory referral/consult to Outpatient Case Management      Provided Cole with a 5-10 year written screening schedule and personal prevention plan. Recommendations were developed using the USPSTF age appropriate recommendations. Education, counseling, and referrals were provided as needed. After Visit Summary printed and given to patient which includes a list of additional screenings\tests needed.    Follow up in about 1 year (around 7/14/2023) for awv.    Leanna Alexander NP  I offered to discuss advanced care planning, including how to pick a person who would make decisions for you if you were unable to make them for yourself, called a health care power of , and what kind of decisions you might make such as use of life sustaining treatments such as ventilators and tube feeding when faced with a life limiting illness recorded on a living will that they will need to know. (How you want to be cared for as you near the end of your natural life)     X  Patient has advanced directives written and agrees to provide copies to the institution.

## 2022-07-18 ENCOUNTER — OFFICE VISIT (OUTPATIENT)
Dept: INTERNAL MEDICINE | Facility: CLINIC | Age: 72
End: 2022-07-18
Payer: MEDICARE

## 2022-07-18 VITALS
OXYGEN SATURATION: 95 % | HEIGHT: 74 IN | DIASTOLIC BLOOD PRESSURE: 66 MMHG | BODY MASS INDEX: 30.31 KG/M2 | TEMPERATURE: 98 F | WEIGHT: 236.13 LBS | SYSTOLIC BLOOD PRESSURE: 110 MMHG | HEART RATE: 61 BPM

## 2022-07-18 DIAGNOSIS — R35.1 NOCTURIA: ICD-10-CM

## 2022-07-18 DIAGNOSIS — M79.89 LEG SWELLING: Primary | ICD-10-CM

## 2022-07-18 LAB
BILIRUB SERPL-MCNC: NEGATIVE MG/DL
BLOOD URINE, POC: NORMAL
CLARITY, POC UA: NORMAL
COLOR, POC UA: YELLOW
GLUCOSE UR QL STRIP: NORMAL
KETONES UR QL STRIP: NEGATIVE
LEUKOCYTE ESTERASE URINE, POC: NEGATIVE
NITRITE, POC UA: NEGATIVE
PH, POC UA: 6
PROTEIN, POC: NEGATIVE
SPECIFIC GRAVITY, POC UA: 1.02
UROBILINOGEN, POC UA: NORMAL

## 2022-07-18 PROCEDURE — 99999 PR PBB SHADOW E&M-EST. PATIENT-LVL V: ICD-10-PCS | Mod: PBBFAC,,, | Performed by: FAMILY MEDICINE

## 2022-07-18 PROCEDURE — 99213 PR OFFICE/OUTPT VISIT, EST, LEVL III, 20-29 MIN: ICD-10-PCS | Mod: S$PBB,,, | Performed by: FAMILY MEDICINE

## 2022-07-18 PROCEDURE — 99999 PR PBB SHADOW E&M-EST. PATIENT-LVL V: CPT | Mod: PBBFAC,,, | Performed by: FAMILY MEDICINE

## 2022-07-18 PROCEDURE — 87186 SC STD MICRODIL/AGAR DIL: CPT | Performed by: FAMILY MEDICINE

## 2022-07-18 PROCEDURE — 87088 URINE BACTERIA CULTURE: CPT | Performed by: FAMILY MEDICINE

## 2022-07-18 PROCEDURE — 81002 URINALYSIS NONAUTO W/O SCOPE: CPT | Mod: PBBFAC,PO | Performed by: FAMILY MEDICINE

## 2022-07-18 PROCEDURE — 99215 OFFICE O/P EST HI 40 MIN: CPT | Mod: PBBFAC,PO | Performed by: FAMILY MEDICINE

## 2022-07-18 PROCEDURE — 87077 CULTURE AEROBIC IDENTIFY: CPT | Performed by: FAMILY MEDICINE

## 2022-07-18 PROCEDURE — 99213 OFFICE O/P EST LOW 20 MIN: CPT | Mod: S$PBB,,, | Performed by: FAMILY MEDICINE

## 2022-07-18 PROCEDURE — 87086 URINE CULTURE/COLONY COUNT: CPT | Performed by: FAMILY MEDICINE

## 2022-07-18 RX ORDER — TAMSULOSIN HYDROCHLORIDE 0.4 MG/1
0.4 CAPSULE ORAL DAILY
Qty: 30 CAPSULE | Refills: 11 | Status: SHIPPED | OUTPATIENT
Start: 2022-07-18 | End: 2024-01-29

## 2022-07-18 RX ORDER — AMLODIPINE BESYLATE 5 MG/1
5 TABLET ORAL DAILY
COMMUNITY
End: 2023-07-21

## 2022-07-18 RX ORDER — TRETINOIN 1 MG/G
CREAM TOPICAL
COMMUNITY
Start: 2022-04-29

## 2022-07-19 ENCOUNTER — OUTPATIENT CASE MANAGEMENT (OUTPATIENT)
Dept: ADMINISTRATIVE | Facility: OTHER | Age: 72
End: 2022-07-19
Payer: MEDICARE

## 2022-07-20 NOTE — PROGRESS NOTES
Subjective:      Patient ID: Cole Doan is a 71 y.o. male.    Chief Complaint: Establish Care      Patient reports increased nocturia, getting up multiple times each night, not able to get enough sleep. Also reports increased swelling in both legs. Denies sob, cardiac symptoms    Review of Systems   Respiratory: Negative for shortness of breath.    Cardiovascular: Positive for leg swelling. Negative for chest pain.   Gastrointestinal: Negative for abdominal pain.   Genitourinary: Positive for frequency and urgency. Negative for difficulty urinating and dysuria.   Musculoskeletal: Negative for back pain.     Past Medical History:   Diagnosis Date    Acute coronary syndrome     Alzheimer's dementia     Anticoagulant long-term use     Plavix    BPH (benign prostatic hypertrophy)     CAD (coronary artery disease)     History of colon polyps     Hyperlipidemia associated with type 2 diabetes mellitus     Hypertension associated with type 2 diabetes mellitus 05/15/2014    Obesity 5/15/2014    SATYA on CPAP     Pacemaker     Pancreatic cyst     Pneumonia     PTSD (post-traumatic stress disorder) 5/15/2014    RBBB 5/15/2014    S/P PTCA (percutaneous transluminal coronary angioplasty) 5/15/2014    Type 2 diabetes mellitus without complication 9/28/2015          Past Surgical History:   Procedure Laterality Date    APPENDECTOMY      at age 14    ATRIAL CARDIAC PACEMAKER INSERTION      COLONOSCOPY Left 4/27/2018    Procedure: COLONOSCOPY;  Surgeon: Artem Medeiros MD;  Location: [facility];  Service: Endoscopy;  Laterality: Left;    CORONARY ANGIOPLASTY WITH STENT PLACEMENT      1990, 2008, 2012    ENDOSCOPIC ULTRASOUND OF UPPER GASTROINTESTINAL TRACT N/A 7/12/2021    Procedure: ULTRASOUND, UPPER GI TRACT, ENDOSCOPIC;  Surgeon: Popeye Terrell MD;  Location: [facility];  Service: Endoscopy;  Laterality: N/A;  upper and linear    ESOPHAGOGASTRODUODENOSCOPY N/A 8/16/2018    Procedure:  ESOPHAGOGASTRODUODENOSCOPY (EGD);  Surgeon: Mario Cesar MD;  Location: [facility] ENDO;  Service: General;  Laterality: N/A;    ROBOT-ASSISTED LAPAROSCOPIC SLEEVE GASTRECTOMY USING DA LURDES XI N/A 2018    Procedure: XI ROBOTIC SLEEVE GASTRECTOMY;  Surgeon: Mario Cesar MD;  Location: [facility] OR;  Service: General;  Laterality: N/A;    VASECTOMY       Family History   Problem Relation Age of Onset    Cataracts Father     Heart disease Father     Hypertension Father     Heart disease Mother     Hypertension Mother     Colon cancer Neg Hx      Social History     Socioeconomic History    Marital status:    Tobacco Use    Smoking status: Former Smoker     Packs/day: 1.00     Years: 40.00     Pack years: 40.00     Types: Cigarettes     Start date:      Quit date: 2009     Years since quittin.5    Smokeless tobacco: Never Used   Substance and Sexual Activity    Alcohol use: No    Drug use: No   Social History Narrative         Social Determinants of Health     Financial Resource Strain: Low Risk     Difficulty of Paying Living Expenses: Not hard at all   Food Insecurity: No Food Insecurity    Worried About Running Out of Food in the Last Year: Never true    Ran Out of Food in the Last Year: Never true   Transportation Needs: No Transportation Needs    Lack of Transportation (Medical): No    Lack of Transportation (Non-Medical): No   Physical Activity: Insufficiently Active    Days of Exercise per Week: 2 days    Minutes of Exercise per Session: 30 min   Stress: No Stress Concern Present    Feeling of Stress : Not at all   Social Connections: Moderately Integrated    Frequency of Communication with Friends and Family: More than three times a week    Frequency of Social Gatherings with Friends and Family: More than three times a week    Attends [employer] Services: More than 4 times per year    Active Member of Clubs or Organizations: No    Attends Club or Organization  "Meetings: Never    Marital Status:    Housing Stability: Low Risk     Unable to Pay for Housing in the Last Year: No    Number of Places Lived in the Last Year: 1    Unstable Housing in the Last Year: No     Review of patient's allergies indicates:   Allergen Reactions    Iodinated contrast media      States, "My arteries started shutting down on me" pt states only to iv dye- was specifically told that oral dye does not have the same reaction       Objective:       /66 (BP Location: Right arm, Patient Position: Sitting, BP Method: Large (Manual))   Pulse 61   Temp 98.3 °F (36.8 °C) (Tympanic)   Ht 6' 1.5" (1.867 m)   Wt 107.1 kg (236 lb 1.8 oz)   SpO2 95%   BMI 30.73 kg/m²   Physical Exam  Constitutional:       General: He is not in acute distress.     Appearance: Normal appearance. He is well-developed. He is not ill-appearing or diaphoretic.   Cardiovascular:      Rate and Rhythm: Normal rate and regular rhythm.      Heart sounds: Normal heart sounds.   Pulmonary:      Effort: Pulmonary effort is normal.      Breath sounds: Normal breath sounds.   Musculoskeletal:      Right lower leg: Edema (trace) present.      Left lower leg: Edema (trace) present.   Neurological:      General: No focal deficit present.      Mental Status: He is alert and oriented to person, place, and time.   Psychiatric:         Mood and Affect: Mood normal.         Behavior: Behavior normal.         Thought Content: Thought content normal.         Judgment: Judgment normal.       Assessment:     1. Leg swelling    2. Nocturia      Plan:   Leg swelling    Nocturia  -     POCT urine dipstick without microscope  -     Urine culture; Future; Expected date: 07/18/2022    Other orders  -     tamsulosin (FLOMAX) 0.4 mg Cap; Take 1 capsule (0.4 mg total) by mouth once daily.  Dispense: 30 capsule; Refill: 11      Medication List with Changes/Refills   New Medications    TAMSULOSIN (FLOMAX) 0.4 MG CAP    Take 1 capsule (0.4 mg " "total) by mouth once daily.   Current Medications    ALBUTEROL INHL    Inhale into the lungs.    AMLODIPINE (NORVASC) 5 MG TABLET    Take 5 mg by mouth once daily.    BUPROPION (WELLBUTRIN SR) 200 MG TBSR    Take 200 mg by mouth once daily. Takes 300mg daily    CALCIUM-VITAMIN D3 (OS-ARVIND 500 + D3) 500 MG-5 MCG (200 UNIT) PER TABLET    Take 1 tablet by mouth 2 (two) times daily with meals.    CETIRIZINE (ZYRTEC) 10 MG TABLET    Take 10 mg by mouth as needed.    CYANOCOBALAMIN (VITAMIN B-12) 1000 MCG TABLET    Take 100 mcg by mouth once daily.    DABIGATRAN ETEXILATE (PRADAXA) 150 MG CAP    Take 150 mg by mouth once daily. "Do NOT break, chew, or open capsules."   Takes 2 caps daily    DICLOFENAC (VOLTAREN) 0.1 % OPHTHALMIC SOLUTION    1 drop 4 (four) times daily.    EZETIMIBE (ZETIA) 10 MG TABLET    TAKE ONE TABLET BY MOUTH EVERY DAY TO LOWER CHOLESTEROL    FLUTICASONE PROPIONATE (FLONASE) 50 MCG/ACTUATION NASAL SPRAY    1 spray by Each Nostril route once daily.    FUROSEMIDE (LASIX) 40 MG TABLET    Takes one-half tablet by mouth daily    KETOTIFEN (ZADITOR) 0.025 % (0.035 %) OPHTHALMIC SOLUTION    1 drop 2 (two) times daily.    LIRAGLUTIDE 0.6 MG/0.1 ML, 18 MG/3 ML, SUBQ PNIJ (VICTOZA 2-ZACHARY) 0.6 MG/0.1 ML (18 MG/3 ML) PNIJ    Inject 0.6 mg into the skin once daily.    METOPROLOL SUCCINATE (TOPROL-XL) 100 MG 24 HR TABLET    Take 200 mg by mouth once daily. Takes 200mg daily    METRONIDAZOLE 0.75% (METROCREAM) 0.75 % CREA    Apply topically 2 (two) times daily.    NITROGLYCERIN (NITROSTAT) 0.4 MG SL TABLET    Place 1 tablet (0.4 mg total) under the tongue every 5 (five) minutes as needed for Chest pain.    NOZASEPTIN (NOZIN) NASAL     1 each by Each Nare route 2 (two) times daily.    OMEPRAZOLE (PRILOSEC) 20 MG CAPSULE    Take 20 mg by mouth once daily.    PYRIDOXINE, VITAMIN B6, (B-6) 100 MG TAB    TAKE ONE TABLET BY MOUTH ONCE DAILY AS A VITAMIN SUPPLEMENT    RANOLAZINE (RANEXA) 1,000 MG TB12    Take 1,000 " mg by mouth 2 (two) times daily. Takes daily    ROSUVASTATIN (CRESTOR) 40 MG TAB    Take 10 mg by mouth every evening.    SILDENAFIL (VIAGRA) 100 MG TABLET    Take 1 tablet (100 mg total) by mouth daily as needed for Erectile Dysfunction.    THIAMINE (VITAMIN B-1) 50 MG TABLET    Take 50 mg by mouth once daily.    TRAZODONE (DESYREL) 100 MG TABLET    TAKE ONE-HALF TABLET BY MOUTH AT BEDTIME AS NEEDED    TRETINOIN (RETIN-A) 0.1 % CREAM    Apply topically.    VITAMIN D 1000 UNITS TAB    Take 2,000 Units by mouth once daily.    WHITE PETROLATUM 43 % OINT    Apply topically.    ZOLPIDEM (AMBIEN) 5 MG TAB    Take 5 mg by mouth nightly as needed.   Discontinued Medications    ALPRAZOLAM (XANAX) 1 MG TABLET    Take 1 mg by mouth 2 (two) times daily as needed.    AMLODIPINE (NORVASC) 2.5 MG TABLET    TAKE 1 TABLET BY MOUTH ONCE DAILY.    AMOXICILLIN-CLAVULANATE 875-125MG (AUGMENTIN) 875-125 MG PER TABLET    Take 1 tablet by mouth 2 (two) times daily.    ASPIRIN (ECOTRIN) 81 MG EC TABLET    Take 81 mg by mouth once daily.    ATORVASTATIN (LIPITOR) 80 MG TABLET    Take 80 mg by mouth once daily.     CLOPIDOGREL (PLAVIX) 75 MG TABLET    TAKE 1 TABLET BY MOUTH EVERY DAY    DONEPEZIL (ARICEPT) 10 MG TABLET    TAKE ONE-HALF TABLET BY MOUTH EVERY DAY FOR MEMORY    DONEPEZIL (ARICEPT) 10 MG TABLET    Take 1 tablet (10 mg total) by mouth every evening.    DOXYCYCLINE MONOHYDRATE ORAL    Take 30 mg by mouth once daily at 6am.    MELATONIN (MELATIN)    Take 6 mg by mouth every evening.    PREGABALIN (LYRICA) 75 MG CAPSULE    Take 75 mg by mouth 2 (two) times daily.    PULSE OXIMETER (PULSE OXIMETER) DEVICE    by Apply Externally route 2 (two) times a day. Use twice daily at 8 AM and 3 PM and record the value in [facility] as directed.

## 2022-07-20 NOTE — PROGRESS NOTES
Outpatient Care Management   - Low Risk Patient Assessment    Patient: Cole Doan  MRN:  [id number]  Date of Service:  7/20/2022  Completed by:  Uma Nash LMSW  Referral Date: 07/14/2022  Program: OPCM Low Risk    Reason for Visit   Patient presents with    OPCM SW First Assessment Attempt     07/19/2022    Social Work Assessment - Low/Mod Risk    Case Closure       07/20/2022       Brief SW completed assessment with patient. Patient resides with spouse. Patient reports he can complete ADLs without assistance. Patient denied using any assisted devise to ambulate. Patient denied needing assistance with food, shelter, medication or medical. [facility] Patient drives himself to and from appointments. Patient provided information on ACP during AWV. Patient reports sleep and memory problems. Patient does take sleep aid to assist with sleeping. Patient reports memory is short term. Patient denied having any social needs.    Patient Summary     OPCM Social Work Assessment (Low/Moderate Risk)    General  Level of Caregiver support: Member independent and does not need caregiver assistance  Transportation means: Self  Assessments  Was the PHQ Depression Screening completed this visit?: No         Complex Care Plan     Care plan was discussed and completed today with input from patient and/or caregiver.    Patient Instructions     No follow-ups on file.    Todays OPC Self-Management Care Plan was developed with the patients/caregivers input and was based on identified barriers from todays assessment.  Goals were written today with the patient/caregiver and the patient has agreed to work towards these goals to improve his/her overall well-being. Patient verbalized understanding of the care plan, goals, and all of today's instructions. Encouraged patient/caregiver to communicate with his/her physician and health care team about health conditions and  the treatment plan.  Provided my contact information today and encouraged patient/caregiver to call me with any questions as needed.

## 2022-07-21 ENCOUNTER — PATIENT OUTREACH (OUTPATIENT)
Dept: ADMINISTRATIVE | Facility: HOSPITAL | Age: 72
End: 2022-07-21
Payer: MEDICARE

## 2022-07-21 LAB — BACTERIA UR CULT: ABNORMAL

## 2022-07-21 RX ORDER — NITROFURANTOIN 25; 75 MG/1; MG/1
100 CAPSULE ORAL 2 TIMES DAILY
Qty: 14 CAPSULE | Refills: 0 | Status: SHIPPED | OUTPATIENT
Start: 2022-07-21 | End: 2022-08-04 | Stop reason: SDUPTHER

## 2022-07-21 NOTE — LETTER
AUTHORIZATION FOR RELEASE OF   CONFIDENTIAL INFORMATION      We are seeing Cole Doan, date of birth 1950, in the clinic at [facility]. Evelyn Martínez MD is the patient's PCP. Cole Doan has an outstanding lab/procedure at the time we reviewed his chart. In order to help keep his health information updated, he has authorized us to request the following medical record(s):        (  )  MAMMOGRAM                                      (  )  COLONOSCOPY      (  )  PAP SMEAR                                          (  )  OUTSIDE LAB RESULTS     (  )  DEXA SCAN                                          ( X ) DIABETIC  EYE EXAM            (  )  FOOT EXAM                                          (  )  ENTIRE RECORD     ( X )  OUTSIDE IMMUNIZATIONS               ( X )  _AAA SCREENING______________         Please fax records to [facility], [phone]     If you have any questions, please contact Mata Hussein          Patient Name: Cole Doan  : 1950  Patient Phone #: [phone]

## 2022-07-21 NOTE — LETTER
AUTHORIZATION FOR RELEASE OF   CONFIDENTIAL INFORMATION      We are seeing Cole Doan, date of birth 1950, in the clinic at [facility]. Evelyn Martínez MD is the patient's PCP. Cole Doan has an outstanding lab/procedure at the time we reviewed his chart. In order to help keep his health information updated, he has authorized us to request the following medical record(s):        (  )  MAMMOGRAM                                      (  )  COLONOSCOPY      (  )  PAP SMEAR                                          (  )  OUTSIDE LAB RESULTS     (  )  DEXA SCAN                                          ( X )  EYE EXAM            (  )  FOOT EXAM                                          (  )  ENTIRE RECORD     ( X )  OUTSIDE IMMUNIZATIONS                ( X )  AAA Screening ______________         Please fax records to [facility], [phone]     If you have any questions, please contact Mata Hussein           Patient Name: Cole Doan  : 1950  Patient Phone #: [phone]

## 2022-07-28 ENCOUNTER — HOSPITAL ENCOUNTER (OUTPATIENT)
Dept: RADIOLOGY | Facility: HOSPITAL | Age: 72
Discharge: HOME | End: 2022-07-28
Attending: NURSE PRACTITIONER
Payer: MEDICARE

## 2022-07-28 ENCOUNTER — OFFICE VISIT (OUTPATIENT)
Dept: PULMONOLOGY | Facility: CLINIC | Age: 72
End: 2022-07-28
Payer: MEDICARE

## 2022-07-28 VITALS
DIASTOLIC BLOOD PRESSURE: 34 MMHG | BODY MASS INDEX: 30.29 KG/M2 | HEART RATE: 62 BPM | SYSTOLIC BLOOD PRESSURE: 128 MMHG | RESPIRATION RATE: 18 BRPM | WEIGHT: 236 LBS | OXYGEN SATURATION: 95 % | HEIGHT: 74 IN

## 2022-07-28 DIAGNOSIS — G47.09 OTHER INSOMNIA: ICD-10-CM

## 2022-07-28 DIAGNOSIS — F17.211 CIGARETTE NICOTINE DEPENDENCE IN REMISSION: ICD-10-CM

## 2022-07-28 DIAGNOSIS — E66.9 OBESITY (BMI 30-39.9): ICD-10-CM

## 2022-07-28 DIAGNOSIS — J98.4 MIXED RESTRICTIVE AND OBSTRUCTIVE LUNG DISEASE: Primary | ICD-10-CM

## 2022-07-28 DIAGNOSIS — F43.10 PTSD (POST-TRAUMATIC STRESS DISORDER): ICD-10-CM

## 2022-07-28 DIAGNOSIS — J43.9 MIXED RESTRICTIVE AND OBSTRUCTIVE LUNG DISEASE: Primary | ICD-10-CM

## 2022-07-28 DIAGNOSIS — J43.9 MIXED RESTRICTIVE AND OBSTRUCTIVE LUNG DISEASE: ICD-10-CM

## 2022-07-28 DIAGNOSIS — E11.9 TYPE 2 DIABETES MELLITUS WITHOUT COMPLICATION, WITHOUT LONG-TERM CURRENT USE OF INSULIN: ICD-10-CM

## 2022-07-28 DIAGNOSIS — I15.2 HYPERTENSION ASSOCIATED WITH TYPE 2 DIABETES MELLITUS: ICD-10-CM

## 2022-07-28 DIAGNOSIS — I70.0 ATHEROSCLEROSIS OF AORTA: ICD-10-CM

## 2022-07-28 DIAGNOSIS — E11.59 HYPERTENSION ASSOCIATED WITH TYPE 2 DIABETES MELLITUS: ICD-10-CM

## 2022-07-28 DIAGNOSIS — J98.4 MIXED RESTRICTIVE AND OBSTRUCTIVE LUNG DISEASE: ICD-10-CM

## 2022-07-28 PROCEDURE — 99214 OFFICE O/P EST MOD 30 MIN: CPT | Mod: S$PBB,ICN,, | Performed by: NURSE PRACTITIONER

## 2022-07-28 PROCEDURE — 99999 PR PBB SHADOW E&M-EST. PATIENT-LVL IV: ICD-10-PCS | Mod: PBBFAC,,, | Performed by: NURSE PRACTITIONER

## 2022-07-28 PROCEDURE — 99214 PR OFFICE/OUTPT VISIT, EST, LEVL IV, 30-39 MIN: ICD-10-PCS | Mod: S$PBB,ICN,, | Performed by: NURSE PRACTITIONER

## 2022-07-28 PROCEDURE — 99999 PR PBB SHADOW E&M-EST. PATIENT-LVL IV: CPT | Mod: PBBFAC,,, | Performed by: NURSE PRACTITIONER

## 2022-07-28 PROCEDURE — 71046 XR CHEST PA AND LATERAL: ICD-10-PCS | Mod: 26,,, | Performed by: RADIOLOGY

## 2022-07-28 PROCEDURE — 71046 X-RAY EXAM CHEST 2 VIEWS: CPT | Mod: 26,,, | Performed by: RADIOLOGY

## 2022-07-28 PROCEDURE — 99214 OFFICE O/P EST MOD 30 MIN: CPT | Mod: PBBFAC,25 | Performed by: NURSE PRACTITIONER

## 2022-07-28 PROCEDURE — 71046 X-RAY EXAM CHEST 2 VIEWS: CPT | Mod: TC

## 2022-07-28 NOTE — PROGRESS NOTES
Subjective:      Patient ID: Cole Doan is a 71 y.o. male.    Chief Complaint: Sleep Apnea and Mixed restrictive obstructive lung disease    HPI: Cole Doan present for follow up new patient visit after told to follow up with pulmonary after wellness visit with VIVIANA Alexander.     He presents as new patient, last seen in pulmonary 9/18/2017 when he had exertional shortness of breath. Shortness of breath has resolved since. Shortness of breath resolved after pacemaker placement with cardiology.     He has no pulmonary complaints.     He is not using inhalers.    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     He did not meet COPD gold criteria classification to be staged in the COPD category with FEV1/FVC:  77 (104 % predicted).     CT of chest 9/7/2017 revealed benign findings.     40 pack year former cigarette smoker. Quit 2009.      He is on CPAP  of 13 cmH2O pressure.   He is compliant with CPAP use. He states 100 % use. Can not sleep without CPAP.   Patient reports benefit from CPAP use and denies snoring and excessive daytime sleepiness.   [facility]   Full [facility] mask   [facility]     Awaiting receiving replacement from [employer].      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  [facility] . [place] exposed to agent orange (a herbicide and defoliant chemical composed of 2 herbicides, 2,4,5-T and 2,4-D) exposed from 2394-8263. No occupational exposure.     Avocational Exposure:   None currently.     Pet Exposures:  Dogs. Denies allergy to Dog and  cat dander.    Previous Report Reviewed: office notes     The following portions of the patient's history were reviewed and updated as appropriate: allergies, current medications, past family history, past medical history, past social history, past surgical history and  problem list.    Review of Systems   Constitutional: Negative for fever, chills, weight loss, weight gain, activity change, appetite change, fatigue and night sweats.   HENT: Negative for postnasal drip, rhinorrhea, sinus pressure, voice change and congestion.    Eyes: Negative for redness and itching.   Respiratory: Negative for snoring, cough, sputum production, chest tightness, shortness of breath, wheezing, orthopnea, asthma nighttime symptoms, dyspnea on extertion, use of rescue inhaler and somnolence.    Cardiovascular: Negative.  Negative for chest pain, palpitations and leg swelling.   Genitourinary: Negative for difficulty urinating and hematuria.   Endocrine: Negative for cold intolerance and heat intolerance.    Musculoskeletal: Negative for arthralgias, gait problem, joint swelling and myalgias.   Skin: Negative.    Gastrointestinal: Negative for nausea, vomiting, abdominal pain and acid reflux.   Neurological: Negative for dizziness, weakness, light-headedness and headaches.   Hematological: Negative for adenopathy. No excessive bruising.   All other systems reviewed and are negative.     Objective:     Physical Exam  Vitals and nursing note reviewed.   Constitutional:       General: He is not in acute distress.     Appearance: He is well-developed. He is not ill-appearing or toxic-appearing.   HENT:      Head: Normocephalic and atraumatic.      Right Ear: External ear normal.      Left Ear: External ear normal.      Nose: Nose normal.      Mouth/Throat:      Pharynx: No oropharyngeal exudate.   Eyes:      Conjunctiva/sclera: Conjunctivae normal.   Cardiovascular:      Rate and Rhythm: Normal rate and regular rhythm.      Heart sounds: Normal heart sounds.   Pulmonary:      Effort: Pulmonary effort is normal.      Breath sounds: Normal breath sounds.   Abdominal:      Palpations: Abdomen is soft.   Musculoskeletal:      Cervical back: Normal range of motion and neck supple.   Skin:     General: Skin is  "warm and dry.   Neurological:      Mental Status: He is alert and oriented to person, place, and time.   Psychiatric:         Behavior: Behavior normal. Behavior is cooperative.         Thought Content: Thought content normal.         Judgment: Judgment normal.       Vitals:    22 0949   BP: (!) 128/34   Pulse: 62   Resp: 18   SpO2: 95%   Weight: 107.1 kg (236 lb)   Height: 6' 1.5" (1.867 m)   PainSc: 0-No pain     body mass index is 30.71 kg/m².    Personal Diagnostic Review      Pulmonary function tests:2017 FEV1: 2.06  (53 % predicted), FVC:  2.82 (54 % predicted), FEV1/FVC:  73 (98 % predicted), T.46 (73 % predicted), RV/TLVC: 48 (121 % predicted), DLCO: 18.9 (61 % predicted)   Post bronchodilator: FEV1: 2.14  (55 % predicted), FVC:  2.78 (53 % predicted), FEV1/FVC:  77 (104 % predicted).   Moderate restriction.  Mild reduction in diffusion capacity -unadjusted for hemoglobin (DLCO >60% and less than 79%) .  Moderately severe obstruction (FEV1>50 and <59% of predicted).  Airflow is not clearly improved after bronchodilator. Clinical improvement following bronchodilator therapy may occur in  the absence of spirometric improvement.  This is a pattern of mixed obstruction and restriction. Clinical correlation suggested    Chest xray 2017   2 view chest    Comparison: 2016    Findings: The lungs are clear and free of infiltrate.  No pleural effusion or pneumothorax is identified.  The heart is not enlarged.   Impression         1.  No acute cardiopulmonary process.     CT chest screening 2017  Technique: CT of the thorax was performed with low dose, lung screening protocol.  No contrast was administered.  Sagittal and coronal reconstructions were obtained.  Comparison: None.  Findings:  Lungs: There are no abnormal nodular opacities that require further evaluation.  Minor scarring or dependent atelectasis present the lung bases.  A few small subpleural blebs in the right " apex.  Pleura: No effusion.  Mediastinum: No pathologic lymphadenopathy.  Heart and pericardium: Normal size without effusion.  Aorta and vasculature: Atherosclerosis including coronary arteries.  Chest wall and skeletal structures: Unremarkable except age-appropriate degenerative changes.  Upper abdomen: Unremarkable.  IMPRESSION:  No lung screening findings that require follow-up.     CPAP download  CPAP 13 cm  Compliance Summary  7/29/2017 - 8/27/2017 (30 days)  Days with Device Usage 30 days  Days without Device Usage 0 days  Percent Days with Device Usage 100.0%  Cumulative Usage 11 days 15 hrs. 47 mins. 52 secs.  Maximum Usage (1 Day) 12 hrs. 12 mins. 1 secs.  Average Usage (All Days) 9 hrs. 19 mins. 35 secs.  Average Usage (Days Used) 9 hrs. 19 mins. 35 secs.  Minimum Usage (1 Day) 7 hrs. 35 mins. 16 secs.  Percent of Days with Usage >= 4 Hours 100.0%  Percent of Days with Usage < 4 Hours 0.0%  Date Range  Total Blower Time 11 days 16 hrs. 6 mins. 59 secs.  Sleep Therapy Statistics  Average Time in Large Leak Per Day 22 secs.  Average AHI 0.4  CPAP 13.0 cmH2O    Patient benefits and is compliant    Pulmonary stress/six minute walk done 8/21/2017: No desaturations requiring supplemental oxygen  Phase Oxygen Assessment Supplemental O2 Heart   Rate Blood Pressure Santino Dyspnea Scale Rating   Resting 97 % Room Air 86 bpm 131/78 3   Exercise        Minute        1 93 % Room Air 80 bpm     2 93 % Room Air 84 bpm     3 93 % Room Air 89 bpm     4 94 % Room Air 85 bpm     5 93 % Room Air 86 bpm     6  94 % Room Air 84 bpm 143/84 7-8   Recovery        Minute        1 96 % Room Air 61 bpm     2 96 % Room Air 59 bpm     3 96 % Room Air 57 bpm     4 95 % Room Air 56 bpm 118/74 4     Six Minute Walk Summary  6MWT Status: completed without stopping  Patient Reported: Chest pain, Dyspnea (Patient felt off balance)   while walking.     7/28/2022 chest xray lungs are clear.     X-Ray Chest PA And Lateral  Narrative:  EXAMINATION:  XR CHEST PA AND LATERAL    CLINICAL HISTORY:  Emphysema, unspecified    TECHNIQUE:  PA and lateral views of the chest were performed.    COMPARISON:  Prior radiographs    FINDINGS:  Cardiac silhouette and mediastinal contours are unchanged.  Cardiac device remains.  Lungs are clear.  Osseous structures are intact.  Impression: No acute cardiopulmonary process.    Electronically signed by: Archie Rico MD  Date:    07/28/2022  Time:    11:34      Assessment:     1. Mixed restrictive and obstructive lung disease    2. Cigarette nicotine dependence in remission    3. Obesity (BMI 30-39.9)    4. PTSD (post-traumatic stress disorder)    5. Type 2 diabetes mellitus without complication, without long-term current use of insulin    6. Hypertension associated with type 2 diabetes mellitus    7. Atherosclerosis of aorta    8. Other insomnia      Orders Placed This Encounter   Procedures    CT Chest Lung Screening Low Dose     Standing Status:   Future     Standing Expiration Date:   10/18/2024     Order Specific Question:   Is the patient a current or former smoker who quit within the past 15 years?     Answer:   Yes     Order Specific Question:   Is the patient between the age 50-80 years old?     Answer:   Yes     Order Specific Question:   Has the patient ever had lung cancer or an abnormal Chest CT?     Answer:   No     Order Specific Question:   Has the patient smoked 20 or more packs of cigarettes/year?     Answer:   Yes     Order Specific Question:   May the Radiologist modify the order per protocol to meet the clinical needs of the patient?     Answer:   Yes     Order Specific Question:   Does the patient show any signs or symptoms of lung cancer?     Answer:   No     Order Specific Question:   Is this the first (baseline) CT or an annual exam?     Answer:   Annual [2]     Order Specific Question:   Is there documentation of shared decision making for this lung screening exam?     Answer:   Yes      Order Specific Question:   Is this a low dose screening chest CT?     Answer:   Yes    X-Ray Chest PA And Lateral     Standing Status:   Future     Number of Occurrences:   1     Standing Expiration Date:   7/28/2023     Order Specific Question:   May the Radiologist modify the order per protocol to meet the clinical needs of the patient?     Answer:   Yes     Order Specific Question:   Release to patient     Answer:   Immediate    Complete PFT with bronchodilator     Standing Status:   Future     Standing Expiration Date:   7/28/2023     Order Specific Question:   Release to patient     Answer:   Immediate     Plan:     Problem List Items Addressed This Visit     Type 2 diabetes mellitus without complication     Stable and controlled. Continue current treatment plan as previously prescribed with your PCP.   Hemoglobin A1C   Date Value Ref Range Status   06/03/2022 5.0 4.0 - 5.6 % Final     Comment:     ADA Screening Guidelines:  5.7-6.4%  Consistent with prediabetes  >or=6.5%  Consistent with diabetes    High levels of fetal hemoglobin interfere with the HbA1C  assay. Heterozygous hemoglobin variants (HbS, HgC, etc)do  not significantly interfere with this assay.   However, presence of multiple variants may affect accuracy.     10/26/2021 5.1 4.0 - 6.0 % Final     Comment:     [facility]   08/27/2021 5.2 4.0 - 5.6 % Final     Comment:     ADA Screening Guidelines:  5.7-6.4%  Consistent with prediabetes  >or=6.5%  Consistent with diabetes    High levels of fetal hemoglobin interfere with the HbA1C  assay. Heterozygous hemoglobin variants (HbS, HgC, etc)do  not significantly interfere with this assay.   However, presence of multiple variants may affect accuracy.                   PTSD (post-traumatic stress disorder)     [facility]            Other insomnia     Ambien 5 [facility]            Obesity (BMI 30-39.9)     Encouraged calorie reduction and 30 minutes of exercise daily. Discussed  impact of obesity on general health.  Wt Readings from Last 9 Encounters:   07/28/22 107.1 kg (236 lb)   07/18/22 107.1 kg (236 lb 1.8 oz)   07/14/22 105.8 kg (233 lb 4 oz)   07/07/22 106.1 kg (233 lb 14.5 oz)   12/02/21 98 kg (216 lb 0.8 oz)   10/14/21 96.4 kg (212 lb 8.4 oz)   08/25/21 97.4 kg (214 lb 11.7 oz)   08/02/21 96.8 kg (213 lb 6.5 oz)   07/19/21 103.5 kg (228 lb 2.8 oz)   Body mass index is 30.71 kg/m².               Mixed restrictive and obstructive lung disease - Primary     Stable, no pulmonary symptoms   Not on inhalers  No COPD gold findings in 2017.  Exam lungs clear  Chest xray lungs clear  Follow up repeat CPFT   No indication to add inhalers            Relevant Orders    Complete PFT with bronchodilator    X-Ray Chest PA And Lateral (Completed)    Hypertension associated with type 2 diabetes mellitus     Stable and controlled. Continue current treatment plan as previously prescribed with your PCP.                Cigarette nicotine dependence in remission     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   2017 CT chest screening for pulmonary nodules, benign.   Quit 13 years ago in 2009, guidelines to stop screening after 15 yrs.   Proceed with LDCT screening for lung cancer           Relevant Orders    CT Chest Lung Screening Low Dose    Atherosclerosis of aorta     Follow heart healthy diet. regular exercise.                ([facility] - [facility]). Reviewed therapeutic goals for positive airway pressure therapy CPAP      I spent a total of 30 minutes on the day of the visit.  This includes face to face time and non-face to face time preparing to see the patient (eg, review of tests), obtaining and/or reviewing separately obtained history, documenting clinical information in the electronic or other health record, independently interpreting results and communicating results to the patient/family/caregiver, or care coordinator.      Follow up in about 1 month (around 8/28/2022) for restrictive lung disease  review CPFT. former smoker LDCT. elio on cpap bring machine.

## 2022-07-28 NOTE — ASSESSMENT & PLAN NOTE
Stable and controlled. Continue current treatment plan as previously prescribed with your PCP.   Hemoglobin A1C   Date Value Ref Range Status   06/03/2022 5.0 4.0 - 5.6 % Final     Comment:     ADA Screening Guidelines:  5.7-6.4%  Consistent with prediabetes  >or=6.5%  Consistent with diabetes    High levels of fetal hemoglobin interfere with the HbA1C  assay. Heterozygous hemoglobin variants (HbS, HgC, etc)do  not significantly interfere with this assay.   However, presence of multiple variants may affect accuracy.     10/26/2021 5.1 4.0 - 6.0 % Final     Comment:     [facility]   08/27/2021 5.2 4.0 - 5.6 % Final     Comment:     ADA Screening Guidelines:  5.7-6.4%  Consistent with prediabetes  >or=6.5%  Consistent with diabetes    High levels of fetal hemoglobin interfere with the HbA1C  assay. Heterozygous hemoglobin variants (HbS, HgC, etc)do  not significantly interfere with this assay.   However, presence of multiple variants may affect accuracy.

## 2022-07-28 NOTE — ASSESSMENT & PLAN NOTE
Stable, no pulmonary symptoms   Not on inhalers  No COPD gold findings in 2017.  Exam lungs clear  Chest xray lungs clear  Follow up repeat CPFT   No indication to add inhalers

## 2022-07-28 NOTE — ASSESSMENT & PLAN NOTE
Encouraged calorie reduction and 30 minutes of exercise daily. Discussed impact of obesity on general health.  Wt Readings from Last 9 Encounters:   07/28/22 107.1 kg (236 lb)   07/18/22 107.1 kg (236 lb 1.8 oz)   07/14/22 105.8 kg (233 lb 4 oz)   07/07/22 106.1 kg (233 lb 14.5 oz)   12/02/21 98 kg (216 lb 0.8 oz)   10/14/21 96.4 kg (212 lb 8.4 oz)   08/25/21 97.4 kg (214 lb 11.7 oz)   08/02/21 96.8 kg (213 lb 6.5 oz)   07/19/21 103.5 kg (228 lb 2.8 oz)   Body mass index is 30.71 kg/m².

## 2022-07-28 NOTE — ASSESSMENT & PLAN NOTE
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   2017 CT chest screening for pulmonary nodules, benign.   Quit 13 years ago in 2009, guidelines to stop screening after 15 yrs.   Proceed with LDCT screening for lung cancer

## 2022-08-03 ENCOUNTER — TELEPHONE (OUTPATIENT)
Dept: INTERNAL MEDICINE | Facility: CLINIC | Age: 72
End: 2022-08-03
Payer: MEDICARE

## 2022-08-03 NOTE — TELEPHONE ENCOUNTER
----- Message from Evelyn Martínez MD sent at 7/21/2022 12:13 PM CDT -----  Please notify urine grew out bacteria. I have sent in rx for nitrofurantoin which should clear it up.

## 2022-08-04 ENCOUNTER — TELEPHONE (OUTPATIENT)
Dept: INTERNAL MEDICINE | Facility: CLINIC | Age: 72
End: 2022-08-04
Payer: MEDICARE

## 2022-08-04 RX ORDER — NITROFURANTOIN 25; 75 MG/1; MG/1
100 CAPSULE ORAL 2 TIMES DAILY
Qty: 14 CAPSULE | Refills: 0 | Status: SHIPPED | OUTPATIENT
Start: 2022-08-04 | End: 2023-07-21

## 2022-08-04 NOTE — TELEPHONE ENCOUNTER
Attempted phone call, no answer, no vm available. Calling to inform patient rx was sent to [facility] on [address]

## 2022-08-04 NOTE — TELEPHONE ENCOUNTER
Patient came in to the lobby.   He stated that [facility] is closed and requesting the rx be sent to [facility] on [address].  Nitrofurantoin 100mg capsules taking 1 capsule BID

## 2022-08-17 NOTE — PROGRESS NOTES
[facility] to [facility] and was told results was faxed over on 08/10/2022. No results uploaded. Faxed another request.     Also called and spoke with patient to confirm if DM EYE EXAM, IMMUNIZATIONS, AAA SCREENING [facility] at [facility] pt said yes.

## 2022-08-18 ENCOUNTER — OFFICE VISIT (OUTPATIENT)
Dept: GASTROENTEROLOGY | Facility: CLINIC | Age: 72
End: 2022-08-18
Payer: MEDICARE

## 2022-08-18 VITALS
HEART RATE: 73 BPM | DIASTOLIC BLOOD PRESSURE: 82 MMHG | HEIGHT: 74 IN | SYSTOLIC BLOOD PRESSURE: 144 MMHG | WEIGHT: 239 LBS | BODY MASS INDEX: 30.67 KG/M2 | OXYGEN SATURATION: 99 %

## 2022-08-18 DIAGNOSIS — K86.2 PANCREATIC CYST: Primary | ICD-10-CM

## 2022-08-18 PROCEDURE — 99213 OFFICE O/P EST LOW 20 MIN: CPT | Mod: PBBFAC | Performed by: INTERNAL MEDICINE

## 2022-08-18 PROCEDURE — 99213 PR OFFICE/OUTPT VISIT, EST, LEVL III, 20-29 MIN: ICD-10-PCS | Mod: S$PBB,,, | Performed by: INTERNAL MEDICINE

## 2022-08-18 PROCEDURE — 99213 OFFICE O/P EST LOW 20 MIN: CPT | Mod: S$PBB,,, | Performed by: INTERNAL MEDICINE

## 2022-08-18 PROCEDURE — 99999 PR PBB SHADOW E&M-EST. PATIENT-LVL III: CPT | Mod: PBBFAC,,, | Performed by: INTERNAL MEDICINE

## 2022-08-18 PROCEDURE — 99999 PR PBB SHADOW E&M-EST. PATIENT-LVL III: ICD-10-PCS | Mod: PBBFAC,,, | Performed by: INTERNAL MEDICINE

## 2022-08-18 RX ORDER — LANOLIN ALCOHOL/MO/W.PET/CERES
CREAM (GRAM) TOPICAL
COMMUNITY
Start: 2022-08-04 | End: 2022-08-18 | Stop reason: SDUPTHER

## 2022-08-18 NOTE — PATIENT INSTRUCTIONS
For the pancreas cysts, schedule a CT scan to follow up on the size and if there has been any change since then. IF there is no change a repeat scan in 1 year is needed.

## 2022-08-18 NOTE — PROGRESS NOTES
[facility]. Upon looking over eye exam, it is not DM eye exam. Called [facility] at [phone] to speak with someone in [facility] to confirm if patient has had a DM eye exam. I was initially transferred to medical records were I was told to fax a request. I explained that we have done so and did receive some records however we were superficially looking for a DM eye exam which report stated he had.  She stated that she was sending over all records that she saw for an eye exam.     [facility] back at number [facility] and asked to speak with nurse. Explained what I was looking for and if I could speak with Optometry. She stated that she would transfer for me over to the department who handles all of this. She transferred me, the next person I spoke with was not in Optometry. She stated that she could not give me any information without us sending over a [facility] since they have never referred patient to us. I explained that I am working with his PCP and trying to get eye exam records. She stated that she has no record of this patient being referred to us. That we were not his PCP. That they would have had to send in a referral/consult to us for us to be his PCP. Therefore she would not give me any info nor send me anything. She then transferred me back to medical records to the original person I spoke with. She again informed me that she sent everything she had.     Uploaded eye exam that we did receive to his .

## 2022-08-18 NOTE — PROGRESS NOTES
Clinic Consult:  [facility] Consultation Note    Reason for Consult:  The encounter diagnosis was Pancreatic cyst.    PCP: Evelyn Martínez       HPI:  This is a 71 y.o. male here for follow up for pancreatic cyst.     EUS in July 2021 for three pancreatic neck cysts with the largest 14 mm. 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 No current symptoms and denied any weight loss, change in appetite, jaundice, abdominal pain, nausea, emesis.        ROS:  CONSTITUTIONAL: Denies weight change,  fatigue, fevers, chills, night sweats.  EYES: No changes in vision.   ENT: No oral lesions or sore throat.  HEMATOLOGICAL/Lymph: Denies bleeding tendency, bruising tendency. No swellings or enlarged lymph nodes.  CARDIOVASCULAR: Denies chest pain, shortness of breath, orthopnea and edema.  RESPIRATORY: Denies cough, hemoptysis, dyspnea, and wheezing.  GI: See HPI.  : Denies dysuria and hematuria  MUSCULOSKELETAL: Denies joint pain or swelling, back pain and muscle pain.  SKIN: Denies rashes.  NEUROLOGIC: Denies headaches, seizures and numbness.  PSYCHIATRIC: Denies depression or anxiety.  ENDOCRINE: Denies heat or cold intolerance and excessive thirst or urination.    Medical History:   Past Medical History:   Diagnosis Date    Acute coronary syndrome     Alzheimer's dementia     Anticoagulant long-term use     Plavix    BPH (benign prostatic hypertrophy)     CAD (coronary artery disease)     History of colon polyps     Hyperlipidemia associated with type 2 diabetes mellitus     Hypertension associated with type 2 diabetes mellitus 05/15/2014    Obesity 5/15/2014    SATYA on CPAP     Pacemaker     Pancreatic cyst     Pneumonia     PTSD (post-traumatic stress disorder) 5/15/2014    RBBB 5/15/2014    S/P PTCA (percutaneous transluminal coronary angioplasty) 5/15/2014    Type 2 diabetes mellitus without complication 9/28/2015       Surgical History:  Past Surgical History:   Procedure Laterality Date    APPENDECTOMY       at age 14    ATRIAL CARDIAC PACEMAKER INSERTION      COLONOSCOPY Left 2018    Procedure: COLONOSCOPY;  Surgeon: Artem Medeiros MD;  Location: [facility] ENDO;  Service: Endoscopy;  Laterality: Left;    CORONARY ANGIOPLASTY WITH STENT PLACEMENT      , ,     ENDOSCOPIC ULTRASOUND OF UPPER GASTROINTESTINAL TRACT N/A 2021    Procedure: ULTRASOUND, UPPER GI TRACT, ENDOSCOPIC;  Surgeon: Popeye Terrell MD;  Location: [facility] ENDO;  Service: Endoscopy;  Laterality: N/A;  upper and linear    ESOPHAGOGASTRODUODENOSCOPY N/A 2018    Procedure: ESOPHAGOGASTRODUODENOSCOPY (EGD);  Surgeon: Mario Cesar MD;  Location: [facility] ENDO;  Service: General;  Laterality: N/A;    ROBOT-ASSISTED LAPAROSCOPIC SLEEVE GASTRECTOMY USING DA LURDES XI N/A 2018    Procedure: XI ROBOTIC SLEEVE GASTRECTOMY;  Surgeon: Mario Cesar MD;  Location: [facility] OR;  Service: General;  Laterality: N/A;    VASECTOMY         Family History:   Family History   Problem Relation Age of Onset    Cataracts Father     Heart disease Father     Hypertension Father     Heart disease Mother     Hypertension Mother     Colon cancer Neg Hx        Social History:   Social History     Tobacco Use    Smoking status: Former Smoker     Packs/day: 1.00     Years: 40.00     Pack years: 40.00     Types: Cigarettes     Start date:      Quit date: 2009     Years since quittin.6    Smokeless tobacco: Never Used   Substance Use Topics    Alcohol use: No    Drug use: No       Allergies: Reviewed    Home Medications:   Medication List with Changes/Refills   Current Medications    ALBUTEROL INHL    Inhale into the lungs.    AMLODIPINE (NORVASC) 5 MG TABLET    Take 5 mg by mouth once daily.    BUPROPION (WELLBUTRIN SR) 200 MG TBSR    Take 200 mg by mouth once daily. Takes 300mg daily    CALCIUM-VITAMIN D3 (OS-ARVIND 500 + D3) 500 MG-5 MCG (200 UNIT) PER TABLET    Take 1 tablet by mouth 2 (two) times daily with meals.     "CETIRIZINE (ZYRTEC) 10 MG TABLET    Take 10 mg by mouth as needed.    CYANOCOBALAMIN (VITAMIN B-12) 1000 MCG TABLET    Take 100 mcg by mouth once daily.    DABIGATRAN ETEXILATE (PRADAXA) 150 MG CAP    Take 150 mg by mouth once daily. "Do NOT break, chew, or open capsules."   Takes 2 caps daily    DICLOFENAC (VOLTAREN) 0.1 % OPHTHALMIC SOLUTION    1 drop 4 (four) times daily.    EZETIMIBE (ZETIA) 10 MG TABLET    TAKE ONE TABLET BY MOUTH EVERY DAY TO LOWER CHOLESTEROL    FLUTICASONE PROPIONATE (FLONASE) 50 MCG/ACTUATION NASAL SPRAY    1 spray by Each Nostril route once daily.    FUROSEMIDE (LASIX) 40 MG TABLET    Takes one-half tablet by mouth daily    KETOTIFEN (ZADITOR) 0.025 % (0.035 %) OPHTHALMIC SOLUTION    1 drop 2 (two) times daily.    LIRAGLUTIDE 0.6 MG/0.1 ML, 18 MG/3 ML, SUBQ PNIJ (VICTOZA 2-ZACHARY) 0.6 MG/0.1 ML (18 MG/3 ML) PNIJ    Inject 0.6 mg into the skin once daily.    METOPROLOL SUCCINATE (TOPROL-XL) 100 MG 24 HR TABLET    Take 200 mg by mouth once daily. Takes 200mg daily    METRONIDAZOLE 0.75% (METROCREAM) 0.75 % CREA    Apply topically 2 (two) times daily.    NITROFURANTOIN, MACROCRYSTAL-MONOHYDRATE, (MACROBID) 100 MG CAPSULE    Take 1 capsule (100 mg total) by mouth 2 (two) times daily.    NITROGLYCERIN (NITROSTAT) 0.4 MG SL TABLET    Place 1 tablet (0.4 mg total) under the tongue every 5 (five) minutes as needed for Chest pain.    NOZASEPTIN (NOZIN) NASAL     1 each by Each Nare route 2 (two) times daily.    OMEPRAZOLE (PRILOSEC) 20 MG CAPSULE    Take 20 mg by mouth once daily.    PYRIDOXINE, VITAMIN B6, (B-6) 100 MG TAB    TAKE ONE TABLET BY MOUTH ONCE DAILY AS A VITAMIN SUPPLEMENT    RANOLAZINE (RANEXA) 1,000 MG TB12    Take 1,000 mg by mouth 2 (two) times daily. Takes daily    ROSUVASTATIN (CRESTOR) 40 MG TAB    Take 10 mg by mouth every evening.    SILDENAFIL (VIAGRA) 100 MG TABLET    Take 1 tablet (100 mg total) by mouth daily as needed for Erectile Dysfunction.    TAMSULOSIN (FLOMAX) " "0.4 MG CAP    Take 1 capsule (0.4 mg total) by mouth once daily.    THIAMINE (VITAMIN B-1) 50 MG TABLET    Take 50 mg by mouth once daily.    TRETINOIN (RETIN-A) 0.1 % CREAM    Apply topically.    VITAMIN D 1000 UNITS TAB    Take 2,000 Units by mouth once daily.    WHITE PETROLATUM 43 % OINT    Apply topically.    ZOLPIDEM (AMBIEN) 5 MG TAB    Take 5 mg by mouth nightly as needed.   Discontinued Medications    CALCIUM CITRATE-VITAMIN D3 315-200 MG (CITRACAL+D) 315 MG-5 MCG (200 UNIT) PER TABLET    TAKE ONE TABLET BY MOUTH TWICE A DAY FOR SUPPLEMENTATION         Physical Exam:  Vital Signs:  BP (!) 144/82   Pulse 73   Ht 6' 1.5" (1.867 m)   Wt 108.4 kg (238 lb 15.7 oz)   SpO2 99%   BMI 31.10 kg/m²   Body mass index is 31.1 kg/m².      GENERAL: No acute distress, A&Ox3  EYES: Anicteric, no pallor noted.  ENT: OP clear  NECK: Supple, no masses, no thyromegally.  CHEST: Equal breath sounds bilaterally, no wheezing.  CARDIOVASCULAR: Regular rate and rhythm. Murmurs, rubs and gallops absent.  ABDOMEN: soft, non-tender, non-distended, normal bowel sounds, no hepatosplenomegaly   EXTREMITIES: No clubbing, cyanosis or edema.  SKIN: Without lesion or erythema.  LYMPH: No cervical, axillary lymphadenopathy palpable.   NEUROLOGICAL: Grossly normal, no asterixis present.    Labs: Pertinent labs reviewed.    Assessment and Plan:  Pancreatic cyst  -     CT Abdomen Pelvis With Contrast; Future; Expected date: 08/18/2022  -     Creatinine, serum; Future; Expected date: 08/18/2022      For the pancreas cysts, schedule a CT scan to follow up on the size and if there has been any change since then. IF there is no change a repeat scan in 1 year is needed.    Follow up in about 1 year (around 8/18/2023).        Thank you so much for allowing me to participate in the care of Cole KARELY Terrell MD  Gastroenterology  Director of Advanced Endoscopy at [facility] "

## 2022-08-26 DIAGNOSIS — R93.3 ABNORMAL FINDINGS ON DIAGNOSTIC IMAGING OF OTHER PARTS OF DIGESTIVE TRACT: ICD-10-CM

## 2022-08-26 DIAGNOSIS — K86.2 PANCREATIC CYST: Primary | ICD-10-CM

## 2022-08-30 ENCOUNTER — HOSPITAL ENCOUNTER (OUTPATIENT)
Dept: RADIOLOGY | Facility: HOSPITAL | Age: 72
Discharge: HOME | End: 2022-08-30
Attending: NURSE PRACTITIONER
Payer: MEDICARE

## 2022-08-30 DIAGNOSIS — F17.211 CIGARETTE NICOTINE DEPENDENCE IN REMISSION: ICD-10-CM

## 2022-08-30 PROCEDURE — 71271 CT THORAX LUNG CANCER SCR C-: CPT | Mod: TC

## 2022-08-30 PROCEDURE — 71271 CT THORAX LUNG CANCER SCR C-: CPT | Mod: 26,,, | Performed by: RADIOLOGY

## 2022-08-30 PROCEDURE — 71271 CT CHEST LUNG SCREENING LOW DOSE: ICD-10-PCS | Mod: 26,,, | Performed by: RADIOLOGY

## 2022-09-08 ENCOUNTER — PATIENT MESSAGE (OUTPATIENT)
Dept: GASTROENTEROLOGY | Facility: CLINIC | Age: 72
End: 2022-09-08
Payer: MEDICARE

## 2022-09-29 ENCOUNTER — CLINICAL SUPPORT (OUTPATIENT)
Dept: PULMONOLOGY | Facility: CLINIC | Age: 72
End: 2022-09-29
Payer: MEDICARE

## 2022-09-29 ENCOUNTER — OFFICE VISIT (OUTPATIENT)
Dept: PULMONOLOGY | Facility: CLINIC | Age: 72
End: 2022-09-29
Payer: MEDICARE

## 2022-09-29 VITALS
HEIGHT: 73 IN | DIASTOLIC BLOOD PRESSURE: 80 MMHG | RESPIRATION RATE: 18 BRPM | HEART RATE: 61 BPM | WEIGHT: 230.81 LBS | OXYGEN SATURATION: 96 % | SYSTOLIC BLOOD PRESSURE: 128 MMHG | BODY MASS INDEX: 30.59 KG/M2

## 2022-09-29 DIAGNOSIS — F43.10 PTSD (POST-TRAUMATIC STRESS DISORDER): ICD-10-CM

## 2022-09-29 DIAGNOSIS — I70.0 ATHEROSCLEROSIS OF AORTA: ICD-10-CM

## 2022-09-29 DIAGNOSIS — I15.2 HYPERTENSION ASSOCIATED WITH TYPE 2 DIABETES MELLITUS: ICD-10-CM

## 2022-09-29 DIAGNOSIS — G47.33 OSA ON CPAP: ICD-10-CM

## 2022-09-29 DIAGNOSIS — E11.59 HYPERTENSION ASSOCIATED WITH TYPE 2 DIABETES MELLITUS: ICD-10-CM

## 2022-09-29 DIAGNOSIS — J98.4 MIXED RESTRICTIVE AND OBSTRUCTIVE LUNG DISEASE: ICD-10-CM

## 2022-09-29 DIAGNOSIS — J43.9 MIXED RESTRICTIVE AND OBSTRUCTIVE LUNG DISEASE: ICD-10-CM

## 2022-09-29 DIAGNOSIS — E66.9 OBESITY (BMI 30-39.9): ICD-10-CM

## 2022-09-29 DIAGNOSIS — F17.211 CIGARETTE NICOTINE DEPENDENCE IN REMISSION: Primary | ICD-10-CM

## 2022-09-29 PROBLEM — J44.9 MIXED RESTRICTIVE AND OBSTRUCTIVE LUNG DISEASE: Status: RESOLVED | Noted: 2017-09-18 | Resolved: 2022-09-29

## 2022-09-29 PROCEDURE — 94729 DIFFUSING CAPACITY: CPT | Mod: PBBFAC

## 2022-09-29 PROCEDURE — 94726 PULM FUNCT TST PLETHYSMOGRAP: ICD-10-PCS | Mod: 26,S$PBB,, | Performed by: INTERNAL MEDICINE

## 2022-09-29 PROCEDURE — 94729 PR C02/MEMBANE DIFFUSE CAPACITY: ICD-10-PCS | Mod: 26,S$PBB,, | Performed by: INTERNAL MEDICINE

## 2022-09-29 PROCEDURE — 99999 PR PBB SHADOW E&M-EST. PATIENT-LVL V: ICD-10-PCS | Mod: PBBFAC,,, | Performed by: NURSE PRACTITIONER

## 2022-09-29 PROCEDURE — 94060 PR EVAL OF BRONCHOSPASM: ICD-10-PCS | Mod: 26,S$PBB,, | Performed by: INTERNAL MEDICINE

## 2022-09-29 PROCEDURE — 99999 PR PBB SHADOW E&M-EST. PATIENT-LVL V: CPT | Mod: PBBFAC,,, | Performed by: NURSE PRACTITIONER

## 2022-09-29 PROCEDURE — 99214 OFFICE O/P EST MOD 30 MIN: CPT | Mod: 25,S$PBB,, | Performed by: NURSE PRACTITIONER

## 2022-09-29 PROCEDURE — 94726 PLETHYSMOGRAPHY LUNG VOLUMES: CPT | Mod: 26,S$PBB,, | Performed by: INTERNAL MEDICINE

## 2022-09-29 PROCEDURE — 94060 EVALUATION OF WHEEZING: CPT | Mod: 26,S$PBB,, | Performed by: INTERNAL MEDICINE

## 2022-09-29 PROCEDURE — 94729 DIFFUSING CAPACITY: CPT | Mod: 26,S$PBB,, | Performed by: INTERNAL MEDICINE

## 2022-09-29 PROCEDURE — 94726 PLETHYSMOGRAPHY LUNG VOLUMES: CPT | Mod: PBBFAC

## 2022-09-29 PROCEDURE — 99215 OFFICE O/P EST HI 40 MIN: CPT | Mod: PBBFAC,25 | Performed by: NURSE PRACTITIONER

## 2022-09-29 PROCEDURE — 94060 EVALUATION OF WHEEZING: CPT | Mod: PBBFAC

## 2022-09-29 PROCEDURE — 99214 PR OFFICE/OUTPT VISIT, EST, LEVL IV, 30-39 MIN: ICD-10-PCS | Mod: 25,S$PBB,, | Performed by: NURSE PRACTITIONER

## 2022-09-29 RX ORDER — IBUPROFEN 800 MG/1
800 TABLET ORAL
COMMUNITY
Start: 2022-09-06

## 2022-09-29 RX ORDER — LANOLIN ALCOHOL/MO/W.PET/CERES
CREAM (GRAM) TOPICAL
COMMUNITY
Start: 2022-08-04

## 2022-09-29 RX ORDER — HYDROCODONE BITARTRATE AND ACETAMINOPHEN 7.5; 325 MG/1; MG/1
1 TABLET ORAL EVERY 6 HOURS PRN
COMMUNITY
Start: 2022-09-06

## 2022-09-29 NOTE — ASSESSMENT & PLAN NOTE
9/29/2022 cpft normal    No COPD gold findings  No pulmonary symptoms  Not on inhalers   Follow up as needed with pulmonary

## 2022-09-29 NOTE — ASSESSMENT & PLAN NOTE
Encouraged calorie reduction and 30 minutes of exercise daily. Discussed impact of obesity on general health.  Wt Readings from Last 9 Encounters:   09/29/22 104.7 kg (230 lb 13.2 oz)   08/18/22 108.4 kg (238 lb 15.7 oz)   07/28/22 107.1 kg (236 lb)   07/18/22 107.1 kg (236 lb 1.8 oz)   07/14/22 105.8 kg (233 lb 4 oz)   07/07/22 106.1 kg (233 lb 14.5 oz)   12/02/21 98 kg (216 lb 0.8 oz)   10/14/21 96.4 kg (212 lb 8.4 oz)   08/25/21 97.4 kg (214 lb 11.7 oz)   Body mass index is 30.45 kg/m².

## 2022-09-29 NOTE — PROGRESS NOTES
Subjective:      Patient ID: Cole Doan is a 72 y.o. male.    Chief Complaint: Apnea    HPI: Cole Doan present for follow up 2 month follow up visit after told to follow up with pulmonary for review of CPFT, LDCT chest.    8/30/2022 LDCT Negative - Continue annual screening with LDCT in 12 months.    9/29/2022 CPFT Normal spirometry. Normal lung volumes. Normal diffusing capacity.    No COPD gold findings on prior pft and none on 9/29/2022.     40 pack year former cigarette smoker. Quit 2009.     No pulmonary symptoms. There is no cough, no wheezing, no shortness of breath, no hemoptysis, no sputum production, no weight loss, noted TB exposure, no asbestos exposure, no chest pain.     Regular exercise: walking program 2 miles every other day.     Not on inhalers.       7/28/2022 Gisele NP  He presents as new patient, last seen in pulmonary 9/18/2017 when he had exertional shortness of breath. Shortness of breath has resolved since. Shortness of breath resolved after pacemaker placement with cardiology.   He has no pulmonary complaints.   He is not using inhalers.  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   He did not meet COPD gold criteria classification to be staged in the COPD category with FEV1/FVC:  77 (104 % predicted).   CT of chest 9/7/2017 revealed benign findings.     40 pack year former cigarette smoker. Quit 2009.      He is on CPAP  of 13 cmH2O pressure.   He is compliant with CPAP use. He states 100 % use. Can not sleep without CPAP.   Patient reports benefit from CPAP use and denies snoring and excessive daytime sleepiness.   [facility].   Full [facility] mask   [facility]     Still awaiting receiving replacement from [employer].     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 Employed in [employer] and  repair the 12 yrs prior.  90% disabled with [facility] . [place] exposed to agent orange (a herbicide and defoliant chemical composed of 2 herbicides, 2,4,5-T and 2,4-D) exposed from 3079-4776. No occupational exposure.     Avocational Exposure:   None currently.     Pet Exposures:  Dogs. Denies allergy to Dog and  cat dander.    Previous Report Reviewed: office notes     The following portions of the patient's history were reviewed and updated as appropriate: allergies, current medications, past family history, past medical history, past social history, past surgical history and problem list.    Review of Systems   Constitutional:  Negative for fever, chills, weight loss, weight gain, activity change, appetite change, fatigue and night sweats.   HENT:  Negative for postnasal drip, rhinorrhea, sinus pressure, voice change and congestion.    Eyes:  Negative for redness and itching.   Respiratory:  Negative for snoring, cough, sputum production, chest tightness, shortness of breath, wheezing, orthopnea, asthma nighttime symptoms, dyspnea on extertion, use of rescue inhaler and somnolence.    Cardiovascular: Negative.  Negative for chest pain, palpitations and leg swelling.   Genitourinary:  Negative for difficulty urinating and hematuria.   Endocrine:  Negative for cold intolerance and heat intolerance.    Musculoskeletal:  Negative for arthralgias, gait problem, joint swelling and myalgias.   Skin: Negative.    Gastrointestinal:  Negative for nausea, vomiting, abdominal pain and acid reflux.   Neurological:  Negative for dizziness, weakness, light-headedness and headaches.   Hematological:  Negative for adenopathy. No excessive bruising.   All other systems reviewed and are negative.   Objective:     Physical Exam  Vitals and nursing note reviewed.   Constitutional:       General: He is not in acute distress.     Appearance: Normal appearance. He is well-developed. He is not ill-appearing or toxic-appearing.   HENT:       "Head: Normocephalic and atraumatic.      Right Ear: External ear normal.      Left Ear: External ear normal.      Nose: Nose normal.      Mouth/Throat:      Pharynx: No oropharyngeal exudate.   Eyes:      Conjunctiva/sclera: Conjunctivae normal.   Cardiovascular:      Rate and Rhythm: Normal rate and regular rhythm.      Heart sounds: Normal heart sounds.   Pulmonary:      Effort: Pulmonary effort is normal.      Breath sounds: Normal breath sounds.   Abdominal:      Palpations: Abdomen is soft.   Musculoskeletal:      Cervical back: Normal range of motion and neck supple.   Skin:     General: Skin is warm and dry.      Capillary Refill: Capillary refill takes less than 2 seconds.   Neurological:      Mental Status: He is alert and oriented to person, place, and time.   Psychiatric:         Behavior: Behavior normal. Behavior is cooperative.         Thought Content: Thought content normal.         Judgment: Judgment normal.     Vitals:    22 0837   BP: 128/80   Pulse: 61   Resp: 18   SpO2: 96%   Weight: 104.7 kg (230 lb 13.2 oz)   Height: 6' 1" (1.854 m)     body mass index is 30.45 kg/m².    Personal Diagnostic Review    2022 CPFT Normal spirometry. Normal lung volumes. Normal diffusing capacity.  No COPD gold findings.    Pulmonary function tests:2017 FEV1: 2.06  (53 % predicted), FVC:  2.82 (54 % predicted), FEV1/FVC:  73 (98 % predicted), T.46 (73 % predicted), RV/TLVC: 48 (121 % predicted), DLCO: 18.9 (61 % predicted)   Post bronchodilator: FEV1: 2.14  (55 % predicted), FVC:  2.78 (53 % predicted), FEV1/FVC:  77 (104 % predicted).   Moderate restriction.  Mild reduction in diffusion capacity -unadjusted for hemoglobin (DLCO >60% and less than 79%) .  Moderately severe obstruction (FEV1>50 and <59% of predicted).  Airflow is not clearly improved after bronchodilator. Clinical improvement following bronchodilator therapy may occur in  the absence of spirometric improvement.  This is a pattern " of mixed obstruction and restriction. Clinical correlation suggested      CT chest screening 9/12/2017  Technique: CT of the thorax was performed with low dose, lung screening protocol.  No contrast was administered.  Sagittal and coronal reconstructions were obtained.  Comparison: None.  Findings:  Lungs: There are no abnormal nodular opacities that require further evaluation.  Minor scarring or dependent atelectasis present the lung bases.  A few small subpleural blebs in the right apex.  Pleura: No effusion.  Mediastinum: No pathologic lymphadenopathy.  Heart and pericardium: Normal size without effusion.  Aorta and vasculature: Atherosclerosis including coronary arteries.  Chest wall and skeletal structures: Unremarkable except age-appropriate degenerative changes.  Upper abdomen: Unremarkable.  IMPRESSION:  No lung screening findings that require follow-up.     CPAP download, patient forgot machine on 9/29/2022 to for updated download  CPAP 13 cm  Compliance Summary  7/29/2017 - 8/27/2017 (30 days)  Days with Device Usage 30 days  Days without Device Usage 0 days  Percent Days with Device Usage 100.0%  Cumulative Usage 11 days 15 hrs. 47 mins. 52 secs.  Maximum Usage (1 Day) 12 hrs. 12 mins. 1 secs.  Average Usage (All Days) 9 hrs. 19 mins. 35 secs.  Average Usage (Days Used) 9 hrs. 19 mins. 35 secs.  Minimum Usage (1 Day) 7 hrs. 35 mins. 16 secs.  Percent of Days with Usage >= 4 Hours 100.0%  Percent of Days with Usage < 4 Hours 0.0%  Date Range  Total Blower Time 11 days 16 hrs. 6 mins. 59 secs.  Sleep Therapy Statistics  Average Time in Large Leak Per Day 22 secs.  Average AHI 0.4  CPAP 13.0 cmH2O    Patient benefits and is compliant    Pulmonary stress/six minute walk done 8/21/2017: No desaturations requiring supplemental oxygen  Phase Oxygen Assessment Supplemental O2 Heart   Rate Blood Pressure Santino Dyspnea Scale Rating   Resting 97 % Room Air 86 bpm 131/78 3   Exercise        Minute        1 93 % Room  Air 80 bpm     2 93 % Room Air 84 bpm     3 93 % Room Air 89 bpm     4 94 % Room Air 85 bpm     5 93 % Room Air 86 bpm     6  94 % Room Air 84 bpm 143/84 7-8   Recovery        Minute        1 96 % Room Air 61 bpm     2 96 % Room Air 59 bpm     3 96 % Room Air 57 bpm     4 95 % Room Air 56 bpm 118/74 4     Six Minute Walk Summary  6MWT Status: completed without stopping  Patient Reported: Chest pain, Dyspnea (Patient felt off balance)   while walking.     7/28/2022 chest xray lungs are clear.     CT Chest Lung Screening Low Dose  Narrative: EXAMINATION:  CT CHEST LUNG SCREENING LOW DOSE    CLINICAL HISTORY:  Lung cancer screening, >= 30 pk-yr smoking history in last 15 yrs (Age 55-80y); Nicotine dependence, cigarettes, in remission    TECHNIQUE:  CT of the thorax was performed with low dose, lung screening protocol.  No contrast was administered.  Sagittal and coronal reconstructions were obtained.    COMPARISON:  Chest x-ray from 07/28/2022 and CT lung screening from 09/12/2017    FINDINGS:  Lungs: There are no abnormal opacities that require further evaluation.  Minimal subpleural bleb formation seen in the region of the right lung apex.    Pleura:   No effusion..    Heart and pericardium: Normal size without effusion.    Aorta and vasculature: Atherosclerosis including coronary arteries.    Chest wall and skeletal structures: Unremarkable except age-appropriate degenerative changes.    Upper abdomen: Cannot exclude partially calcified gallstones within the gallbladder.  Postoperative changes seen associated with the stomach.  Impression: Lung-RADS Category:  1 - Negative - Continue annual screening with LDCT in 12 months.    Clinically or potentially clinically significant non lung cancer finding:  S - Significant.    Prior Lung Cancer Modifier:  No history of prior lung cancer.    Electronically signed by: Fidel Stubbs DO  Date:    08/30/2022  Time:    10:35      Assessment:     1. Cigarette nicotine dependence  in remission    2. SATYA on CPAP    3. Obesity (BMI 30-39.9)    4. Mixed restrictive and obstructive lung disease    5. Atherosclerosis of aorta    6. Hypertension associated with type 2 diabetes mellitus    7. PTSD (post-traumatic stress disorder)      Orders Placed This Encounter   Procedures    CT Chest Lung Screening Low Dose     Standing Status:   Future     Standing Expiration Date:   12/20/2024     Order Specific Question:   Is the patient a current or former smoker who quit within the past 15 years?     Answer:   Yes     Order Specific Question:   Is the patient between the age 50-80 years old?     Answer:   Yes     Order Specific Question:   Has the patient ever had lung cancer or an abnormal Chest CT?     Answer:   No     Order Specific Question:   Has the patient smoked 20 or more packs of cigarettes/year?     Answer:   Yes     Order Specific Question:   May the Radiologist modify the order per protocol to meet the clinical needs of the patient?     Answer:   Yes     Order Specific Question:   Does the patient show any signs or symptoms of lung cancer?     Answer:   No     Order Specific Question:   Is this the first (baseline) CT or an annual exam?     Answer:   Annual [2]     Order Specific Question:   Is there documentation of shared decision making for this lung screening exam?     Answer:   Yes     Order Specific Question:   Is this a low dose screening chest CT?     Answer:   Yes       Plan:     Problem List Items Addressed This Visit       PTSD (post-traumatic stress disorder)     [facility]           SATYA on CPAP        He is on CPAP  of 13 cmH2O pressure.   He is compliant with CPAP use. He states 100 % use. Can not sleep without CPAP.   Patient reports benefit from CPAP use and denies snoring and excessive daytime sleepiness.   [facility].   Full face mask   [facility]: [facility]     Still awaiting receiving replacement from [facility].          Obesity (BMI 30-39.9)      Encouraged calorie reduction and 30 minutes of exercise daily. Discussed impact of obesity on general health.  Wt Readings from Last 9 Encounters:   09/29/22 104.7 kg (230 lb 13.2 oz)   08/18/22 108.4 kg (238 lb 15.7 oz)   07/28/22 107.1 kg (236 lb)   07/18/22 107.1 kg (236 lb 1.8 oz)   07/14/22 105.8 kg (233 lb 4 oz)   07/07/22 106.1 kg (233 lb 14.5 oz)   12/02/21 98 kg (216 lb 0.8 oz)   10/14/21 96.4 kg (212 lb 8.4 oz)   08/25/21 97.4 kg (214 lb 11.7 oz)   Body mass index is 30.45 kg/m².             RESOLVED: Mixed restrictive and obstructive lung disease     9/29/2022 cpft normal    No COPD gold findings  No pulmonary symptoms  Not on inhalers   Follow up as needed with pulmonary          Hypertension associated with type 2 diabetes mellitus     Stable and controlled. Continue current treatment plan as previously prescribed with your PCP.              Cigarette nicotine dependence in remission - Primary     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   2017 CT chest screening for pulmonary nodules, benign.   Quit 13 years ago in 2009, guidelines to stop screening after 15 yrs.   8/30/2022 LDCT screening benign, follow up 1 year.   Fu August-Sept 2023 repeat LDCT         Relevant Orders    CT Chest Lung Screening Low Dose    Atherosclerosis of aorta     Follow heart healthy diet. regular exercise.           ([facility]ME - [facility]). Reviewed therapeutic goals for positive airway pressure therapy CPAP      No LOS data to display  This includes face to face time and non-face to face time preparing to see the patient (eg, review of tests), obtaining and/or reviewing separately obtained history, documenting clinical information in the electronic or other health record, independently interpreting results and communicating results to the patient/family/caregiver, or care coordinator.      Follow up in about 11 months (around 8/30/2023) for former smoker review LDCT. [facility]AP with [facility], bring machine.

## 2022-09-29 NOTE — ASSESSMENT & PLAN NOTE
He is on CPAP  of 13 cmH2O pressure.   He is compliant with CPAP use. He states 100 % use. Can not sleep without CPAP.   Patient reports benefit from CPAP use and denies snoring and excessive daytime sleepiness.   [facility].   Full face mask   [facility]     Still awaiting receiving replacement from [facility].

## 2022-09-29 NOTE — ASSESSMENT & PLAN NOTE
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   2017 CT chest screening for pulmonary nodules, benign.   Quit 13 years ago in 2009, guidelines to stop screening after 15 yrs.   8/30/2022 LDCT screening benign, follow up 1 year.   Fu August-Sept 2023 repeat LDCT

## 2022-09-30 LAB
BRPFT: NORMAL
DLCO ADJ PRE: 24.34 ML/(MIN*MMHG)
DLCO SINGLE BREATH LLN: 22.05
DLCO SINGLE BREATH PRE REF: 84 %
DLCO SINGLE BREATH REF: 28.98
DLCOC SBVA LLN: 2.7
DLCOC SBVA PRE REF: 153.3 %
DLCOC SBVA REF: 3.76
DLCOC SINGLE BREATH LLN: 22.05
DLCOC SINGLE BREATH PRE REF: 84 %
DLCOC SINGLE BREATH REF: 28.98
DLCOVA LLN: 2.7
DLCOVA PRE REF: 153.3 %
DLCOVA PRE: 5.77 ML/(MIN*MMHG*L)
DLCOVA REF: 3.76
DLVAADJ PRE: 5.77 ML/(MIN*MMHG*L)
ERV LLN: -16448.9
ERV PRE REF: 69.3 %
ERV REF: 1.1
FEF 25 75 CHG: 35.1 %
FEF 25 75 LLN: 1.03
FEF 25 75 POST REF: 140.4 %
FEF 25 75 PRE REF: 103.9 %
FEF 25 75 REF: 2.47
FET100 CHG: -1.1 %
FEV1 CHG: 10.5 %
FEV1 FVC CHG: 4.5 %
FEV1 FVC LLN: 61
FEV1 FVC POST REF: 105.3 %
FEV1 FVC PRE REF: 100.7 %
FEV1 FVC REF: 75
FEV1 LLN: 2.42
FEV1 POST REF: 102.6 %
FEV1 PRE REF: 92.9 %
FEV1 REF: 3.4
FRCPLETH LLN: 2.91
FRCPLETH PREREF: 114.2 %
FRCPLETH REF: 3.9
FVC CHG: 5.7 %
FVC LLN: 3.35
FVC POST REF: 96.8 %
FVC PRE REF: 91.6 %
FVC REF: 4.55
IVC PRE: 2.71 L
IVC SINGLE BREATH LLN: 3.35
IVC SINGLE BREATH PRE REF: 59.6 %
IVC SINGLE BREATH REF: 4.55
MVV LLN: 114
MVV PRE REF: 70.2 %
MVV REF: 134
PEF CHG: 2.7 %
PEF LLN: 6.19
PEF POST REF: 101.2 %
PEF PRE REF: 98.5 %
PEF REF: 8.7
POST FEF 25 75: 3.46 L/S
POST FET 100: 10.49 SEC
POST FEV1 FVC: 79.02 %
POST FEV1: 3.48 L
POST FVC: 4.41 L
POST PEF: 8.8 L/S
PRE DLCO: 24.34 ML/(MIN*MMHG)
PRE ERV: 0.76 L
PRE FEF 25 75: 2.56 L/S
PRE FET 100: 10.61 SEC
PRE FEV1 FVC: 75.64 %
PRE FEV1: 3.15 L
PRE FRC PL: 4.45 L
PRE FVC: 4.17 L
PRE MVV: 94 L/MIN
PRE PEF: 8.57 L/S
PRE RV: 3.64 L
PRE TLC: 7.81 L
RAW LLN: 3.06
RAW PRE REF: 84 %
RAW PRE: 2.57 CMH2O*S/L
RAW REF: 3.06
RV LLN: 2.13
RV PRE REF: 130.1 %
RV REF: 2.8
RVTLC LLN: 33
RVTLC PRE REF: 109.9 %
RVTLC PRE: 46.61 %
RVTLC REF: 42
TLC LLN: 6.55
TLC PRE REF: 101.4 %
TLC REF: 7.7
VA PRE: 4.22 L
VA SINGLE BREATH LLN: 7.55
VA SINGLE BREATH PRE REF: 55.9 %
VA SINGLE BREATH REF: 7.55
VC LLN: 3.35
VC PRE REF: 91.6 %
VC PRE: 4.17 L
VC REF: 4.55
VTGRAWPRE: 4.2 L

## 2022-10-18 ENCOUNTER — PATIENT MESSAGE (OUTPATIENT)
Dept: ADMINISTRATIVE | Facility: HOSPITAL | Age: 72
End: 2022-10-18
Payer: MEDICARE

## 2022-11-08 ENCOUNTER — TELEPHONE (OUTPATIENT)
Dept: GASTROENTEROLOGY | Facility: CLINIC | Age: 72
End: 2022-11-08
Payer: MEDICARE

## 2022-11-08 NOTE — TELEPHONE ENCOUNTER
Phoned patient and advised that he will need to have his MRI/MRCP done in [facility] as they have a machine that can accommodate his pacemaker.  Advised him that I was working on getting him scheduled and would call him back with the appointment date and time.  He verbalized understanding.     Phoned [facility] and was informed that [facility] is the  for MRI/MRCP that can accommodate the pacemaker.  Left voice mail with [facility] requesting help in scheduling patient and for her to return my call.

## 2022-11-08 NOTE — TELEPHONE ENCOUNTER
----- Message from Pau Gaxiola sent at 11/8/2022  1:20 PM CST -----  Good Afternoon!    This patient has a pacemaker and his MRI cannot be done in [place]. Will you please give this patient a call and get him rescheduled in [place]?    Thank You,    Pau BLAIR  Radiology Dept

## 2022-11-09 ENCOUNTER — TELEPHONE (OUTPATIENT)
Dept: GASTROENTEROLOGY | Facility: CLINIC | Age: 72
End: 2022-11-09

## 2022-11-09 NOTE — TELEPHONE ENCOUNTER
Sent [facility] a teams message requesting help in scheduling patient for MRI/MRCP in [facility] because of patient's pacemaker.

## 2022-11-15 ENCOUNTER — OFFICE VISIT (OUTPATIENT)
Dept: CARDIOLOGY | Facility: CLINIC | Age: 72
End: 2022-11-15
Payer: MEDICARE

## 2022-11-15 ENCOUNTER — HOSPITAL ENCOUNTER (OUTPATIENT)
Dept: CARDIOLOGY | Facility: HOSPITAL | Age: 72
Discharge: HOME | End: 2022-11-15
Attending: INTERNAL MEDICINE
Payer: MEDICARE

## 2022-11-15 VITALS
HEIGHT: 73 IN | DIASTOLIC BLOOD PRESSURE: 72 MMHG | OXYGEN SATURATION: 99 % | BODY MASS INDEX: 29.95 KG/M2 | HEART RATE: 65 BPM | WEIGHT: 226 LBS | SYSTOLIC BLOOD PRESSURE: 120 MMHG

## 2022-11-15 DIAGNOSIS — I25.10 CAD S/P PERCUTANEOUS CORONARY ANGIOPLASTY: Primary | ICD-10-CM

## 2022-11-15 DIAGNOSIS — E78.5 HYPERLIPIDEMIA ASSOCIATED WITH TYPE 2 DIABETES MELLITUS: ICD-10-CM

## 2022-11-15 DIAGNOSIS — Z98.61 CAD S/P PERCUTANEOUS CORONARY ANGIOPLASTY: ICD-10-CM

## 2022-11-15 DIAGNOSIS — Z98.61 CAD S/P PERCUTANEOUS CORONARY ANGIOPLASTY: Primary | ICD-10-CM

## 2022-11-15 DIAGNOSIS — I25.10 CAD S/P PERCUTANEOUS CORONARY ANGIOPLASTY: ICD-10-CM

## 2022-11-15 DIAGNOSIS — Z95.0 PACEMAKER: ICD-10-CM

## 2022-11-15 DIAGNOSIS — E66.9 OBESITY (BMI 30-39.9): ICD-10-CM

## 2022-11-15 DIAGNOSIS — E11.69 HYPERLIPIDEMIA ASSOCIATED WITH TYPE 2 DIABETES MELLITUS: ICD-10-CM

## 2022-11-15 DIAGNOSIS — F43.10 PTSD (POST-TRAUMATIC STRESS DISORDER): ICD-10-CM

## 2022-11-15 DIAGNOSIS — I70.0 ATHEROSCLEROSIS OF AORTA: ICD-10-CM

## 2022-11-15 DIAGNOSIS — I45.10 RBBB: ICD-10-CM

## 2022-11-15 DIAGNOSIS — I15.2 HYPERTENSION ASSOCIATED WITH TYPE 2 DIABETES MELLITUS: ICD-10-CM

## 2022-11-15 DIAGNOSIS — E11.59 HYPERTENSION ASSOCIATED WITH TYPE 2 DIABETES MELLITUS: ICD-10-CM

## 2022-11-15 DIAGNOSIS — G47.33 OSA ON CPAP: ICD-10-CM

## 2022-11-15 DIAGNOSIS — E11.9 TYPE 2 DIABETES MELLITUS WITHOUT COMPLICATION, WITHOUT LONG-TERM CURRENT USE OF INSULIN: ICD-10-CM

## 2022-11-15 PROCEDURE — 93005 ELECTROCARDIOGRAM TRACING: CPT

## 2022-11-15 PROCEDURE — 99999 PR PBB SHADOW E&M-EST. PATIENT-LVL III: ICD-10-PCS | Mod: PBBFAC,,, | Performed by: INTERNAL MEDICINE

## 2022-11-15 PROCEDURE — 93010 ELECTROCARDIOGRAM REPORT: CPT | Mod: ,,, | Performed by: INTERNAL MEDICINE

## 2022-11-15 PROCEDURE — 93010 EKG 12-LEAD: ICD-10-PCS | Mod: ,,, | Performed by: INTERNAL MEDICINE

## 2022-11-15 PROCEDURE — 99213 OFFICE O/P EST LOW 20 MIN: CPT | Mod: PBBFAC | Performed by: INTERNAL MEDICINE

## 2022-11-15 PROCEDURE — 99212 OFFICE O/P EST SF 10 MIN: CPT | Mod: S$PBB,,, | Performed by: INTERNAL MEDICINE

## 2022-11-15 PROCEDURE — 99999 PR PBB SHADOW E&M-EST. PATIENT-LVL III: CPT | Mod: PBBFAC,,, | Performed by: INTERNAL MEDICINE

## 2022-11-15 PROCEDURE — 99212 PR OFFICE/OUTPT VISIT, EST, LEVL II, 10-19 MIN: ICD-10-PCS | Mod: S$PBB,,, | Performed by: INTERNAL MEDICINE

## 2022-11-15 NOTE — PROGRESS NOTES
Subjective:   Patient ID:  Cole Doan is a 72 y.o. male who presents for follow up of No chief complaint on file.      HPI  12/5/2019  A 68 yo male with cad s/p pacer htn hlp elio is here fro f/u had sleeve surgery lost a lot of weight gfeels great no angina no shortness of breath eh is fairly active works a lot at the [employer]. Has no chf no tia claudication his pacer site is ok checked  [facility]     12/2/2021  [facility]ERE [facility] F [facility]. HE HAD COVID HAD SEPSIS IN April WAS AT THE [place].HE IS BEING W/U FOR DEMENTIA HE HAS MULTIPLE ACCIDENTS. HAS ABNORMAL GAIT HE HAS VERY SELDOM CHEST PAIN. HE AHS BEEN COMPLIANT WITH MEDS. HE HAS BEEN TAKING HIS MEDS REGULARILY HE HAD NO RECENT CARDIAC EVENTS OR PROCEDURE/. HE FEELS  NO HEART PALPITATION. USES CPAP REGULARILY.      7/7/2022  Here fopr f/u no change in status no new complaints. Has some chest tightness when he lies down. Has no exertional symptoms. Uses cpap regularily . His lipids incraesed ? No change in diet or eating habits or meds intake.     11/15/2022   Here forf /u still walks fro exercise compliant with meds diet remote pacer check. No issues clinically cardiovascular wise. Compliant with medical therapy.   Tries to be compliant with diet [facility] was told [facility].   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    Obesity 5/15/2014    ELIO on CPAP     Pacemaker     Pancreatic cyst     Pneumonia     PTSD (post-traumatic stress disorder) 5/15/2014    RBBB 5/15/2014    S/P PTCA (percutaneous transluminal coronary angioplasty) 5/15/2014    Type 2 diabetes  mellitus without complication 2015       Past Surgical History:   Procedure Laterality Date    APPENDECTOMY      at age 14    ATRIAL CARDIAC PACEMAKER INSERTION      COLONOSCOPY Left 2018    Procedure: COLONOSCOPY;  Surgeon: Artem Medeiros MD;  Location: [facility] ENDO;  Service: Endoscopy;  Laterality: Left;    CORONARY ANGIOPLASTY WITH STENT PLACEMENT      , ,     ENDOSCOPIC ULTRASOUND OF UPPER GASTROINTESTINAL TRACT N/A 2021    Procedure: ULTRASOUND, UPPER GI TRACT, ENDOSCOPIC;  Surgeon: Popeye Terrell MD;  Location: [facility] ENDO;  Service: Endoscopy;  Laterality: N/A;  upper and linear    ESOPHAGOGASTRODUODENOSCOPY N/A 2018    Procedure: ESOPHAGOGASTRODUODENOSCOPY (EGD);  Surgeon: Mario Cesar MD;  Location: [facility] ENDO;  Service: General;  Laterality: N/A;    ROBOT-ASSISTED LAPAROSCOPIC SLEEVE GASTRECTOMY USING DA LURDES XI N/A 2018    Procedure: XI ROBOTIC SLEEVE GASTRECTOMY;  Surgeon: Mario Cesar MD;  Location: [facility] OR;  Service: General;  Laterality: N/A;    VASECTOMY         Social History     Tobacco Use    Smoking status: Former     Packs/day: 1.00     Years: 40.00     Pack years: 40.00     Types: Cigarettes     Start date:      Quit date: 2009     Years since quittin.8    Smokeless tobacco: Never   Substance Use Topics    Alcohol use: No    Drug use: No       Family History   Problem Relation Age of Onset    Cataracts Father     Heart disease Father     Hypertension Father     Heart disease Mother     Hypertension Mother     Colon cancer Neg Hx        Current Outpatient Medications   Medication Sig    ALBUTEROL INHL Inhale into the lungs.    amLODIPine (NORVASC) 5 MG tablet Take 5 mg by mouth once daily.    buPROPion (WELLBUTRIN SR) 200 MG TbSR Take 200 mg by mouth once daily. Takes 300mg daily    calcium citrate-vitamin D3 315-200 mg (CITRACAL+D) 315 mg-5 mcg (200 unit) per tablet TAKE ONE TABLET BY MOUTH TWICE A DAY FOR SUPPLEMENTATION     "calcium-vitamin D3 (OS-ARVIND 500 + D3) 500 mg-5 mcg (200 unit) per tablet Take 1 tablet by mouth 2 (two) times daily with meals.    cetirizine (ZYRTEC) 10 MG tablet Take 10 mg by mouth as needed.    cyanocobalamin (VITAMIN B-12) 1000 MCG tablet Take 100 mcg by mouth once daily.    dabigatran etexilate (PRADAXA) 150 mg Cap Take 150 mg by mouth once daily. "Do NOT break, chew, or open capsules."   Takes 2 caps daily    diclofenac (VOLTAREN) 0.1 % ophthalmic solution 1 drop 4 (four) times daily.    ezetimibe (ZETIA) 10 mg tablet TAKE ONE TABLET BY MOUTH EVERY DAY TO LOWER CHOLESTEROL    fluticasone propionate (FLONASE) 50 mcg/actuation nasal spray 1 spray by Each Nostril route once daily.    furosemide (LASIX) 40 MG tablet Takes one-half tablet by mouth daily    HYDROcodone-acetaminophen (NORCO) 7.5-325 mg per tablet Take 1 tablet by mouth every 6 (six) hours as needed.     mg tablet Take 800 mg by mouth.    ketotifen (ZADITOR) 0.025 % (0.035 %) ophthalmic solution 1 drop 2 (two) times daily.    liraglutide 0.6 mg/0.1 mL, 18 mg/3 mL, subq PNIJ (VICTOZA 2-ZACHARY) 0.6 mg/0.1 mL (18 mg/3 mL) PnIj Inject 0.6 mg into the skin once daily.    metoprolol succinate (TOPROL-XL) 100 MG 24 hr tablet Take 200 mg by mouth once daily. Takes 200mg daily    metronidazole 0.75% (METROCREAM) 0.75 % Crea Apply topically 2 (two) times daily.    nitrofurantoin, macrocrystal-monohydrate, (MACROBID) 100 MG capsule Take 1 capsule (100 mg total) by mouth 2 (two) times daily.    nitroGLYCERIN (NITROSTAT) 0.4 MG SL tablet Place 1 tablet (0.4 mg total) under the tongue every 5 (five) minutes as needed for Chest pain.    nozaseptin (NOZIN) nasal  1 each by Each Nare route 2 (two) times daily.    omeprazole (PRILOSEC) 20 MG capsule Take 20 mg by mouth once daily.    pyridoxine, vitamin B6, (B-6) 100 MG Tab TAKE ONE TABLET BY MOUTH ONCE DAILY AS A VITAMIN SUPPLEMENT    ranolazine (RANEXA) 1,000 mg Tb12 Take 1,000 mg by mouth 2 (two) times " "daily. Takes daily    rosuvastatin (CRESTOR) 40 MG Tab Take 10 mg by mouth every evening.    sildenafil (VIAGRA) 100 MG tablet Take 1 tablet (100 mg total) by mouth daily as needed for Erectile Dysfunction. (Patient not taking: Reported on 7/18/2022)    tamsulosin (FLOMAX) 0.4 mg Cap Take 1 capsule (0.4 mg total) by mouth once daily.    thiamine (VITAMIN B-1) 50 MG tablet Take 50 mg by mouth once daily.    tretinoin (RETIN-A) 0.1 % cream Apply topically.    vitamin D 1000 units Tab Take 2,000 Units by mouth once daily.    white petrolatum 43 % Oint Apply topically.    zolpidem (AMBIEN) 5 MG Tab Take 5 mg by mouth nightly as needed.     No current facility-administered medications for this visit.     Current Outpatient Medications on File Prior to Visit   Medication Sig    ALBUTEROL INHL Inhale into the lungs.    amLODIPine (NORVASC) 5 MG tablet Take 5 mg by mouth once daily.    buPROPion (WELLBUTRIN SR) 200 MG TbSR Take 200 mg by mouth once daily. Takes 300mg daily    calcium citrate-vitamin D3 315-200 mg (CITRACAL+D) 315 mg-5 mcg (200 unit) per tablet TAKE ONE TABLET BY MOUTH TWICE A DAY FOR SUPPLEMENTATION    calcium-vitamin D3 (OS-ARVIND 500 + D3) 500 mg-5 mcg (200 unit) per tablet Take 1 tablet by mouth 2 (two) times daily with meals.    cetirizine (ZYRTEC) 10 MG tablet Take 10 mg by mouth as needed.    cyanocobalamin (VITAMIN B-12) 1000 MCG tablet Take 100 mcg by mouth once daily.    dabigatran etexilate (PRADAXA) 150 mg Cap Take 150 mg by mouth once daily. "Do NOT break, chew, or open capsules."   Takes 2 caps daily    diclofenac (VOLTAREN) 0.1 % ophthalmic solution 1 drop 4 (four) times daily.    ezetimibe (ZETIA) 10 mg tablet TAKE ONE TABLET BY MOUTH EVERY DAY TO LOWER CHOLESTEROL    fluticasone propionate (FLONASE) 50 mcg/actuation nasal spray 1 spray by Each Nostril route once daily.    furosemide (LASIX) 40 MG tablet Takes one-half tablet by mouth daily    HYDROcodone-acetaminophen (NORCO) 7.5-325 mg per " tablet Take 1 tablet by mouth every 6 (six) hours as needed.     mg tablet Take 800 mg by mouth.    ketotifen (ZADITOR) 0.025 % (0.035 %) ophthalmic solution 1 drop 2 (two) times daily.    liraglutide 0.6 mg/0.1 mL, 18 mg/3 mL, subq PNIJ (VICTOZA 2-ZACHARY) 0.6 mg/0.1 mL (18 mg/3 mL) PnIj Inject 0.6 mg into the skin once daily.    metoprolol succinate (TOPROL-XL) 100 MG 24 hr tablet Take 200 mg by mouth once daily. Takes 200mg daily    metronidazole 0.75% (METROCREAM) 0.75 % Crea Apply topically 2 (two) times daily.    nitrofurantoin, macrocrystal-monohydrate, (MACROBID) 100 MG capsule Take 1 capsule (100 mg total) by mouth 2 (two) times daily.    nitroGLYCERIN (NITROSTAT) 0.4 MG SL tablet Place 1 tablet (0.4 mg total) under the tongue every 5 (five) minutes as needed for Chest pain.    nozaseptin (NOZIN) nasal  1 each by Each Nare route 2 (two) times daily.    omeprazole (PRILOSEC) 20 MG capsule Take 20 mg by mouth once daily.    pyridoxine, vitamin B6, (B-6) 100 MG Tab TAKE ONE TABLET BY MOUTH ONCE DAILY AS A VITAMIN SUPPLEMENT    ranolazine (RANEXA) 1,000 mg Tb12 Take 1,000 mg by mouth 2 (two) times daily. Takes daily    rosuvastatin (CRESTOR) 40 MG Tab Take 10 mg by mouth every evening.    sildenafil (VIAGRA) 100 MG tablet Take 1 tablet (100 mg total) by mouth daily as needed for Erectile Dysfunction. (Patient not taking: Reported on 7/18/2022)    tamsulosin (FLOMAX) 0.4 mg Cap Take 1 capsule (0.4 mg total) by mouth once daily.    thiamine (VITAMIN B-1) 50 MG tablet Take 50 mg by mouth once daily.    tretinoin (RETIN-A) 0.1 % cream Apply topically.    vitamin D 1000 units Tab Take 2,000 Units by mouth once daily.    white petrolatum 43 % Oint Apply topically.    zolpidem (AMBIEN) 5 MG Tab Take 5 mg by mouth nightly as needed.     No current facility-administered medications on file prior to visit.     Review of patient's allergies indicates:   Allergen Reactions    Iodinated contrast media       "States, "My arteries started shutting down on me" pt states only to iv dye- was specifically told that oral dye does not have the same reaction      Review of Systems   Constitutional: Negative for malaise/fatigue.   Eyes:  Negative for blurred vision.   Cardiovascular:  Negative for chest pain, claudication, cyanosis, dyspnea on exertion, irregular heartbeat, leg swelling, near-syncope, orthopnea, palpitations and paroxysmal nocturnal dyspnea.   Respiratory:  Negative for cough, hemoptysis and shortness of breath.    Hematologic/Lymphatic: Negative for bleeding problem. Does not bruise/bleed easily.   Skin:  Negative for dry skin and itching.   Musculoskeletal:  Negative for falls, muscle weakness and myalgias.   Gastrointestinal:  Negative for abdominal pain, diarrhea, heartburn, hematemesis, hematochezia and melena.   Genitourinary:  Negative for flank pain and hematuria.   Neurological:  Negative for dizziness, focal weakness, headaches, light-headedness, numbness, paresthesias, seizures and weakness.   Psychiatric/Behavioral:  Negative for altered mental status and memory loss. The patient is not nervous/anxious.    Allergic/Immunologic: Negative for hives.     Objective:   Physical Exam  Vitals and nursing note reviewed.   Constitutional:       General: He is not in acute distress.     Appearance: He is well-developed. He is not diaphoretic.   HENT:      Head: Normocephalic and atraumatic.   Eyes:      General:         Right eye: No discharge.         Left eye: No discharge.      Pupils: Pupils are equal, round, and reactive to light.   Neck:      Thyroid: No thyromegaly.      Vascular: No JVD.   Cardiovascular:      Rate and Rhythm: Normal rate and regular rhythm.      Pulses: Intact distal pulses.      Heart sounds: Normal heart sounds. No murmur heard.    No friction rub. No gallop.   Pulmonary:      Effort: Pulmonary effort is normal. No respiratory distress.      Breath sounds: Normal breath sounds. No " "wheezing or rales.      Comments: Pacer site well healed.  Chest:      Chest wall: No tenderness.   Abdominal:      General: Bowel sounds are normal. There is no distension.      Palpations: Abdomen is soft.      Tenderness: There is no abdominal tenderness.   Musculoskeletal:         General: Normal range of motion.      Cervical back: Neck supple.      Right lower leg: No edema.      Left lower leg: No edema.   Skin:     General: Skin is warm and dry.      Findings: No erythema or rash.   Neurological:      Mental Status: He is alert and oriented to person, place, and time.      Cranial Nerves: No cranial nerve deficit.   Psychiatric:         Behavior: Behavior normal.         Judgment: Judgment normal.     Vitals:    11/15/22 1432 11/15/22 1434   BP: 124/76 120/72   BP Location: Left arm Right arm   Patient Position: Sitting Sitting   BP Method: Large (Manual) Large (Manual)   Pulse: 65    SpO2: 99%    Weight: 102.5 kg (225 lb 15.5 oz)    Height: 6' 1" (1.854 m)      Lab Results   Component Value Date    CHOL 202 (H) 06/03/2022    CHOL 139 10/26/2021    CHOL 126 08/27/2021     Lab Results   Component Value Date    HDL 35 (L) 06/03/2022    HDL 54 10/26/2021    HDL 51 08/27/2021     Lab Results   Component Value Date    LDLCALC 145.2 06/03/2022    LDLCALC 73 10/26/2021    LDLCALC 61.8 (L) 08/27/2021     Lab Results   Component Value Date    TRIG 109 06/03/2022    TRIG 75 10/26/2021    TRIG 66 08/27/2021     Lab Results   Component Value Date    CHOLHDL 17.3 (L) 06/03/2022    CHOLHDL 40.5 08/27/2021    CHOLHDL 19.9 (L) 01/19/2017       Chemistry        Component Value Date/Time     06/03/2022 0911    K 5.0 06/03/2022 0911     06/03/2022 0911    CO2 28 06/03/2022 0911    BUN 18 06/03/2022 0911    CREATININE 0.9 06/03/2022 0911    GLU 89 06/03/2022 0911        Component Value Date/Time    CALCIUM 9.5 06/03/2022 0911    ALKPHOS 65 06/03/2022 0911    AST 18 06/03/2022 0911    ALT 17 06/03/2022 0911    " BILITOT 1.0 06/03/2022 0911    ESTGFRAFRICA >60.0 06/03/2022 0911    EGFRNONAA >60.0 06/03/2022 0911          Lab Results   Component Value Date    TSH 2.29 10/26/2021     Lab Results   Component Value Date    INR 1.1 09/30/2018    INR 1.0 09/28/2018    INR 1.0 08/03/2016     Lab Results   Component Value Date    WBC 4.95 08/27/2021    HGB 15.2 08/27/2021    HCT 45.9 08/27/2021    MCV 97 08/27/2021     (L) 08/27/2021     BMP  Sodium   Date Value Ref Range Status   06/03/2022 145 136 - 145 mmol/L Final     Potassium   Date Value Ref Range Status   06/03/2022 5.0 3.5 - 5.1 mmol/L Final     Chloride   Date Value Ref Range Status   06/03/2022 109 95 - 110 mmol/L Final     CO2   Date Value Ref Range Status   06/03/2022 28 23 - 29 mmol/L Final     BUN   Date Value Ref Range Status   06/03/2022 18 8 - 23 mg/dL Final     Creatinine   Date Value Ref Range Status   06/03/2022 0.9 0.5 - 1.4 mg/dL Final     Calcium   Date Value Ref Range Status   06/03/2022 9.5 8.7 - 10.5 mg/dL Final     Anion Gap   Date Value Ref Range Status   06/03/2022 8 8 - 16 mmol/L Final     eGFR if    Date Value Ref Range Status   06/03/2022 >60.0 >60 mL/min/1.73 m^2 Final     eGFR if non    Date Value Ref Range Status   06/03/2022 >60.0 >60 mL/min/1.73 m^2 Final     Comment:     Calculation used to obtain the estimated glomerular filtration  rate (eGFR) is the CKD-EPI equation.        CrCl cannot be calculated (Patient's most recent lab result is older than the maximum 7 days allowed.).    Assessment:     1. CAD S/P percutaneous coronary angioplasty    2. RBBB    3. SATYA on CPAP    4. PTSD (post-traumatic stress disorder)    5. Type 2 diabetes mellitus without complication, without long-term current use of insulin    6. Obesity (BMI 30-39.9)    7. Hypertension associated with type 2 diabetes mellitus    8. Hyperlipidemia associated with type 2 diabetes mellitus    9. Pacemaker    10. Atherosclerosis of aorta       [facility] No arrythmias clinically or angina encouraged continued diet control weight loss exercise.   Plan:   Continue current therapy  Cardiac low salt diet.  Risk factor modification and excercise program./weight loss  F/u in 6 months he will [facility].

## 2022-11-21 DIAGNOSIS — R93.3 ABNORMAL FINDINGS ON DIAGNOSTIC IMAGING OF OTHER PARTS OF DIGESTIVE TRACT: Primary | ICD-10-CM

## 2022-11-22 ENCOUNTER — DOCUMENTATION ONLY (OUTPATIENT)
Dept: CARDIOLOGY | Facility: HOSPITAL | Age: 72
End: 2022-11-22
Payer: MEDICARE

## 2022-11-22 NOTE — PROGRESS NOTES
Received a call/message from Yamile in the MRI Department in relation to this patient needing to be scheduled for a MRI and has a [employer] ICD/Pacemaker.  Patient's Device is NOT MRI compatible/conditional.  This patient can NOT have an MRI.

## 2022-12-05 ENCOUNTER — PATIENT MESSAGE (OUTPATIENT)
Dept: GASTROENTEROLOGY | Facility: CLINIC | Age: 72
End: 2022-12-05
Payer: MEDICARE

## 2022-12-16 LAB — HBA1C MFR BLD: 5.6 % (ref 4–6)

## 2023-01-25 ENCOUNTER — PATIENT MESSAGE (OUTPATIENT)
Dept: ADMINISTRATIVE | Facility: HOSPITAL | Age: 73
End: 2023-01-25
Payer: MEDICARE

## 2023-01-30 LAB
OCCULT BLOOD, STOOL #1: NEGATIVE
OCCULT BLOOD, STOOL #2: POSITIVE
OCCULT BLOOD, STOOL #3: NEGATIVE

## 2023-02-15 ENCOUNTER — HOSPITAL ENCOUNTER (OUTPATIENT)
Dept: CARDIOLOGY | Facility: HOSPITAL | Age: 73
Discharge: HOME | End: 2023-02-15
Attending: INTERNAL MEDICINE
Payer: MEDICARE

## 2023-02-15 DIAGNOSIS — R00.1 SYMPTOMATIC BRADYCARDIA: ICD-10-CM

## 2023-02-15 DIAGNOSIS — I49.5 SINUS NODE DYSFUNCTION: ICD-10-CM

## 2023-02-15 DIAGNOSIS — Z95.0 CARDIAC PACEMAKER IN SITU: ICD-10-CM

## 2023-02-15 PROCEDURE — 93280 PM DEVICE PROGR EVAL DUAL: CPT

## 2023-02-15 PROCEDURE — 93280 CARDIAC DEVICE CHECK - IN CLINIC & HOSPITAL: ICD-10-PCS | Mod: 26,,, | Performed by: INTERNAL MEDICINE

## 2023-02-15 PROCEDURE — 93280 PM DEVICE PROGR EVAL DUAL: CPT | Mod: 26,,, | Performed by: INTERNAL MEDICINE

## 2023-03-03 ENCOUNTER — PATIENT MESSAGE (OUTPATIENT)
Dept: ADMINISTRATIVE | Facility: HOSPITAL | Age: 73
End: 2023-03-03
Payer: MEDICARE

## 2023-04-14 ENCOUNTER — PATIENT OUTREACH (OUTPATIENT)
Dept: ADMINISTRATIVE | Facility: HOSPITAL | Age: 73
End: 2023-04-14
Payer: MEDICARE

## 2023-04-21 ENCOUNTER — PES CALL (OUTPATIENT)
Dept: ADMINISTRATIVE | Facility: CLINIC | Age: 73
End: 2023-04-21
Payer: MEDICARE

## 2023-05-16 ENCOUNTER — TELEPHONE (OUTPATIENT)
Dept: ADMINISTRATIVE | Facility: HOSPITAL | Age: 73
End: 2023-05-16
Payer: MEDICARE

## 2023-07-12 ENCOUNTER — TELEPHONE (OUTPATIENT)
Dept: INTERNAL MEDICINE | Facility: CLINIC | Age: 73
End: 2023-07-12
Payer: MEDICARE

## 2023-07-12 DIAGNOSIS — E11.59 HYPERTENSION ASSOCIATED WITH TYPE 2 DIABETES MELLITUS: ICD-10-CM

## 2023-07-12 DIAGNOSIS — E11.9 TYPE 2 DIABETES MELLITUS WITHOUT COMPLICATION: ICD-10-CM

## 2023-07-12 DIAGNOSIS — I15.2 HYPERTENSION ASSOCIATED WITH TYPE 2 DIABETES MELLITUS: ICD-10-CM

## 2023-07-12 DIAGNOSIS — Z12.5 SCREENING FOR MALIGNANT NEOPLASM OF PROSTATE: ICD-10-CM

## 2023-07-12 DIAGNOSIS — E78.5 HYPERLIPIDEMIA ASSOCIATED WITH TYPE 2 DIABETES MELLITUS: Primary | ICD-10-CM

## 2023-07-12 DIAGNOSIS — E11.69 HYPERLIPIDEMIA ASSOCIATED WITH TYPE 2 DIABETES MELLITUS: Primary | ICD-10-CM

## 2023-07-12 NOTE — TELEPHONE ENCOUNTER
----- Message from Evelyn Martínez MD sent at 7/12/2023  4:28 PM CDT -----  Please notify needs labs then appointment with me.

## 2023-07-17 ENCOUNTER — PATIENT OUTREACH (OUTPATIENT)
Dept: ADMINISTRATIVE | Facility: HOSPITAL | Age: 73
End: 2023-07-17
Payer: MEDICARE

## 2023-07-17 DIAGNOSIS — E11.9 TYPE 2 DIABETES MELLITUS WITHOUT COMPLICATION, WITHOUT LONG-TERM CURRENT USE OF INSULIN: Primary | ICD-10-CM

## 2023-07-17 NOTE — PROGRESS NOTES
DM LABS: per chart review pt is overdue for all dm labs, I ordered microalbumin/linked micro & Ha1c, other labs already linked to lab appt scheduled 07/18/23.

## 2023-07-18 ENCOUNTER — LAB VISIT (OUTPATIENT)
Dept: LAB | Facility: HOSPITAL | Age: 73
End: 2023-07-18
Attending: INTERNAL MEDICINE
Payer: MEDICARE

## 2023-07-18 DIAGNOSIS — E11.9 TYPE 2 DIABETES MELLITUS WITHOUT COMPLICATION, WITHOUT LONG-TERM CURRENT USE OF INSULIN: ICD-10-CM

## 2023-07-18 DIAGNOSIS — E66.9 OBESITY (BMI 30-39.9): ICD-10-CM

## 2023-07-18 DIAGNOSIS — Z12.5 SCREENING FOR MALIGNANT NEOPLASM OF PROSTATE: ICD-10-CM

## 2023-07-18 DIAGNOSIS — E78.5 HYPERLIPIDEMIA ASSOCIATED WITH TYPE 2 DIABETES MELLITUS: ICD-10-CM

## 2023-07-18 DIAGNOSIS — E11.69 HYPERLIPIDEMIA ASSOCIATED WITH TYPE 2 DIABETES MELLITUS: ICD-10-CM

## 2023-07-18 DIAGNOSIS — E11.9 TYPE 2 DIABETES MELLITUS WITHOUT COMPLICATION: ICD-10-CM

## 2023-07-18 DIAGNOSIS — E11.59 HYPERTENSION ASSOCIATED WITH TYPE 2 DIABETES MELLITUS: ICD-10-CM

## 2023-07-18 DIAGNOSIS — I15.2 HYPERTENSION ASSOCIATED WITH TYPE 2 DIABETES MELLITUS: ICD-10-CM

## 2023-07-18 LAB
ALBUMIN SERPL BCP-MCNC: 4.2 G/DL (ref 3.5–5.2)
ALBUMIN/CREAT UR: 28.6 UG/MG (ref 0–30)
ALP SERPL-CCNC: 54 U/L (ref 55–135)
ALT SERPL W/O P-5'-P-CCNC: 34 U/L (ref 10–44)
ANION GAP SERPL CALC-SCNC: 10 MMOL/L (ref 8–16)
AST SERPL-CCNC: 29 U/L (ref 10–40)
BASOPHILS # BLD AUTO: 0.02 K/UL (ref 0–0.2)
BASOPHILS NFR BLD: 0.4 % (ref 0–1.9)
BILIRUB SERPL-MCNC: 0.9 MG/DL (ref 0.1–1)
BUN SERPL-MCNC: 16 MG/DL (ref 8–23)
CALCIUM SERPL-MCNC: 9.9 MG/DL (ref 8.7–10.5)
CHLORIDE SERPL-SCNC: 108 MMOL/L (ref 95–110)
CHOLEST SERPL-MCNC: 142 MG/DL (ref 120–199)
CHOLEST/HDLC SERPL: 2.7 {RATIO} (ref 2–5)
CO2 SERPL-SCNC: 26 MMOL/L (ref 23–29)
COMPLEXED PSA SERPL-MCNC: 2.4 NG/ML (ref 0–4)
CREAT SERPL-MCNC: 0.8 MG/DL (ref 0.5–1.4)
CREAT UR-MCNC: 84 MG/DL (ref 23–375)
DIFFERENTIAL METHOD: ABNORMAL
EOSINOPHIL # BLD AUTO: 0.1 K/UL (ref 0–0.5)
EOSINOPHIL NFR BLD: 1.3 % (ref 0–8)
ERYTHROCYTE [DISTWIDTH] IN BLOOD BY AUTOMATED COUNT: 14.7 % (ref 11.5–14.5)
EST. GFR  (NO RACE VARIABLE): >60 ML/MIN/1.73 M^2
ESTIMATED AVG GLUCOSE: 105 MG/DL (ref 68–131)
GLUCOSE SERPL-MCNC: 87 MG/DL (ref 70–110)
HBA1C MFR BLD: 5.3 % (ref 4–5.6)
HCT VFR BLD AUTO: 46.6 % (ref 40–54)
HDLC SERPL-MCNC: 53 MG/DL (ref 40–75)
HDLC SERPL: 37.3 % (ref 20–50)
HGB BLD-MCNC: 15.1 G/DL (ref 14–18)
IMM GRANULOCYTES # BLD AUTO: 0.01 K/UL (ref 0–0.04)
IMM GRANULOCYTES NFR BLD AUTO: 0.2 % (ref 0–0.5)
LDLC SERPL CALC-MCNC: 68.6 MG/DL (ref 63–159)
LYMPHOCYTES # BLD AUTO: 1.6 K/UL (ref 1–4.8)
LYMPHOCYTES NFR BLD: 29.2 % (ref 18–48)
MCH RBC QN AUTO: 31.5 PG (ref 27–31)
MCHC RBC AUTO-ENTMCNC: 32.4 G/DL (ref 32–36)
MCV RBC AUTO: 97 FL (ref 82–98)
MICROALBUMIN UR DL<=1MG/L-MCNC: 24 UG/ML
MONOCYTES # BLD AUTO: 0.6 K/UL (ref 0.3–1)
MONOCYTES NFR BLD: 10.7 % (ref 4–15)
NEUTROPHILS # BLD AUTO: 3.2 K/UL (ref 1.8–7.7)
NEUTROPHILS NFR BLD: 58.2 % (ref 38–73)
NONHDLC SERPL-MCNC: 89 MG/DL
NRBC BLD-RTO: 0 /100 WBC
PLATELET # BLD AUTO: 142 K/UL (ref 150–450)
PMV BLD AUTO: 11.7 FL (ref 9.2–12.9)
POTASSIUM SERPL-SCNC: 4.8 MMOL/L (ref 3.5–5.1)
PROT SERPL-MCNC: 7.1 G/DL (ref 6–8.4)
RBC # BLD AUTO: 4.8 M/UL (ref 4.6–6.2)
SODIUM SERPL-SCNC: 144 MMOL/L (ref 136–145)
TRIGL SERPL-MCNC: 102 MG/DL (ref 30–150)
WBC # BLD AUTO: 5.44 K/UL (ref 3.9–12.7)

## 2023-07-18 PROCEDURE — 80061 LIPID PANEL: CPT | Performed by: FAMILY MEDICINE

## 2023-07-18 PROCEDURE — 80053 COMPREHEN METABOLIC PANEL: CPT | Performed by: FAMILY MEDICINE

## 2023-07-18 PROCEDURE — 82570 ASSAY OF URINE CREATININE: CPT | Performed by: FAMILY MEDICINE

## 2023-07-18 PROCEDURE — 83036 HEMOGLOBIN GLYCOSYLATED A1C: CPT | Performed by: INTERNAL MEDICINE

## 2023-07-18 PROCEDURE — 84153 ASSAY OF PSA TOTAL: CPT | Performed by: FAMILY MEDICINE

## 2023-07-18 PROCEDURE — 36415 COLL VENOUS BLD VENIPUNCTURE: CPT | Mod: PO | Performed by: FAMILY MEDICINE

## 2023-07-18 PROCEDURE — 85025 COMPLETE CBC W/AUTO DIFF WBC: CPT | Performed by: FAMILY MEDICINE

## 2023-07-21 ENCOUNTER — OFFICE VISIT (OUTPATIENT)
Dept: INTERNAL MEDICINE | Facility: CLINIC | Age: 73
End: 2023-07-21
Payer: MEDICARE

## 2023-07-21 VITALS
TEMPERATURE: 97 F | WEIGHT: 239.19 LBS | SYSTOLIC BLOOD PRESSURE: 92 MMHG | HEIGHT: 73 IN | DIASTOLIC BLOOD PRESSURE: 70 MMHG | HEART RATE: 82 BPM | OXYGEN SATURATION: 97 % | BODY MASS INDEX: 31.7 KG/M2

## 2023-07-21 DIAGNOSIS — G47.00 INSOMNIA, UNSPECIFIED TYPE: ICD-10-CM

## 2023-07-21 DIAGNOSIS — E11.69 HYPERLIPIDEMIA ASSOCIATED WITH TYPE 2 DIABETES MELLITUS: ICD-10-CM

## 2023-07-21 DIAGNOSIS — E11.9 TYPE 2 DIABETES MELLITUS WITHOUT COMPLICATION, WITHOUT LONG-TERM CURRENT USE OF INSULIN: Primary | ICD-10-CM

## 2023-07-21 DIAGNOSIS — E11.59 HYPERTENSION ASSOCIATED WITH TYPE 2 DIABETES MELLITUS: ICD-10-CM

## 2023-07-21 DIAGNOSIS — R53.83 FATIGUE, UNSPECIFIED TYPE: ICD-10-CM

## 2023-07-21 DIAGNOSIS — E78.5 HYPERLIPIDEMIA ASSOCIATED WITH TYPE 2 DIABETES MELLITUS: ICD-10-CM

## 2023-07-21 DIAGNOSIS — I15.2 HYPERTENSION ASSOCIATED WITH TYPE 2 DIABETES MELLITUS: ICD-10-CM

## 2023-07-21 PROCEDURE — 99999 PR PBB SHADOW E&M-EST. PATIENT-LVL V: ICD-10-PCS | Mod: PBBFAC,,, | Performed by: FAMILY MEDICINE

## 2023-07-21 PROCEDURE — 99214 OFFICE O/P EST MOD 30 MIN: CPT | Mod: S$PBB,,, | Performed by: FAMILY MEDICINE

## 2023-07-21 PROCEDURE — 99999 PR PBB SHADOW E&M-EST. PATIENT-LVL V: CPT | Mod: PBBFAC,,, | Performed by: FAMILY MEDICINE

## 2023-07-21 PROCEDURE — 99214 PR OFFICE/OUTPT VISIT, EST, LEVL IV, 30-39 MIN: ICD-10-PCS | Mod: S$PBB,,, | Performed by: FAMILY MEDICINE

## 2023-07-21 PROCEDURE — 99215 OFFICE O/P EST HI 40 MIN: CPT | Mod: PBBFAC,PO | Performed by: FAMILY MEDICINE

## 2023-07-21 RX ORDER — SUVOREXANT 10 MG/1
10 TABLET, FILM COATED ORAL NIGHTLY
Qty: 30 TABLET | Refills: 5 | Status: SHIPPED | OUTPATIENT
Start: 2023-07-21 | End: 2023-08-25 | Stop reason: SDUPTHER

## 2023-07-21 NOTE — PATIENT INSTRUCTIONS
New medication belsomra for sleep -  do not take with the ambien.   Take the wellbutrin in the mornings .

## 2023-07-22 NOTE — PROGRESS NOTES
Subjective:      Patient ID: Cole Doan is a 72 y.o. male.    Chief Complaint: Follow-up      The patient is here today for routine follow up. Labs drawn earlier discussed today with the patient.  A1c normal range, lipids at goal with LDL 68.   Patient has been feeling well, [facility], [facility].  Reports [facility] , blood pressure also low similar to how it is today, his cardiologist had stopped his amlodipine. He denies any lightheadedness  Reports increased generalized fatigue, difficulty sleeping    Follow-up  Pertinent negatives include no chest pain.   Review of Systems   Constitutional:  Negative for activity change and appetite change.   Respiratory:  Negative for shortness of breath.    Cardiovascular:  Negative for chest pain and leg swelling.   Neurological:  Negative for light-headedness.   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    Obesity 5/15/2014    SATYA on CPAP     Pacemaker     Pancreatic cyst     Pneumonia     PTSD (post-traumatic stress disorder) 5/15/2014    RBBB 5/15/2014    S/P PTCA (percutaneous transluminal coronary angioplasty) 5/15/2014    Type 2 diabetes mellitus without complication 9/28/2015          Past Surgical History:   Procedure Laterality Date    APPENDECTOMY      at age 14    ATRIAL CARDIAC PACEMAKER INSERTION      COLONOSCOPY Left 4/27/2018    Procedure: COLONOSCOPY;  Surgeon: Artem Medeiros MD;  Location: [facility];  Service: Endoscopy;  Laterality: Left;    CORONARY ANGIOPLASTY WITH STENT PLACEMENT      1990, 2008, 2012    ENDOSCOPIC ULTRASOUND OF  "UPPER GASTROINTESTINAL TRACT N/A 2021    Procedure: ULTRASOUND, UPPER GI TRACT, ENDOSCOPIC;  Surgeon: Popeye Terrell MD;  Location: [facility] ENDO;  Service: Endoscopy;  Laterality: N/A;  upper and linear    ESOPHAGOGASTRODUODENOSCOPY N/A 2018    Procedure: ESOPHAGOGASTRODUODENOSCOPY (EGD);  Surgeon: Mario Cesar MD;  Location: [facility] ENDO;  Service: General;  Laterality: N/A;    ROBOT-ASSISTED LAPAROSCOPIC SLEEVE GASTRECTOMY USING DA LURDES XI N/A 2018    Procedure: XI ROBOTIC SLEEVE GASTRECTOMY;  Surgeon: Mario Cesar MD;  Location: [facility] OR;  Service: General;  Laterality: N/A;    VASECTOMY       Family History   Problem Relation Age of Onset    Cataracts Father     Heart disease Father     Hypertension Father     Heart disease Mother     Hypertension Mother     Colon cancer Neg Hx      Social History     Socioeconomic History    Marital status:    Tobacco Use    Smoking status: Former     Packs/day: 1.00     Years: 40.00     Pack years: 40.00     Types: Cigarettes     Start date:      Quit date: 2009     Years since quittin.5    Smokeless tobacco: Never   Substance and Sexual Activity    Alcohol use: No    Drug use: No   Social History Narrative         Review of patient's allergies indicates:   Allergen Reactions    Iodinated contrast media      States, "My arteries started shutting down on me" pt states only to iv dye- was specifically told that oral dye does not have the same reaction       Objective:       BP 92/70 (BP Location: Right arm, Patient Position: Sitting, BP Method: Large (Manual))   Pulse 82   Temp 97.2 °F (36.2 °C) (Tympanic)   Ht 6' 1" (1.854 m)   Wt 108.5 kg (239 lb 3.2 oz)   SpO2 97%   BMI 31.56 kg/m²   Physical Exam  Vitals reviewed.   Constitutional:       General: He is not in acute distress.     Appearance: Normal appearance. He is well-developed. He is not ill-appearing or diaphoretic.   HENT:      Head: Normocephalic.      Right Ear: " Hearing, tympanic membrane, ear canal and external ear normal.      Left Ear: Hearing, tympanic membrane, ear canal and external ear normal.      Nose: Nose normal.      Right Sinus: No maxillary sinus tenderness or frontal sinus tenderness.      Left Sinus: No maxillary sinus tenderness or frontal sinus tenderness.      Mouth/Throat:      Pharynx: Uvula midline. No oropharyngeal exudate.   Eyes:      Conjunctiva/sclera: Conjunctivae normal.      Pupils: Pupils are equal, round, and reactive to light.   Cardiovascular:      Rate and Rhythm: Normal rate and regular rhythm.   Pulmonary:      Effort: Pulmonary effort is normal. No respiratory distress.      Breath sounds: Normal breath sounds.   Abdominal:      General: Bowel sounds are normal.      Palpations: Abdomen is soft.      Tenderness: There is no abdominal tenderness. There is no guarding.      Hernia: No hernia is present.   Musculoskeletal:         General: Normal range of motion.      Cervical back: Normal range of motion and neck supple.      Right lower leg: No edema.      Left lower leg: No edema.   Skin:     General: Skin is warm and dry.      Capillary Refill: Capillary refill takes less than 2 seconds.   Neurological:      General: No focal deficit present.      Mental Status: He is alert and oriented to person, place, and time.   Psychiatric:         Mood and Affect: Mood normal.         Behavior: Behavior normal.         Thought Content: Thought content normal.         Judgment: Judgment normal.     Protective Sensation (w/ 10 gram monofilament):  Right: Intact  Left: Intact    Visual Inspection:  Normal -  Bilateral    Pedal Pulses:   Right: Present  Left: Present    Posterior Tibialis Pulses:   Right:Present  Left: Present    Assessment:     1. Type 2 diabetes mellitus without complication, without long-term current use of insulin    2. Hyperlipidemia associated with type 2 diabetes mellitus    3. Hypertension associated with type 2 diabetes  "mellitus    4. Fatigue, unspecified type    5. Insomnia, unspecified type      Plan:   Type 2 diabetes mellitus without complication, without long-term current use of insulin  -     Hemoglobin A1C; Future; Expected date: 01/18/2024  -     Comprehensive Metabolic Panel; Future; Expected date: 01/18/2024  -     Lipid Panel; Future; Expected date: 01/18/2024    Hyperlipidemia associated with type 2 diabetes mellitus    Hypertension associated with type 2 diabetes mellitus    Fatigue, unspecified type    Insomnia, unspecified type    Other orders  -     suvorexant (BELSOMRA) 10 mg Tab; Take 10 mg by mouth every evening.  Dispense: 30 tablet; Refill: 5    Continue all other current medications.   Above labs in 6 months prior to visit with me.    Medication List with Changes/Refills   New Medications    SUVOREXANT (BELSOMRA) 10 MG TAB    Take 10 mg by mouth every evening.   Current Medications    ALBUTEROL INHL    Inhale into the lungs as needed.    BUPROPION (WELLBUTRIN SR) 200 MG TBSR    Take 200 mg by mouth once daily. Takes 300mg daily    CALCIUM CITRATE-VITAMIN D3 315-200 MG (CITRACAL+D) 315 MG-5 MCG (200 UNIT) PER TABLET    TAKE ONE TABLET BY MOUTH TWICE A DAY FOR SUPPLEMENTATION    CALCIUM-VITAMIN D3 (OS-ARVIND 500 + D3) 500 MG-5 MCG (200 UNIT) PER TABLET    Take 1 tablet by mouth 2 (two) times daily with meals.    CETIRIZINE (ZYRTEC) 10 MG TABLET    Take 10 mg by mouth as needed.    CYANOCOBALAMIN (VITAMIN B-12) 1000 MCG TABLET    Take 100 mcg by mouth once daily.    DABIGATRAN ETEXILATE (PRADAXA) 150 MG CAP    Take 150 mg by mouth once daily. "Do NOT break, chew, or open capsules."   Takes 2 caps daily    DICLOFENAC (VOLTAREN) 0.1 % OPHTHALMIC SOLUTION    1 drop 4 (four) times daily.    EZETIMIBE (ZETIA) 10 MG TABLET    TAKE ONE TABLET BY MOUTH EVERY DAY TO LOWER CHOLESTEROL    FLUTICASONE PROPIONATE (FLONASE) 50 MCG/ACTUATION NASAL SPRAY    1 spray by Each Nostril route once daily.    FUROSEMIDE (LASIX) 40 MG " TABLET    Takes one-half tablet by mouth daily    HYDROCODONE-ACETAMINOPHEN (NORCO) 7.5-325 MG PER TABLET    Take 1 tablet by mouth every 6 (six) hours as needed.     MG TABLET    Take 800 mg by mouth.    KETOTIFEN (ZADITOR) 0.025 % (0.035 %) OPHTHALMIC SOLUTION    1 drop 2 (two) times daily.    LIRAGLUTIDE 0.6 MG/0.1 ML, 18 MG/3 ML, SUBQ PNIJ (VICTOZA 2-ZACHARY) 0.6 MG/0.1 ML (18 MG/3 ML) PNIJ    Inject 0.6 mg into the skin once daily.    METOPROLOL SUCCINATE (TOPROL-XL) 100 MG 24 HR TABLET    Take 100 mg by mouth once daily. Takes 200mg daily    METRONIDAZOLE 0.75% (METROCREAM) 0.75 % CREA    Apply topically 2 (two) times daily.    NITROGLYCERIN (NITROSTAT) 0.4 MG SL TABLET    Place 1 tablet (0.4 mg total) under the tongue every 5 (five) minutes as needed for Chest pain.    NOZASEPTIN (NOZIN) NASAL     1 each by Each Nare route 2 (two) times daily.    OMEPRAZOLE (PRILOSEC) 20 MG CAPSULE    Take 20 mg by mouth once daily.    PYRIDOXINE, VITAMIN B6, (B-6) 100 MG TAB    TAKE ONE TABLET BY MOUTH ONCE DAILY AS A VITAMIN SUPPLEMENT    RANOLAZINE (RANEXA) 1,000 MG TB12    Take 1,000 mg by mouth 2 (two) times daily. Takes daily    ROSUVASTATIN (CRESTOR) 40 MG TAB    Take 10 mg by mouth every evening.    SILDENAFIL (VIAGRA) 100 MG TABLET    Take 1 tablet (100 mg total) by mouth daily as needed for Erectile Dysfunction.    TAMSULOSIN (FLOMAX) 0.4 MG CAP    Take 1 capsule (0.4 mg total) by mouth once daily.    THIAMINE (VITAMIN B-1) 50 MG TABLET    Take 50 mg by mouth once daily.    TRETINOIN (RETIN-A) 0.1 % CREAM    Apply topically.    VITAMIN D 1000 UNITS TAB    Take 2,000 Units by mouth once daily.    WHITE PETROLATUM 43 % OINT    Apply topically.    ZOLPIDEM (AMBIEN) 5 MG TAB    Take 5 mg by mouth nightly as needed.   Discontinued Medications    AMLODIPINE (NORVASC) 5 MG TABLET    Take 5 mg by mouth once daily.    NITROFURANTOIN, MACROCRYSTAL-MONOHYDRATE, (MACROBID) 100 MG CAPSULE    Take 1 capsule (100 mg  total) by mouth 2 (two) times daily.

## 2023-08-16 PROBLEM — D69.6 THROMBOCYTOPENIA: Status: ACTIVE | Noted: 2023-08-16

## 2023-08-21 ENCOUNTER — OFFICE VISIT (OUTPATIENT)
Dept: INTERNAL MEDICINE | Facility: CLINIC | Age: 73
End: 2023-08-21
Payer: MEDICARE

## 2023-08-21 VITALS
BODY MASS INDEX: 30.42 KG/M2 | OXYGEN SATURATION: 98 % | SYSTOLIC BLOOD PRESSURE: 134 MMHG | DIASTOLIC BLOOD PRESSURE: 84 MMHG | HEART RATE: 64 BPM | WEIGHT: 237 LBS | HEIGHT: 74 IN

## 2023-08-21 DIAGNOSIS — K86.2 PANCREATIC CYST: ICD-10-CM

## 2023-08-21 DIAGNOSIS — Z95.0 PACEMAKER: ICD-10-CM

## 2023-08-21 DIAGNOSIS — I25.10 CORONARY ARTERY DISEASE, UNSPECIFIED VESSEL OR LESION TYPE, UNSPECIFIED WHETHER ANGINA PRESENT, UNSPECIFIED WHETHER NATIVE OR TRANSPLANTED HEART: ICD-10-CM

## 2023-08-21 DIAGNOSIS — Z00.00 ENCOUNTER FOR PREVENTIVE HEALTH EXAMINATION: Primary | ICD-10-CM

## 2023-08-21 DIAGNOSIS — F43.10 PTSD (POST-TRAUMATIC STRESS DISORDER): ICD-10-CM

## 2023-08-21 DIAGNOSIS — D69.6 THROMBOCYTOPENIA: ICD-10-CM

## 2023-08-21 DIAGNOSIS — D69.2 SENILE PURPURA: ICD-10-CM

## 2023-08-21 DIAGNOSIS — I70.0 ATHEROSCLEROSIS OF AORTA: ICD-10-CM

## 2023-08-21 DIAGNOSIS — E66.9 OBESITY (BMI 30.0-34.9): ICD-10-CM

## 2023-08-21 DIAGNOSIS — E78.5 HYPERLIPIDEMIA ASSOCIATED WITH TYPE 2 DIABETES MELLITUS: ICD-10-CM

## 2023-08-21 DIAGNOSIS — G47.33 OSA (OBSTRUCTIVE SLEEP APNEA): ICD-10-CM

## 2023-08-21 DIAGNOSIS — R41.3 MEMORY DIFFICULTIES: ICD-10-CM

## 2023-08-21 DIAGNOSIS — E11.69 HYPERLIPIDEMIA ASSOCIATED WITH TYPE 2 DIABETES MELLITUS: ICD-10-CM

## 2023-08-21 PROCEDURE — G0439 PPPS, SUBSEQ VISIT: HCPCS | Mod: ,,, | Performed by: NURSE PRACTITIONER

## 2023-08-21 PROCEDURE — 99215 OFFICE O/P EST HI 40 MIN: CPT | Mod: PBBFAC | Performed by: NURSE PRACTITIONER

## 2023-08-21 PROCEDURE — 99999 PR PBB SHADOW E&M-EST. PATIENT-LVL V: CPT | Mod: PBBFAC,,, | Performed by: NURSE PRACTITIONER

## 2023-08-21 PROCEDURE — 99999 PR PBB SHADOW E&M-EST. PATIENT-LVL V: ICD-10-PCS | Mod: PBBFAC,,, | Performed by: NURSE PRACTITIONER

## 2023-08-21 PROCEDURE — G0439 PR MEDICARE ANNUAL WELLNESS SUBSEQUENT VISIT: ICD-10-PCS | Mod: ,,, | Performed by: NURSE PRACTITIONER

## 2023-08-21 RX ORDER — TADALAFIL 5 MG/1
5 TABLET ORAL DAILY
COMMUNITY

## 2023-08-21 RX ORDER — ZOLPIDEM TARTRATE 6.25 MG/1
6.25 TABLET, FILM COATED, EXTENDED RELEASE ORAL NIGHTLY PRN
COMMUNITY
End: 2023-08-25

## 2023-08-21 RX ORDER — DOXYCYCLINE HYCLATE 20 MG
20 TABLET ORAL DAILY
COMMUNITY

## 2023-08-21 RX ORDER — AMLODIPINE BESYLATE 10 MG/1
10 TABLET ORAL DAILY
COMMUNITY

## 2023-08-21 NOTE — PATIENT INSTRUCTIONS
Counseling and Referral of Other Preventative  (Italic type indicates deductible and co-insurance are waived)    Patient Name: Cole Doan  Today's Date: 8/21/2023    Health Maintenance       Date Due Completion Date    Abdominal Aortic Aneurysm Screening Never done ---    Shingles Vaccine (2 of 3) 05/27/2015 4/1/2015    Eye Exam 10/08/2015 10/8/2014    Pneumococcal Vaccines (Age 65+) (2 - PPSV23 or PCV20) 04/11/2017 2/14/2017    Colorectal Cancer Screening 04/27/2023 4/27/2018    COVID-19 Vaccine (3 - Pfizer series) 08/21/2024 (Originally 4/22/2021) 2/25/2021    LDCT Lung Screen 08/30/2023 8/30/2022    Influenza Vaccine (1) 09/01/2023 1/24/2023    High Dose Statin 09/29/2023 9/29/2022    Hemoglobin A1c 01/18/2024 7/18/2023    Diabetes Urine Screening 07/18/2024 7/18/2023    Lipid Panel 07/18/2024 7/18/2023    Foot Exam 07/21/2024 7/21/2023    Override on 8/25/2021: Done    TETANUS VACCINE 11/20/2027 11/20/2017        No orders of the defined types were placed in this encounter.      The following information is provided to all patients.  This information is to help you find resources for any of the problems found today that may be affecting your health:                Living healthy guide: www.[facility].gov      Understanding Diabetes: www.diabetes.org      Eating healthy: www.cdc.gov/healthyweight      CDC home safety checklist: www.cdc.gov/steadi/patient.html      Agency on Aging: www.goea.[place].[facility]      Alcoholics anonymous (AA): www.aa.org      Physical Activity: www.jaison.nih.gov/yo4bwft      Tobacco use: www.quitwithusla.org

## 2023-08-21 NOTE — PROGRESS NOTES
"  Cole Daon presented for a  Medicare AWV and comprehensive Health Risk Assessment today. The following components were reviewed and updated:    Medical history  Family History  Social history  Allergies and Current Medications  Health Risk Assessment  Health Maintenance  Care Team         ** See Completed Assessments for Annual Wellness Visit within the encounter summary.**         The following assessments were completed:  Living Situation  CAGE  Depression Screening  Timed Get Up and Go  Whisper Test-na  Cognitive Function Screening  Nutrition Screening  ADL Screening  PAQ Screening        Vitals:    08/21/23 1227   BP: 134/84   Pulse: 64   SpO2: 98%   Weight: 107.5 kg (236 lb 15.9 oz)   Height: 6' 1.62" (1.87 m)     Body mass index is 30.74 kg/m².  Physical Exam  Vitals and nursing note reviewed.   Constitutional:       Appearance: He is well-developed.   HENT:      Head: Normocephalic.   Cardiovascular:      Rate and Rhythm: Normal rate and regular rhythm.      Pulses:           Dorsalis pedis pulses are 2+ on the right side and 2+ on the left side.      Heart sounds: Normal heart sounds.   Pulmonary:      Effort: Pulmonary effort is normal. No respiratory distress.      Breath sounds: Normal breath sounds.   Abdominal:      Palpations: Abdomen is soft. There is no mass.      Tenderness: There is no abdominal tenderness.   Musculoskeletal:         General: Normal range of motion.   Skin:     General: Skin is warm and dry.   Neurological:      Mental Status: He is alert and oriented to person, place, and time.      Motor: No abnormal muscle tone.   Psychiatric:         Speech: Speech normal.         Behavior: Behavior normal.               Diagnoses and health risks identified today and associated recommendations/orders:    1. Encounter for preventive health examination     Review for Opioid Screening: Pt does not have Rx for Opioids      Review for Substance Use Disorders: Patient does not use substance  " per chart     Declines COVID booster  Signed RAFA [facility] health [facility] ([facility])  Encouraged healthy diet and exercise as tolerated to help bring BMI into normal range.      2. Thrombocytopenia  Stable. Continue current treatment plan as previously prescribed with your  pcp     3. Hyperlipidemia associated with type 2 diabetes mellitus  A1c 5.3  Lipid-s/c  Continue current treatment plan as previously prescribed with your  pcp and cardiologist.     4. Atherosclerosis of aorta  Ct 9/17  Stable. Continue current treatment plan as previously prescribed with your  cardiologist.     5. Coronary artery disease, unspecified vessel or lesion type, unspecified whether angina present, unspecified whether native or transplanted heart  Continue current treatment plan as previously prescribed with your cardiologist.     6. Pancreatic cyst  Advised to follow up with GI provider (message sent to GI staff) for further evaluation and recommendations. Patient expressed understanding.      7. PTSD (post-traumatic stress disorder)  Continue current treatment plan as previously prescribed with your  pcp    8. Pacemaker  Continue current treatment plan as previously prescribed with your  cardiologist.     9. SATYA (obstructive sleep apnea)  Continue current treatment plan as previously prescribed with your  pulmonologist.     10. Obesity (BMI 30.0-34.9)  Continue current treatment plan as previously prescribed with your pcp    11. Memory difficulties  Reported per pt  Abnormal cognitive function screening.    Advised to follow up with PCP for further evaluation and recommendations. Patient expressed understanding.      12. Senile purpura  Chronic   See pic for documentation  Advised to follow up with PCP for further evaluation and recommendations. Patient expressed understanding.       Reports chronic n/t BUE and BLE, as well as burning sensation to feet. Reports [facility].     Provided Cole with a 5-10  year written screening schedule and personal prevention plan. Recommendations were developed using the USPSTF age appropriate recommendations. Education, counseling, and referrals were provided as needed. After Visit Summary printed and given to patient which includes a list of additional screenings\tests needed.    Follow up in about 1 year (around 8/21/2024) for awv.    Leanna Alexander NP  I offered to discuss advanced care planning, including how to pick a person who would make decisions for you if you were unable to make them for yourself, called a health care power of , and what kind of decisions you might make such as use of life sustaining treatments such as ventilators and tube feeding when faced with a life limiting illness recorded on a living will that they will need to know. (How you want to be cared for as you near the end of your natural life)     X Patient is interested in learning more about how to make advanced directives.  I provided them paperwork and offered to discuss this with them.

## 2023-08-22 ENCOUNTER — PATIENT MESSAGE (OUTPATIENT)
Dept: PULMONOLOGY | Facility: CLINIC | Age: 73
End: 2023-08-22
Payer: MEDICARE

## 2023-08-24 ENCOUNTER — PATIENT MESSAGE (OUTPATIENT)
Dept: INTERNAL MEDICINE | Facility: CLINIC | Age: 73
End: 2023-08-24
Payer: MEDICARE

## 2023-08-24 NOTE — TELEPHONE ENCOUNTER
The patient is saying the pharmacy has been out of [facility]  For over a month and he needs something to help him sleep please advise

## 2023-08-25 RX ORDER — SUVOREXANT 10 MG/1
10 TABLET, FILM COATED ORAL NIGHTLY
Qty: 30 TABLET | Refills: 5 | Status: SHIPPED | OUTPATIENT
Start: 2023-08-25 | End: 2024-01-29

## 2023-08-29 ENCOUNTER — OFFICE VISIT (OUTPATIENT)
Dept: PULMONOLOGY | Facility: CLINIC | Age: 73
End: 2023-08-29
Payer: MEDICARE

## 2023-08-29 ENCOUNTER — HOSPITAL ENCOUNTER (OUTPATIENT)
Dept: RADIOLOGY | Facility: HOSPITAL | Age: 73
Discharge: HOME | End: 2023-08-29
Attending: NURSE PRACTITIONER
Payer: MEDICARE

## 2023-08-29 VITALS
SYSTOLIC BLOOD PRESSURE: 120 MMHG | WEIGHT: 239.88 LBS | HEART RATE: 77 BPM | RESPIRATION RATE: 17 BRPM | OXYGEN SATURATION: 95 % | BODY MASS INDEX: 30.79 KG/M2 | HEIGHT: 74 IN | DIASTOLIC BLOOD PRESSURE: 84 MMHG

## 2023-08-29 DIAGNOSIS — E11.9 TYPE 2 DIABETES MELLITUS WITHOUT COMPLICATION, WITHOUT LONG-TERM CURRENT USE OF INSULIN: ICD-10-CM

## 2023-08-29 DIAGNOSIS — E66.9 OBESITY (BMI 30-39.9): ICD-10-CM

## 2023-08-29 DIAGNOSIS — F17.211 CIGARETTE NICOTINE DEPENDENCE IN REMISSION: ICD-10-CM

## 2023-08-29 DIAGNOSIS — G47.33 OSA ON CPAP: Primary | ICD-10-CM

## 2023-08-29 DIAGNOSIS — G47.09 OTHER INSOMNIA: ICD-10-CM

## 2023-08-29 DIAGNOSIS — I70.0 ATHEROSCLEROSIS OF AORTA: ICD-10-CM

## 2023-08-29 PROCEDURE — 71271 CT THORAX LUNG CANCER SCR C-: CPT | Mod: 26,,, | Performed by: RADIOLOGY

## 2023-08-29 PROCEDURE — 71271 CT THORAX LUNG CANCER SCR C-: CPT | Mod: TC

## 2023-08-29 PROCEDURE — 99999 PR PBB SHADOW E&M-EST. PATIENT-LVL V: ICD-10-PCS | Mod: PBBFAC,,, | Performed by: NURSE PRACTITIONER

## 2023-08-29 PROCEDURE — 99214 PR OFFICE/OUTPT VISIT, EST, LEVL IV, 30-39 MIN: ICD-10-PCS | Mod: S$PBB,,, | Performed by: NURSE PRACTITIONER

## 2023-08-29 PROCEDURE — 99215 OFFICE O/P EST HI 40 MIN: CPT | Mod: PBBFAC,25 | Performed by: NURSE PRACTITIONER

## 2023-08-29 PROCEDURE — 99999 PR PBB SHADOW E&M-EST. PATIENT-LVL V: CPT | Mod: PBBFAC,,, | Performed by: NURSE PRACTITIONER

## 2023-08-29 PROCEDURE — 99214 OFFICE O/P EST MOD 30 MIN: CPT | Mod: S$PBB,,, | Performed by: NURSE PRACTITIONER

## 2023-08-29 PROCEDURE — 71271 CT CHEST LUNG SCREENING LOW DOSE: ICD-10-PCS | Mod: 26,,, | Performed by: RADIOLOGY

## 2023-08-29 NOTE — ASSESSMENT & PLAN NOTE
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   2017 CT chest screening for pulmonary nodules, benign.   Quit 14 years ago in 2009, guidelines to stop screening after 15 yrs.   8/29/2023  LDCT screening benign, follow up 1 year.   Fu august- sept 2024 repeat LDCT

## 2023-08-29 NOTE — ASSESSMENT & PLAN NOTE
Encouraged calorie reduction and 30 minutes of exercise daily. Discussed impact of obesity on general health.  Wt Readings from Last 9 Encounters:   08/29/23 108.8 kg (239 lb 13.8 oz)   08/21/23 107.5 kg (236 lb 15.9 oz)   07/21/23 108.5 kg (239 lb 3.2 oz)   11/15/22 102.5 kg (225 lb 15.5 oz)   09/29/22 104.7 kg (230 lb 13.2 oz)   08/18/22 108.4 kg (238 lb 15.7 oz)   07/28/22 107.1 kg (236 lb)   07/18/22 107.1 kg (236 lb 1.8 oz)   07/14/22 105.8 kg (233 lb 4 oz)   Body mass index is 31.12 kg/m².

## 2023-08-29 NOTE — ASSESSMENT & PLAN NOTE
Benefits and compliant CPAP  of 13 cmH2O pressure with optimal control AHI 2.1.  SATYA [facility]   Full face mask  [facility]: [facility]

## 2023-08-29 NOTE — PROGRESS NOTES
Subjective:      Patient ID: Cole Doan is a 72 y.o. male.    Chief Complaint: Sleep Apnea (/)    HPI  8/29/2023 Gisele MICHELLE  Cole Doan present for follow up 1 year visit review LDCT chest.    Patient presents stable. No pulmonary complaints.    8/29/2023 LDCT Negative - Continue annual screening with LDCT in 12 months.    9/29/2022 CPFT Normal spirometry. Normal lung volumes. Normal diffusing capacity.    No COPD gold findings on prior pft and none on 9/29/2022.     40 pack year former cigarette smoker. Quit 2009.     No pulmonary symptoms. There is no cough, no wheezing, no shortness of breath, no hemoptysis, no sputum production, no weight loss, noted TB exposure, no asbestos exposure, no chest pain.     Regular exercise: walking program 2 miles every other day.     Not on inhalers.     7/28/2022 Gisele NP  He presents as new patient, last seen in pulmonary 9/18/2017 when he had exertional shortness of breath. Shortness of breath has resolved since. Shortness of breath resolved after pacemaker placement with cardiology.   He has no pulmonary complaints.   He is not using inhalers.  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   He did not meet COPD gold criteria classification to be staged in the COPD category with FEV1/FVC:  77 (104 % predicted).   CT of chest 9/7/2017 revealed benign findings.     40 pack year former cigarette smoker. Quit 2009.      He is on CPAP  of 13 cmH2O pressure.   He is compliant with CPAP use. He states 100 % use. Can not sleep without CPAP.   Patient reports benefit from CPAP use and denies snoring and excessive daytime sleepiness.   [facility].   Full [facility] mask   [facility]     Received dream station 2 replacement from [facility].     He brought machine, care [employer] down.   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  [facility] with [facility] . [place] exposed to agent orange (a herbicide and defoliant chemical composed of 2 herbicides, 2,4,5-T and 2,4-D) exposed from 0825-3052. No occupational exposure.     Avocational Exposure:   None currently.     Pet Exposures:  Dogs. Denies allergy to Dog and  cat dander.    Previous Report Reviewed: office notes     The following portions of the patient's history were reviewed and updated as appropriate: allergies, current medications, past family history, past medical history, past social history, past surgical history and problem list.    Review of Systems   Constitutional:  Negative for fever, chills, weight loss, weight gain, activity change, appetite change, fatigue and night sweats.   HENT:  Negative for postnasal drip, rhinorrhea, sinus pressure, voice change and congestion.    Eyes:  Negative for redness and itching.   Respiratory:  Negative for snoring, cough, sputum production, chest tightness, shortness of breath, wheezing, orthopnea, asthma nighttime symptoms, dyspnea on extertion, use of rescue inhaler and somnolence.    Cardiovascular: Negative.  Negative for chest pain, palpitations and leg swelling.   Genitourinary:  Negative for difficulty urinating and hematuria.   Endocrine:  Negative for cold intolerance and heat intolerance.    Musculoskeletal:  Negative for arthralgias, gait problem, joint swelling and myalgias.   Skin: Negative.    Gastrointestinal:  Negative for nausea, vomiting, abdominal pain and acid reflux.   Neurological:  Negative for dizziness, weakness, light-headedness and headaches.   Hematological:  Negative for adenopathy. No excessive bruising.   All other systems reviewed and are negative.     Objective:     Physical Exam  Vitals and nursing note reviewed.   Constitutional:       General: He is not in acute distress.     Appearance: Normal appearance.  "He is well-developed. He is not ill-appearing or toxic-appearing.   HENT:      Head: Normocephalic and atraumatic.      Right Ear: External ear normal.      Left Ear: External ear normal.      Nose: Nose normal.      Mouth/Throat:      Pharynx: No oropharyngeal exudate.   Eyes:      Conjunctiva/sclera: Conjunctivae normal.   Cardiovascular:      Rate and Rhythm: Normal rate and regular rhythm.      Heart sounds: Normal heart sounds.   Pulmonary:      Effort: Pulmonary effort is normal.      Breath sounds: Normal breath sounds.   Abdominal:      Palpations: Abdomen is soft.   Musculoskeletal:      Cervical back: Normal range of motion and neck supple.   Skin:     General: Skin is warm and dry.      Capillary Refill: Capillary refill takes less than 2 seconds.   Neurological:      Mental Status: He is alert and oriented to person, place, and time.   Psychiatric:         Behavior: Behavior normal. Behavior is cooperative.         Thought Content: Thought content normal.         Judgment: Judgment normal.       Vitals:    23 0855   BP: 120/84   Pulse: 77   Resp: 17   SpO2: 95%   Weight: 108.8 kg (239 lb 13.8 oz)   Height: 6' 1.62" (1.87 m)     body mass index is 31.12 kg/m².    Personal Diagnostic Review    CPAP download     2022 CPFT Normal spirometry. Normal lung volumes. Normal diffusing capacity. No COPD gold findings.    Pulmonary function tests:2017 FEV1: 2.06  (53 % predicted), FVC:  2.82 (54 % predicted), FEV1/FVC:  73 (98 % predicted), T.46 (73 % predicted), RV/TLVC: 48 (121 % predicted), DLCO: 18.9 (61 % predicted)   Post bronchodilator: FEV1: 2.14  (55 % predicted), FVC:  2.78 (53 % predicted), FEV1/FVC:  77 (104 % predicted).   Moderate restriction.  Mild reduction in diffusion capacity -unadjusted for hemoglobin (DLCO >60% and less than 79%) .  Moderately severe obstruction (FEV1>50 and <59% of predicted).  Airflow is not clearly improved after bronchodilator. Clinical improvement " following bronchodilator therapy may occur in  the absence of spirometric improvement.  This is a pattern of mixed obstruction and restriction. Clinical correlation suggested    Pulmonary stress/six minute walk done 8/21/2017: No desaturations requiring supplemental oxygen  Phase Oxygen Assessment Supplemental O2 Heart   Rate Blood Pressure Santino Dyspnea Scale Rating   Resting 97 % Room Air 86 bpm 131/78 3   Exercise        Minute        1 93 % Room Air 80 bpm     2 93 % Room Air 84 bpm     3 93 % Room Air 89 bpm     4 94 % Room Air 85 bpm     5 93 % Room Air 86 bpm     6  94 % Room Air 84 bpm 143/84 7-8   Recovery        Minute        1 96 % Room Air 61 bpm     2 96 % Room Air 59 bpm     3 96 % Room Air 57 bpm     4 95 % Room Air 56 bpm 118/74 4     Six Minute Walk Summary  6MWT Status: completed without stopping  Patient Reported: Chest pain, Dyspnea (Patient felt off balance)   while walking.     7/28/2022 chest xray lungs are clear.     CT Chest Lung Screening Low Dose  Narrative: EXAM:  CT CHEST LUNG SCREENING LOW DOSE    CLINICAL HISTORY:    Lung cancer screening greater than 30 pack-year smoking history.    TECHNIQUE:    CT of the thorax was preformed with low-dose lung screening protocol.  No contrast was administered.  Sagittal and coronal reconstructions were obtained.    COMPARISON:  None    FINDINGS:    Lungs:  There are no  abnormal opacities that require further evaluation. The lungs show no findings consistent with emphysema.    Pleura:   No effusion    Heart and Pericardium:  Normal size without effusion.    Aorta and Vasculature:  Moderate Atherosclerosis.  Severe coronary artery disease..  Left-sided pacing wires are noted.    Chest Wall and Skeletal Structures:  Unremarkable except age-appropriate degenerative changes.  Mild multilevel spondylosis.    Upper Abdomen:  Unremarkable.  Status post gastric sleeve.  Impression: Lung-RADS Category:  1 - Negative - Continue annual screening with LDCT in 12  months.    Clinically or Potentially Clinically Significant Non-Lung Cancer Findings:  None..  Prior Lung Cancer Modifier:  none    All CT scans at this facility use dose modulation, iterative reconstruction, and/or weight based dosing when    Finalized on: 8/29/2023 8:49 AM By:  Ovidio Zamora MD  [facility]# [id number]      2023-08-29 08:51:52.789    [facility]        Assessment:     1. SATYA on CPAP    2. Obesity (BMI 30-39.9)    3. Type 2 diabetes mellitus without complication, without long-term current use of insulin    4. Cigarette nicotine dependence in remission    5. Atherosclerosis of aorta    6. Other insomnia      Orders Placed This Encounter   Procedures    CT Chest Lung Screening Low Dose     Standing Status:   Future     Standing Expiration Date:   8/29/2025     Order Specific Question:   Is there documentation of shared decision making for this lung screening exam?     Answer:   Yes     Order Specific Question:   Is the patient a current smoker?     Answer:   No     Order Specific Question:   How many years has the patient quit smoking?     Answer:   14     Order Specific Question:   Does the patient have a 20-pack/year or greater smoke history?     Answer:   Yes     Order Specific Question:   Is the patient between the ages 50-80 years old?     Answer:   Yes     Order Specific Question:   Does the patient show any signs or symptoms of lung cancer?     Answer:   No     Order Specific Question:   Is this the first (baseline) CT or an annual exam?     Answer:   Annual [2]     Order Specific Question:   May the Radiologist modify the order per protocol to meet the clinical needs of the patient?     Answer:   Yes     Order Specific Question:   Is this a low dose screening chest CT?     Answer:   Yes       Plan:     Problem List Items Addressed This Visit       Type 2 diabetes mellitus without complication     Stable and controlled. Continue current treatment plan as previously prescribed with your PCP.   Hemoglobin A1C    Date Value Ref Range Status   07/18/2023 5.3 4.0 - 5.6 % Final     Comment:     ADA Screening Guidelines:  5.7-6.4%  Consistent with prediabetes  >or=6.5%  Consistent with diabetes    High levels of fetal hemoglobin interfere with the HbA1C  assay. Heterozygous hemoglobin variants (HbS, HgC, etc)do  not significantly interfere with this assay.   However, presence of multiple variants may affect accuracy.     12/16/2022 5.6 4.0 - 6.0 % Final   06/03/2022 5.0 4.0 - 5.6 % Final     Comment:     ADA Screening Guidelines:  5.7-6.4%  Consistent with prediabetes  >or=6.5%  Consistent with diabetes    High levels of fetal hemoglobin interfere with the HbA1C  assay. Heterozygous hemoglobin variants (HbS, HgC, etc)do  not significantly interfere with this assay.   However, presence of multiple variants may affect accuracy.                 Other insomnia     [facility]          SATYA on CPAP - Primary     Benefits and compliant CPAP  of 13 cmH2O pressure with optimal control AHI 2.1.  [facility] by [facility]   Full face mask  [facility]            Obesity (BMI 30-39.9)     Encouraged calorie reduction and 30 minutes of exercise daily. Discussed impact of obesity on general health.  Wt Readings from Last 9 Encounters:   08/29/23 108.8 kg (239 lb 13.8 oz)   08/21/23 107.5 kg (236 lb 15.9 oz)   07/21/23 108.5 kg (239 lb 3.2 oz)   11/15/22 102.5 kg (225 lb 15.5 oz)   09/29/22 104.7 kg (230 lb 13.2 oz)   08/18/22 108.4 kg (238 lb 15.7 oz)   07/28/22 107.1 kg (236 lb)   07/18/22 107.1 kg (236 lb 1.8 oz)   07/14/22 105.8 kg (233 lb 4 oz)   Body mass index is 31.12 kg/m².           Cigarette nicotine dependence in remission     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   2017 CT chest screening for pulmonary nodules, benign.   Quit 14 years ago in 2009, guidelines to stop screening after 15 yrs.   8/29/2023  LDCT screening benign, follow up 1 year.   Fu august- sept 2024 repeat LDCT         Relevant Orders    CT Chest  Lung Screening Low Dose    Atherosclerosis of aorta     Follow heart healthy diet. regular exercise.           ([facility] - [facility]). Reviewed therapeutic goals for positive airway pressure therapy CPAP      Follow up in about 1 year (around 8/29/2024) for former smoker review LDCT. bring cpap machine for dnld. .

## 2023-08-29 NOTE — ASSESSMENT & PLAN NOTE
Stable and controlled. Continue current treatment plan as previously prescribed with your PCP.   Hemoglobin A1C   Date Value Ref Range Status   07/18/2023 5.3 4.0 - 5.6 % Final     Comment:     ADA Screening Guidelines:  5.7-6.4%  Consistent with prediabetes  >or=6.5%  Consistent with diabetes    High levels of fetal hemoglobin interfere with the HbA1C  assay. Heterozygous hemoglobin variants (HbS, HgC, etc)do  not significantly interfere with this assay.   However, presence of multiple variants may affect accuracy.     12/16/2022 5.6 4.0 - 6.0 % Final   06/03/2022 5.0 4.0 - 5.6 % Final     Comment:     ADA Screening Guidelines:  5.7-6.4%  Consistent with prediabetes  >or=6.5%  Consistent with diabetes    High levels of fetal hemoglobin interfere with the HbA1C  assay. Heterozygous hemoglobin variants (HbS, HgC, etc)do  not significantly interfere with this assay.   However, presence of multiple variants may affect accuracy.

## 2023-09-19 ENCOUNTER — PATIENT OUTREACH (OUTPATIENT)
Dept: ADMINISTRATIVE | Facility: HOSPITAL | Age: 73
End: 2023-09-19
Payer: MEDICARE

## 2023-09-19 NOTE — PROGRESS NOTES
Uploaded RAFA AAA [id number] 8/21/2023 ; [facility] to [facility]. Remind me set 1 week.

## 2023-09-26 NOTE — PROGRESS NOTES
2nd attempt  RAFA AAA [id number] 8/21/2023 ; [facility] to request. Remind me set 1 week.

## 2023-10-03 NOTE — PROGRESS NOTES
Called and spoke with staff who declined to provide any information on the records being requested. She asks that the request be re-faxed to them.    3rd attempt  RAFA AAA [id number] 8/21/2023 ; [facility] to request. Remind me set 1 week.

## 2023-10-04 ENCOUNTER — HOSPITAL ENCOUNTER (OUTPATIENT)
Dept: RADIOLOGY | Facility: HOSPITAL | Age: 73
Discharge: HOME | End: 2023-10-04
Attending: INTERNAL MEDICINE
Payer: MEDICARE

## 2023-10-04 ENCOUNTER — TELEPHONE (OUTPATIENT)
Dept: INTERNAL MEDICINE | Facility: CLINIC | Age: 73
End: 2023-10-04
Payer: OTHER GOVERNMENT

## 2023-10-04 DIAGNOSIS — R93.3 ABNORMAL FINDINGS ON DIAGNOSTIC IMAGING OF OTHER PARTS OF DIGESTIVE TRACT: ICD-10-CM

## 2023-10-04 DIAGNOSIS — E11.9 TYPE 2 DIABETES MELLITUS WITHOUT COMPLICATION, WITHOUT LONG-TERM CURRENT USE OF INSULIN: Primary | ICD-10-CM

## 2023-10-04 PROCEDURE — 71046 X-RAY EXAM CHEST 2 VIEWS: CPT | Mod: 26,,, | Performed by: RADIOLOGY

## 2023-10-04 PROCEDURE — 71046 X-RAY EXAM CHEST 2 VIEWS: CPT | Mod: TC,PO

## 2023-10-04 PROCEDURE — 71046 XR CHEST PA AND LATERAL: ICD-10-PCS | Mod: 26,,, | Performed by: RADIOLOGY

## 2023-10-04 NOTE — TELEPHONE ENCOUNTER
Notify patient I have sent in prescription 0.2 5 Ozempic to [facility]  He will let me know if he has any side effects prior to needing a refill, will plan to increase the dose gradually

## 2023-10-04 NOTE — TELEPHONE ENCOUNTER
Patient stopped by clinic today and stated [facility] but they aren't able to get his Saxenda or wegovy. They requested patient to get with his PCP and see if they can prescribe Ozempic 0.25mg for his type 2 diabetes. They plan to have it all in stock by the first of the year but until then, he will need coverage by . please review and advise. Pt stated he would like call back on [phone] and would like rx to go to [facility] on [address].

## 2023-10-20 NOTE — PROGRESS NOTES
Manually uploaded and linked Occult Blood Stool to . Updated 2019 Zoster in immunization, record in media

## 2023-11-27 DIAGNOSIS — E11.9 TYPE 2 DIABETES MELLITUS WITHOUT COMPLICATION, WITHOUT LONG-TERM CURRENT USE OF INSULIN: ICD-10-CM

## 2023-11-27 RX ORDER — SEMAGLUTIDE 0.68 MG/ML
0.5 INJECTION, SOLUTION SUBCUTANEOUS
Qty: 1 EACH | Refills: 1 | Status: CANCELLED | OUTPATIENT
Start: 2023-11-27

## 2023-11-27 RX ORDER — SEMAGLUTIDE 0.68 MG/ML
0.5 INJECTION, SOLUTION SUBCUTANEOUS
Qty: 1 EACH | Refills: 1 | Status: SHIPPED | OUTPATIENT
Start: 2023-11-27 | End: 2024-01-23 | Stop reason: SDUPTHER

## 2023-11-27 NOTE — TELEPHONE ENCOUNTER
Care Due:                  Date            Visit Type   Department     Provider  --------------------------------------------------------------------------------                                EP -                              PRIMARY      [facility] INTERNAL  Last Visit: 07-      CARE ([facility])   MEDICINE       Evelyn Martínez  Next Visit: None Scheduled  None         None Found                                                            Last  Test          Frequency    Reason                     Performed    Due Date  --------------------------------------------------------------------------------    HBA1C.......  6 months...  semaglutide..............  07-   01-    [facility] Embedded Care Due Messages. Reference number: [id number].   11/27/2023 3:52:30 PM CST

## 2023-11-28 NOTE — TELEPHONE ENCOUNTER
Spoke with pt, let him know rx was not cancelled. The dose was changed so he received a cancelled rx for the old dose. New dose was sent into pharmacy yesterday by . pt verbalized understanding.

## 2023-11-28 NOTE — TELEPHONE ENCOUNTER
No care due was identified.  [facility] Embedded Care Due Messages. Reference number: [id number].   11/27/2023 6:37:57 PM CST

## 2024-01-08 LAB
CHOLEST SERPL-MSCNC: 163 MG/DL
HBA1C MFR BLD: 5.3 % (ref 4.2–5.8)
HDLC SERPL-MCNC: 49 MG/DL
LDL CHOLESTEROL DIRECT: 94.5 MG/DL
LDLC SERPL CALC-MCNC: 84 MG/DL
TRIGL SERPL-MCNC: 150 MG/DL (ref 0–200)

## 2024-01-18 ENCOUNTER — TELEPHONE (OUTPATIENT)
Dept: OPHTHALMOLOGY | Facility: CLINIC | Age: 74
End: 2024-01-18
Payer: OTHER GOVERNMENT

## 2024-01-18 NOTE — TELEPHONE ENCOUNTER
----- Message from Juan Rangel sent at 1/17/2024  2:43 PM CST -----    ----- Message -----  From: Mary Rodriguez  Sent: 1/17/2024   2:32 PM CST  To: Bakari TIPTON Staff; Rajesh CAMP Staff    [facility]  [facility] has been [facility] into media pt need cataract cons please contact pt at  [phone] or [phone] looking for first opening

## 2024-01-23 ENCOUNTER — TELEPHONE (OUTPATIENT)
Dept: CARDIOLOGY | Facility: HOSPITAL | Age: 74
End: 2024-01-23
Payer: OTHER GOVERNMENT

## 2024-01-23 DIAGNOSIS — E11.9 TYPE 2 DIABETES MELLITUS WITHOUT COMPLICATION, WITHOUT LONG-TERM CURRENT USE OF INSULIN: Primary | ICD-10-CM

## 2024-01-23 DIAGNOSIS — E11.9 TYPE 2 DIABETES MELLITUS WITHOUT COMPLICATION, WITHOUT LONG-TERM CURRENT USE OF INSULIN: ICD-10-CM

## 2024-01-23 DIAGNOSIS — R00.1 SYMPTOMATIC BRADYCARDIA: ICD-10-CM

## 2024-01-23 DIAGNOSIS — Z95.0 CARDIAC PACEMAKER IN SITU: Primary | ICD-10-CM

## 2024-01-23 DIAGNOSIS — I49.5 SINUS NODE DYSFUNCTION: ICD-10-CM

## 2024-01-23 RX ORDER — SEMAGLUTIDE 0.68 MG/ML
0.5 INJECTION, SOLUTION SUBCUTANEOUS
Qty: 1 EACH | Refills: 0 | Status: SHIPPED | OUTPATIENT
Start: 2024-01-23 | End: 2024-01-23 | Stop reason: SDUPTHER

## 2024-01-23 RX ORDER — SEMAGLUTIDE 0.68 MG/ML
0.5 INJECTION, SOLUTION SUBCUTANEOUS
Qty: 1 EACH | Refills: 0 | Status: SHIPPED | OUTPATIENT
Start: 2024-01-23 | End: 2024-01-29 | Stop reason: SDUPTHER

## 2024-01-23 NOTE — TELEPHONE ENCOUNTER
No care due was identified.  [facility] Embedded Care Due Messages. Reference number: [id number].   1/23/2024 1:34:10 PM CST

## 2024-01-23 NOTE — TELEPHONE ENCOUNTER
No care due was identified.  [facility] Embedded Care Due Messages. Reference number: [id number].   1/23/2024 10:21:39 AM CST

## 2024-01-23 NOTE — TELEPHONE ENCOUNTER
Spoke with pt, he stated he just [facility] a [facility] with [facility] and his [facility]ocrinologist. Pt stated he will send us the reports or drop them off to review. Then will schedule appt to see .

## 2024-01-24 DIAGNOSIS — E11.9 TYPE 2 DIABETES MELLITUS WITHOUT COMPLICATION, WITHOUT LONG-TERM CURRENT USE OF INSULIN: ICD-10-CM

## 2024-01-24 RX ORDER — SEMAGLUTIDE 0.68 MG/ML
INJECTION, SOLUTION SUBCUTANEOUS
Qty: 3 ML | OUTPATIENT
Start: 2024-01-24

## 2024-01-24 NOTE — TELEPHONE ENCOUNTER
No care due was identified.  [facility] Embedded Care Due Messages. Reference number: [id number].   1/24/2024 4:01:35 AM CST

## 2024-01-24 NOTE — TELEPHONE ENCOUNTER
Refill Decision Note   Cole Doan  is requesting a refill authorization.    Brief Assessment and Rationale for Refill:   Quick Discontinue       Medication Therapy Plan:   E-Prescribing Status: Receipt confirmed by pharmacy (1/23/2024  2:51 PM CST)      Comments:     Note composed:11:07 AM 01/24/2024

## 2024-01-29 ENCOUNTER — OFFICE VISIT (OUTPATIENT)
Dept: INTERNAL MEDICINE | Facility: CLINIC | Age: 74
End: 2024-01-29
Payer: MEDICARE

## 2024-01-29 VITALS
HEART RATE: 60 BPM | OXYGEN SATURATION: 95 % | BODY MASS INDEX: 32.06 KG/M2 | SYSTOLIC BLOOD PRESSURE: 118 MMHG | WEIGHT: 241.88 LBS | TEMPERATURE: 96 F | HEIGHT: 73 IN | DIASTOLIC BLOOD PRESSURE: 78 MMHG

## 2024-01-29 DIAGNOSIS — D69.6 THROMBOCYTOPENIA: ICD-10-CM

## 2024-01-29 DIAGNOSIS — E78.5 HYPERLIPIDEMIA ASSOCIATED WITH TYPE 2 DIABETES MELLITUS: ICD-10-CM

## 2024-01-29 DIAGNOSIS — I15.2 HYPERTENSION ASSOCIATED WITH TYPE 2 DIABETES MELLITUS: ICD-10-CM

## 2024-01-29 DIAGNOSIS — E11.59 HYPERTENSION ASSOCIATED WITH TYPE 2 DIABETES MELLITUS: ICD-10-CM

## 2024-01-29 DIAGNOSIS — E11.9 TYPE 2 DIABETES MELLITUS WITHOUT COMPLICATION, WITHOUT LONG-TERM CURRENT USE OF INSULIN: Primary | ICD-10-CM

## 2024-01-29 DIAGNOSIS — E11.69 HYPERLIPIDEMIA ASSOCIATED WITH TYPE 2 DIABETES MELLITUS: ICD-10-CM

## 2024-01-29 DIAGNOSIS — I70.0 ATHEROSCLEROSIS OF AORTA: ICD-10-CM

## 2024-01-29 PROCEDURE — 99999 PR PBB SHADOW E&M-EST. PATIENT-LVL V: CPT | Mod: PBBFAC,,, | Performed by: FAMILY MEDICINE

## 2024-01-29 PROCEDURE — 99215 OFFICE O/P EST HI 40 MIN: CPT | Mod: PBBFAC,PO | Performed by: FAMILY MEDICINE

## 2024-01-29 PROCEDURE — 99214 OFFICE O/P EST MOD 30 MIN: CPT | Mod: S$PBB,,, | Performed by: FAMILY MEDICINE

## 2024-01-29 RX ORDER — SEMAGLUTIDE 0.68 MG/ML
0.5 INJECTION, SOLUTION SUBCUTANEOUS
Qty: 1 EACH | Refills: 5 | Status: SHIPPED | OUTPATIENT
Start: 2024-01-29 | End: 2024-02-13 | Stop reason: SDUPTHER

## 2024-01-29 RX ORDER — DOXEPIN HYDROCHLORIDE 10 MG/1
10 CAPSULE ORAL NIGHTLY
Qty: 30 CAPSULE | Refills: 5 | Status: SHIPPED | OUTPATIENT
Start: 2024-01-29 | End: 2025-01-28

## 2024-01-29 RX ORDER — MELATONIN 5 MG
CAPSULE ORAL
COMMUNITY

## 2024-01-29 RX ORDER — PREGABALIN 75 MG/1
75 CAPSULE ORAL DAILY
COMMUNITY

## 2024-01-29 RX ORDER — MELATONIN 3 MG
3 CAPSULE ORAL DAILY
COMMUNITY

## 2024-01-31 ENCOUNTER — TELEPHONE (OUTPATIENT)
Dept: INTERNAL MEDICINE | Facility: CLINIC | Age: 74
End: 2024-01-31
Payer: OTHER GOVERNMENT

## 2024-02-01 ENCOUNTER — PATIENT OUTREACH (OUTPATIENT)
Dept: ADMINISTRATIVE | Facility: HOSPITAL | Age: 74
End: 2024-02-01
Payer: OTHER GOVERNMENT

## 2024-02-12 DIAGNOSIS — I25.10 CAD S/P PERCUTANEOUS CORONARY ANGIOPLASTY: Primary | ICD-10-CM

## 2024-02-12 DIAGNOSIS — Z98.61 CAD S/P PERCUTANEOUS CORONARY ANGIOPLASTY: Primary | ICD-10-CM

## 2024-02-13 ENCOUNTER — OFFICE VISIT (OUTPATIENT)
Dept: CARDIOLOGY | Facility: CLINIC | Age: 74
End: 2024-02-13
Payer: MEDICARE

## 2024-02-13 ENCOUNTER — HOSPITAL ENCOUNTER (OUTPATIENT)
Dept: CARDIOLOGY | Facility: HOSPITAL | Age: 74
Discharge: HOME | End: 2024-02-13
Attending: INTERNAL MEDICINE
Payer: MEDICARE

## 2024-02-13 VITALS
HEART RATE: 60 BPM | OXYGEN SATURATION: 98 % | DIASTOLIC BLOOD PRESSURE: 72 MMHG | WEIGHT: 246.06 LBS | SYSTOLIC BLOOD PRESSURE: 110 MMHG | BODY MASS INDEX: 32.46 KG/M2

## 2024-02-13 DIAGNOSIS — Z98.61 CAD S/P PERCUTANEOUS CORONARY ANGIOPLASTY: ICD-10-CM

## 2024-02-13 DIAGNOSIS — I25.10 CAD S/P PERCUTANEOUS CORONARY ANGIOPLASTY: Primary | ICD-10-CM

## 2024-02-13 DIAGNOSIS — Z98.61 CAD S/P PERCUTANEOUS CORONARY ANGIOPLASTY: Primary | ICD-10-CM

## 2024-02-13 DIAGNOSIS — E66.9 OBESITY (BMI 30-39.9): ICD-10-CM

## 2024-02-13 DIAGNOSIS — R00.1 SYMPTOMATIC BRADYCARDIA: ICD-10-CM

## 2024-02-13 DIAGNOSIS — Z95.0 CARDIAC PACEMAKER IN SITU: ICD-10-CM

## 2024-02-13 DIAGNOSIS — E78.5 HYPERLIPIDEMIA ASSOCIATED WITH TYPE 2 DIABETES MELLITUS: ICD-10-CM

## 2024-02-13 DIAGNOSIS — I45.10 RBBB: ICD-10-CM

## 2024-02-13 DIAGNOSIS — I49.5 SINUS NODE DYSFUNCTION: ICD-10-CM

## 2024-02-13 DIAGNOSIS — I25.10 CAD S/P PERCUTANEOUS CORONARY ANGIOPLASTY: ICD-10-CM

## 2024-02-13 DIAGNOSIS — I48.0 PAF (PAROXYSMAL ATRIAL FIBRILLATION): ICD-10-CM

## 2024-02-13 DIAGNOSIS — Z95.0 PACEMAKER: ICD-10-CM

## 2024-02-13 DIAGNOSIS — E11.9 TYPE 2 DIABETES MELLITUS WITHOUT COMPLICATION, WITHOUT LONG-TERM CURRENT USE OF INSULIN: ICD-10-CM

## 2024-02-13 DIAGNOSIS — G47.33 OSA ON CPAP: ICD-10-CM

## 2024-02-13 DIAGNOSIS — E11.69 HYPERLIPIDEMIA ASSOCIATED WITH TYPE 2 DIABETES MELLITUS: ICD-10-CM

## 2024-02-13 PROCEDURE — 93280 PM DEVICE PROGR EVAL DUAL: CPT | Mod: 26,,, | Performed by: INTERNAL MEDICINE

## 2024-02-13 PROCEDURE — 99214 OFFICE O/P EST MOD 30 MIN: CPT | Mod: S$PBB,,, | Performed by: NURSE PRACTITIONER

## 2024-02-13 PROCEDURE — 99999 PR PBB SHADOW E&M-EST. PATIENT-LVL IV: CPT | Mod: PBBFAC,,, | Performed by: NURSE PRACTITIONER

## 2024-02-13 PROCEDURE — 93280 PM DEVICE PROGR EVAL DUAL: CPT

## 2024-02-13 PROCEDURE — 99214 OFFICE O/P EST MOD 30 MIN: CPT | Mod: PBBFAC,25 | Performed by: NURSE PRACTITIONER

## 2024-02-13 RX ORDER — SEMAGLUTIDE 0.68 MG/ML
0.5 INJECTION, SOLUTION SUBCUTANEOUS
Qty: 1 EACH | Refills: 5 | Status: SHIPPED | OUTPATIENT
Start: 2024-02-13

## 2024-02-13 NOTE — PROGRESS NOTES
Subjective:   Patient ID:  Cole Doan is a 73 y.o. male who presents for evaluation of No chief complaint on file.      HPI    Cole Doan is a 73 year old male who presents to clinic for 6 month follow up with device check.     His current medical conditions include HTN, HLP, SSS s/p PPM (ABT), SATYA on CPAP, CAD s/p PTCA, DM Type II, obesity, PAF on Pradaxa.     Denies chest pain or anginal equivalents. No shortness of breath, HERRON or palpitations. Denies orthopnea, PND or abdominal bloating. Reports regular walking without any issues lately. NO leg swelling or claudications. No recent falls, syncope or near syncopal events. Reports compliance with medications and dietary restrictions. NO CNS complaints to suggest TIA or CVA today. No signs of abnormal bleeding on pradaxa.     Using cpap regularly.     Trouble with Ozempic Rx for diabetes Rx.     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    Obesity 5/15/2014    SATYA on CPAP     Pacemaker     PAF (paroxysmal atrial fibrillation) 2/13/2024    Pancreatic cyst     Pneumonia     PTSD (post-traumatic stress disorder) 5/15/2014    RBBB 5/15/2014    S/P PTCA (percutaneous transluminal coronary angioplasty) 5/15/2014    Type 2 diabetes mellitus without complication 9/28/2015       Past Surgical History:   Procedure Laterality Date    APPENDECTOMY      at age 14    ATRIAL CARDIAC PACEMAKER INSERTION      COLONOSCOPY Left 4/27/2018    Procedure: COLONOSCOPY;  Surgeon: Artem Medeiros MD;  Location: [facility];  Service: Endoscopy;  Laterality: Left;    CORONARY ANGIOPLASTY WITH STENT PLACEMENT      1990, 2008, 2012     ENDOSCOPIC ULTRASOUND OF UPPER GASTROINTESTINAL TRACT N/A 2021    Procedure: ULTRASOUND, UPPER GI TRACT, ENDOSCOPIC;  Surgeon: Popeye Terrell MD;  Location: [facility] ENDO;  Service: Endoscopy;  Laterality: N/A;  upper and linear    ESOPHAGOGASTRODUODENOSCOPY N/A 2018    Procedure: ESOPHAGOGASTRODUODENOSCOPY (EGD);  Surgeon: Mario Cesar MD;  Location: [facility] ENDO;  Service: General;  Laterality: N/A;    ROBOT-ASSISTED LAPAROSCOPIC SLEEVE GASTRECTOMY USING DA LURDES XI N/A 2018    Procedure: XI ROBOTIC SLEEVE GASTRECTOMY;  Surgeon: Mario Cesar MD;  Location: [facility] OR;  Service: General;  Laterality: N/A;    VASECTOMY         Social History     Tobacco Use    Smoking status: Former     Current packs/day: 0.00     Average packs/day: 1 pack/day for 45.0 years (45.0 ttl pk-yrs)     Types: Cigarettes     Start date:      Quit date: 2009     Years since quittin.1    Smokeless tobacco: Never   Substance Use Topics    Alcohol use: No    Drug use: No       Family History   Problem Relation Age of Onset    Cataracts Father     Heart disease Father     Hypertension Father     Heart disease Mother     Hypertension Mother     Colon cancer Neg Hx        Wt Readings from Last 3 Encounters:   24 111.6 kg (246 lb 0.5 oz)   24 109.7 kg (241 lb 13.5 oz)   23 108.8 kg (239 lb 13.8 oz)     Temp Readings from Last 3 Encounters:   24 96.4 °F (35.8 °C) (Tympanic)   23 97.2 °F (36.2 °C) (Tympanic)   22 98.3 °F (36.8 °C) (Tympanic)     BP Readings from Last 3 Encounters:   24 110/72   24 118/78   23 120/84     Pulse Readings from Last 3 Encounters:   24 60   24 60   23 77       Current Outpatient Medications on File Prior to Visit   Medication Sig Dispense Refill    ALBUTEROL INHL Inhale into the lungs as needed.      amLODIPine (NORVASC) 10 MG tablet Take 10 mg by mouth once daily.      buPROPion (WELLBUTRIN SR) 200 MG TbSR Take 200 mg  "by mouth once daily. Takes 300mg daily      calcium citrate-vitamin D3 315-200 mg (CITRACAL+D) 315 mg-5 mcg (200 unit) per tablet TAKE ONE TABLET BY MOUTH TWICE A DAY FOR SUPPLEMENTATION      calcium-vitamin D3 (OS-ARVIND 500 + D3) 500 mg-5 mcg (200 unit) per tablet Take 1 tablet by mouth 2 (two) times daily with meals.      cetirizine (ZYRTEC) 10 MG tablet Take 10 mg by mouth as needed.      cyanocobalamin (VITAMIN B-12) 1000 MCG tablet Take 100 mcg by mouth once daily.      dabigatran etexilate (PRADAXA) 150 mg Cap Take 150 mg by mouth once daily. "Do NOT break, chew, or open capsules."   Takes 2 caps daily      diclofenac (VOLTAREN) 0.1 % ophthalmic solution 1 drop 4 (four) times daily.      doxepin (SINEQUAN) 10 MG capsule Take 1 capsule (10 mg total) by mouth every evening. 30 capsule 5    doxycycline (PERIOSTAT) 20 MG tablet Take 20 mg by mouth once daily. 2 weeks on and 2 [facility] off [facility])      ezetimibe (ZETIA) 10 mg tablet TAKE ONE TABLET BY MOUTH EVERY DAY TO LOWER CHOLESTEROL      fluticasone propionate (FLONASE) 50 mcg/actuation nasal spray 1 spray by Each Nostril route once daily.      HYDROcodone-acetaminophen (NORCO) 7.5-325 mg per tablet Take 1 tablet by mouth every 6 (six) hours as needed.       mg tablet Take 800 mg by mouth.      ketotifen (ZADITOR) 0.025 % (0.035 %) ophthalmic solution 1 drop 2 (two) times daily.      melatonin 3 mg Cap Take 3 mg by mouth Daily.      metoprolol succinate (TOPROL-XL) 100 MG 24 hr tablet Take 100 mg by mouth once daily. Takes 200mg daily      metronidazole 0.75% (METROCREAM) 0.75 % Crea Apply topically 2 (two) times daily.      multivitamin (MULTIPLE VITAMIN ORAL) Take by mouth.      omeprazole (PRILOSEC) 20 MG capsule Take 20 mg by mouth once daily.      pregabalin (LYRICA) 75 MG capsule Take 75 mg by mouth Daily.      pyridoxine, vitamin B6, (B-6) 100 MG Tab TAKE ONE TABLET BY MOUTH ONCE DAILY AS A VITAMIN SUPPLEMENT      ranolazine (RANEXA) 1,000 mg " Tb12 Take 1,000 mg by mouth 2 (two) times daily. Takes daily      rosuvastatin (CRESTOR) 40 MG Tab Take 40 mg by mouth every evening.      tadalafiL (CIALIS) 5 MG tablet Take 5 mg by mouth once daily.      thiamine (VITAMIN B-1) 50 MG tablet Take 50 mg by mouth once daily.      tretinoin (RETIN-A) 0.1 % cream Apply topically.      vitamin D 1000 units Tab Take 2,000 Units by mouth once daily.      white petrolatum 43 % Oint Apply topically.      [DISCONTINUED] semaglutide (OZEMPIC) 0.25 mg or 0.5 mg (2 mg/3 mL) pen injector Inject 0.5 mg into the skin every 7 days. 1 each 5    furosemide (LASIX) 40 MG tablet Takes one-half tablet by mouth daily (Patient not taking: Reported on 1/29/2024) 30 tablet 1    melatonin 5 mg Cap Take by mouth.      nitroGLYCERIN (NITROSTAT) 0.4 MG SL tablet Place 1 tablet (0.4 mg total) under the tongue every 5 (five) minutes as needed for Chest pain. (Patient not taking: Reported on 1/29/2024) 60 tablet 12    sildenafil (VIAGRA) 100 MG tablet Take 1 tablet (100 mg total) by mouth daily as needed for Erectile Dysfunction. (Patient not taking: Reported on 7/18/2022) 6 tablet 3    tamsulosin (FLOMAX) 0.4 mg Cap Take 1 capsule (0.4 mg total) by mouth once daily. 30 capsule 11     No current facility-administered medications on file prior to visit.       No cardiac monitor results found for the past 12 months    No results found for this or any previous visit.    No results found for this or any previous visit.        Results for orders placed or performed during the hospital encounter of 11/15/22   EKG 12-lead    Collection Time: 11/15/22  2:18 PM    Narrative    Test Reason : I25.10,Z98.61,    Vent. Rate : 060 BPM     Atrial Rate : 060 BPM     P-R Int : 194 ms          QRS Dur : 140 ms      QT Int : 430 ms       P-R-T Axes : 053 015 025 degrees     QTc Int : 430 ms    Atrial-paced rhythm  Right bundle branch block  Abnormal ECG  When compared with ECG of 19-JUL-2021 16:21,  No significant  change was found  Confirmed by MD LIAN, RIVKA ([id number] on 11/15/2022 9:10:28 PM    Referred By: DANYA ESRNA           Confirmed By:RIVKA BEAL MD         Review of Systems   Constitutional: Positive for malaise/fatigue.   HENT:  Negative for hearing loss and hoarse voice.    Eyes:  Negative for blurred vision and visual disturbance.   Cardiovascular:  Negative for chest pain, claudication, dyspnea on exertion, irregular heartbeat, leg swelling, near-syncope, orthopnea, palpitations, paroxysmal nocturnal dyspnea and syncope.   Respiratory:  Negative for cough, hemoptysis, shortness of breath, sleep disturbances due to breathing, snoring and wheezing.    Endocrine: Negative for cold intolerance and heat intolerance.   Hematologic/Lymphatic: Does not bruise/bleed easily.   Skin:  Negative for color change, dry skin and nail changes.   Musculoskeletal:  Positive for arthritis and back pain. Negative for joint pain and myalgias.   Gastrointestinal:  Negative for bloating, abdominal pain, constipation, nausea and vomiting.   Genitourinary:  Negative for dysuria, flank pain, hematuria and hesitancy.   Neurological:  Negative for headaches, light-headedness, loss of balance, numbness, paresthesias and weakness.   Psychiatric/Behavioral:  Negative for altered mental status.    Allergic/Immunologic: Negative for environmental allergies.         Objective:/72 (BP Location: Right arm, Patient Position: Sitting, BP Method: Small (Automatic))   Pulse 60   Wt 111.6 kg (246 lb 0.5 oz)   SpO2 98%   BMI 32.46 kg/m²      Physical Exam  Vitals and nursing note reviewed.   Constitutional:       General: He is not in acute distress.     Appearance: Normal appearance. He is well-developed. He is obese. He is not ill-appearing.   HENT:      Head: Normocephalic and atraumatic.      Nose: Nose normal.      Mouth/Throat:      Mouth: Mucous membranes are moist.   Eyes:      Pupils: Pupils are equal, round, and reactive to light.   Neck:       Thyroid: No thyromegaly.      Vascular: No JVD.      Trachea: No tracheal deviation.   Cardiovascular:      Rate and Rhythm: Normal rate and regular rhythm.      Chest Wall: PMI is not displaced.      Pulses: Intact distal pulses.           Radial pulses are 2+ on the right side and 2+ on the left side.        Dorsalis pedis pulses are 2+ on the right side and 2+ on the left side.      Heart sounds: S1 normal and S2 normal. Heart sounds not distant. No murmur heard.  Pulmonary:      Effort: Pulmonary effort is normal. No respiratory distress.      Breath sounds: Normal breath sounds. No wheezing.   Abdominal:      General: Bowel sounds are normal. There is no distension.      Palpations: Abdomen is soft.      Tenderness: There is no abdominal tenderness.   Musculoskeletal:         General: No swelling. Normal range of motion.      Cervical back: Full passive range of motion without pain, normal range of motion and neck supple.      Right lower leg: No edema.      Left lower leg: No edema.      Right ankle: No swelling.      Left ankle: No swelling.   Skin:     General: Skin is warm and dry.      Capillary Refill: Capillary refill takes less than 2 seconds.      Nails: There is no clubbing.   Neurological:      General: No focal deficit present.      Mental Status: He is alert and oriented to person, place, and time.      Motor: No weakness.   Psychiatric:         Speech: Speech normal.         Behavior: Behavior normal.         Thought Content: Thought content normal.         Judgment: Judgment normal.         Lab Results   Component Value Date    CHOL 163 01/08/2024    CHOL 142 07/18/2023    CHOL 202 (H) 06/03/2022     Lab Results   Component Value Date    HDL 49 01/08/2024    HDL 53 07/18/2023    HDL 35 (L) 06/03/2022     Lab Results   Component Value Date    LDLCALC 84 01/08/2024    LDLCALC 68.6 07/18/2023    LDLCALC 145.2 06/03/2022     Lab Results   Component Value Date    TRIG 150 01/08/2024    TRIG 102  07/18/2023    TRIG 109 06/03/2022     Lab Results   Component Value Date    CHOLHDL 37.3 07/18/2023    CHOLHDL 17.3 (L) 06/03/2022    CHOLHDL 40.5 08/27/2021       Chemistry        Component Value Date/Time     07/18/2023 1051    K 4.8 07/18/2023 1051     07/18/2023 1051    CO2 26 07/18/2023 1051    BUN 16 07/18/2023 1051    CREATININE 0.8 07/18/2023 1051    GLU 87 07/18/2023 1051        Component Value Date/Time    CALCIUM 9.9 07/18/2023 1051    ALKPHOS 54 (L) 07/18/2023 1051    AST 29 07/18/2023 1051    ALT 34 07/18/2023 1051    BILITOT 0.9 07/18/2023 1051    ESTGFRAFRICA >60.0 06/03/2022 0911    EGFRNONAA >60.0 06/03/2022 0911          Lab Results   Component Value Date    TSH 2.29 10/26/2021     Lab Results   Component Value Date    INR 1.1 09/30/2018    INR 1.0 09/28/2018    INR 1.0 08/03/2016     Lab Results   Component Value Date    WBC 5.44 07/18/2023    HGB 15.1 07/18/2023    HCT 46.6 07/18/2023    MCV 97 07/18/2023     (L) 07/18/2023          Assessment:      1. CAD S/P percutaneous coronary angioplasty    2. Type 2 diabetes mellitus without complication, without long-term current use of insulin    3. Pacemaker    4. PAF (paroxysmal atrial fibrillation)    5. Obesity (BMI 30-39.9)    6. SATYA on CPAP    7. RBBB    8. Hyperlipidemia associated with type 2 diabetes mellitus        Plan:     Continue pradaxa for cardio-embolic protection  Continue all other CV meds  Continue device monitoring and annual in office interrogations  Dash diet 2 gm sodium restriction  Diabetes Rx per PCP  RTC in 6 months with Dr Jimenez.     ODRETHA Sampson  [facility]

## 2024-02-14 ENCOUNTER — PATIENT MESSAGE (OUTPATIENT)
Dept: CARDIOLOGY | Facility: CLINIC | Age: 74
End: 2024-02-14
Payer: OTHER GOVERNMENT

## 2024-02-27 ENCOUNTER — CLINICAL SUPPORT (OUTPATIENT)
Dept: CARDIOLOGY | Facility: HOSPITAL | Age: 74
End: 2024-02-27

## 2024-02-27 ENCOUNTER — CLINICAL SUPPORT (OUTPATIENT)
Dept: CARDIOLOGY | Facility: HOSPITAL | Age: 74
End: 2024-02-27
Attending: INTERNAL MEDICINE
Payer: OTHER GOVERNMENT

## 2024-02-27 DIAGNOSIS — Z95.0 PRESENCE OF CARDIAC PACEMAKER: ICD-10-CM

## 2024-02-27 DIAGNOSIS — I49.5 SICK SINUS SYNDROME: ICD-10-CM

## 2024-02-27 PROCEDURE — 93296 REM INTERROG EVL PM/IDS: CPT | Performed by: INTERNAL MEDICINE

## 2024-02-27 PROCEDURE — 93294 REM INTERROG EVL PM/LDLS PM: CPT | Mod: ,,, | Performed by: INTERNAL MEDICINE

## 2024-02-29 ENCOUNTER — OFFICE VISIT (OUTPATIENT)
Dept: OPHTHALMOLOGY | Facility: CLINIC | Age: 74
End: 2024-02-29
Payer: OTHER GOVERNMENT

## 2024-02-29 ENCOUNTER — DOCUMENTATION ONLY (OUTPATIENT)
Dept: OPHTHALMOLOGY | Facility: CLINIC | Age: 74
End: 2024-02-29

## 2024-02-29 DIAGNOSIS — H25.12 NUCLEAR SCLEROSIS OF LEFT EYE: ICD-10-CM

## 2024-02-29 DIAGNOSIS — H25.11 NUCLEAR SCLEROSIS OF RIGHT EYE: Primary | ICD-10-CM

## 2024-02-29 DIAGNOSIS — E11.9 TYPE 2 DIABETES MELLITUS WITHOUT COMPLICATION, WITHOUT LONG-TERM CURRENT USE OF INSULIN: ICD-10-CM

## 2024-02-29 DIAGNOSIS — H43.811 PVD (POSTERIOR VITREOUS DETACHMENT), RIGHT EYE: ICD-10-CM

## 2024-02-29 DIAGNOSIS — Z86.69 HISTORY OF RETINAL TEAR: ICD-10-CM

## 2024-02-29 DIAGNOSIS — H18.513 FUCHS' CORNEAL DYSTROPHY OF BOTH EYES: ICD-10-CM

## 2024-02-29 PROCEDURE — 92136 OPHTHALMIC BIOMETRY: CPT | Mod: PBBFAC,LT | Performed by: STUDENT IN AN ORGANIZED HEALTH CARE EDUCATION/TRAINING PROGRAM

## 2024-02-29 PROCEDURE — 92025 CPTRIZED CORNEAL TOPOGRAPHY: CPT | Mod: PBBFAC,LT | Performed by: STUDENT IN AN ORGANIZED HEALTH CARE EDUCATION/TRAINING PROGRAM

## 2024-02-29 PROCEDURE — 99999 PR PBB SHADOW E&M-EST. PATIENT-LVL IV: CPT | Mod: PBBFAC,,, | Performed by: STUDENT IN AN ORGANIZED HEALTH CARE EDUCATION/TRAINING PROGRAM

## 2024-02-29 PROCEDURE — 99204 OFFICE O/P NEW MOD 45 MIN: CPT | Mod: S$PBB,,, | Performed by: STUDENT IN AN ORGANIZED HEALTH CARE EDUCATION/TRAINING PROGRAM

## 2024-02-29 PROCEDURE — 99214 OFFICE O/P EST MOD 30 MIN: CPT | Mod: PBBFAC,25 | Performed by: STUDENT IN AN ORGANIZED HEALTH CARE EDUCATION/TRAINING PROGRAM

## 2024-02-29 RX ORDER — PREDNISOLONE ACETATE 10 MG/ML
1 SUSPENSION/ DROPS OPHTHALMIC EVERY 4 HOURS
Qty: 5 ML | Refills: 1 | Status: SHIPPED | OUTPATIENT
Start: 2024-02-29 | End: 2024-03-21 | Stop reason: SDUPTHER

## 2024-02-29 NOTE — PROGRESS NOTES
Short Stay Record    Diagnosis: Nuclear Sclerotic Cataract left    CC: Blurry Vision     HPI:  Cole Doan is a 73 y.o. male who presents for evaluation prior to ophthalmic surgery. No current complaints.     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    Obesity 5/15/2014    SATYA on CPAP     Pacemaker     PAF (paroxysmal atrial fibrillation) 2/13/2024    Pancreatic cyst     Pneumonia     PTSD (post-traumatic stress disorder) 5/15/2014    RBBB 5/15/2014    S/P PTCA (percutaneous transluminal coronary angioplasty) 5/15/2014    Type 2 diabetes mellitus without complication 9/28/2015     Past Surgical History:   Procedure Laterality Date    APPENDECTOMY      at age 14    ATRIAL CARDIAC PACEMAKER INSERTION      COLONOSCOPY Left 4/27/2018    Procedure: COLONOSCOPY;  Surgeon: Artem Medeiros MD;  Location: [facility] ENDO;  Service: Endoscopy;  Laterality: Left;    CORONARY ANGIOPLASTY WITH STENT PLACEMENT      1990, 2008, 2012    ENDOSCOPIC ULTRASOUND OF UPPER GASTROINTESTINAL TRACT N/A 7/12/2021    Procedure: ULTRASOUND, UPPER GI TRACT, ENDOSCOPIC;  Surgeon: Popeye Terrell MD;  Location: [facility];  Service: Endoscopy;  Laterality: N/A;  upper and linear    ESOPHAGOGASTRODUODENOSCOPY N/A 8/16/2018    Procedure: ESOPHAGOGASTRODUODENOSCOPY (EGD);  Surgeon: Mario Cesar MD;  Location: [facility] ENDO;  Service: General;  Laterality: N/A;    ROBOT-ASSISTED LAPAROSCOPIC SLEEVE GASTRECTOMY USING DA LURDES XI N/A 9/27/2018    Procedure: XI ROBOTIC SLEEVE GASTRECTOMY;  Surgeon: Mario Cesar MD;  Location: [facility] OR;  Service: General;  Laterality: N/A;    VASECTOMY       Social  "History     Tobacco Use    Smoking status: Former     Current packs/day: 0.00     Average packs/day: 1 pack/day for 45.0 years (45.0 ttl pk-yrs)     Types: Cigarettes     Start date:      Quit date: 2009     Years since quittin.1    Smokeless tobacco: Never   Substance Use Topics    Alcohol use: No     Family History   Problem Relation Age of Onset    Cataracts Father     Heart disease Father     Hypertension Father     Heart disease Mother     Hypertension Mother     Colon cancer Neg Hx      Review of patient's allergies indicates:   Allergen Reactions    Iodinated contrast media      States, "My arteries started shutting down on me" pt states only to iv dye- was specifically told that oral dye does not have the same reaction         Current Outpatient Medications:     ALBUTEROL INHL, Inhale into the lungs as needed., Disp: , Rfl:     amLODIPine (NORVASC) 10 MG tablet, Take 10 mg by mouth once daily., Disp: , Rfl:     buPROPion (WELLBUTRIN SR) 200 MG TbSR, Take 200 mg by mouth once daily. Takes 300mg daily, Disp: , Rfl:     calcium citrate-vitamin D3 315-200 mg (CITRACAL+D) 315 mg-5 mcg (200 unit) per tablet, TAKE ONE TABLET BY MOUTH TWICE A DAY FOR SUPPLEMENTATION, Disp: , Rfl:     calcium-vitamin D3 (OS-ARVIND 500 + D3) 500 mg-5 mcg (200 unit) per tablet, Take 1 tablet by mouth 2 (two) times daily with meals., Disp: , Rfl:     cetirizine (ZYRTEC) 10 MG tablet, Take 10 mg by mouth as needed., Disp: , Rfl:     cyanocobalamin (VITAMIN B-12) 1000 MCG tablet, Take 100 mcg by mouth once daily., Disp: , Rfl:     dabigatran etexilate (PRADAXA) 150 mg Cap, Take 150 mg by mouth once daily. "Do NOT break, chew, or open capsules."  Takes 2 caps daily, Disp: , Rfl:     diclofenac (VOLTAREN) 0.1 % ophthalmic solution, 1 drop 4 (four) times daily., Disp: , Rfl:     doxepin (SINEQUAN) 10 MG capsule, Take 1 capsule (10 mg total) by mouth every evening., Disp: 30 capsule, Rfl: 5    doxycycline (PERIOSTAT) 20 MG tablet, " Take 20 mg by mouth once daily. 2 weeks on and 2 weeks off ([facility]), Disp: , Rfl:     ezetimibe (ZETIA) 10 mg tablet, TAKE ONE TABLET BY MOUTH EVERY DAY TO LOWER CHOLESTEROL, Disp: , Rfl:     fluticasone propionate (FLONASE) 50 mcg/actuation nasal spray, 1 spray by Each Nostril route once daily., Disp: , Rfl:     furosemide (LASIX) 40 MG tablet, Takes one-half tablet by mouth daily (Patient not taking: Reported on 1/29/2024), Disp: 30 tablet, Rfl: 1    HYDROcodone-acetaminophen (NORCO) 7.5-325 mg per tablet, Take 1 tablet by mouth every 6 (six) hours as needed., Disp: , Rfl:      mg tablet, Take 800 mg by mouth., Disp: , Rfl:     ketotifen (ZADITOR) 0.025 % (0.035 %) ophthalmic solution, 1 drop 2 (two) times daily., Disp: , Rfl:     melatonin 3 mg Cap, Take 3 mg by mouth Daily., Disp: , Rfl:     melatonin 5 mg Cap, Take by mouth., Disp: , Rfl:     metoprolol succinate (TOPROL-XL) 100 MG 24 hr tablet, Take 100 mg by mouth once daily. Takes 200mg daily, Disp: , Rfl:     metronidazole 0.75% (METROCREAM) 0.75 % Crea, Apply topically 2 (two) times daily., Disp: , Rfl:     multivitamin (MULTIPLE VITAMIN ORAL), Take by mouth., Disp: , Rfl:     nitroGLYCERIN (NITROSTAT) 0.4 MG SL tablet, Place 1 tablet (0.4 mg total) under the tongue every 5 (five) minutes as needed for Chest pain. (Patient not taking: Reported on 1/29/2024), Disp: 60 tablet, Rfl: 12    omeprazole (PRILOSEC) 20 MG capsule, Take 20 mg by mouth once daily., Disp: , Rfl:     prednisoLONE acetate (PRED FORTE) 1 % DrpS, Place 1 drop into the left eye every 4 (four) hours., Disp: 5 mL, Rfl: 1    pregabalin (LYRICA) 75 MG capsule, Take 75 mg by mouth Daily., Disp: , Rfl:     pyridoxine, vitamin B6, (B-6) 100 MG Tab, TAKE ONE TABLET BY MOUTH ONCE DAILY AS A VITAMIN SUPPLEMENT, Disp: , Rfl:     ranolazine (RANEXA) 1,000 mg Tb12, Take 1,000 mg by mouth 2 (two) times daily. Takes daily, Disp: , Rfl:     rosuvastatin (CRESTOR) 40 MG Tab, Take 40 mg by mouth  every evening., Disp: , Rfl:     semaglutide (OZEMPIC) 0.25 mg or 0.5 mg (2 mg/3 mL) pen injector, Inject 0.5 mg into the skin every 7 days., Disp: 1 each, Rfl: 5    sildenafil (VIAGRA) 100 MG tablet, Take 1 tablet (100 mg total) by mouth daily as needed for Erectile Dysfunction. (Patient not taking: Reported on 7/18/2022), Disp: 6 tablet, Rfl: 3    tadalafiL (CIALIS) 5 MG tablet, Take 5 mg by mouth once daily., Disp: , Rfl:     tamsulosin (FLOMAX) 0.4 mg Cap, Take 1 capsule (0.4 mg total) by mouth once daily., Disp: 30 capsule, Rfl: 11    thiamine (VITAMIN B-1) 50 MG tablet, Take 50 mg by mouth once daily., Disp: , Rfl:     tretinoin (RETIN-A) 0.1 % cream, Apply topically., Disp: , Rfl:     vitamin D 1000 units Tab, Take 2,000 Units by mouth once daily., Disp: , Rfl:     white petrolatum 43 % Oint, Apply topically., Disp: , Rfl:     Review of Systems:  10 Pt ROS negative except as stated in HPI    Physical Exam:  General Appearance:    A&Ox3, no distress, appears stated age   Head:    Normocephalic, without obvious abnormality, atraumatic   Eyes:    PERRL, EOM's intact   Back:     Symmetric, no curvature   Lungs:     respirations unlabored   Chest Wall:    No tenderness or deformity    Heart:  Abdomen:  Extremities:  Skin:    S1 and S2 present    Soft, non-tender    Extremities normal, atraumatic    Skin color, texture, turgor normal     Patient is stable for ophthalmic surgery under local and MAC.       _

## 2024-02-29 NOTE — PROGRESS NOTES
HPI     Cataract     Additional comments: Pt co ou [facility] seeming to always be looking through a   cloud   No pain  Pt states he uses gtts from the [facility]   No fol   No floaters           Comments    Dm 2 controlled          Last edited by Juan Rangel on 2/29/2024  8:16 AM.            Assessment /Plan     For exam results, see Encounter Report.    Nuclear sclerosis of right eye- Follow    Nuclear sclerosis of left eye- Visually Significant Cataract OU  Patient reports decreased vision consistent with the clinical amount of lenticular opacity, which reaches the level of visual significance and affects activities of daily living including reading and glare. Risks, benefits, and alternatives to cataract surgery were discussed.  Discussion of risks included possibility of infection as well as permanent vision loss.The pt expressed a desire to proceed with surgery with the potential for some reasonable degree of visual improvement. Recommended regular use of artificial tears and good lid hygiene to optimize surgical outcome.     Discussed IOL options and refractive outcomes for this patient.    Phaco left eye,   Topical  Will aim for Monofocal - Distance IOL - [facility]    Intraop Kenalog 40: No    Post op gtts:   PF      Discussed that vision may be limited by:  Fuchs, + Flomax    Dilation: good  Alpha Blockers: none    SS record completed, IOL master reviewed, external referral completed.   Referral to [facility] for Ophthalmic [facility]  Prescriptions sent for preoperative medications  Explained that patient may need glasses after surgery.    RTC for POD#1      History of retinal tear- Good Barrier laser     Fuchs' corneal dystrophy of both eyes- ATs QID and lid hygiene w/ baby shampoo    PVD (posterior vitreous detachment), right eye- No tears or breaks were seen after careful retinal evaluation. Present >3 months per patient report.   RT/RD precautions    Discussed retinal detachment signs and symptoms  including flashes of lights, floaters, perceived curtains or veils. Advised to patient to monitor visual status including increase in flashes and floaters, or the development of visual field changes including curtain and /or veils. Advised patient to RTC urgently if these symptoms occur. Explained the need for follow up exams to the patient even if there are no changes in the symptoms.      Type 2 diabetes mellitus without complication, without long-term current use of insulin- last A1c 5.3   Strict BG control, f/u w/ PCP, and annual DFE  Stressed importance of DM control to preserve vision

## 2024-03-04 LAB
OHS CV AF BURDEN PERCENT: < 1
OHS CV DC REMOTE DEVICE TYPE: NORMAL
OHS CV RV PACING PERCENT: 1 %

## 2024-03-06 ENCOUNTER — OUTSIDE PLACE OF SERVICE (OUTPATIENT)
Dept: OPHTHALMOLOGY | Facility: CLINIC | Age: 74
End: 2024-03-06
Payer: OTHER GOVERNMENT

## 2024-03-06 PROCEDURE — 66984 XCAPSL CTRC RMVL W/O ECP: CPT | Mod: LT,,, | Performed by: STUDENT IN AN ORGANIZED HEALTH CARE EDUCATION/TRAINING PROGRAM

## 2024-03-07 ENCOUNTER — OFFICE VISIT (OUTPATIENT)
Dept: OPHTHALMOLOGY | Facility: CLINIC | Age: 74
End: 2024-03-07
Payer: OTHER GOVERNMENT

## 2024-03-07 DIAGNOSIS — Z98.890 POST-OPERATIVE STATE: Primary | ICD-10-CM

## 2024-03-07 DIAGNOSIS — Z96.1 PSEUDOPHAKIA OF LEFT EYE: ICD-10-CM

## 2024-03-07 PROCEDURE — 99024 POSTOP FOLLOW-UP VISIT: CPT | Mod: ,,, | Performed by: OPTOMETRIST

## 2024-03-07 PROCEDURE — 99213 OFFICE O/P EST LOW 20 MIN: CPT | Mod: PBBFAC | Performed by: OPTOMETRIST

## 2024-03-07 PROCEDURE — 99999 PR PBB SHADOW E&M-EST. PATIENT-LVL III: CPT | Mod: PBBFAC,,, | Performed by: OPTOMETRIST

## 2024-03-07 NOTE — PROGRESS NOTES
HPI     Post-op Evaluation            Comments: 1 day post op [facility]  Pt states [facility] is wonderful, no pain and is compliant with gtts         Last edited by Rox Gordon on 3/7/2024  7:48 AM.            Assessment /Plan     For exam results, see Encounter Report.    Post-operative state    Pseudophakia of left eye      PO Day 1 S/P Phaco/IOL  left eye  Doing well.    Continue Pred QID OS  Reinstructed in importance of absolute compliance with Post-OP instructions including medications, shield at bedtime, and limitation of activities.   Follow up appointments in approximately one and six weeks or call immediately for increased pain, redness or vision loss.

## 2024-03-13 ENCOUNTER — DOCUMENTATION ONLY (OUTPATIENT)
Dept: OPHTHALMOLOGY | Facility: CLINIC | Age: 74
End: 2024-03-13

## 2024-03-13 ENCOUNTER — OFFICE VISIT (OUTPATIENT)
Dept: OPHTHALMOLOGY | Facility: CLINIC | Age: 74
End: 2024-03-13
Payer: OTHER GOVERNMENT

## 2024-03-13 DIAGNOSIS — Z98.890 POST-OPERATIVE STATE: Primary | ICD-10-CM

## 2024-03-13 DIAGNOSIS — H25.11 NUCLEAR SCLEROSIS OF RIGHT EYE: ICD-10-CM

## 2024-03-13 DIAGNOSIS — Z98.42 CATARACT EXTRACTION STATUS OF EYE, LEFT: ICD-10-CM

## 2024-03-13 DIAGNOSIS — H43.813 PVD (POSTERIOR VITREOUS DETACHMENT), BOTH EYES: ICD-10-CM

## 2024-03-13 PROCEDURE — 99214 OFFICE O/P EST MOD 30 MIN: CPT | Mod: PBBFAC,25 | Performed by: STUDENT IN AN ORGANIZED HEALTH CARE EDUCATION/TRAINING PROGRAM

## 2024-03-13 PROCEDURE — 99999 PR PBB SHADOW E&M-EST. PATIENT-LVL IV: CPT | Mod: PBBFAC,,, | Performed by: STUDENT IN AN ORGANIZED HEALTH CARE EDUCATION/TRAINING PROGRAM

## 2024-03-13 PROCEDURE — 99024 POSTOP FOLLOW-UP VISIT: CPT | Mod: ,,, | Performed by: STUDENT IN AN ORGANIZED HEALTH CARE EDUCATION/TRAINING PROGRAM

## 2024-03-13 PROCEDURE — 92136 OPHTHALMIC BIOMETRY: CPT | Mod: PBBFAC,RT | Performed by: STUDENT IN AN ORGANIZED HEALTH CARE EDUCATION/TRAINING PROGRAM

## 2024-03-13 RX ORDER — PREDNISOLONE ACETATE 10 MG/ML
1 SUSPENSION/ DROPS OPHTHALMIC 4 TIMES DAILY
Qty: 5 ML | Refills: 1 | Status: SHIPPED | OUTPATIENT
Start: 2024-03-13

## 2024-03-13 NOTE — PROGRESS NOTES
HPI     Cataract     Additional comments: Pt states his vision is still blurry in his right   eye and he's ready to have the cataract removed.            Comments    Patient here today for POW#1 visit s/p CEIOL of the left eye. Patient   states vision is doing well and is using drops. Pt states it feels like   something is in his left eye.  No other ocular complaints.    Dm 2 controlled  PCIOL OS 03/06/2024  NS OD  PVD OD  Fuchs' OU    Pred QID OS             Last edited by Claire Castellanos on 3/13/2024  9:26 AM.            Assessment /Plan     For exam results, see Encounter Report.    Post-operative state  Cataract extraction status of eye, left- Impression/Plan  POW#1 S/P CEIOL OS : Doing well with no evidence of infection.     Trace Cell. PF Taper 4-3-2-1 then stop    Pt given and instructed in one week postop instructions. Can resume normal activitites and d/c eye shield. OTC reading glasses can be used until evaluated for final MRPamella       Nuclear sclerosis of right eye- Patient reports decreased vision in the fellow eye consistent with the clinical amount of lenticular opacity, which reaches the level of visual significance and affects activities of daily living including reading and glare. Risks, benefits, and alternatives to cataract surgery were discussed and pt desired to schedule cataract surgery. Pt was consented and the biometry and lens options were reviewed.     [place] right eye,   Topical  Will aim for Monofocal - Distance  TORIC VA BENEFIT IOL    Intraop Kenalog 40: No    Post op gtts:   PF      Discussed that vision may be limited by:  Astigmatism >1D, + Flomax    Dilation: good  Alpha Blockers: none    SS record completed, IOL master reviewed, external referral completed.   Referral to [facility] for Ophthalmic surgery  Prescriptions sent for preoperative medications  Explained that patient may need glasses after surgery.    RTC for POD#1      PVD (posterior vitreous detachment),  both eyes- No tears or breaks were seen after careful retinal evaluation. Present >3 months per patient report.   RT/RD precautions    Discussed retinal detachment signs and symptoms including flashes of lights, floaters, perceived curtains or veils. Advised to patient to monitor visual status including increase in flashes and floaters, or the development of visual field changes including curtain and /or veils. Advised patient to RTC urgently if these symptoms occur. Explained the need for follow up exams to the patient even if there are no changes in the symptoms.

## 2024-03-13 NOTE — PROGRESS NOTES
Short Stay Record    Diagnosis: Nuclear Sclerotic Cataract right    CC: Blurry Vision     HPI:  Cole Doan is a 73 y.o. male who presents for evaluation prior to ophthalmic surgery. No current complaints.     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    Obesity 05/15/2014    SATYA on CPAP     Pacemaker     PAF (paroxysmal atrial fibrillation) 02/13/2024    Pancreatic cyst     Pneumonia     PTSD (post-traumatic stress disorder) 05/15/2014    RBBB 05/15/2014    S/P PTCA (percutaneous transluminal coronary angioplasty) 05/15/2014    Type 2 diabetes mellitus without complication 09/28/2015     Past Surgical History:   Procedure Laterality Date    APPENDECTOMY      at age 14    ATRIAL CARDIAC PACEMAKER INSERTION      CATARACT EXTRACTION      COLONOSCOPY Left 04/27/2018    Procedure: COLONOSCOPY;  Surgeon: Artem Medeiros MD;  Location: [facility];  Service: Endoscopy;  Laterality: Left;    CORONARY ANGIOPLASTY WITH STENT PLACEMENT      1990, 2008, 2012    ENDOSCOPIC ULTRASOUND OF UPPER GASTROINTESTINAL TRACT N/A 07/12/2021    Procedure: ULTRASOUND, UPPER GI TRACT, ENDOSCOPIC;  Surgeon: Popeye Terrell MD;  Location: [facility];  Service: Endoscopy;  Laterality: N/A;  upper and linear    ESOPHAGOGASTRODUODENOSCOPY N/A 08/16/2018    Procedure: ESOPHAGOGASTRODUODENOSCOPY (EGD);  Surgeon: Mario Cesar MD;  Location: [facility];  Service: General;  Laterality: N/A;    ROBOT-ASSISTED LAPAROSCOPIC SLEEVE GASTRECTOMY USING DA LURDES XI N/A 09/27/2018    Procedure: XI ROBOTIC SLEEVE GASTRECTOMY;  Surgeon: Mario Cesar MD;  Location: [facility];  Service:  "General;  Laterality: N/A;    VASECTOMY       Social History     Tobacco Use    Smoking status: Former     Current packs/day: 0.00     Average packs/day: 1 pack/day for 45.0 years (45.0 ttl pk-yrs)     Types: Cigarettes     Start date:      Quit date: 2009     Years since quittin.2    Smokeless tobacco: Never   Substance Use Topics    Alcohol use: No     Family History   Problem Relation Age of Onset    Cataracts Father     Heart disease Father     Hypertension Father     Heart disease Mother     Hypertension Mother     Colon cancer Neg Hx      Review of patient's allergies indicates:   Allergen Reactions    Iodinated contrast media      States, "My arteries started shutting down on me" pt states only to iv dye- was specifically told that oral dye does not have the same reaction         Current Outpatient Medications:     ALBUTEROL INHL, Inhale into the lungs as needed., Disp: , Rfl:     amLODIPine (NORVASC) 10 MG tablet, Take 10 mg by mouth once daily., Disp: , Rfl:     buPROPion (WELLBUTRIN SR) 200 MG TbSR, Take 200 mg by mouth once daily. Takes 300mg daily, Disp: , Rfl:     calcium citrate-vitamin D3 315-200 mg (CITRACAL+D) 315 mg-5 mcg (200 unit) per tablet, TAKE ONE TABLET BY MOUTH TWICE A DAY FOR SUPPLEMENTATION, Disp: , Rfl:     calcium-vitamin D3 (OS-ARVIND 500 + D3) 500 mg-5 mcg (200 unit) per tablet, Take 1 tablet by mouth 2 (two) times daily with meals., Disp: , Rfl:     cetirizine (ZYRTEC) 10 MG tablet, Take 10 mg by mouth as needed., Disp: , Rfl:     cyanocobalamin (VITAMIN B-12) 1000 MCG tablet, Take 100 mcg by mouth once daily., Disp: , Rfl:     dabigatran etexilate (PRADAXA) 150 mg Cap, Take 150 mg by mouth once daily. "Do NOT break, chew, or open capsules."  Takes 2 caps daily, Disp: , Rfl:     diclofenac (VOLTAREN) 0.1 % ophthalmic solution, 1 drop 4 (four) times daily., Disp: , Rfl:     doxepin (SINEQUAN) 10 MG capsule, Take 1 capsule (10 mg total) by mouth every evening., Disp: 30 " capsule, Rfl: 5    doxycycline (PERIOSTAT) 20 MG tablet, Take 20 mg by mouth once daily. 2 weeks on and 2 weeks off ([facility]), Disp: , Rfl:     ezetimibe (ZETIA) 10 mg tablet, TAKE ONE TABLET BY MOUTH EVERY DAY TO LOWER CHOLESTEROL, Disp: , Rfl:     fluticasone propionate (FLONASE) 50 mcg/actuation nasal spray, 1 spray by Each Nostril route once daily., Disp: , Rfl:     furosemide (LASIX) 40 MG tablet, Takes one-half tablet by mouth daily, Disp: 30 tablet, Rfl: 1    HYDROcodone-acetaminophen (NORCO) 7.5-325 mg per tablet, Take 1 tablet by mouth every 6 (six) hours as needed., Disp: , Rfl:      mg tablet, Take 800 mg by mouth., Disp: , Rfl:     ketotifen (ZADITOR) 0.025 % (0.035 %) ophthalmic solution, 1 drop 2 (two) times daily., Disp: , Rfl:     melatonin 3 mg Cap, Take 3 mg by mouth Daily., Disp: , Rfl:     melatonin 5 mg Cap, Take by mouth., Disp: , Rfl:     metoprolol succinate (TOPROL-XL) 100 MG 24 hr tablet, Take 100 mg by mouth once daily. Takes 200mg daily, Disp: , Rfl:     metronidazole 0.75% (METROCREAM) 0.75 % Crea, Apply topically 2 (two) times daily., Disp: , Rfl:     multivitamin (MULTIPLE VITAMIN ORAL), Take by mouth., Disp: , Rfl:     nitroGLYCERIN (NITROSTAT) 0.4 MG SL tablet, Place 1 tablet (0.4 mg total) under the tongue every 5 (five) minutes as needed for Chest pain., Disp: 60 tablet, Rfl: 12    omeprazole (PRILOSEC) 20 MG capsule, Take 20 mg by mouth once daily., Disp: , Rfl:     prednisoLONE acetate (PRED FORTE) 1 % DrpS, Place 1 drop into the left eye every 4 (four) hours., Disp: 5 mL, Rfl: 1    prednisoLONE acetate (PRED FORTE) 1 % DrpS, Place 1 drop into the right eye 4 (four) times daily., Disp: 5 mL, Rfl: 1    pregabalin (LYRICA) 75 MG capsule, Take 75 mg by mouth Daily., Disp: , Rfl:     pyridoxine, vitamin B6, (B-6) 100 MG Tab, TAKE ONE TABLET BY MOUTH ONCE DAILY AS A VITAMIN SUPPLEMENT, Disp: , Rfl:     ranolazine (RANEXA) 1,000 mg Tb12, Take 1,000 mg by mouth 2 (two) times  daily. Takes daily, Disp: , Rfl:     rosuvastatin (CRESTOR) 40 MG Tab, Take 40 mg by mouth every evening., Disp: , Rfl:     semaglutide (OZEMPIC) 0.25 mg or 0.5 mg (2 mg/3 mL) pen injector, Inject 0.5 mg into the skin every 7 days., Disp: 1 each, Rfl: 5    sildenafil (VIAGRA) 100 MG tablet, Take 1 tablet (100 mg total) by mouth daily as needed for Erectile Dysfunction. (Patient not taking: Reported on 7/18/2022), Disp: 6 tablet, Rfl: 3    tadalafiL (CIALIS) 5 MG tablet, Take 5 mg by mouth once daily., Disp: , Rfl:     tamsulosin (FLOMAX) 0.4 mg Cap, Take 1 capsule (0.4 mg total) by mouth once daily., Disp: 30 capsule, Rfl: 11    thiamine (VITAMIN B-1) 50 MG tablet, Take 50 mg by mouth once daily., Disp: , Rfl:     tretinoin (RETIN-A) 0.1 % cream, Apply topically., Disp: , Rfl:     vitamin D 1000 units Tab, Take 2,000 Units by mouth once daily., Disp: , Rfl:     white petrolatum 43 % Oint, Apply topically., Disp: , Rfl:     Review of Systems:  10 Pt ROS negative except as stated in HPI    Physical Exam:  General Appearance:    A&Ox3, no distress, appears stated age   Head:    Normocephalic, without obvious abnormality, atraumatic   Eyes:    PERRL, EOM's intact   Back:     Symmetric, no curvature   Lungs:     respirations unlabored   Chest Wall:    No tenderness or deformity    Heart:  Abdomen:  Extremities:  Skin:    S1 and S2 present    Soft, non-tender    Extremities normal, atraumatic    Skin color, texture, turgor normal     Patient is stable for ophthalmic surgery under local and MAC.       _

## 2024-03-20 ENCOUNTER — OUTSIDE PLACE OF SERVICE (OUTPATIENT)
Dept: OPHTHALMOLOGY | Facility: CLINIC | Age: 74
End: 2024-03-20
Payer: OTHER GOVERNMENT

## 2024-03-20 PROCEDURE — 66984 XCAPSL CTRC RMVL W/O ECP: CPT | Mod: 79,RT,, | Performed by: STUDENT IN AN ORGANIZED HEALTH CARE EDUCATION/TRAINING PROGRAM

## 2024-03-21 ENCOUNTER — OFFICE VISIT (OUTPATIENT)
Dept: OPHTHALMOLOGY | Facility: CLINIC | Age: 74
End: 2024-03-21
Payer: OTHER GOVERNMENT

## 2024-03-21 DIAGNOSIS — H25.12 NUCLEAR SCLEROSIS OF LEFT EYE: ICD-10-CM

## 2024-03-21 DIAGNOSIS — Z98.890 POST-OPERATIVE STATE: ICD-10-CM

## 2024-03-21 DIAGNOSIS — Z98.41 CATARACT EXTRACTION STATUS OF EYE, RIGHT: Primary | ICD-10-CM

## 2024-03-21 PROCEDURE — 99024 POSTOP FOLLOW-UP VISIT: CPT | Mod: ,,, | Performed by: STUDENT IN AN ORGANIZED HEALTH CARE EDUCATION/TRAINING PROGRAM

## 2024-03-21 PROCEDURE — 99999 PR PBB SHADOW E&M-EST. PATIENT-LVL IV: CPT | Mod: PBBFAC,,, | Performed by: STUDENT IN AN ORGANIZED HEALTH CARE EDUCATION/TRAINING PROGRAM

## 2024-03-21 PROCEDURE — 99999PBSHW PR PBB SHADOW TECHNICAL ONLY FILED TO HB: Mod: PBBFAC,,,

## 2024-03-21 PROCEDURE — 99214 OFFICE O/P EST MOD 30 MIN: CPT | Mod: PBBFAC | Performed by: STUDENT IN AN ORGANIZED HEALTH CARE EDUCATION/TRAINING PROGRAM

## 2024-03-21 RX ORDER — PREDNISOLONE ACETATE 10 MG/ML
1 SUSPENSION/ DROPS OPHTHALMIC EVERY 4 HOURS
Qty: 5 ML | Refills: 1 | Status: SHIPPED | OUTPATIENT
Start: 2024-03-21 | End: 2025-03-21

## 2024-03-21 NOTE — PROGRESS NOTES
HPI     Post-op Evaluation     Additional comments: Pt reports for 1 day PCIOL OD. Denies any pain or   irritation. Va stable. 100% compliant with gtts.           Comments    Dm 2 controlled  PCIOL OS 03/06/2024  PCIOL OD 3/20/24  PVD OD  Fuchs' OU    Pred TID OS   Pred QID OD             Last edited by Markel Moss on 3/21/2024  4:03 PM.            Assessment /Plan     For exam results, see Encounter Report.    Cataract extraction status of eye, right  Post-operative state    POD#1 S/P CEIOL OD Doing well. Mild edema    Continue gtts to operative eye:  PF QID    Astigmatism corrected using a toric lens as [facility] benefit    Reinstructed in importance of absolute compliance with Post-OP instructions including medications, shield at bedtime, protective glasses during the day, and limitation of activities. Follow up appointments in approximately one and six weeks or call immediately for increased pain, redness or vision loss.     RTC 1 week. MOCT if PH worse than 20/25

## 2024-03-27 ENCOUNTER — OFFICE VISIT (OUTPATIENT)
Dept: OPHTHALMOLOGY | Facility: CLINIC | Age: 74
End: 2024-03-27
Payer: MEDICARE

## 2024-03-27 DIAGNOSIS — H18.513 FUCHS' CORNEAL DYSTROPHY OF BOTH EYES: ICD-10-CM

## 2024-03-27 DIAGNOSIS — Z86.69 HISTORY OF RETINAL TEAR: ICD-10-CM

## 2024-03-27 DIAGNOSIS — E11.9 TYPE 2 DIABETES MELLITUS WITHOUT COMPLICATION, WITHOUT LONG-TERM CURRENT USE OF INSULIN: ICD-10-CM

## 2024-03-27 DIAGNOSIS — Z98.41 CATARACT EXTRACTION STATUS OF EYE, RIGHT: ICD-10-CM

## 2024-03-27 DIAGNOSIS — Z98.890 POST-OPERATIVE STATE: Primary | ICD-10-CM

## 2024-03-27 DIAGNOSIS — Z98.42 CATARACT EXTRACTION STATUS OF EYE, LEFT: ICD-10-CM

## 2024-03-27 PROCEDURE — 99211 OFF/OP EST MAY X REQ PHY/QHP: CPT | Mod: PBBFAC | Performed by: STUDENT IN AN ORGANIZED HEALTH CARE EDUCATION/TRAINING PROGRAM

## 2024-03-27 PROCEDURE — 99024 POSTOP FOLLOW-UP VISIT: CPT | Mod: POP,,, | Performed by: STUDENT IN AN ORGANIZED HEALTH CARE EDUCATION/TRAINING PROGRAM

## 2024-03-27 PROCEDURE — 99999 PR PBB SHADOW E&M-EST. PATIENT-LVL I: CPT | Mod: PBBFAC,,, | Performed by: STUDENT IN AN ORGANIZED HEALTH CARE EDUCATION/TRAINING PROGRAM

## 2024-03-27 NOTE — PROGRESS NOTES
HPI    Patient here today for POW#1 visit s/p CEIOL of the right eye. Patient   states vision is doing well and is using drops. Denies any pain or   discomfort.  No other ocular complaints    Last edited by Zoraida Villegas MD on 3/27/2024  9:00 AM.            Assessment /Plan     For exam results, see Encounter Report.    Post-operative state  Cataract extraction status of eye, left  Cataract extraction status of eye, right  Impression/Plan  POW#1 S/P CEIOL OD : Doing well with no evidence of infection.     Trace Cell. PF Taper 4-3-2-1 then stop    Pt given and instructed in one week postop instructions. Can resume normal activitites and d/c eye shield. OTC reading glasses can be used until evaluated for final MR.     Fuchs' corneal dystrophy of both eyes  Monitor, NVS    History of retinal tear  Monitor    Type 2 diabetes mellitus without complication, without long-term current use of insulin  ATs QID and lid hygiene w/ baby shampoo  [place] 3 Fish Oils      RTC 3 mo w/ DNL

## 2024-04-03 ENCOUNTER — PATIENT MESSAGE (OUTPATIENT)
Dept: PULMONOLOGY | Facility: CLINIC | Age: 74
End: 2024-04-03
Payer: OTHER GOVERNMENT

## 2024-04-08 ENCOUNTER — OFFICE VISIT (OUTPATIENT)
Dept: URGENT CARE | Facility: CLINIC | Age: 74
End: 2024-04-08
Payer: MEDICARE

## 2024-04-08 VITALS
OXYGEN SATURATION: 95 % | BODY MASS INDEX: 32.6 KG/M2 | SYSTOLIC BLOOD PRESSURE: 122 MMHG | TEMPERATURE: 98 F | RESPIRATION RATE: 20 BRPM | HEART RATE: 62 BPM | DIASTOLIC BLOOD PRESSURE: 69 MMHG | HEIGHT: 73 IN | WEIGHT: 246 LBS

## 2024-04-08 DIAGNOSIS — J01.40 ACUTE NON-RECURRENT PANSINUSITIS: Primary | ICD-10-CM

## 2024-04-08 DIAGNOSIS — R09.81 CONGESTION OF PARANASAL SINUS: ICD-10-CM

## 2024-04-08 PROCEDURE — 99214 OFFICE O/P EST MOD 30 MIN: CPT | Mod: S$GLB,,, | Performed by: NURSE PRACTITIONER

## 2024-04-08 RX ORDER — IPRATROPIUM BROMIDE 21 UG/1
2 SPRAY, METERED NASAL 3 TIMES DAILY PRN
Qty: 30 ML | Refills: 1 | Status: SHIPPED | OUTPATIENT
Start: 2024-04-08

## 2024-04-08 RX ORDER — CEFDINIR 300 MG/1
300 CAPSULE ORAL 2 TIMES DAILY
Qty: 14 CAPSULE | Refills: 0 | Status: SHIPPED | OUTPATIENT
Start: 2024-04-08 | End: 2024-04-15

## 2024-04-08 NOTE — PATIENT INSTRUCTIONS
Increase fluids   Rest activity ad ganesh   Tylenol 650 mg every 4-6 hrs as needed for fever headache body aches   Atrovent nasal spray as needed for nasal blocking congestion   Complete antibiotic as prescribed   Supportive care measures   If symptoms persist or worsen follow up OUC or PCP

## 2024-04-08 NOTE — PROGRESS NOTES
"Subjective:      Patient ID: Cole Doan is a 73 y.o. male.    Vitals:  height is 6' 1" (1.854 m) and weight is 111.6 kg (246 lb). His tympanic temperature is 97.6 °F (36.4 °C). His blood pressure is 122/69 and his pulse is 62. His respiration is 20 and oxygen saturation is 95%.     Chief Complaint: Sinus Problem    Cole Doan is a 73 year old male who is presenting for evaluation of nasal congestion with symptoms of productive cough which onset 1 week ago.   Treatments tried include tylenol.     Sinus Problem  This is a new problem. The current episode started in the past 7 days. The problem is unchanged. There has been no fever. He is experiencing no pain. Associated symptoms include congestion and coughing. Pertinent negatives include no hoarse voice, sneezing or sore throat. Past treatments include acetaminophen (muccinex). The treatment provided no relief.     HENT:  Positive for congestion. Negative for sore throat.    Respiratory:  Positive for cough.    Allergic/Immunologic: Negative for sneezing.      Objective:     Vitals:    04/08/24 1108   BP: 122/69   BP Location: Right arm   Patient Position: Sitting   BP Method: Large (Automatic)   Pulse: 62   Resp: 20   Temp: 97.6 °F (36.4 °C)   TempSrc: Tympanic   SpO2: 95%   Weight: 111.6 kg (246 lb)   Height: 6' 1" (1.854 m)       Physical Exam   Constitutional: He is oriented to person, place, and time. He appears well-developed. He is cooperative.  Non-toxic appearance. No distress.   HENT:   Head: Normocephalic and atraumatic.   Ears:   Right Ear: Hearing, external ear and ear canal normal. Tympanic membrane is retracted. A middle ear effusion is present.   Left Ear: Hearing, external ear and ear canal normal. Tympanic membrane is retracted. A middle ear effusion is present.   Nose: Mucosal edema and congestion present. No nasal deformity. No epistaxis. Right sinus exhibits maxillary sinus tenderness and frontal sinus tenderness. Left sinus exhibits " maxillary sinus tenderness and frontal sinus tenderness.   Mouth/Throat: Uvula is midline, oropharynx is clear and moist and mucous membranes are normal. Mucous membranes are moist. No trismus in the jaw. Normal dentition. No uvula swelling. No oropharyngeal exudate, posterior oropharyngeal edema or posterior oropharyngeal erythema.   Eyes: Conjunctivae and lids are normal. Pupils are equal, round, and reactive to light. No scleral icterus. Extraocular movement intact   Neck: Trachea normal and phonation normal. Neck supple. No edema present. No erythema present. No neck rigidity present.   Cardiovascular: Normal rate, regular rhythm, normal heart sounds and normal pulses.   Pulmonary/Chest: Effort normal and breath sounds normal. No respiratory distress. He has no decreased breath sounds. He has no rhonchi.   Abdominal: Normal appearance.   Musculoskeletal: Normal range of motion.         General: No deformity. Normal range of motion.   Neurological: He is alert and oriented to person, place, and time. He exhibits normal muscle tone. Coordination normal.   Skin: Skin is warm, dry, intact, not diaphoretic and not pale.   Psychiatric: His speech is normal and behavior is normal. Judgment and thought content normal.   Nursing note and vitals reviewed.      Assessment:     1. Acute non-recurrent pansinusitis    2. Congestion of paranasal sinus        Plan:     Patient stable for discharge and home management of condition    Acute non-recurrent pansinusitis  -     cefdinir (OMNICEF) 300 MG capsule; Take 1 capsule (300 mg total) by mouth 2 (two) times daily. Take with food for 7 days  Dispense: 14 capsule; Refill: 0    Congestion of paranasal sinus  -     ipratropium (ATROVENT) 21 mcg (0.03 %) nasal spray; 2 sprays by Each Nostril route 3 (three) times daily as needed for Rhinitis (nasal congestion).  Dispense: 30 mL; Refill: 1        Patient Instructions   Increase fluids   Rest activity ad ganesh   Tylenol 650 mg every  4-6 hrs as needed for fever headache body aches   Atrovent nasal spray as needed for nasal blocking congestion   Complete antibiotic as prescribed   Supportive care measures   If symptoms persist or worsen follow up OUC or PCP

## 2024-04-28 LAB
OHS CV AF BURDEN PERCENT: < 1
OHS CV DC REMOTE DEVICE TYPE: NORMAL
OHS CV RV PACING PERCENT: 1.1 %

## 2024-05-01 ENCOUNTER — TELEPHONE (OUTPATIENT)
Dept: OPHTHALMOLOGY | Facility: CLINIC | Age: 74
End: 2024-05-01
Payer: OTHER GOVERNMENT

## 2024-05-01 ENCOUNTER — PATIENT MESSAGE (OUTPATIENT)
Dept: OPHTHALMOLOGY | Facility: CLINIC | Age: 74
End: 2024-05-01
Payer: OTHER GOVERNMENT

## 2024-05-03 ENCOUNTER — OFFICE VISIT (OUTPATIENT)
Dept: OPHTHALMOLOGY | Facility: CLINIC | Age: 74
End: 2024-05-03
Payer: OTHER GOVERNMENT

## 2024-05-03 DIAGNOSIS — Z86.69 HISTORY OF RETINAL TEAR: ICD-10-CM

## 2024-05-03 DIAGNOSIS — H43.811 PVD (POSTERIOR VITREOUS DETACHMENT), RIGHT EYE: Primary | ICD-10-CM

## 2024-05-03 PROCEDURE — 99214 OFFICE O/P EST MOD 30 MIN: CPT | Mod: PBBFAC | Performed by: OPTOMETRIST

## 2024-05-03 PROCEDURE — 92014 COMPRE OPH EXAM EST PT 1/>: CPT | Mod: S$PBB,,, | Performed by: OPTOMETRIST

## 2024-05-03 PROCEDURE — 99999 PR PBB SHADOW E&M-EST. PATIENT-LVL IV: CPT | Mod: PBBFAC,,, | Performed by: OPTOMETRIST

## 2024-05-03 NOTE — PROGRESS NOTES
"HPI     Spots and/or Floaters            Comments: Pt states seeing a "whole bunch of floaters and a translucent   cloud" over OD VA after PC IOL. No pain          Last edited by Aimee Arnold MA on 5/3/2024  9:03 AM.        symptoms started 2 weeks ago  Seeing black spots every where  Worse in bright light    Assessment /Plan     For exam results, see Encounter Report.    PVD (posterior vitreous detachment), right eye    History of retinal tear    Stable today with no new tears, holes or RD OD  Stable mOCT today OD  Signs and symptoms of retinal detachment were reviewed with patient  Daily monitoring recommended by covering each eye separately and checking for new signs and symptoms as above.  Written educational material about PVD/flashes/floaters as well as instructions for getting in touch with on call eye doctor after hours if needed were provided for the patient.  Return to clinic as soon as possible (same day) if you notice any new floaters, flashes of light, curtain/veil over your vision from any direction, or any change in vision.  Consult Dr. Inman      RTC next available with [facility] for PVD OD consult or PRN with any worsening or new symptoms  Discussed above and all questions were answered.                      "

## 2024-05-24 ENCOUNTER — OFFICE VISIT (OUTPATIENT)
Dept: OPHTHALMOLOGY | Facility: CLINIC | Age: 74
End: 2024-05-24
Payer: MEDICARE

## 2024-05-24 DIAGNOSIS — E11.9 TYPE 2 DIABETES MELLITUS WITHOUT COMPLICATION, WITHOUT LONG-TERM CURRENT USE OF INSULIN: ICD-10-CM

## 2024-05-24 DIAGNOSIS — Z86.69 HISTORY OF RETINAL TEAR: Primary | ICD-10-CM

## 2024-05-24 DIAGNOSIS — Z98.42 CATARACT EXTRACTION STATUS OF EYE, LEFT: ICD-10-CM

## 2024-05-24 DIAGNOSIS — H18.513 FUCHS' CORNEAL DYSTROPHY OF BOTH EYES: ICD-10-CM

## 2024-05-24 DIAGNOSIS — Z98.41 CATARACT EXTRACTION STATUS OF EYE, RIGHT: ICD-10-CM

## 2024-05-24 DIAGNOSIS — H43.813 PVD (POSTERIOR VITREOUS DETACHMENT), BOTH EYES: ICD-10-CM

## 2024-05-24 PROCEDURE — 99214 OFFICE O/P EST MOD 30 MIN: CPT | Mod: 24,S$PBB,, | Performed by: OPHTHALMOLOGY

## 2024-05-24 PROCEDURE — 99999 PR PBB SHADOW E&M-EST. PATIENT-LVL IV: CPT | Mod: PBBFAC,,, | Performed by: OPHTHALMOLOGY

## 2024-05-24 PROCEDURE — 92134 CPTRZ OPH DX IMG PST SGM RTA: CPT | Mod: PBBFAC | Performed by: OPHTHALMOLOGY

## 2024-05-24 PROCEDURE — 99214 OFFICE O/P EST MOD 30 MIN: CPT | Mod: PBBFAC,25 | Performed by: OPHTHALMOLOGY

## 2024-05-24 NOTE — PROGRESS NOTES
===============================  Date today is 5/24/2024  Cole Doan is a 73 y.o. male  Last visit [facility]: :Visit date not found   Last visit eye dept. 5/3/2024    Uncorrected distance visual acuity was 20/25 in the right eye and 20/20 in the left eye.  Tonometry       Tonometry ([facility], 8:50 AM)         Right Left    Pressure 9 11                  Not recorded       Not recorded       Not recorded       Chief Complaint   Patient presents with    Follow-up     PVD per Dnl   Pt using AREDS po bid   Pt states fol and floaters have decreased, noticeable in bright light maybe a few times daily  No pain   No gtts      HPI     Follow-up     Additional comments: PVD per Dnl   Pt using AREDS po bid   Pt states fol and floaters have decreased, noticeable in bright light   maybe a few times daily  No pain   No gtts           Last edited by Juan Rangel on 5/24/2024  8:53 AM.      Problem List Items Addressed This Visit          Eye/Vision problems    Type 2 diabetes mellitus without complication    Relevant Orders    Posterior Segment OCT Retina-Both eyes (Completed)     Other Visit Diagnoses       History of retinal tear    -  Primary    Relevant Orders    Posterior Segment OCT Retina-Both eyes (Completed)    Cataract extraction status of eye, left        Cataract extraction status of eye, right        Fuchs' corneal dystrophy of both eyes        PVD (posterior vitreous detachment), both eyes              Instructed to call 24/7 for any worsening of vision, visual distortion or pain.  Check OU independently daily.    Gave my office and personal cell phone number.  ________________  5/24/2024 today  Cole Doan    PVD OD  OCT looks good  Treated tear OD at 9 o'clock  No new holes or tears on exam  DM no retinopathy  PCIOL OU  Fuch's OU  OD fibrillary vitreous  Sharp disc OD 0.4  Macula looks good  OD periphery looks good  OS sharp disc 0.2  PVD OS  No sign of macular degeneration  Expect resolution of  floaters    RTC as scheduled with Dr. Dalton  Instructed to call 24/7 for any worsening of vision or symptoms. Check OU daily.   Gave my office and cell phone number.    =============================

## 2024-05-28 ENCOUNTER — CLINICAL SUPPORT (OUTPATIENT)
Dept: CARDIOLOGY | Facility: HOSPITAL | Age: 74
End: 2024-05-28

## 2024-05-28 ENCOUNTER — CLINICAL SUPPORT (OUTPATIENT)
Dept: CARDIOLOGY | Facility: HOSPITAL | Age: 74
End: 2024-05-28
Attending: INTERNAL MEDICINE
Payer: OTHER GOVERNMENT

## 2024-05-28 DIAGNOSIS — Z95.0 PRESENCE OF CARDIAC PACEMAKER: ICD-10-CM

## 2024-05-28 DIAGNOSIS — I49.5 SICK SINUS SYNDROME: ICD-10-CM

## 2024-05-28 PROCEDURE — 93296 REM INTERROG EVL PM/IDS: CPT | Performed by: INTERNAL MEDICINE

## 2024-05-28 PROCEDURE — 93294 REM INTERROG EVL PM/LDLS PM: CPT | Mod: ,,, | Performed by: INTERNAL MEDICINE

## 2024-07-23 ENCOUNTER — OFFICE VISIT (OUTPATIENT)
Dept: OPHTHALMOLOGY | Facility: CLINIC | Age: 74
End: 2024-07-23
Payer: MEDICARE

## 2024-07-23 DIAGNOSIS — Z96.1 PSEUDOPHAKIA OF BOTH EYES: Primary | ICD-10-CM

## 2024-07-23 DIAGNOSIS — Z86.69 HISTORY OF RETINAL TEAR: ICD-10-CM

## 2024-07-23 DIAGNOSIS — E11.9 TYPE 2 DIABETES MELLITUS WITHOUT COMPLICATION, WITHOUT LONG-TERM CURRENT USE OF INSULIN: ICD-10-CM

## 2024-07-23 DIAGNOSIS — H18.513 FUCHS' CORNEAL DYSTROPHY OF BOTH EYES: ICD-10-CM

## 2024-07-23 PROCEDURE — 99213 OFFICE O/P EST LOW 20 MIN: CPT | Mod: PBBFAC | Performed by: STUDENT IN AN ORGANIZED HEALTH CARE EDUCATION/TRAINING PROGRAM

## 2024-07-23 PROCEDURE — 99999 PR PBB SHADOW E&M-EST. PATIENT-LVL III: CPT | Mod: PBBFAC,,, | Performed by: STUDENT IN AN ORGANIZED HEALTH CARE EDUCATION/TRAINING PROGRAM

## 2024-07-23 PROCEDURE — 99214 OFFICE O/P EST MOD 30 MIN: CPT | Mod: S$PBB,,, | Performed by: STUDENT IN AN ORGANIZED HEALTH CARE EDUCATION/TRAINING PROGRAM

## 2024-07-23 NOTE — PROGRESS NOTES
HPI     Eye Exam     Additional comments: Pt here for 3 mo RTC  No pain  No gtts  No va complaints x3mo constant           Comments    1. DM   2. PC IOL OS 03/06/2024  3. PC IOL OD 3/20/24  4. PVD OD  5. Fuchs' OU    Areds po BID             Last edited by Zoraida Villegas MD on 7/23/2024  3:30 PM.            Assessment /Plan     For exam results, see Encounter Report.    Pseudophakia of both eyes- Stable, follow    Type 2 diabetes mellitus without complication, without long-term current use of insulin- last A1c 5.3   Strict BG control, f/u w/ PCP, and annual DFE  Stressed importance of DM control to preserve vision    Fuchs' corneal dystrophy of both eyes- ATs QID and lid hygiene w/ baby shampoo    History of retinal tear- No tears or breaks were seen after careful retinal evaluation.   Discussed retinal detachment signs and symptoms including flashes of lights, floaters, perceived curtains or veils. Advised to patient to monitor visual status including increase in flashes and floaters, or the development of visual field changes including curtain and /or veils. Advised patient to RTC urgently if these symptoms occur. Explained the need for follow up exams to the patient even if there are no changes in the symptoms.      Return to clinic in 1 year DFE orPRN

## 2024-07-24 DIAGNOSIS — E11.9 TYPE 2 DIABETES MELLITUS WITHOUT COMPLICATION: ICD-10-CM

## 2024-07-26 ENCOUNTER — LAB VISIT (OUTPATIENT)
Dept: LAB | Facility: HOSPITAL | Age: 74
End: 2024-07-26
Attending: FAMILY MEDICINE
Payer: MEDICARE

## 2024-07-26 DIAGNOSIS — E11.9 TYPE 2 DIABETES MELLITUS WITHOUT COMPLICATION, WITHOUT LONG-TERM CURRENT USE OF INSULIN: ICD-10-CM

## 2024-07-26 LAB
ALBUMIN SERPL BCP-MCNC: 3.7 G/DL (ref 3.5–5.2)
ALP SERPL-CCNC: 54 U/L (ref 55–135)
ALT SERPL W/O P-5'-P-CCNC: 36 U/L (ref 10–44)
ANION GAP SERPL CALC-SCNC: 5 MMOL/L (ref 8–16)
AST SERPL-CCNC: 30 U/L (ref 10–40)
BILIRUB SERPL-MCNC: 0.7 MG/DL (ref 0.1–1)
BUN SERPL-MCNC: 16 MG/DL (ref 8–23)
CALCIUM SERPL-MCNC: 9.4 MG/DL (ref 8.7–10.5)
CHLORIDE SERPL-SCNC: 110 MMOL/L (ref 95–110)
CHOLEST SERPL-MCNC: 136 MG/DL (ref 120–199)
CHOLEST/HDLC SERPL: 2.7 {RATIO} (ref 2–5)
CO2 SERPL-SCNC: 28 MMOL/L (ref 23–29)
CREAT SERPL-MCNC: 1 MG/DL (ref 0.5–1.4)
EST. GFR  (NO RACE VARIABLE): >60 ML/MIN/1.73 M^2
ESTIMATED AVG GLUCOSE: 103 MG/DL (ref 68–131)
GLUCOSE SERPL-MCNC: 82 MG/DL (ref 70–110)
HBA1C MFR BLD: 5.2 % (ref 4–5.6)
HDLC SERPL-MCNC: 50 MG/DL (ref 40–75)
HDLC SERPL: 36.8 % (ref 20–50)
LDLC SERPL CALC-MCNC: 67.4 MG/DL (ref 63–159)
NONHDLC SERPL-MCNC: 86 MG/DL
POTASSIUM SERPL-SCNC: 4.6 MMOL/L (ref 3.5–5.1)
PROT SERPL-MCNC: 6.1 G/DL (ref 6–8.4)
SODIUM SERPL-SCNC: 143 MMOL/L (ref 136–145)
TRIGL SERPL-MCNC: 93 MG/DL (ref 30–150)

## 2024-07-26 PROCEDURE — 80053 COMPREHEN METABOLIC PANEL: CPT | Performed by: FAMILY MEDICINE

## 2024-07-26 PROCEDURE — 36415 COLL VENOUS BLD VENIPUNCTURE: CPT | Mod: PO | Performed by: FAMILY MEDICINE

## 2024-07-26 PROCEDURE — 83036 HEMOGLOBIN GLYCOSYLATED A1C: CPT | Performed by: FAMILY MEDICINE

## 2024-07-26 PROCEDURE — 80061 LIPID PANEL: CPT | Performed by: FAMILY MEDICINE

## 2024-07-29 ENCOUNTER — OFFICE VISIT (OUTPATIENT)
Dept: INTERNAL MEDICINE | Facility: CLINIC | Age: 74
End: 2024-07-29
Payer: MEDICARE

## 2024-07-29 VITALS
TEMPERATURE: 96 F | DIASTOLIC BLOOD PRESSURE: 82 MMHG | OXYGEN SATURATION: 95 % | BODY MASS INDEX: 31.96 KG/M2 | HEIGHT: 73 IN | SYSTOLIC BLOOD PRESSURE: 118 MMHG | WEIGHT: 241.19 LBS | HEART RATE: 64 BPM

## 2024-07-29 DIAGNOSIS — E11.69 HYPERLIPIDEMIA ASSOCIATED WITH TYPE 2 DIABETES MELLITUS: ICD-10-CM

## 2024-07-29 DIAGNOSIS — Z12.5 SCREENING FOR MALIGNANT NEOPLASM OF PROSTATE: ICD-10-CM

## 2024-07-29 DIAGNOSIS — E11.59 HYPERTENSION ASSOCIATED WITH TYPE 2 DIABETES MELLITUS: ICD-10-CM

## 2024-07-29 DIAGNOSIS — E11.9 TYPE 2 DIABETES MELLITUS WITHOUT COMPLICATION, WITHOUT LONG-TERM CURRENT USE OF INSULIN: Primary | ICD-10-CM

## 2024-07-29 DIAGNOSIS — I15.2 HYPERTENSION ASSOCIATED WITH TYPE 2 DIABETES MELLITUS: ICD-10-CM

## 2024-07-29 DIAGNOSIS — E78.5 HYPERLIPIDEMIA ASSOCIATED WITH TYPE 2 DIABETES MELLITUS: ICD-10-CM

## 2024-07-29 PROCEDURE — G2211 COMPLEX E/M VISIT ADD ON: HCPCS | Mod: S$PBB,,, | Performed by: FAMILY MEDICINE

## 2024-07-29 PROCEDURE — 99999 PR PBB SHADOW E&M-EST. PATIENT-LVL V: CPT | Mod: PBBFAC,,, | Performed by: FAMILY MEDICINE

## 2024-07-29 PROCEDURE — 99215 OFFICE O/P EST HI 40 MIN: CPT | Mod: PBBFAC,PO | Performed by: FAMILY MEDICINE

## 2024-07-29 PROCEDURE — 99214 OFFICE O/P EST MOD 30 MIN: CPT | Mod: S$PBB,,, | Performed by: FAMILY MEDICINE

## 2024-07-29 RX ORDER — SEMAGLUTIDE 1.34 MG/ML
1 INJECTION, SOLUTION SUBCUTANEOUS
Qty: 3 ML | Refills: 11 | Status: SHIPPED | OUTPATIENT
Start: 2024-07-29 | End: 2025-07-29

## 2024-07-29 NOTE — PROGRESS NOTES
Subjective:      Patient ID: Cole Doan is a 73 y.o. male.    Chief Complaint: Follow-up      Patient here for routine follow up on diabetes, HTN, hyperlipidemia. Reviewed labs drawn recently today with the patient.   A1c is improved 5.2  Lipids at goal.  Blood pressure controlled today.  Kidney function is stable.  He reports his cardiologist increase the dose of his Ozempic-tolerating well without adverse side effects however not losing weight-however not gaining weight either.  Still [facility] well-reports having recently had podiatry visit, colorectal screening    Follow-up  Pertinent negatives include no abdominal pain.     Review of Systems   Constitutional:  Negative for activity change, appetite change and unexpected weight change.   Respiratory:  Negative for shortness of breath.    Cardiovascular:  Negative for leg swelling.   Gastrointestinal:  Negative for abdominal pain.     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    Obesity 05/15/2014    SATYA on CPAP     Pacemaker     PAF (paroxysmal atrial fibrillation) 02/13/2024    Pancreatic cyst     Pneumonia     PTSD (post-traumatic stress disorder) 05/15/2014    RBBB 05/15/2014    S/P PTCA (percutaneous transluminal coronary angioplasty) 05/15/2014    Type 2 diabetes mellitus without complication 09/28/2015          Past Surgical History:   Procedure Laterality Date    APPENDECTOMY      at age 14    ATRIAL CARDIAC PACEMAKER INSERTION      CATARACT EXTRACTION      COLONOSCOPY Left 04/27/2018    Procedure: COLONOSCOPY;  Surgeon: Artem Medeiros MD;  Location: [facility]  ENDO;  Service: Endoscopy;  Laterality: Left;    CORONARY ANGIOPLASTY WITH STENT PLACEMENT      , ,     ENDOSCOPIC ULTRASOUND OF UPPER GASTROINTESTINAL TRACT N/A 2021    Procedure: ULTRASOUND, UPPER GI TRACT, ENDOSCOPIC;  Surgeon: Popeye Terrell MD;  Location: [facility];  Service: Endoscopy;  Laterality: N/A;  upper and linear    ESOPHAGOGASTRODUODENOSCOPY N/A 2018    Procedure: ESOPHAGOGASTRODUODENOSCOPY (EGD);  Surgeon: Mario Cesar MD;  Location: [facility] ENDO;  Service: General;  Laterality: N/A;    ROBOT-ASSISTED LAPAROSCOPIC SLEEVE GASTRECTOMY USING DA LURDES XI N/A 2018    Procedure: XI ROBOTIC SLEEVE GASTRECTOMY;  Surgeon: Mario Cesar MD;  Location: [facility] OR;  Service: General;  Laterality: N/A;    VASECTOMY       Family History   Problem Relation Name Age of Onset    Cataracts Father      Heart disease Father      Hypertension Father      Heart disease Mother      Hypertension Mother      Colon cancer Neg Hx       Social History     Socioeconomic History    Marital status:    Tobacco Use    Smoking status: Former     Current packs/day: 0.00     Average packs/day: 1 pack/day for 45.0 years (45.0 ttl pk-yrs)     Types: Cigarettes     Start date:      Quit date: 2009     Years since quittin.5    Smokeless tobacco: Never   Substance and Sexual Activity    Alcohol use: No    Drug use: No   Social History Narrative         Social Determinants of Health     Financial Resource Strain: Low Risk  (2023)    Overall Financial Resource Strain (CARDIA)     Difficulty of Paying Living Expenses: Not hard at all   Food Insecurity: No Food Insecurity (2023)    Hunger Vital Sign     Worried About Running Out of Food in the Last Year: Never true     Ran Out of Food in the Last Year: Never true   Transportation Needs: No Transportation Needs (2023)    PRAPARE - Transportation     Lack of Transportation (Medical): No     Lack of Transportation  "(Non-Medical): No   Physical Activity: Inactive (8/21/2023)    Exercise Vital Sign     Days of Exercise per Week: 0 days     Minutes of Exercise per Session: 0 min   Stress: No Stress Concern Present (8/21/2023)    [employer] [place] [employer] - Occupational Stress Questionnaire     Feeling of Stress : Not at all   Housing Stability: Low Risk  (8/21/2023)    Housing Stability Vital Sign     Unable to Pay for Housing in the Last Year: No     Number of Places Lived in the Last Year: 1     Unstable Housing in the Last Year: No     Review of patient's allergies indicates:   Allergen Reactions    Iodinated contrast media      States, "My arteries started shutting down on me" pt states only to iv dye- was specifically told that oral dye does not have the same reaction       Objective:       /82 (BP Location: Left arm, Patient Position: Sitting, BP Method: Large (Manual))   Pulse 64   Temp 96.2 °F (35.7 °C) (Tympanic)   Ht 6' 1" (1.854 m)   Wt 109.4 kg (241 lb 2.9 oz)   SpO2 95%   BMI 31.82 kg/m²   Physical Exam  Vitals reviewed.   Constitutional:       General: He is not in acute distress.     Appearance: Normal appearance. He is well-developed. He is not ill-appearing or diaphoretic.   HENT:      Head: Normocephalic.      Right Ear: Hearing, tympanic membrane, ear canal and external ear normal.      Left Ear: Hearing, tympanic membrane, ear canal and external ear normal.      Nose: Nose normal.      Right Sinus: No maxillary sinus tenderness or frontal sinus tenderness.      Left Sinus: No maxillary sinus tenderness or frontal sinus tenderness.      Mouth/Throat:      Pharynx: Uvula midline. No oropharyngeal exudate.   Eyes:      Conjunctiva/sclera: Conjunctivae normal.      Pupils: Pupils are equal, round, and reactive to light.   Cardiovascular:      Rate and Rhythm: Normal rate and regular rhythm.   Pulmonary:      Effort: Pulmonary effort is normal. No respiratory distress.      Breath sounds: " Normal breath sounds.   Abdominal:      General: Bowel sounds are normal.      Palpations: Abdomen is soft.      Tenderness: There is no abdominal tenderness. There is no guarding.      Hernia: No hernia is present.   Musculoskeletal:         General: Normal range of motion.      Cervical back: Normal range of motion and neck supple.      Right lower leg: No edema.      Left lower leg: No edema.   Skin:     General: Skin is warm and dry.      Capillary Refill: Capillary refill takes less than 2 seconds.   Neurological:      General: No focal deficit present.      Mental Status: He is alert and oriented to person, place, and time.   Psychiatric:         Mood and Affect: Mood normal.         Behavior: Behavior normal.         Thought Content: Thought content normal.         Judgment: Judgment normal.       Assessment:     1. Type 2 diabetes mellitus without complication, without long-term current use of insulin    2. Hyperlipidemia associated with type 2 diabetes mellitus    3. Hypertension associated with type 2 diabetes mellitus    4. Screening for malignant neoplasm of prostate      Plan:   Type 2 diabetes mellitus without complication, without long-term current use of insulin  -     Hemoglobin A1C; Future; Expected date: 01/25/2025  -     Lipid Panel; Future; Expected date: 01/25/2025  -     CBC Auto Differential; Future; Expected date: 01/25/2025  -     semaglutide (OZEMPIC) 1 mg/dose (4 mg/3 mL); Inject 1 mg into the skin every 7 days.  Dispense: 3 mL; Refill: 11    Hyperlipidemia associated with type 2 diabetes mellitus    Hypertension associated with type 2 diabetes mellitus    Screening for malignant neoplasm of prostate  -     PSA, Screening; Future; Expected date: 01/25/2025    He is interested in increasing Ozempic dose to aid in weight loss-will see if he tolerates increased dose  [facility]  Continue all other current medications.   Above labs in 6 months prior to visit  "with me.    Medication List with Changes/Refills   New Medications    SEMAGLUTIDE (OZEMPIC) 1 MG/DOSE (4 MG/3 ML)    Inject 1 mg into the skin every 7 days.   Current Medications    ALBUTEROL INHL    Inhale into the lungs as needed.    AMLODIPINE (NORVASC) 10 MG TABLET    Take 10 mg by mouth once daily.    BUPROPION (WELLBUTRIN SR) 200 MG TBSR    Take 200 mg by mouth once daily. Takes 300mg daily    CALCIUM CITRATE-VITAMIN D3 315-200 MG (CITRACAL+D) 315 MG-5 MCG (200 UNIT) PER TABLET    TAKE ONE TABLET BY MOUTH TWICE A DAY FOR SUPPLEMENTATION    CALCIUM-VITAMIN D3 (OS-ARVIND 500 + D3) 500 MG-5 MCG (200 UNIT) PER TABLET    Take 1 tablet by mouth 2 (two) times daily with meals.    CETIRIZINE (ZYRTEC) 10 MG TABLET    Take 10 mg by mouth as needed.    CYANOCOBALAMIN (VITAMIN B-12) 1000 MCG TABLET    Take 100 mcg by mouth once daily.    DABIGATRAN ETEXILATE (PRADAXA) 150 MG CAP    Take 150 mg by mouth once daily. "Do NOT break, chew, or open capsules."   Takes 2 caps daily    DICLOFENAC (VOLTAREN) 0.1 % OPHTHALMIC SOLUTION    1 drop 4 (four) times daily.    DOXEPIN (SINEQUAN) 10 MG CAPSULE    Take 1 capsule (10 mg total) by mouth every evening.    DOXYCYCLINE (PERIOSTAT) 20 MG TABLET    Take 20 mg by mouth once daily. 2 weeks on and 2 weeks off ([facility])    EZETIMIBE (ZETIA) 10 MG TABLET    TAKE ONE TABLET BY MOUTH EVERY DAY TO LOWER CHOLESTEROL    FLUTICASONE PROPIONATE (FLONASE) 50 MCG/ACTUATION NASAL SPRAY    1 spray by Each Nostril route once daily.    FUROSEMIDE (LASIX) 40 MG TABLET    Takes one-half tablet by mouth daily     MG TABLET    Take 800 mg by mouth.    IPRATROPIUM (ATROVENT) 21 MCG (0.03 %) NASAL SPRAY    2 sprays by Each Nostril route 3 (three) times daily as needed for Rhinitis (nasal congestion).    KETOTIFEN (ZADITOR) 0.025 % (0.035 %) OPHTHALMIC SOLUTION    1 drop 2 (two) times daily.    MELATONIN 5 MG CAP    Take by mouth.    METOPROLOL SUCCINATE (TOPROL-XL) 100 MG 24 HR TABLET    Take 100 mg " by mouth once daily. Takes 200mg daily    METRONIDAZOLE 0.75% (METROCREAM) 0.75 % CREA    Apply topically 2 (two) times daily.    MULTIVITAMIN (MULTIPLE VITAMIN ORAL)    Take by mouth.    NITROGLYCERIN (NITROSTAT) 0.4 MG SL TABLET    Place 1 tablet (0.4 mg total) under the tongue every 5 (five) minutes as needed for Chest pain.    OMEPRAZOLE (PRILOSEC) 20 MG CAPSULE    Take 20 mg by mouth once daily.    PREGABALIN (LYRICA) 75 MG CAPSULE    Take 75 mg by mouth Daily.    PYRIDOXINE, VITAMIN B6, (B-6) 100 MG TAB    TAKE ONE TABLET BY MOUTH ONCE DAILY AS A VITAMIN SUPPLEMENT    RANOLAZINE (RANEXA) 1,000 MG TB12    Take 1,000 mg by mouth 2 (two) times daily. Takes daily    ROSUVASTATIN (CRESTOR) 40 MG TAB    Take 40 mg by mouth every evening.    SEMAGLUTIDE (OZEMPIC) 0.25 MG OR 0.5 MG (2 MG/3 ML) PEN INJECTOR    Inject 0.5 mg into the skin every 7 days.    SILDENAFIL (VIAGRA) 100 MG TABLET    Take 1 tablet (100 mg total) by mouth daily as needed for Erectile Dysfunction.    THIAMINE (VITAMIN B-1) 50 MG TABLET    Take 50 mg by mouth once daily.    TRETINOIN (RETIN-A) 0.1 % CREAM    Apply topically.    VITAMIN D 1000 UNITS TAB    Take 2,000 Units by mouth once daily.    WHITE PETROLATUM 43 % OINT    Apply topically.   Discontinued Medications    HYDROCODONE-ACETAMINOPHEN (NORCO) 7.5-325 MG PER TABLET    Take 1 tablet by mouth every 6 (six) hours as needed.    MELATONIN 3 MG CAP    Take 3 mg by mouth Daily.    PREDNISOLONE ACETATE (PRED FORTE) 1 % DRPS    Place 1 drop into the right eye 4 (four) times daily.    PREDNISOLONE ACETATE (PRED FORTE) 1 % DRPS    Place 1 drop into the left eye every 4 (four) hours.    TADALAFIL (CIALIS) 5 MG TABLET    Take 5 mg by mouth once daily.    TAMSULOSIN (FLOMAX) 0.4 MG CAP    Take 1 capsule (0.4 mg total) by mouth once daily.

## 2024-08-01 NOTE — EICU
Hg 9.8  -> 7.6 after IV fluids  Annamarialey dilution   Recent upper GI bleed 3 weeks ago. Received 5 units PRBC at that time   Iron panel low, received 2 unit doses of IV Venofer in hospital  Discharge with p.o. iron and repeat CBC next week   eICU Note :    Called by the [facility]:    Problem: Chest pain , s/p Gastric sleeve by pass , recent h/o PPM and a [facility]     Pertinent History and labs reviewed :  Patient Active Problem List   Diagnosis    Special screening for malignant neoplasms, colon    Atherosclerotic heart disease of native coronary artery with angina pectoris    S/P PTCA (percutaneous transluminal coronary angioplasty)    HTN (hypertension)    Hyperlipidemia    RBBB    SATYA on CPAP    Obesity    PTSD (post-traumatic stress disorder)    Shortness of breath    S/P laparoscopic sleeve gastrectomy    Personal history of colonic polyps    Angina effort    CAD S/P percutaneous coronary angioplasty    Type 2 diabetes mellitus without complication    Obesity (BMI 30-39.9)    Chest pain    Tobacco use disorder, moderate, in sustained remission    Morbid obesity with BMI of 40.0-44.9, adult    Mixed restrictive and obstructive lung disease    Physical deconditioning    Diverticulosis of large intestine without hemorrhage    Benign neoplasm of transverse colon    NSTEMI (non-ST elevated myocardial infarction)       Treatment /Intervention given: Gastric cocktail x 1, 12 lead EKG , trend troponins        Adamaris Martinez Physician

## 2024-08-02 ENCOUNTER — TELEPHONE (OUTPATIENT)
Dept: INTERNAL MEDICINE | Facility: CLINIC | Age: 74
End: 2024-08-02
Payer: OTHER GOVERNMENT

## 2024-08-13 LAB
OHS CV AF BURDEN PERCENT: < 1
OHS CV DC REMOTE DEVICE TYPE: NORMAL
OHS CV RV PACING PERCENT: 1 %

## 2024-08-20 DIAGNOSIS — I48.0 PAF (PAROXYSMAL ATRIAL FIBRILLATION): Primary | ICD-10-CM

## 2024-08-22 ENCOUNTER — OFFICE VISIT (OUTPATIENT)
Dept: CARDIOLOGY | Facility: CLINIC | Age: 74
End: 2024-08-22
Payer: MEDICARE

## 2024-08-22 ENCOUNTER — HOSPITAL ENCOUNTER (OUTPATIENT)
Dept: CARDIOLOGY | Facility: HOSPITAL | Age: 74
Discharge: HOME | End: 2024-08-22
Attending: INTERNAL MEDICINE
Payer: MEDICARE

## 2024-08-22 VITALS
SYSTOLIC BLOOD PRESSURE: 102 MMHG | BODY MASS INDEX: 31.93 KG/M2 | DIASTOLIC BLOOD PRESSURE: 68 MMHG | HEIGHT: 73 IN | HEART RATE: 67 BPM | WEIGHT: 240.94 LBS | OXYGEN SATURATION: 97 %

## 2024-08-22 DIAGNOSIS — E66.9 OBESITY (BMI 30-39.9): ICD-10-CM

## 2024-08-22 DIAGNOSIS — G47.33 OSA ON CPAP: ICD-10-CM

## 2024-08-22 DIAGNOSIS — Z95.0 PACEMAKER: ICD-10-CM

## 2024-08-22 DIAGNOSIS — Z98.61 CAD S/P PERCUTANEOUS CORONARY ANGIOPLASTY: Primary | ICD-10-CM

## 2024-08-22 DIAGNOSIS — I25.10 CAD S/P PERCUTANEOUS CORONARY ANGIOPLASTY: Primary | ICD-10-CM

## 2024-08-22 DIAGNOSIS — I70.0 ATHEROSCLEROSIS OF AORTA: ICD-10-CM

## 2024-08-22 DIAGNOSIS — I15.2 HYPERTENSION ASSOCIATED WITH TYPE 2 DIABETES MELLITUS: ICD-10-CM

## 2024-08-22 DIAGNOSIS — E11.9 TYPE 2 DIABETES MELLITUS WITHOUT COMPLICATION, WITHOUT LONG-TERM CURRENT USE OF INSULIN: ICD-10-CM

## 2024-08-22 DIAGNOSIS — I45.10 RBBB: ICD-10-CM

## 2024-08-22 DIAGNOSIS — F43.10 PTSD (POST-TRAUMATIC STRESS DISORDER): ICD-10-CM

## 2024-08-22 DIAGNOSIS — R53.81 PHYSICAL DECONDITIONING: ICD-10-CM

## 2024-08-22 DIAGNOSIS — I48.0 PAF (PAROXYSMAL ATRIAL FIBRILLATION): ICD-10-CM

## 2024-08-22 DIAGNOSIS — E11.59 HYPERTENSION ASSOCIATED WITH TYPE 2 DIABETES MELLITUS: ICD-10-CM

## 2024-08-22 LAB
OHS QRS DURATION: 130 MS
OHS QTC CALCULATION: 410 MS

## 2024-08-22 PROCEDURE — 99999 PR PBB SHADOW E&M-EST. PATIENT-LVL III: CPT | Mod: PBBFAC,,, | Performed by: INTERNAL MEDICINE

## 2024-08-22 PROCEDURE — 93010 ELECTROCARDIOGRAM REPORT: CPT | Mod: ,,, | Performed by: INTERNAL MEDICINE

## 2024-08-22 PROCEDURE — 99213 OFFICE O/P EST LOW 20 MIN: CPT | Mod: PBBFAC,25 | Performed by: INTERNAL MEDICINE

## 2024-08-22 PROCEDURE — 93005 ELECTROCARDIOGRAM TRACING: CPT

## 2024-08-22 PROCEDURE — 99214 OFFICE O/P EST MOD 30 MIN: CPT | Mod: S$PBB,,, | Performed by: INTERNAL MEDICINE

## 2024-08-22 NOTE — PROGRESS NOTES
Subjective:   Patient ID:  Cole Doan is a 73 y.o. male who presents for follow up of No chief complaint on file.      HPI  JD MAY NP 2/13/2024   Cole Doan is a 73 year old male who presents to clinic for 6 month follow up with device check.      His current medical conditions include HTN, HLP, SSS s/p PPM (ABT), SATYA on CPAP, CAD s/p PTCA, DM Type II, obesity, PAF on Pradaxa.      Denies chest pain or anginal equivalents. No shortness of breath, HERRON or palpitations. Denies orthopnea, PND or abdominal bloating. Reports regular walking without any issues lately. NO leg swelling or claudications. No recent falls, syncope or near syncopal events. Reports compliance with medications and dietary restrictions. NO CNS complaints to suggest TIA or CVA today. No signs of abnormal bleeding on pradaxa.      Using cpap regularly.      Trouble with Ozempic Rx for diabetes Rx.       8/22/2024   Still has low stamina using cpap regularily his weight stable labs on target. His pacer adequately functioning. Walks for exercise  works in yard cuts grass no cardiac symptoms. Labs on target.     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    Obesity 05/15/2014    SATYA on CPAP     Pacemaker     PAF (paroxysmal atrial fibrillation) 02/13/2024    Pancreatic cyst     Pneumonia     PTSD (post-traumatic stress disorder) 05/15/2014    RBBB 05/15/2014    S/P PTCA (percutaneous transluminal coronary angioplasty) 05/15/2014    Type 2 diabetes mellitus without complication 09/28/2015       Past Surgical History:   Procedure Laterality Date    APPENDECTOMY      at age 14     ATRIAL CARDIAC PACEMAKER INSERTION      CATARACT EXTRACTION      COLONOSCOPY Left 2018    Procedure: COLONOSCOPY;  Surgeon: Artem Medeiros MD;  Location: [facility] END[facility];  Service: Endoscopy;  Laterality: Left;    CORONARY ANGIOPLASTY WITH STENT PLACEMENT      , ,     ENDOSCOPIC ULTRASOUND OF UPPER GASTROINTESTINAL TRACT N/A 2021    Procedure: ULTRASOUND, UPPER GI TRACT, ENDOSCOPIC;  Surgeon: Popeye Terrell MD;  Location: [facility] ENDO;  Service: Endoscopy;  Laterality: N/A;  upper and linear    ESOPHAGOGASTRODUODENOSCOPY N/A 2018    Procedure: ESOPHAGOGASTRODUODENOSCOPY (EGD);  Surgeon: Mario Cesar MD;  Location: [facility] ENDO;  Service: General;  Laterality: N/A;    ROBOT-ASSISTED LAPAROSCOPIC SLEEVE GASTRECTOMY USING DA LURDSE XI N/A 2018    Procedure: XI ROBOTIC SLEEVE GASTRECTOMY;  Surgeon: Mario Cesar MD;  Location: [facility] OR;  Service: General;  Laterality: N/A;    VASECTOMY         Social History     Tobacco Use    Smoking status: Former     Current packs/day: 0.00     Average packs/day: 1 pack/day for 45.0 years (45.0 ttl pk-yrs)     Types: Cigarettes     Start date:      Quit date: 2009     Years since quittin.6    Smokeless tobacco: Never   Substance Use Topics    Alcohol use: No    Drug use: No       Family History   Problem Relation Name Age of Onset    Cataracts Father      Heart disease Father      Hypertension Father      Heart disease Mother      Hypertension Mother      Colon cancer Neg Hx         Current Outpatient Medications   Medication Sig    amLODIPine (NORVASC) 10 MG tablet Take 10 mg by mouth once daily.    buPROPion (WELLBUTRIN SR) 200 MG TbSR Take 200 mg by mouth once daily. Takes 300mg daily    calcium-vitamin D3 (OS-ARVIND 500 + D3) 500 mg-5 mcg (200 unit) per tablet Take 1 tablet by mouth 2 (two) times daily with meals.    cyanocobalamin (VITAMIN B-12) 1000 MCG tablet Take 100 mcg by mouth once daily.    dabigatran etexilate (PRADAXA)  "150 mg Cap Take 150 mg by mouth once daily. "Do NOT break, chew, or open capsules."   Takes 2 caps daily    doxepin (SINEQUAN) 10 MG capsule Take 1 capsule (10 mg total) by mouth every evening.    doxycycline (PERIOSTAT) 20 MG tablet Take 20 mg by mouth once daily. 2 weeks on and 2 weeks off ([facility])    ezetimibe (ZETIA) 10 mg tablet TAKE ONE TABLET BY MOUTH EVERY DAY TO LOWER CHOLESTEROL    furosemide (LASIX) 40 MG tablet Takes one-half tablet by mouth daily    melatonin 5 mg Cap Take by mouth.    metoprolol succinate (TOPROL-XL) 100 MG 24 hr tablet Take 100 mg by mouth once daily. Takes 200mg daily    multivitamin (MULTIPLE VITAMIN ORAL) Take by mouth.    nitroGLYCERIN (NITROSTAT) 0.4 MG SL tablet Place 1 tablet (0.4 mg total) under the tongue every 5 (five) minutes as needed for Chest pain.    pregabalin (LYRICA) 75 MG capsule Take 75 mg by mouth Daily.    ranolazine (RANEXA) 1,000 mg Tb12 Take 1,000 mg by mouth 2 (two) times daily. Takes daily    rosuvastatin (CRESTOR) 40 MG Tab Take 40 mg by mouth every evening.    semaglutide (OZEMPIC) 0.25 mg or 0.5 mg (2 mg/3 mL) pen injector Inject 0.5 mg into the skin every 7 days.    vitamin D 1000 units Tab Take 2,000 Units by mouth once daily.    ALBUTEROL INHL Inhale into the lungs as needed. (Patient not taking: Reported on 7/29/2024)    calcium citrate-vitamin D3 315-200 mg (CITRACAL+D) 315 mg-5 mcg (200 unit) per tablet TAKE ONE TABLET BY MOUTH TWICE A DAY FOR SUPPLEMENTATION    cetirizine (ZYRTEC) 10 MG tablet Take 10 mg by mouth as needed. (Patient not taking: Reported on 8/22/2024)    diclofenac (VOLTAREN) 0.1 % ophthalmic solution 1 drop 4 (four) times daily. (Patient not taking: Reported on 8/22/2024)    fluticasone propionate (FLONASE) 50 mcg/actuation nasal spray 1 spray by Each Nostril route once daily. (Patient not taking: Reported on 7/29/2024)     mg tablet Take 800 mg by mouth. (Patient not taking: Reported on 8/22/2024)    ipratropium " "(ATROVENT) 21 mcg (0.03 %) nasal spray 2 sprays by Each Nostril route 3 (three) times daily as needed for Rhinitis (nasal congestion). (Patient not taking: Reported on 7/29/2024)    ketotifen (ZADITOR) 0.025 % (0.035 %) ophthalmic solution 1 drop 2 (two) times daily. (Patient not taking: Reported on 8/22/2024)    metronidazole 0.75% (METROCREAM) 0.75 % Crea Apply topically 2 (two) times daily. (Patient not taking: Reported on 8/22/2024)    omeprazole (PRILOSEC) 20 MG capsule Take 20 mg by mouth once daily. (Patient not taking: Reported on 7/29/2024)    pyridoxine, vitamin B6, (B-6) 100 MG Tab TAKE ONE TABLET BY MOUTH ONCE DAILY AS A VITAMIN SUPPLEMENT (Patient not taking: Reported on 7/29/2024)    semaglutide (OZEMPIC) 1 mg/dose (4 mg/3 mL) Inject 1 mg into the skin every 7 days.    sildenafil (VIAGRA) 100 MG tablet Take 1 tablet (100 mg total) by mouth daily as needed for Erectile Dysfunction.    thiamine (VITAMIN B-1) 50 MG tablet Take 50 mg by mouth once daily. (Patient not taking: Reported on 7/29/2024)    tretinoin (RETIN-A) 0.1 % cream Apply topically. (Patient not taking: Reported on 8/22/2024)    white petrolatum 43 % Oint Apply topically. (Patient not taking: Reported on 8/22/2024)     No current facility-administered medications for this visit.     Current Outpatient Medications on File Prior to Visit   Medication Sig    amLODIPine (NORVASC) 10 MG tablet Take 10 mg by mouth once daily.    buPROPion (WELLBUTRIN SR) 200 MG TbSR Take 200 mg by mouth once daily. Takes 300mg daily    calcium-vitamin D3 (OS-ARVIND 500 + D3) 500 mg-5 mcg (200 unit) per tablet Take 1 tablet by mouth 2 (two) times daily with meals.    cyanocobalamin (VITAMIN B-12) 1000 MCG tablet Take 100 mcg by mouth once daily.    dabigatran etexilate (PRADAXA) 150 mg Cap Take 150 mg by mouth once daily. "Do NOT break, chew, or open capsules."   Takes 2 caps daily    doxepin (SINEQUAN) 10 MG capsule Take 1 capsule (10 mg total) by mouth every " evening.    doxycycline (PERIOSTAT) 20 MG tablet Take 20 mg by mouth once daily. 2 weeks on and 2 weeks off ([facility])    ezetimibe (ZETIA) 10 mg tablet TAKE ONE TABLET BY MOUTH EVERY DAY TO LOWER CHOLESTEROL    furosemide (LASIX) 40 MG tablet Takes one-half tablet by mouth daily    melatonin 5 mg Cap Take by mouth.    metoprolol succinate (TOPROL-XL) 100 MG 24 hr tablet Take 100 mg by mouth once daily. Takes 200mg daily    multivitamin (MULTIPLE VITAMIN ORAL) Take by mouth.    nitroGLYCERIN (NITROSTAT) 0.4 MG SL tablet Place 1 tablet (0.4 mg total) under the tongue every 5 (five) minutes as needed for Chest pain.    pregabalin (LYRICA) 75 MG capsule Take 75 mg by mouth Daily.    ranolazine (RANEXA) 1,000 mg Tb12 Take 1,000 mg by mouth 2 (two) times daily. Takes daily    rosuvastatin (CRESTOR) 40 MG Tab Take 40 mg by mouth every evening.    semaglutide (OZEMPIC) 0.25 mg or 0.5 mg (2 mg/3 mL) pen injector Inject 0.5 mg into the skin every 7 days.    vitamin D 1000 units Tab Take 2,000 Units by mouth once daily.    ALBUTEROL INHL Inhale into the lungs as needed. (Patient not taking: Reported on 7/29/2024)    calcium citrate-vitamin D3 315-200 mg (CITRACAL+D) 315 mg-5 mcg (200 unit) per tablet TAKE ONE TABLET BY MOUTH TWICE A DAY FOR SUPPLEMENTATION    cetirizine (ZYRTEC) 10 MG tablet Take 10 mg by mouth as needed. (Patient not taking: Reported on 8/22/2024)    diclofenac (VOLTAREN) 0.1 % ophthalmic solution 1 drop 4 (four) times daily. (Patient not taking: Reported on 8/22/2024)    fluticasone propionate (FLONASE) 50 mcg/actuation nasal spray 1 spray by Each Nostril route once daily. (Patient not taking: Reported on 7/29/2024)     mg tablet Take 800 mg by mouth. (Patient not taking: Reported on 8/22/2024)    ipratropium (ATROVENT) 21 mcg (0.03 %) nasal spray 2 sprays by Each Nostril route 3 (three) times daily as needed for Rhinitis (nasal congestion). (Patient not taking: Reported on 7/29/2024)    ketotifen  "(ZADITOR) 0.025 % (0.035 %) ophthalmic solution 1 drop 2 (two) times daily. (Patient not taking: Reported on 8/22/2024)    metronidazole 0.75% (METROCREAM) 0.75 % Crea Apply topically 2 (two) times daily. (Patient not taking: Reported on 8/22/2024)    omeprazole (PRILOSEC) 20 MG capsule Take 20 mg by mouth once daily. (Patient not taking: Reported on 7/29/2024)    pyridoxine, vitamin B6, (B-6) 100 MG Tab TAKE ONE TABLET BY MOUTH ONCE DAILY AS A VITAMIN SUPPLEMENT (Patient not taking: Reported on 7/29/2024)    semaglutide (OZEMPIC) 1 mg/dose (4 mg/3 mL) Inject 1 mg into the skin every 7 days.    sildenafil (VIAGRA) 100 MG tablet Take 1 tablet (100 mg total) by mouth daily as needed for Erectile Dysfunction.    thiamine (VITAMIN B-1) 50 MG tablet Take 50 mg by mouth once daily. (Patient not taking: Reported on 7/29/2024)    tretinoin (RETIN-A) 0.1 % cream Apply topically. (Patient not taking: Reported on 8/22/2024)    white petrolatum 43 % Oint Apply topically. (Patient not taking: Reported on 8/22/2024)     No current facility-administered medications on file prior to visit.     Review of patient's allergies indicates:   Allergen Reactions    Iodinated contrast media      States, "My arteries started shutting down on me" pt states only to iv dye- was specifically told that oral dye does not have the same reaction      Review of Systems   Constitutional: Positive for malaise/fatigue.   Eyes:  Negative for blurred vision.   Cardiovascular:  Negative for chest pain, claudication, cyanosis, dyspnea on exertion, irregular heartbeat, leg swelling, near-syncope, orthopnea, palpitations and paroxysmal nocturnal dyspnea.   Respiratory:  Negative for cough, hemoptysis and shortness of breath.    Hematologic/Lymphatic: Negative for bleeding problem. Does not bruise/bleed easily.   Skin:  Negative for dry skin and itching.   Musculoskeletal:  Negative for falls, muscle weakness and myalgias.   Gastrointestinal:  Negative for " abdominal pain, diarrhea, heartburn, hematemesis, hematochezia and melena.   Genitourinary:  Negative for flank pain and hematuria.   Neurological:  Negative for dizziness, focal weakness, headaches, light-headedness, numbness, paresthesias, seizures and weakness.   Psychiatric/Behavioral:  Negative for altered mental status and memory loss. The patient is not nervous/anxious.    Allergic/Immunologic: Negative for hives.       Objective:   Physical Exam  Vitals and nursing note reviewed.   Constitutional:       General: He is not in acute distress.     Appearance: He is well-developed. He is obese. He is not diaphoretic.   HENT:      Head: Normocephalic and atraumatic.   Eyes:      General:         Right eye: No discharge.         Left eye: No discharge.      Pupils: Pupils are equal, round, and reactive to light.   Neck:      Thyroid: No thyromegaly.      Vascular: No JVD.   Cardiovascular:      Rate and Rhythm: Normal rate and regular rhythm.      Pulses: Normal pulses and intact distal pulses.      Heart sounds: Normal heart sounds. No murmur heard.     No friction rub. No gallop.   Pulmonary:      Effort: Pulmonary effort is normal. No respiratory distress.      Breath sounds: Normal breath sounds. No wheezing or rales.      Comments: Pacer site well healed.   Chest:      Chest wall: No tenderness.   Abdominal:      General: Bowel sounds are normal. There is no distension.      Palpations: Abdomen is soft.      Tenderness: There is no abdominal tenderness.   Musculoskeletal:         General: Normal range of motion.      Cervical back: Neck supple.      Right lower leg: No edema.      Left lower leg: No edema.   Skin:     General: Skin is warm and dry.      Findings: No erythema or rash.   Neurological:      General: No focal deficit present.      Mental Status: He is alert and oriented to person, place, and time.      Cranial Nerves: No cranial nerve deficit.   Psychiatric:         Mood and Affect: Mood normal.   "       Behavior: Behavior normal.       Vitals:    08/22/24 1038 08/22/24 1039   BP: 108/60 102/68   BP Location: Right arm Left arm   Patient Position: Sitting Sitting   BP Method: Large (Manual) Large (Manual)   Pulse: 67    SpO2: 97%    Weight: 109.3 kg (240 lb 15.4 oz)    Height: 6' 1" (1.854 m)      Lab Results   Component Value Date    CHOL 136 07/26/2024    CHOL 163 01/08/2024    CHOL 142 07/18/2023      Body mass index is 31.79 kg/m².   Lab Results   Component Value Date    HGBA1C 5.2 07/26/2024      BMP  Lab Results   Component Value Date     07/26/2024    K 4.6 07/26/2024     07/26/2024    CO2 28 07/26/2024    BUN 16 07/26/2024    CREATININE 1.0 07/26/2024    CALCIUM 9.4 07/26/2024    ANIONGAP 5 (L) 07/26/2024    EGFRNORACEVR >60.0 07/26/2024      Lab Results   Component Value Date    HDL 50 07/26/2024    HDL 49 01/08/2024    HDL 53 07/18/2023     Lab Results   Component Value Date    LDLCALC 67.4 07/26/2024    LDLCALC 84 01/08/2024    LDLCALC 68.6 07/18/2023     Lab Results   Component Value Date    TRIG 93 07/26/2024    TRIG 150 01/08/2024    TRIG 102 07/18/2023     Lab Results   Component Value Date    CHOLHDL 36.8 07/26/2024    CHOLHDL 37.3 07/18/2023    CHOLHDL 17.3 (L) 06/03/2022       Chemistry        Component Value Date/Time     07/26/2024 0748    K 4.6 07/26/2024 0748     07/26/2024 0748    CO2 28 07/26/2024 0748    BUN 16 07/26/2024 0748    CREATININE 1.0 07/26/2024 0748    GLU 82 07/26/2024 0748        Component Value Date/Time    CALCIUM 9.4 07/26/2024 0748    ALKPHOS 54 (L) 07/26/2024 0748    AST 30 07/26/2024 0748    ALT 36 07/26/2024 0748    BILITOT 0.7 07/26/2024 0748    ESTGFRAFRICA >60.0 06/03/2022 0911    EGFRNONAA >60.0 06/03/2022 0911          Lab Results   Component Value Date    TSH 2.29 10/26/2021     Lab Results   Component Value Date    INR 1.1 09/30/2018    INR 1.0 09/28/2018    INR 1.0 08/03/2016     Lab Results   Component Value Date    WBC 5.44 " 07/18/2023    HGB 15.1 07/18/2023    HCT 46.6 07/18/2023    MCV 97 07/18/2023     (L) 07/18/2023     BMP  Sodium   Date Value Ref Range Status   07/26/2024 143 136 - 145 mmol/L Final     Potassium   Date Value Ref Range Status   07/26/2024 4.6 3.5 - 5.1 mmol/L Final     Chloride   Date Value Ref Range Status   07/26/2024 110 95 - 110 mmol/L Final     CO2   Date Value Ref Range Status   07/26/2024 28 23 - 29 mmol/L Final     BUN   Date Value Ref Range Status   07/26/2024 16 8 - 23 mg/dL Final     Creatinine   Date Value Ref Range Status   07/26/2024 1.0 0.5 - 1.4 mg/dL Final     Calcium   Date Value Ref Range Status   07/26/2024 9.4 8.7 - 10.5 mg/dL Final     Anion Gap   Date Value Ref Range Status   07/26/2024 5 (L) 8 - 16 mmol/L Final     eGFR if    Date Value Ref Range Status   06/03/2022 >60.0 >60 mL/min/1.73 m^2 Final     eGFR if non    Date Value Ref Range Status   06/03/2022 >60.0 >60 mL/min/1.73 m^2 Final     Comment:     Calculation used to obtain the estimated glomerular filtration  rate (eGFR) is the CKD-EPI equation.        CrCl cannot be calculated (Patient's most recent lab result is older than the maximum 7 days allowed.).    Assessment:     1. CAD S/P percutaneous coronary angioplasty    2. RBBB    3. RAMONE on CPAP    4. PTSD (post-traumatic stress disorder)    5. Type 2 diabetes mellitus without complication, without long-term current use of insulin    6. Obesity (BMI 30-39.9)    7. Physical deconditioning    8. Hypertension associated with type 2 diabetes mellitus    9. Pacemaker    10. Atherosclerosis of aorta    11. PAF (paroxysmal atrial fibrillation)    Pacer adequately functioning continue pacer clinic check  Paf non clinically continue the same    Htn controlled low salt diet emphasized continue the same    Diabetes well controlled on target continue same    Cad s/p pci asymptomatic good exercise tolerance continue the same.  Ramone on cpap compliant continue  same tolerated well.  Hlp on target stable continue same    Obesity losing weight very active continue exercise and compliance.     Plan:   Continue current therapy  Cardiac low salt diet.  Risk factor modification and excercise program.  F/u in 6 months with lipid cmp a1c

## 2024-08-27 DIAGNOSIS — Z00.00 ENCOUNTER FOR MEDICARE ANNUAL WELLNESS EXAM: ICD-10-CM

## 2024-08-28 DIAGNOSIS — Z98.61 CAD S/P PERCUTANEOUS CORONARY ANGIOPLASTY: ICD-10-CM

## 2024-08-28 DIAGNOSIS — I25.10 CAD S/P PERCUTANEOUS CORONARY ANGIOPLASTY: ICD-10-CM

## 2024-08-28 DIAGNOSIS — E11.9 TYPE 2 DIABETES MELLITUS WITHOUT COMPLICATION, WITHOUT LONG-TERM CURRENT USE OF INSULIN: ICD-10-CM

## 2024-08-29 RX ORDER — SEMAGLUTIDE 0.68 MG/ML
0.5 INJECTION, SOLUTION SUBCUTANEOUS
Qty: 1 EACH | Refills: 5 | Status: SHIPPED | OUTPATIENT
Start: 2024-08-29

## 2024-09-06 ENCOUNTER — OFFICE VISIT (OUTPATIENT)
Dept: PULMONOLOGY | Facility: CLINIC | Age: 74
End: 2024-09-06
Payer: MEDICARE

## 2024-09-06 ENCOUNTER — HOSPITAL ENCOUNTER (OUTPATIENT)
Dept: RADIOLOGY | Facility: HOSPITAL | Age: 74
Discharge: HOME | End: 2024-09-06
Attending: NURSE PRACTITIONER
Payer: MEDICARE

## 2024-09-06 ENCOUNTER — CLINICAL SUPPORT (OUTPATIENT)
Dept: CARDIOLOGY | Facility: HOSPITAL | Age: 74
End: 2024-09-06
Payer: MEDICARE

## 2024-09-06 VITALS
BODY MASS INDEX: 32.54 KG/M2 | HEIGHT: 73 IN | WEIGHT: 245.56 LBS | SYSTOLIC BLOOD PRESSURE: 106 MMHG | RESPIRATION RATE: 16 BRPM | HEART RATE: 69 BPM | OXYGEN SATURATION: 98 % | DIASTOLIC BLOOD PRESSURE: 70 MMHG

## 2024-09-06 DIAGNOSIS — E66.9 OBESITY (BMI 30-39.9): ICD-10-CM

## 2024-09-06 DIAGNOSIS — I49.5 SICK SINUS SYNDROME: ICD-10-CM

## 2024-09-06 DIAGNOSIS — Z95.0 PRESENCE OF CARDIAC PACEMAKER: ICD-10-CM

## 2024-09-06 DIAGNOSIS — F17.211 CIGARETTE NICOTINE DEPENDENCE IN REMISSION: ICD-10-CM

## 2024-09-06 DIAGNOSIS — G47.33 OSA ON CPAP: ICD-10-CM

## 2024-09-06 DIAGNOSIS — F17.211 CIGARETTE NICOTINE DEPENDENCE IN REMISSION: Primary | ICD-10-CM

## 2024-09-06 PROCEDURE — 99215 OFFICE O/P EST HI 40 MIN: CPT | Mod: PBBFAC,25 | Performed by: NURSE PRACTITIONER

## 2024-09-06 PROCEDURE — 99999 PR PBB SHADOW E&M-EST. PATIENT-LVL V: CPT | Mod: PBBFAC,,, | Performed by: NURSE PRACTITIONER

## 2024-09-06 PROCEDURE — 71271 CT THORAX LUNG CANCER SCR C-: CPT | Mod: TC

## 2024-09-06 PROCEDURE — 71271 CT THORAX LUNG CANCER SCR C-: CPT | Mod: 26,,, | Performed by: RADIOLOGY

## 2024-09-06 RX ORDER — DOXYCYCLINE 40 MG/1
40 CAPSULE ORAL
COMMUNITY
Start: 2024-04-17

## 2024-09-06 NOTE — PROGRESS NOTES
"Subjective:      Patient ID: Cole Doan is a 73 y.o. male.    Chief Complaint: Sleep Apnea    HPI  Presents for sleep apnea and review of CPAP therapy. Patient states improved symptoms with use of CPAP. Sleeping more soundly. Waking up feeling more refreshed. Improved daytime sleepiness. Patient states he is benefiting from use of the CPAP.   [facility]    No fever, chills, or hemoptysis. No pleuritic type chest pain. Breathing is stable as compared to 6 months ago.  Quit smoking 2009.  Normal PFT     Patient Active Problem List   Diagnosis    RBBB    SATYA on CPAP    PTSD (post-traumatic stress disorder)    CAD S/P percutaneous coronary angioplasty    Type 2 diabetes mellitus without complication    Obesity (BMI 30-39.9)    Cigarette nicotine dependence in remission    Physical deconditioning    Diverticulosis of large intestine without hemorrhage    History of colon polyps    Hypertension associated with type 2 diabetes mellitus    Hyperlipidemia associated with type 2 diabetes mellitus    Pancreatic cyst    Pacemaker    Atherosclerosis of aorta    Other insomnia    Thrombocytopenia    PAF (paroxysmal atrial fibrillation)     /70   Pulse 69   Resp 16   Ht 6' 1" (1.854 m)   Wt 111.4 kg (245 lb 9.5 oz)   SpO2 98%   BMI 32.40 kg/m²   Body mass index is 32.4 kg/m².    Review of Systems   Constitutional: Negative.    HENT: Negative.     Respiratory: Negative.     Cardiovascular: Negative.    Musculoskeletal: Negative.    Gastrointestinal: Negative.    Neurological: Negative.    Psychiatric/Behavioral: Negative.       Objective:      Physical Exam  Constitutional:       Appearance: He is obese.   HENT:      Head: Normocephalic and atraumatic.      Nose: Nose normal.   Cardiovascular:      Rate and Rhythm: Normal rate and regular rhythm.   Pulmonary:      Effort: Pulmonary effort is normal.      Breath sounds: Normal breath sounds.   Abdominal:      Palpations: Abdomen is soft.      " "Tenderness: There is no abdominal tenderness.   Musculoskeletal:         General: Normal range of motion.      Cervical back: Normal range of motion and neck supple.   Skin:     General: Skin is warm and dry.   Neurological:      General: No focal deficit present.      Mental Status: He is alert and oriented to person, place, and time.   Psychiatric:         Mood and Affect: Mood normal.         Behavior: Behavior normal.       Personal Diagnostic Review      9/6/2024     8:22 AM   EPWORTH SLEEPINESS SCALE   Sitting and reading 0   Watching TV 2   Sitting, inactive in a public place (e.g. a theatre or a meeting) 0   As a passenger in a car for an hour without a break 0   Lying down to rest in the afternoon when circumstances permit 0   Sitting and talking to someone 0   Sitting quietly after a lunch without alcohol 0   In a car, while stopped for a few minutes in traffic 0   Total score 2         Assessment:       1. Cigarette nicotine dependence in remission    2. SATYA on CPAP    3. Obesity (BMI 30-39.9)        Outpatient Encounter Medications as of 9/6/2024   Medication Sig Dispense Refill    ALBUTEROL INHL Inhale into the lungs as needed.      amLODIPine (NORVASC) 10 MG tablet Take 10 mg by mouth once daily.      buPROPion (WELLBUTRIN SR) 200 MG TbSR Take 200 mg by mouth once daily. Takes 300mg daily      calcium citrate-vitamin D3 315-200 mg (CITRACAL+D) 315 mg-5 mcg (200 unit) per tablet TAKE ONE TABLET BY MOUTH TWICE A DAY FOR SUPPLEMENTATION      calcium-vitamin D3 (OS-ARVIND 500 + D3) 500 mg-5 mcg (200 unit) per tablet Take 1 tablet by mouth 2 (two) times daily with meals.      cetirizine (ZYRTEC) 10 MG tablet Take 10 mg by mouth as needed.      cyanocobalamin (VITAMIN B-12) 1000 MCG tablet Take 100 mcg by mouth once daily.      dabigatran etexilate (PRADAXA) 150 mg Cap Take 150 mg by mouth once daily. "Do NOT break, chew, or open capsules."   Takes 2 caps daily      diclofenac (VOLTAREN) 0.1 % ophthalmic " solution 1 drop 4 (four) times daily.      doxepin (SINEQUAN) 10 MG capsule Take 1 capsule (10 mg total) by mouth every evening. 30 capsule 5    doxycycline (ORACEA) 40 mg capsule Take 40 mg by mouth.      doxycycline (PERIOSTAT) 20 MG tablet Take 20 mg by mouth once daily. 2 weeks on and 2 weeks off ([facility])      ezetimibe (ZETIA) 10 mg tablet TAKE ONE TABLET BY MOUTH EVERY DAY TO LOWER CHOLESTEROL      fluticasone propionate (FLONASE) 50 mcg/actuation nasal spray 1 spray by Each Nostril route once daily.      furosemide (LASIX) 40 MG tablet Takes one-half tablet by mouth daily 30 tablet 1     mg tablet Take 800 mg by mouth.      ipratropium (ATROVENT) 21 mcg (0.03 %) nasal spray 2 sprays by Each Nostril route 3 (three) times daily as needed for Rhinitis (nasal congestion). 30 mL 1    ketotifen (ZADITOR) 0.025 % (0.035 %) ophthalmic solution 1 drop 2 (two) times daily.      melatonin 5 mg Cap Take by mouth.      metoprolol succinate (TOPROL-XL) 100 MG 24 hr tablet Take 100 mg by mouth once daily. Takes 200mg daily      metronidazole 0.75% (METROCREAM) 0.75 % Crea Apply topically 2 (two) times daily.      multivitamin (MULTIPLE VITAMIN ORAL) Take by mouth.      nitroGLYCERIN (NITROSTAT) 0.4 MG SL tablet Place 1 tablet (0.4 mg total) under the tongue every 5 (five) minutes as needed for Chest pain. 60 tablet 12    omeprazole (PRILOSEC) 20 MG capsule Take 20 mg by mouth once daily.      pregabalin (LYRICA) 75 MG capsule Take 75 mg by mouth Daily.      pyridoxine, vitamin B6, (B-6) 100 MG Tab       ranolazine (RANEXA) 1,000 mg Tb12 Take 1,000 mg by mouth 2 (two) times daily. Takes daily      rosuvastatin (CRESTOR) 40 MG Tab Take 40 mg by mouth every evening.      semaglutide (OZEMPIC) 0.25 mg or 0.5 mg (2 mg/3 mL) pen injector Inject 0.5 mg into the skin every 7 days. 1 each 5    semaglutide (OZEMPIC) 1 mg/dose (4 mg/3 mL) Inject 1 mg into the skin every 7 days. 3 mL 11    thiamine (VITAMIN B-1) 50 MG tablet  Take 50 mg by mouth once daily.      tretinoin (RETIN-A) 0.1 % cream Apply topically.      vitamin D 1000 units Tab Take 2,000 Units by mouth once daily.      white petrolatum 43 % Oint Apply topically.      sildenafil (VIAGRA) 100 MG tablet Take 1 tablet (100 mg total) by mouth daily as needed for Erectile Dysfunction. 6 tablet 3    [DISCONTINUED] semaglutide (OZEMPIC) 0.25 mg or 0.5 mg (2 mg/3 mL) pen injector Inject 0.5 mg into the skin every 7 days. 1 each 5     No facility-administered encounter medications on file as of 9/6/2024.     Orders Placed This Encounter   Procedures    CT Chest Lung Screening Low Dose     Standing Status:   Future     Standing Expiration Date:   3/6/2026     Order Specific Question:   Is there documentation of shared decision making for this lung screening exam?     Answer:   Yes     Order Specific Question:   Is the patient a current smoker?     Answer:   No     Order Specific Question:   How many years has the patient quit smoking?     Answer:   15     Order Specific Question:   Does the patient have a 20-pack/year or greater smoke history?     Answer:   Yes     Order Specific Question:   Is the patient between the ages 50-80 years old?     Answer:   Yes     Order Specific Question:   Does the patient show any signs or symptoms of lung cancer?     Answer:   No     Order Specific Question:   Is this the first (baseline) CT or an annual exam?     Answer:   Annual [2]     Order Specific Question:   May the Radiologist modify the order per protocol to meet the clinical needs of the patient?     Answer:   Yes     Order Specific Question:   Is this a low dose screening chest CT?     Answer:   Yes     Plan:       1. Cigarette nicotine dependence in remission  Overview:  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   Repeat CT one year- this will be 15 yr cessation /75yo      Orders:  -     CT Chest Lung Screening Low Dose; Future; Expected date: 09/06/2025    2. SATYA on  CPAP  Overview:  Benefits and compliant CPAP  of 13 cmH2O pressure with optimal control AHI 2.1.  [facility]   Full face mask  [facility]       3. Obesity (BMI 30-39.9)     Weight loss and exercise to improve overall health.                     Elizabeth LeJeune, ACNP, ANP    I reviewed with the patient the [place]. Preventative Services Task Force Recommendation on Lung Cancer Screening With Low-Dose Computed Tomography.   Patient meets eligibility criteria as per current CMS guidelines for initial LDCT lung cancer screening (age 50-77; asymptomatic; tobacco smoking hx of at least 20  pack years; current smoker or one who has quit smoking within the last 15 years).   Benefits and harms of screening discussed with patient, including follow-up diagnostic testing, over-diagnosis, false positive rate, and total radiation exposure.   Patient counseled on the importance of adherence to annual lung cancer LDCT screening, impact of comorbidities, and ability or willingness to undergo diagnosis   and treatment.   Patient counseled on the importance of maintaining cigarette smoking abstinence if former smoker or the importance of smoking cessation if current smoker and, if appropriate, furnishing of information about tobacco cessation interventions.   All questions were answered.  Patient wishes to proceed    Order has been placed.

## 2024-11-26 ENCOUNTER — CLINICAL SUPPORT (OUTPATIENT)
Dept: CARDIOLOGY | Facility: HOSPITAL | Age: 74
End: 2024-11-26
Payer: MEDICARE

## 2024-11-26 DIAGNOSIS — Z95.0 PRESENCE OF CARDIAC PACEMAKER: ICD-10-CM

## 2024-11-26 DIAGNOSIS — I49.5 SICK SINUS SYNDROME: ICD-10-CM

## 2024-12-20 ENCOUNTER — HOSPITAL ENCOUNTER (OUTPATIENT)
Dept: RADIOLOGY | Facility: HOSPITAL | Age: 74
Discharge: HOME | End: 2024-12-20
Attending: NURSE PRACTITIONER
Payer: MEDICARE

## 2024-12-20 ENCOUNTER — OFFICE VISIT (OUTPATIENT)
Dept: INTERNAL MEDICINE | Facility: CLINIC | Age: 74
End: 2024-12-20
Payer: MEDICARE

## 2024-12-20 VITALS
SYSTOLIC BLOOD PRESSURE: 114 MMHG | BODY MASS INDEX: 32.17 KG/M2 | WEIGHT: 242.75 LBS | OXYGEN SATURATION: 98 % | HEIGHT: 73 IN | TEMPERATURE: 96 F | HEART RATE: 59 BPM | DIASTOLIC BLOOD PRESSURE: 78 MMHG

## 2024-12-20 DIAGNOSIS — Z87.01 HISTORY OF PNEUMONIA: ICD-10-CM

## 2024-12-20 DIAGNOSIS — R09.81 NASAL CONGESTION: ICD-10-CM

## 2024-12-20 DIAGNOSIS — J40 BRONCHITIS: ICD-10-CM

## 2024-12-20 DIAGNOSIS — R51.9 NONINTRACTABLE HEADACHE, UNSPECIFIED CHRONICITY PATTERN, UNSPECIFIED HEADACHE TYPE: ICD-10-CM

## 2024-12-20 DIAGNOSIS — R05.9 COUGH, UNSPECIFIED TYPE: Primary | ICD-10-CM

## 2024-12-20 LAB
CTP QC/QA: YES
POC MOLECULAR INFLUENZA A AGN: NEGATIVE
POC MOLECULAR INFLUENZA B AGN: NEGATIVE

## 2024-12-20 PROCEDURE — 71046 X-RAY EXAM CHEST 2 VIEWS: CPT | Mod: 26,,, | Performed by: RADIOLOGY

## 2024-12-20 PROCEDURE — 99215 OFFICE O/P EST HI 40 MIN: CPT | Mod: PBBFAC,25 | Performed by: NURSE PRACTITIONER

## 2024-12-20 PROCEDURE — 99999 PR PBB SHADOW E&M-EST. PATIENT-LVL V: CPT | Mod: PBBFAC,,, | Performed by: NURSE PRACTITIONER

## 2024-12-20 PROCEDURE — 71046 X-RAY EXAM CHEST 2 VIEWS: CPT | Mod: TC

## 2024-12-20 RX ORDER — PROMETHAZINE HYDROCHLORIDE AND DEXTROMETHORPHAN HYDROBROMIDE 6.25; 15 MG/5ML; MG/5ML
5 SYRUP ORAL EVERY 8 HOURS PRN
Qty: 118 ML | Refills: 0 | Status: SHIPPED | OUTPATIENT
Start: 2024-12-20 | End: 2024-12-30

## 2024-12-20 RX ORDER — AZITHROMYCIN 250 MG/1
TABLET, FILM COATED ORAL
Qty: 6 TABLET | Refills: 0 | Status: SHIPPED | OUTPATIENT
Start: 2024-12-20

## 2024-12-20 NOTE — PROGRESS NOTES
Subjective:       Patient ID: Cole Doan is a 74 y.o. male.    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    Obesity 05/15/2014    SATYA on CPAP     Pacemaker     PAF (paroxysmal atrial fibrillation) 02/13/2024    Pancreatic cyst     Pneumonia     PTSD (post-traumatic stress disorder) 05/15/2014    RBBB 05/15/2014    S/P PTCA (percutaneous transluminal coronary angioplasty) 05/15/2014    Type 2 diabetes mellitus without complication 09/28/2015     Past Surgical History:   Procedure Laterality Date    APPENDECTOMY      at age 14    ATRIAL CARDIAC PACEMAKER INSERTION      CATARACT EXTRACTION      COLONOSCOPY Left 04/27/2018    Procedure: COLONOSCOPY;  Surgeon: Artem Medeiros MD;  Location: [facility] ENDO;  Service: Endoscopy;  Laterality: Left;    CORONARY ANGIOPLASTY WITH STENT PLACEMENT      1990, 2008, 2012    ENDOSCOPIC ULTRASOUND OF UPPER GASTROINTESTINAL TRACT N/A 07/12/2021    Procedure: ULTRASOUND, UPPER GI TRACT, ENDOSCOPIC;  Surgeon: Popeye Terrell MD;  Location: [facility] ENDO;  Service: Endoscopy;  Laterality: N/A;  upper and linear    ESOPHAGOGASTRODUODENOSCOPY N/A 08/16/2018    Procedure: ESOPHAGOGASTRODUODENOSCOPY (EGD);  Surgeon: Mario Cesar MD;  Location: [facility] ENDO;  Service: General;  Laterality: N/A;    ROBOT-ASSISTED LAPAROSCOPIC SLEEVE GASTRECTOMY USING DA LURDES XI N/A 09/27/2018    Procedure: XI ROBOTIC SLEEVE GASTRECTOMY;  Surgeon: Mario Cesar MD;  Location: [facility] OR;  Service: General;  Laterality: N/A;    VASECTOMY       Social  "History     Socioeconomic History    Marital status:    Tobacco Use    Smoking status: Former     Current packs/day: 0.00     Average packs/day: 1 pack/day for 45.0 years (45.0 ttl pk-yrs)     Types: Cigarettes     Start date:      Quit date: 2009     Years since quittin.9    Smokeless tobacco: Never   Substance and Sexual Activity    Alcohol use: No    Drug use: No   Social History Narrative         Social Drivers of Health     Financial Resource Strain: Low Risk  (2023)    Overall Financial Resource Strain (CARDIA)     Difficulty of Paying Living Expenses: Not hard at all   Food Insecurity: No Food Insecurity (2023)    Hunger Vital Sign     Worried About Running Out of Food in the Last Year: Never true     Ran Out of Food in the Last Year: Never true   Transportation Needs: No Transportation Needs (2023)    PRAPARE - Transportation     Lack of Transportation (Medical): No     Lack of Transportation (Non-Medical): No   Physical Activity: Inactive (2023)    Exercise Vital Sign     Days of Exercise per Week: 0 days     Minutes of Exercise per Session: 0 min   Stress: No Stress Concern Present (2023)    [employer] - Occupational Stress Questionnaire     Feeling of Stress : Not at all   Housing Stability: Low Risk  (2023)    Housing Stability Vital Sign     Unable to Pay for Housing in the Last Year: No     Number of Places Lived in the Last Year: 1     Unstable Housing in the Last Year: No     Review of patient's allergies indicates:   Allergen Reactions    Iodinated contrast media      States, "My arteries started shutting down on me" pt states only to iv dye- was specifically told that oral dye does not have the same reaction     Current Outpatient Medications   Medication Sig    ALBUTEROL INHL Inhale into the lungs as needed.    amLODIPine (NORVASC) 10 MG tablet Take 10 mg by mouth once daily.    buPROPion (WELLBUTRIN SR) 200 " "MG TbSR Take 200 mg by mouth once daily. Takes 300mg daily    calcium citrate-vitamin D3 315-200 mg (CITRACAL+D) 315 mg-5 mcg (200 unit) per tablet TAKE ONE TABLET BY MOUTH TWICE A DAY FOR SUPPLEMENTATION    calcium-vitamin D3 (OS-ARVIND 500 + D3) 500 mg-5 mcg (200 unit) per tablet Take 1 tablet by mouth 2 (two) times daily with meals.    cetirizine (ZYRTEC) 10 MG tablet Take 10 mg by mouth as needed.    cyanocobalamin (VITAMIN B-12) 1000 MCG tablet Take 100 mcg by mouth once daily.    dabigatran etexilate (PRADAXA) 150 mg Cap Take 150 mg by mouth once daily. "Do NOT break, chew, or open capsules."   Takes 2 caps daily    diclofenac (VOLTAREN) 0.1 % ophthalmic solution 1 drop 4 (four) times daily.    doxepin (SINEQUAN) 10 MG capsule Take 1 capsule (10 mg total) by mouth every evening.    doxycycline (ORACEA) 40 mg capsule Take 40 mg by mouth.    doxycycline (PERIOSTAT) 20 MG tablet Take 20 mg by mouth once daily. 2 weeks on and 2 weeks off ([facility])    ezetimibe (ZETIA) 10 mg tablet TAKE ONE TABLET BY MOUTH EVERY DAY TO LOWER CHOLESTEROL    fluticasone propionate (FLONASE) 50 mcg/actuation nasal spray 1 spray by Each Nostril route once daily.    furosemide (LASIX) 40 MG tablet Takes one-half tablet by mouth daily     mg tablet Take 800 mg by mouth.    ipratropium (ATROVENT) 21 mcg (0.03 %) nasal spray 2 sprays by Each Nostril route 3 (three) times daily as needed for Rhinitis (nasal congestion).    ketotifen (ZADITOR) 0.025 % (0.035 %) ophthalmic solution 1 drop 2 (two) times daily.    melatonin 5 mg Cap Take by mouth.    metoprolol succinate (TOPROL-XL) 100 MG 24 hr tablet Take 100 mg by mouth once daily. Takes 200mg daily    metronidazole 0.75% (METROCREAM) 0.75 % Crea Apply topically 2 (two) times daily.    multivitamin (MULTIPLE VITAMIN ORAL) Take by mouth.    nitroGLYCERIN (NITROSTAT) 0.4 MG SL tablet Place 1 tablet (0.4 mg total) under the tongue every 5 (five) minutes as needed for Chest pain.    " omeprazole (PRILOSEC) 20 MG capsule Take 20 mg by mouth once daily.    pregabalin (LYRICA) 75 MG capsule Take 75 mg by mouth Daily.    pyridoxine, vitamin B6, (B-6) 100 MG Tab     ranolazine (RANEXA) 1,000 mg Tb12 Take 1,000 mg by mouth 2 (two) times daily. Takes daily    rosuvastatin (CRESTOR) 40 MG Tab Take 40 mg by mouth every evening.    semaglutide (OZEMPIC) 0.25 mg or 0.5 mg (2 mg/3 mL) pen injector Inject 0.5 mg into the skin every 7 days.    semaglutide (OZEMPIC) 1 mg/dose (4 mg/3 mL) Inject 1 mg into the skin every 7 days.    thiamine (VITAMIN B-1) 50 MG tablet Take 50 mg by mouth once daily.    tretinoin (RETIN-A) 0.1 % cream Apply topically.    vitamin D 1000 units Tab Take 2,000 Units by mouth once daily.    white petrolatum 43 % Oint Apply topically.    azithromycin (Z-ZACHARY) 250 MG tablet Take 2 tablets by mouth on day 1; Take 1 tablet by mouth on days 2-5    promethazine-dextromethorphan (PROMETHAZINE-DM) 6.25-15 mg/5 mL Syrp Take 5 mLs by mouth every 8 (eight) hours as needed (cough).    sildenafil (VIAGRA) 100 MG tablet Take 1 tablet (100 mg total) by mouth daily as needed for Erectile Dysfunction.     No current facility-administered medications for this visit.           Review of Systems   Constitutional:  Negative for activity change, appetite change, chills, diaphoresis, fatigue, fever and unexpected weight change.   HENT:  Positive for congestion. Negative for ear pain, postnasal drip, rhinorrhea, sinus pressure, sinus pain, sneezing, sore throat, tinnitus, trouble swallowing and voice change.    Eyes:  Negative for photophobia, pain and visual disturbance.   Respiratory:  Positive for cough. Negative for chest tightness, shortness of breath and wheezing.    Cardiovascular:  Negative for chest pain, palpitations and leg swelling.   Gastrointestinal:  Negative for abdominal distention, abdominal pain, constipation, diarrhea, nausea and vomiting.   Genitourinary:  Negative for decreased urine  volume, difficulty urinating, dysuria, flank pain, frequency, hematuria and urgency.   Musculoskeletal:  Positive for myalgias. Negative for arthralgias, back pain, joint swelling, neck pain and neck stiffness.   Allergic/Immunologic: Negative for immunocompromised state.   Neurological:  Positive for headaches. Negative for dizziness, tremors, seizures, syncope, facial asymmetry, speech difficulty, weakness, light-headedness and numbness.   Hematological:  Negative for adenopathy. Does not bruise/bleed easily.   Psychiatric/Behavioral:  Negative for confusion and sleep disturbance.        Objective:      Physical Exam  Constitutional:       General: He is not in acute distress.  HENT:      Right Ear: Tympanic membrane is erythematous.      Left Ear: Tympanic membrane is erythematous.      Nose: Mucosal edema and rhinorrhea present.      Mouth/Throat:      Mouth: No oral lesions.      Pharynx: Posterior oropharyngeal erythema present. No oropharyngeal exudate or uvula swelling.   Eyes:      Conjunctiva/sclera: Conjunctivae normal.      Pupils: Pupils are equal, round, and reactive to light.   Cardiovascular:      Rate and Rhythm: Normal rate and regular rhythm.      Heart sounds: Normal heart sounds. No murmur heard.     No friction rub. No gallop.   Pulmonary:      Effort: Pulmonary effort is normal. No respiratory distress.      Breath sounds: Normal breath sounds. No wheezing or rales.   Skin:     General: Skin is warm and dry.   Neurological:      Mental Status: He is alert and oriented to person, place, and time.         Assessment:     Vitals:    12/20/24 0851   BP: 114/78   Pulse: (!) 59   Temp: 96 °F (35.6 °C)         1. Cough, unspecified type    2. Nonintractable headache, unspecified chronicity pattern, unspecified headache type    3. Nasal congestion    4. Bronchitis    5. History of pneumonia        Plan:   Cough, unspecified type  -     POCT COVID-19 Rapid Screening  -     POCT Influenza A/B  Molecular    Nonintractable headache, unspecified chronicity pattern, unspecified headache type  -     POCT COVID-19 Rapid Screening  -     POCT Influenza A/B Molecular    Nasal congestion  -     POCT COVID-19 Rapid Screening  -     POCT Influenza A/B Molecular    Bronchitis  -     X-Ray Chest PA And Lateral; Future; Expected date: 12/20/2024  -     promethazine-dextromethorphan (PROMETHAZINE-DM) 6.25-15 mg/5 mL Syrp; Take 5 mLs by mouth every 8 (eight) hours as needed (cough).  Dispense: 118 mL; Refill: 0    History of pneumonia  -     X-Ray Chest PA And Lateral; Future; Expected date: 12/20/2024  -     promethazine-dextromethorphan (PROMETHAZINE-DM) 6.25-15 mg/5 mL Syrp; Take 5 mLs by mouth every 8 (eight) hours as needed (cough).  Dispense: 118 mL; Refill: 0    Other orders  -     azithromycin (Z-ZACHARY) 250 MG tablet; Take 2 tablets by mouth on day 1; Take 1 tablet by mouth on days 2-5  Dispense: 6 tablet; Refill: 0

## 2025-01-06 ENCOUNTER — TELEPHONE (OUTPATIENT)
Dept: CARDIOLOGY | Facility: CLINIC | Age: 75
End: 2025-01-06
Payer: MEDICARE

## 2025-01-06 DIAGNOSIS — I25.10 CAD S/P PERCUTANEOUS CORONARY ANGIOPLASTY: ICD-10-CM

## 2025-01-06 DIAGNOSIS — E11.9 TYPE 2 DIABETES MELLITUS WITHOUT COMPLICATION, WITHOUT LONG-TERM CURRENT USE OF INSULIN: ICD-10-CM

## 2025-01-06 DIAGNOSIS — Z98.61 CAD S/P PERCUTANEOUS CORONARY ANGIOPLASTY: ICD-10-CM

## 2025-01-06 RX ORDER — SEMAGLUTIDE 0.68 MG/ML
0.5 INJECTION, SOLUTION SUBCUTANEOUS
Qty: 1 EACH | Refills: 5 | Status: SHIPPED | OUTPATIENT
Start: 2025-01-06

## 2025-01-06 NOTE — TELEPHONE ENCOUNTER
Initiated PA for Ozempic through CoverMyMeds.    Your information has been submitted to [employer]. [facility] will review the request and will issue a decision, typically within 1-3 days from your submission. You can check the updated outcome later by reopening this request.  If [employer] has not responded in 1-3 days or if you have any questions about your ePA request, please contact [facility] at [phone]. If you think there may be a problem with your PA request, use our live chat feature at the bottom right.

## 2025-01-06 NOTE — TELEPHONE ENCOUNTER
PA for Ozempic approved by patients insurance plan.   Purple DH (Discharge Huddle; Vulnerable Patient)

## 2025-01-07 DIAGNOSIS — E11.9 TYPE 2 DIABETES MELLITUS WITHOUT COMPLICATION, WITHOUT LONG-TERM CURRENT USE OF INSULIN: ICD-10-CM

## 2025-01-07 RX ORDER — SEMAGLUTIDE 1.34 MG/ML
1 INJECTION, SOLUTION SUBCUTANEOUS
Qty: 3 ML | Refills: 11 | Status: CANCELLED | OUTPATIENT
Start: 2025-01-07 | End: 2026-01-07

## 2025-01-24 ENCOUNTER — LAB VISIT (OUTPATIENT)
Dept: LAB | Facility: HOSPITAL | Age: 75
End: 2025-01-24
Attending: FAMILY MEDICINE
Payer: MEDICARE

## 2025-01-24 DIAGNOSIS — E11.9 TYPE 2 DIABETES MELLITUS WITHOUT COMPLICATION, WITHOUT LONG-TERM CURRENT USE OF INSULIN: ICD-10-CM

## 2025-01-24 DIAGNOSIS — Z12.5 SCREENING FOR MALIGNANT NEOPLASM OF PROSTATE: ICD-10-CM

## 2025-01-24 LAB
BASOPHILS # BLD AUTO: 0.02 K/UL (ref 0–0.2)
BASOPHILS NFR BLD: 0.5 % (ref 0–1.9)
CHOLEST SERPL-MCNC: 177 MG/DL (ref 120–199)
CHOLEST/HDLC SERPL: 4.7 {RATIO} (ref 2–5)
COMPLEXED PSA SERPL-MCNC: 3.4 NG/ML (ref 0–4)
DIFFERENTIAL METHOD BLD: NORMAL
EOSINOPHIL # BLD AUTO: 0.1 K/UL (ref 0–0.5)
EOSINOPHIL NFR BLD: 1.7 % (ref 0–8)
ERYTHROCYTE [DISTWIDTH] IN BLOOD BY AUTOMATED COUNT: 14.1 % (ref 11.5–14.5)
ESTIMATED AVG GLUCOSE: 100 MG/DL (ref 68–131)
HBA1C MFR BLD: 5.1 % (ref 4–5.6)
HCT VFR BLD AUTO: 45.7 % (ref 40–54)
HDLC SERPL-MCNC: 38 MG/DL (ref 40–75)
HDLC SERPL: 21.5 % (ref 20–50)
HGB BLD-MCNC: 14.9 G/DL (ref 14–18)
IMM GRANULOCYTES # BLD AUTO: 0.01 K/UL (ref 0–0.04)
IMM GRANULOCYTES NFR BLD AUTO: 0.2 % (ref 0–0.5)
LDLC SERPL CALC-MCNC: 115.4 MG/DL (ref 63–159)
LYMPHOCYTES # BLD AUTO: 1.4 K/UL (ref 1–4.8)
LYMPHOCYTES NFR BLD: 33 % (ref 18–48)
MCH RBC QN AUTO: 30.4 PG (ref 27–31)
MCHC RBC AUTO-ENTMCNC: 32.6 G/DL (ref 32–36)
MCV RBC AUTO: 93 FL (ref 82–98)
MONOCYTES # BLD AUTO: 0.6 K/UL (ref 0.3–1)
MONOCYTES NFR BLD: 13.1 % (ref 4–15)
NEUTROPHILS # BLD AUTO: 2.2 K/UL (ref 1.8–7.7)
NEUTROPHILS NFR BLD: 51.5 % (ref 38–73)
NONHDLC SERPL-MCNC: 139 MG/DL
NRBC BLD-RTO: 0 /100 WBC
PLATELET # BLD AUTO: 165 K/UL (ref 150–450)
PMV BLD AUTO: 10.4 FL (ref 9.2–12.9)
RBC # BLD AUTO: 4.9 M/UL (ref 4.6–6.2)
TRIGL SERPL-MCNC: 118 MG/DL (ref 30–150)
WBC # BLD AUTO: 4.21 K/UL (ref 3.9–12.7)

## 2025-01-24 PROCEDURE — 85025 COMPLETE CBC W/AUTO DIFF WBC: CPT | Performed by: FAMILY MEDICINE

## 2025-01-24 PROCEDURE — 84153 ASSAY OF PSA TOTAL: CPT | Performed by: FAMILY MEDICINE

## 2025-01-24 PROCEDURE — 83036 HEMOGLOBIN GLYCOSYLATED A1C: CPT | Performed by: FAMILY MEDICINE

## 2025-01-24 PROCEDURE — 80061 LIPID PANEL: CPT | Performed by: FAMILY MEDICINE

## 2025-01-24 PROCEDURE — 36415 COLL VENOUS BLD VENIPUNCTURE: CPT | Mod: PO | Performed by: FAMILY MEDICINE

## 2025-01-29 ENCOUNTER — OFFICE VISIT (OUTPATIENT)
Dept: INTERNAL MEDICINE | Facility: CLINIC | Age: 75
End: 2025-01-29
Payer: MEDICARE

## 2025-01-29 ENCOUNTER — LAB VISIT (OUTPATIENT)
Dept: LAB | Facility: HOSPITAL | Age: 75
End: 2025-01-29
Attending: FAMILY MEDICINE
Payer: MEDICARE

## 2025-01-29 VITALS
BODY MASS INDEX: 31.76 KG/M2 | HEIGHT: 73 IN | OXYGEN SATURATION: 96 % | TEMPERATURE: 96 F | DIASTOLIC BLOOD PRESSURE: 77 MMHG | WEIGHT: 239.63 LBS | HEART RATE: 63 BPM | SYSTOLIC BLOOD PRESSURE: 139 MMHG

## 2025-01-29 DIAGNOSIS — I50.9 HEART FAILURE, UNSPECIFIED HF CHRONICITY, UNSPECIFIED HEART FAILURE TYPE: ICD-10-CM

## 2025-01-29 DIAGNOSIS — I48.0 PAF (PAROXYSMAL ATRIAL FIBRILLATION): ICD-10-CM

## 2025-01-29 DIAGNOSIS — I15.2 HYPERTENSION ASSOCIATED WITH TYPE 2 DIABETES MELLITUS: ICD-10-CM

## 2025-01-29 DIAGNOSIS — R53.83 FATIGUE, UNSPECIFIED TYPE: ICD-10-CM

## 2025-01-29 DIAGNOSIS — E11.59 HYPERTENSION ASSOCIATED WITH TYPE 2 DIABETES MELLITUS: ICD-10-CM

## 2025-01-29 DIAGNOSIS — K86.2 PANCREATIC CYST: ICD-10-CM

## 2025-01-29 DIAGNOSIS — N40.0 BENIGN PROSTATIC HYPERPLASIA, UNSPECIFIED WHETHER LOWER URINARY TRACT SYMPTOMS PRESENT: Primary | ICD-10-CM

## 2025-01-29 DIAGNOSIS — R19.00 INTRA-ABDOMINAL AND PELVIC SWELLING, MASS AND LUMP, UNSPECIFIED SITE: ICD-10-CM

## 2025-01-29 DIAGNOSIS — E78.5 HYPERLIPIDEMIA ASSOCIATED WITH TYPE 2 DIABETES MELLITUS: ICD-10-CM

## 2025-01-29 DIAGNOSIS — Z12.11 COLON CANCER SCREENING: ICD-10-CM

## 2025-01-29 DIAGNOSIS — E11.69 HYPERLIPIDEMIA ASSOCIATED WITH TYPE 2 DIABETES MELLITUS: ICD-10-CM

## 2025-01-29 DIAGNOSIS — E11.9 TYPE 2 DIABETES MELLITUS WITHOUT COMPLICATION, WITHOUT LONG-TERM CURRENT USE OF INSULIN: ICD-10-CM

## 2025-01-29 DIAGNOSIS — I70.0 ATHEROSCLEROSIS OF AORTA: ICD-10-CM

## 2025-01-29 PROBLEM — D69.6 THROMBOCYTOPENIA: Status: RESOLVED | Noted: 2023-08-16 | Resolved: 2025-01-29

## 2025-01-29 PROCEDURE — 99214 OFFICE O/P EST MOD 30 MIN: CPT | Mod: S$PBB,,, | Performed by: FAMILY MEDICINE

## 2025-01-29 PROCEDURE — 99999 PR PBB SHADOW E&M-EST. PATIENT-LVL V: CPT | Mod: PBBFAC,,, | Performed by: FAMILY MEDICINE

## 2025-01-29 PROCEDURE — 36415 COLL VENOUS BLD VENIPUNCTURE: CPT | Mod: PO | Performed by: FAMILY MEDICINE

## 2025-01-29 PROCEDURE — 84403 ASSAY OF TOTAL TESTOSTERONE: CPT | Performed by: FAMILY MEDICINE

## 2025-01-29 PROCEDURE — 99215 OFFICE O/P EST HI 40 MIN: CPT | Mod: PBBFAC,PO | Performed by: FAMILY MEDICINE

## 2025-01-29 PROCEDURE — G2211 COMPLEX E/M VISIT ADD ON: HCPCS | Mod: S$PBB,,, | Performed by: FAMILY MEDICINE

## 2025-01-29 RX ORDER — SEMAGLUTIDE 1.34 MG/ML
1 INJECTION, SOLUTION SUBCUTANEOUS
Qty: 3 ML | Refills: 11 | Status: SHIPPED | OUTPATIENT
Start: 2025-01-29 | End: 2026-01-29

## 2025-01-29 NOTE — PROGRESS NOTES
Subjective:      Patient ID: Cole Doan is a 74 y.o. male.    Chief Complaint: Follow-up      History of Present Illness    CHIEF COMPLAINT:  Mr. Doan presents today for follow up.     He reports gradually increasing trouble breathing and shortness of breath with walking, also increased generalized fatigue.    RESPIRATORY AND CARDIOVASCULAR:  He reports dyspnea on exertion. He has [facility] both [facility] and a local cardiologist, Dr. Rutledge, with next appointment scheduled in March. His December device checks were reported as good.    FATIGUE:  He reports experiencing persistent fatigue throughout the day.    MEDICAL HISTORY:  He has a history of pancreatic cysts identified on imaging in 2021, with multiple cysts noted at that time. Further recommended diagnostic testing was not completed. He also has a history of colonic polyps, with last colonoscopy in 2018 showing a single polyp. Prior to this, he required two colonoscopies within 6-8 weeks due to inadequate bowel preparation, where multiple polyps were identified and removed.    NEW CONCERNS:  He reports a progressively enlarging growth on abdomen.    LABS:  Recent labs show stable A1C within normal range. Cholesterol levels have increased with elevated LDL and decreased HDL compared to July, despite no changes in eating habits. CBC shows normal white cells without anemia. PSA remains within normal range.    CURRENT MEDICATIONS:  He takes doxycycline daily, Ozempic 0.25 mg, Zetia (half tablet), and Crestor for cholesterol management.        Past Medical History:   Diagnosis Date    Acute coronary syndrome     Anticoagulant long-term use     Plavix    BPH (benign prostatic hypertrophy)     CAD (coronary artery disease)     Cataract     Heart failure, unspecified HF chronicity, unspecified heart failure type 1/29/2025    History of colon polyps     Hyperlipidemia associated with type 2 diabetes mellitus     Hypertension associated  with type 2 diabetes mellitus 05/15/2014    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    Obesity 05/15/2014    SATYA on CPAP     Pacemaker     PAF (paroxysmal atrial fibrillation) 02/13/2024    Pancreatic cyst     Pneumonia     PTSD (post-traumatic stress disorder) 05/15/2014    RBBB 05/15/2014    S/P PTCA (percutaneous transluminal coronary angioplasty) 05/15/2014    Type 2 diabetes mellitus without complication 09/28/2015          Past Surgical History:   Procedure Laterality Date    APPENDECTOMY      at age 14    ATRIAL CARDIAC PACEMAKER INSERTION      CATARACT EXTRACTION      COLONOSCOPY Left 04/27/2018    Procedure: COLONOSCOPY;  Surgeon: Artem Medeiros MD;  Location: [facility] ENDO;  Service: Endoscopy;  Laterality: Left;    CORONARY ANGIOPLASTY WITH STENT PLACEMENT      1990, 2008, 2012    ENDOSCOPIC ULTRASOUND OF UPPER GASTROINTESTINAL TRACT N/A 07/12/2021    Procedure: ULTRASOUND, UPPER GI TRACT, ENDOSCOPIC;  Surgeon: Popeye Terrell MD;  Location: [facility] ENDO;  Service: Endoscopy;  Laterality: N/A;  upper and linear    ESOPHAGOGASTRODUODENOSCOPY N/A 08/16/2018    Procedure: ESOPHAGOGASTRODUODENOSCOPY (EGD);  Surgeon: Mario Cesar MD;  Location: [facility] ENDO;  Service: General;  Laterality: N/A;    ROBOT-ASSISTED LAPAROSCOPIC SLEEVE GASTRECTOMY USING DA LURDES XI N/A 09/27/2018    Procedure: XI ROBOTIC SLEEVE GASTRECTOMY;  Surgeon: Mario Cesar MD;  Location: [facility] OR;  Service: General;  Laterality: N/A;    VASECTOMY       Family History   Problem Relation Name Age of Onset    Cataracts Father      Heart disease Father      Hypertension Father      Heart disease Mother      Hypertension Mother      Colon cancer Neg Hx       Social History     Socioeconomic History    Marital status:    Tobacco Use    Smoking status: Former     Current packs/day: 0.00     Average packs/day: 1 pack/day for 45.0 years (45.0 ttl pk-yrs)     Types: Cigarettes     Start date: 1965     Quit date: 12/31/2009  "    Years since quitting: 15.0    Smokeless tobacco: Never   Substance and Sexual Activity    Alcohol use: No    Drug use: No   Social History Narrative         Social Drivers of Health     Financial Resource Strain: Low Risk  (8/21/2023)    Overall Financial Resource Strain (CARDIA)     Difficulty of Paying Living Expenses: Not hard at all   Food Insecurity: No Food Insecurity (8/21/2023)    Hunger Vital Sign     Worried About Running Out of Food in the Last Year: Never true     Ran Out of Food in the Last Year: Never true   Transportation Needs: No Transportation Needs (8/21/2023)    PRAPARE - Transportation     Lack of Transportation (Medical): No     Lack of Transportation (Non-Medical): No   Physical Activity: Inactive (8/21/2023)    Exercise Vital Sign     Days of Exercise per Week: 0 days     Minutes of Exercise per Session: 0 min   Stress: No Stress Concern Present (8/21/2023)    [employer] [place] [employer] - Occupational Stress Questionnaire     Feeling of Stress : Not at all   Housing Stability: Low Risk  (8/21/2023)    Housing Stability Vital Sign     Unable to Pay for Housing in the Last Year: No     Number of Places Lived in the Last Year: 1     Unstable Housing in the Last Year: No     Review of patient's allergies indicates:   Allergen Reactions    Iodinated contrast media      States, "My arteries started shutting down on me" pt states only to iv dye- was specifically told that oral dye does not have the same reaction       Review of Systems   Constitutional:  Positive for malaise/fatigue. Negative for weight loss.   Respiratory:  Positive for shortness of breath. Negative for cough.    Cardiovascular:  Negative for chest pain, palpitations and leg swelling.   Gastrointestinal:  Negative for abdominal pain, constipation and heartburn.     Objective:       /77 (BP Location: Left arm, Patient Position: Sitting)   Pulse 63   Temp 96.2 °F (35.7 °C) (Tympanic)   Ht 6' 1" " (1.854 m)   Wt 108.7 kg (239 lb 10.2 oz)   SpO2 96%   BMI 31.62 kg/m²   Physical Exam             Physical Exam  Constitutional:       General: He is not in acute distress.     Appearance: Normal appearance. He is well-developed. He is not ill-appearing or diaphoretic.   Cardiovascular:      Rate and Rhythm: Normal rate and regular rhythm.      Heart sounds: Normal heart sounds.   Pulmonary:      Effort: Pulmonary effort is normal.      Breath sounds: Normal breath sounds.   Abdominal:      Palpations: Abdomen is soft. There is mass (right lower quadrant - feels like lipoma).      Tenderness: There is no abdominal tenderness. There is no guarding.   Musculoskeletal:      Right lower leg: No edema.      Left lower leg: No edema.   Neurological:      General: No focal deficit present.      Mental Status: He is alert and oriented to person, place, and time.   Psychiatric:         Mood and Affect: Mood normal.         Behavior: Behavior normal.         Thought Content: Thought content normal.         Judgment: Judgment normal.         Assessment:     1. Benign prostatic hyperplasia, unspecified whether lower urinary tract symptoms present    2. Type 2 diabetes mellitus without complication, without long-term current use of insulin    3. Hyperlipidemia associated with type 2 diabetes mellitus    4. Hypertension associated with type 2 diabetes mellitus    5. Fatigue, unspecified type    6. Colon cancer screening    7. Intra-abdominal and pelvic swelling, mass and lump, unspecified site    8. Pancreatic cyst    9. Heart failure, unspecified HF chronicity, unspecified heart failure type    10. PAF (paroxysmal atrial fibrillation)    11. Atherosclerosis of aorta      Plan:   Assessment & Plan        HEART FAILURE:  - Assessed patient's shortness of breath, potentially heart-related.  - Mr. Doan reports dyspnea on exertion, limiting walking ability.  - Advised patient to mention this to the cardiologist.  - Mr. Doan  se[facility].  - Scheduled follow-up visit with cardiologist Dr. Tracy in March, approximately 6 weeks from now.    PAF (PAROXYSMAL ATRIAL FIBRILLATION):  - Confirmed patient's cardiac device was checked in December and is functioning properly.  - Noted patient has a pacemaker with leads that may be too long, potentially affecting the ability to have MRIs.    CARDIOVASCULAR DISEASE:  - Recommend increasing cholesterol medication dosage to target LDL <70 mg/dL due to cardiac history.  - Continued current cholesterol medications (Zetia and Crestor) for management of heart disease.  - Recommend increasing Zetia dosage from half tablet to full tablet daily.    DIABETES:  - Reviewed lab results: A1C stable and within normal range.  - Recommend increasing cholesterol medication dosage to target LDL <70 mg/dL due to diabetes.  - Increased Ozempic to 0.5 mg for next few doses, then to 1 mg if approved by insurance.  - Discussed the need to achieve LDL <70 mg/dL or <55 mg/dL due to patient's history of diabetes and heart issues.    HYPERLIPIDEMIA:  - Reviewed lab results: cholesterol levels increased (LDL >100 mg/dL, HDL decreased).  - Recommend increasing cholesterol medication dosage to target LDL <70 mg/dL.  - Increased Zetia from half tablet to 1 full tablet daily.  - Continued Crestor at current dose (1 tablet daily).    COLON CANCER SCREENING:  - Noted patient's history of multiple colonoscopies with polyps found each time.  - Last documented colonoscopy was in 2018 with Dr. Medeiros, where one polyp was found.  - Determined patient is due for another colonoscopy based on the last documented procedure in 2018.  - Ordered a colonoscopy;  will contact the patient to set up the appointment.    ABDOMINAL MASS:  - Evaluated abdominal mass, likely lipoma; ordered CT to assess.  - Mr. Doan reports a growing lump on their body.  - Examined the lump and suspected it to be a  lipoma, described as a collection of fat tissue in the body with a surrounding wall.  - Suggested an ultrasound for definitive diagnosis.  - Ordered a CT of the abdomen to examine the lipoma and assess a previously identified pancreatic cyst.    DIETARY RECOMMENDATIONS:  - Advised dietary changes as recommended by cardiologist: eating 2 meals per day instead of 3, increasing intake of fruits, vegetables, fish, and chicken.        Benign prostatic hyperplasia, unspecified whether lower urinary tract symptoms present  -     Ambulatory referral/consult to Urology; Future; Expected date: 02/05/2025    Type 2 diabetes mellitus without complication, without long-term current use of insulin  -     semaglutide (OZEMPIC) 1 mg/dose (4 mg/3 mL); Inject 1 mg into the skin every 7 days.  Dispense: 3 mL; Refill: 11  -     Comprehensive Metabolic Panel; Future; Expected date: 07/28/2025  -     Lipid Panel; Future; Expected date: 07/28/2025  -     Hemoglobin A1C; Future; Expected date: 07/28/2025    Hyperlipidemia associated with type 2 diabetes mellitus    Hypertension associated with type 2 diabetes mellitus    Fatigue, unspecified type  -     TESTOSTERONE PANEL; Future; Expected date: 01/29/2025    Colon cancer screening  -     Ambulatory referral/consult to Endo Procedure ; Future; Expected date: 01/30/2025    Intra-abdominal and pelvic swelling, mass and lump, unspecified site  -     CT Abdomen Pelvis  Without Contrast; Future; Expected date: 01/29/2025    Pancreatic cyst  -     CT Abdomen Pelvis  Without Contrast; Future; Expected date: 01/29/2025    Heart failure, unspecified HF chronicity, unspecified heart failure type    PAF (paroxysmal atrial fibrillation)    Atherosclerosis of aorta    Continue all other current medications.   Above labs in 6 months prior to visit with me.    Medication List with Changes/Refills   Current Medications    ALBUTEROL INHL    Inhale into the lungs as needed.    AMLODIPINE (NORVASC)  "10 MG TABLET    Take 10 mg by mouth once daily.    BUPROPION (WELLBUTRIN SR) 200 MG TBSR    Take 200 mg by mouth once daily. Takes 300mg daily    CALCIUM CITRATE-VITAMIN D3 315-200 MG (CITRACAL+D) 315 MG-5 MCG (200 UNIT) PER TABLET    TAKE ONE TABLET BY MOUTH TWICE A DAY FOR SUPPLEMENTATION    CALCIUM-VITAMIN D3 (OS-ARVIND 500 + D3) 500 MG-5 MCG (200 UNIT) PER TABLET    Take 1 tablet by mouth 2 (two) times daily with meals.    CETIRIZINE (ZYRTEC) 10 MG TABLET    Take 10 mg by mouth as needed.    CYANOCOBALAMIN (VITAMIN B-12) 1000 MCG TABLET    Take 100 mcg by mouth once daily.    DABIGATRAN ETEXILATE (PRADAXA) 150 MG CAP    Take 150 mg by mouth once daily. "Do NOT break, chew, or open capsules."   Takes 2 caps daily    DICLOFENAC (VOLTAREN) 0.1 % OPHTHALMIC SOLUTION    1 drop 4 (four) times daily.    DOXEPIN (SINEQUAN) 10 MG CAPSULE    Take 1 capsule (10 mg total) by mouth every evening.    DOXYCYCLINE (ORACEA) 40 MG CAPSULE    Take 40 mg by mouth.    DOXYCYCLINE (PERIOSTAT) 20 MG TABLET    Take 20 mg by mouth once daily. 2 weeks on and 2 weeks off ([facility])    EZETIMIBE (ZETIA) 10 MG TABLET    TAKE ONE TABLET BY MOUTH EVERY DAY TO LOWER CHOLESTEROL    FLUTICASONE PROPIONATE (FLONASE) 50 MCG/ACTUATION NASAL SPRAY    1 spray by Each Nostril route once daily.    FUROSEMIDE (LASIX) 40 MG TABLET    Takes one-half tablet by mouth daily     MG TABLET    Take 800 mg by mouth.    IPRATROPIUM (ATROVENT) 21 MCG (0.03 %) NASAL SPRAY    2 sprays by Each Nostril route 3 (three) times daily as needed for Rhinitis (nasal congestion).    KETOTIFEN (ZADITOR) 0.025 % (0.035 %) OPHTHALMIC SOLUTION    1 drop 2 (two) times daily.    MELATONIN 5 MG CAP    Take by mouth.    METOPROLOL SUCCINATE (TOPROL-XL) 100 MG 24 HR TABLET    Take 100 mg by mouth once daily. Takes 200mg daily    METRONIDAZOLE 0.75% (METROCREAM) 0.75 % CREA    Apply topically 2 (two) times daily.    MULTIVITAMIN (MULTIPLE VITAMIN ORAL)    Take by mouth.    " NITROGLYCERIN (NITROSTAT) 0.4 MG SL TABLET    Place 1 tablet (0.4 mg total) under the tongue every 5 (five) minutes as needed for Chest pain.    OMEPRAZOLE (PRILOSEC) 20 MG CAPSULE    Take 20 mg by mouth once daily.    PREGABALIN (LYRICA) 75 MG CAPSULE    Take 75 mg by mouth Daily.    PYRIDOXINE, VITAMIN B6, (B-6) 100 MG TAB        RANOLAZINE (RANEXA) 1,000 MG TB12    Take 1,000 mg by mouth 2 (two) times daily. Takes daily    ROSUVASTATIN (CRESTOR) 40 MG TAB    Take 40 mg by mouth every evening.    SEMAGLUTIDE (OZEMPIC) 0.25 MG OR 0.5 MG (2 MG/3 ML) PEN INJECTOR    Inject 0.5 mg into the skin every 7 days.    SILDENAFIL (VIAGRA) 100 MG TABLET    Take 1 tablet (100 mg total) by mouth daily as needed for Erectile Dysfunction.    THIAMINE (VITAMIN B-1) 50 MG TABLET    Take 50 mg by mouth once daily.    TRETINOIN (RETIN-A) 0.1 % CREAM    Apply topically.    VITAMIN D 1000 UNITS TAB    Take 2,000 Units by mouth once daily.    WHITE PETROLATUM 43 % OINT    Apply topically.   Changed and/or Refilled Medications    Modified Medication Previous Medication    SEMAGLUTIDE (OZEMPIC) 1 MG/DOSE (4 MG/3 ML) semaglutide (OZEMPIC) 1 mg/dose (4 mg/3 mL)       Inject 1 mg into the skin every 7 days.    Inject 1 mg into the skin every 7 days.   Discontinued Medications    AZITHROMYCIN (Z-ZACHARY) 250 MG TABLET    Take 2 tablets by mouth on day 1; Take 1 tablet by mouth on days 2-5       This note was generated with the assistance of ambient listening technology. Verbal consent was obtained by the patient and accompanying visitor(s) for the recording of patient appointment to facilitate this note. I attest to having reviewed and edited the generated note for accuracy, though some syntax or spelling errors may persist. Please contact the author of this note for any clarification.

## 2025-02-05 LAB
ALBUMIN SERPL-MCNC: 4 G/DL (ref 3.6–5.1)
SHBG SERPL-SCNC: 43 NMOL/L (ref 22–77)
TESTOST FREE SERPL-MCNC: 30.6 PG/ML (ref 6–73)
TESTOST SERPL-MCNC: 294 NG/DL (ref 250–1100)
TESTOSTERONE.FREE+WB SERPL-MCNC: 56.2 NG/DL (ref 15–150)

## 2025-02-06 ENCOUNTER — PATIENT MESSAGE (OUTPATIENT)
Dept: INTERNAL MEDICINE | Facility: CLINIC | Age: 75
End: 2025-02-06
Payer: MEDICARE

## 2025-02-24 ENCOUNTER — RESULTS FOLLOW-UP (OUTPATIENT)
Dept: INTERNAL MEDICINE | Facility: CLINIC | Age: 75
End: 2025-02-24
Payer: MEDICARE

## 2025-02-24 ENCOUNTER — HOSPITAL ENCOUNTER (OUTPATIENT)
Dept: RADIOLOGY | Facility: HOSPITAL | Age: 75
Discharge: HOME | End: 2025-02-24
Attending: FAMILY MEDICINE
Payer: MEDICARE

## 2025-02-24 DIAGNOSIS — Z00.00 ENCOUNTER FOR MEDICARE ANNUAL WELLNESS EXAM: ICD-10-CM

## 2025-02-24 DIAGNOSIS — I72.3 ANEURYSM OF RIGHT COMMON ILIAC ARTERY: Primary | ICD-10-CM

## 2025-02-24 DIAGNOSIS — R19.00 INTRA-ABDOMINAL AND PELVIC SWELLING, MASS AND LUMP, UNSPECIFIED SITE: ICD-10-CM

## 2025-02-24 DIAGNOSIS — K86.2 PANCREATIC CYST: ICD-10-CM

## 2025-02-24 PROCEDURE — 74176 CT ABD & PELVIS W/O CONTRAST: CPT | Mod: TC

## 2025-02-24 PROCEDURE — A9698 NON-RAD CONTRAST MATERIALNOC: HCPCS | Performed by: FAMILY MEDICINE

## 2025-02-24 PROCEDURE — 25500020 PHARM REV CODE 255: Performed by: FAMILY MEDICINE

## 2025-02-24 PROCEDURE — 74176 CT ABD & PELVIS W/O CONTRAST: CPT | Mod: 26,,, | Performed by: STUDENT IN AN ORGANIZED HEALTH CARE EDUCATION/TRAINING PROGRAM

## 2025-02-24 RX ADMIN — BARIUM SULFATE 900 ML: 20 SUSPENSION ORAL at 01:02

## 2025-02-27 ENCOUNTER — LAB VISIT (OUTPATIENT)
Dept: LAB | Facility: HOSPITAL | Age: 75
End: 2025-02-27
Attending: INTERNAL MEDICINE
Payer: MEDICARE

## 2025-02-27 DIAGNOSIS — Z98.61 CAD S/P PERCUTANEOUS CORONARY ANGIOPLASTY: ICD-10-CM

## 2025-02-27 DIAGNOSIS — E11.9 TYPE 2 DIABETES MELLITUS WITHOUT COMPLICATION, WITHOUT LONG-TERM CURRENT USE OF INSULIN: ICD-10-CM

## 2025-02-27 DIAGNOSIS — I25.10 CAD S/P PERCUTANEOUS CORONARY ANGIOPLASTY: ICD-10-CM

## 2025-02-27 LAB
ALBUMIN SERPL BCP-MCNC: 3.5 G/DL (ref 3.5–5.2)
ALP SERPL-CCNC: 66 U/L (ref 40–150)
ALT SERPL W/O P-5'-P-CCNC: 15 U/L (ref 10–44)
ANION GAP SERPL CALC-SCNC: 6 MMOL/L (ref 8–16)
AST SERPL-CCNC: 18 U/L (ref 10–40)
BILIRUB SERPL-MCNC: 0.5 MG/DL (ref 0.1–1)
BUN SERPL-MCNC: 11 MG/DL (ref 8–23)
CALCIUM SERPL-MCNC: 9.4 MG/DL (ref 8.7–10.5)
CHLORIDE SERPL-SCNC: 107 MMOL/L (ref 95–110)
CHOLEST SERPL-MCNC: 164 MG/DL (ref 120–199)
CHOLEST/HDLC SERPL: 4.8 {RATIO} (ref 2–5)
CO2 SERPL-SCNC: 29 MMOL/L (ref 23–29)
CREAT SERPL-MCNC: 0.8 MG/DL (ref 0.5–1.4)
EST. GFR  (NO RACE VARIABLE): >60 ML/MIN/1.73 M^2
ESTIMATED AVG GLUCOSE: 97 MG/DL (ref 68–131)
GLUCOSE SERPL-MCNC: 95 MG/DL (ref 70–110)
HBA1C MFR BLD: 5 % (ref 4–5.6)
HDLC SERPL-MCNC: 34 MG/DL (ref 40–75)
HDLC SERPL: 20.7 % (ref 20–50)
LDLC SERPL CALC-MCNC: 101 MG/DL (ref 63–159)
NONHDLC SERPL-MCNC: 130 MG/DL
POTASSIUM SERPL-SCNC: 4.1 MMOL/L (ref 3.5–5.1)
PROT SERPL-MCNC: 6.6 G/DL (ref 6–8.4)
SODIUM SERPL-SCNC: 142 MMOL/L (ref 136–145)
TRIGL SERPL-MCNC: 145 MG/DL (ref 30–150)

## 2025-02-27 PROCEDURE — 80053 COMPREHEN METABOLIC PANEL: CPT | Performed by: INTERNAL MEDICINE

## 2025-02-27 PROCEDURE — 83036 HEMOGLOBIN GLYCOSYLATED A1C: CPT | Performed by: INTERNAL MEDICINE

## 2025-02-27 PROCEDURE — 80061 LIPID PANEL: CPT | Performed by: INTERNAL MEDICINE

## 2025-02-28 ENCOUNTER — RESULTS FOLLOW-UP (OUTPATIENT)
Dept: CARDIOLOGY | Facility: CLINIC | Age: 75
End: 2025-02-28

## 2025-02-28 NOTE — PROGRESS NOTES
Labs reviewed. LDL above goal. Encourage heart healthy diet. Kidney and liver function stable. A1C normal. F/u as scheduled.

## 2025-03-16 ENCOUNTER — CLINICAL SUPPORT (OUTPATIENT)
Dept: CARDIOLOGY | Facility: HOSPITAL | Age: 75
End: 2025-03-16
Attending: INTERNAL MEDICINE
Payer: OTHER GOVERNMENT

## 2025-03-16 ENCOUNTER — CLINICAL SUPPORT (OUTPATIENT)
Dept: CARDIOLOGY | Facility: HOSPITAL | Age: 75
End: 2025-03-16
Payer: OTHER GOVERNMENT

## 2025-03-16 DIAGNOSIS — I49.5 SICK SINUS SYNDROME: ICD-10-CM

## 2025-03-16 DIAGNOSIS — Z95.0 PRESENCE OF CARDIAC PACEMAKER: ICD-10-CM

## 2025-03-16 PROCEDURE — 93296 REM INTERROG EVL PM/IDS: CPT | Performed by: INTERNAL MEDICINE

## 2025-03-16 PROCEDURE — 93294 REM INTERROG EVL PM/LDLS PM: CPT | Mod: ,,, | Performed by: INTERNAL MEDICINE

## 2025-03-24 LAB
OHS CV AF BURDEN PERCENT: < 1
OHS CV DC REMOTE DEVICE TYPE: NORMAL
OHS CV RV PACING PERCENT: 1 %

## 2025-03-25 ENCOUNTER — OFFICE VISIT (OUTPATIENT)
Dept: UROLOGY | Facility: CLINIC | Age: 75
End: 2025-03-25
Payer: OTHER GOVERNMENT

## 2025-03-25 VITALS
HEART RATE: 66 BPM | DIASTOLIC BLOOD PRESSURE: 89 MMHG | HEIGHT: 73 IN | BODY MASS INDEX: 30.36 KG/M2 | WEIGHT: 229.06 LBS | SYSTOLIC BLOOD PRESSURE: 136 MMHG

## 2025-03-25 DIAGNOSIS — N52.01 ERECTILE DYSFUNCTION DUE TO ARTERIAL INSUFFICIENCY: Primary | ICD-10-CM

## 2025-03-25 DIAGNOSIS — N40.0 BENIGN PROSTATIC HYPERPLASIA, UNSPECIFIED WHETHER LOWER URINARY TRACT SYMPTOMS PRESENT: ICD-10-CM

## 2025-03-25 PROCEDURE — 99204 OFFICE O/P NEW MOD 45 MIN: CPT | Mod: S$PBB,,, | Performed by: NURSE PRACTITIONER

## 2025-03-25 PROCEDURE — 99999 PR PBB SHADOW E&M-EST. PATIENT-LVL V: CPT | Mod: PBBFAC,,, | Performed by: NURSE PRACTITIONER

## 2025-03-25 PROCEDURE — 99215 OFFICE O/P EST HI 40 MIN: CPT | Mod: PBBFAC | Performed by: NURSE PRACTITIONER

## 2025-03-25 RX ORDER — TADALAFIL 5 MG/1
5 TABLET ORAL DAILY
COMMUNITY

## 2025-03-25 RX ORDER — DUTASTERIDE 0.5 MG/1
0.5 CAPSULE, LIQUID FILLED ORAL DAILY
Qty: 90 CAPSULE | Refills: 3 | Status: SHIPPED | OUTPATIENT
Start: 2025-03-25

## 2025-03-25 RX ORDER — TAMSULOSIN HYDROCHLORIDE 0.4 MG/1
0.4 CAPSULE ORAL DAILY
COMMUNITY

## 2025-03-25 NOTE — PROGRESS NOTES
Chief Complaint:   BPH  Erectile dysfunction    HPI:   Patient is a 74-year-old male that [facility].  States that he was told, years ago, that he needed a TURP.  Is currently on tamsulosin, reports stream is slow and postvoid dribbling.  Nocturia is only once nightly.  Urine in clinic is negative. PVR = 217 ml.  Denies gross hematuria.  No  cancers in his family.  Recent PSA per primary care physician was normal.  No history of elevated PSAs that required a prostate biopsy.  Patient reports erectile dysfunction for many years.  Has been prescribed or medication, with no resolve to his symptoms.  Is unable to maintain an erection rigid enough for sexual activity.  Allergies:  Iodinated contrast media    Medications:  has a current medication list which includes the following prescription(s): albuterol, amlodipine, bupropion, calcium citrate-vitamin d3 315-200 mg, calcium-vitamin d3, cetirizine, cyanocobalamin, pradaxa, diclofenac, doxepin, doxycycline, doxycycline, ezetimibe, fluticasone propionate, furosemide, ibu, ipratropium, ketotifen, melatonin, metoprolol succinate, metronidazole 0.75%, multivitamin, nitroglycerin, omeprazole, pregabalin, pyridoxine (vitamin b6), ranolazine, rosuvastatin, ozempic, ozempic, sildenafil, tadalafil, tamsulosin, thiamine, tretinoin, vitamin d, and white petrolatum.    Review of Systems:  General: No fever, chills, fatigability, or weight loss.  Skin: No rashes, itching, or changes in color or texture of skin.  Chest: Denies HERRON, cyanosis, wheezing, cough, and sputum production.  Abdomen: Appetite fine. No weight loss. Denies diarrhea, abdominal pain, hematemesis, or blood in stool.  Musculoskeletal: No joint stiffness or swelling. Denies back pain.  : As above.  All other review of systems negative.    PMH:   has a past medical history of Acute coronary syndrome, Anticoagulant long-term use, BPH (benign prostatic hypertrophy), CAD (coronary artery  disease), Cataract, Heart failure, unspecified HF chronicity, unspecified heart failure type (1/29/2025), History of colon polyps, Hyperlipidemia associated with type 2 diabetes mellitus, Hypertension associated with type 2 diabetes mellitus (05/15/2014), Mixed restrictive and obstructive lung disease (09/18/2017), Obesity (05/15/2014), SATYA on CPAP, Pacemaker, PAF (paroxysmal atrial fibrillation) (02/13/2024), Pancreatic cyst, Pneumonia, PTSD (post-traumatic stress disorder) (05/15/2014), RBBB (05/15/2014), S/P PTCA (percutaneous transluminal coronary angioplasty) (05/15/2014), and Type 2 diabetes mellitus without complication (09/28/2015).    PSH:   has a past surgical history that includes Coronary angioplasty with stent; Appendectomy; Colonoscopy (Left, 04/27/2018); Esophagogastroduodenoscopy (N/A, 08/16/2018); VASECTOMY; Atrial cardiac pacemaker insertion; Robot-assisted laparoscopic sleeve gastrectomy using da Lb Xi (N/A, 09/27/2018); Endoscopic ultrasound of upper gastrointestinal tract (N/A, 07/12/2021); and Cataract extraction.    FamHx: family history includes Cataracts in his father; Heart disease in his father and mother; Hypertension in his father and mother.    SocHx:  reports that he quit smoking about 15 years ago. His smoking use included cigarettes. He started smoking about 60 years ago. He has a 45 pack-year smoking history. He has never used smokeless tobacco. He reports that he does not drink alcohol and does not use drugs.      Physical Exam:  Vitals:    03/25/25 1302   BP: 136/89   Pulse: 66     General: A&Ox3, no apparent distress, no deformities  Neck: No masses, normal thyroid  Lungs: normal inspiration, no use of accessory muscles  Heart: normal pulse, no arrhythmias  Abdomen: Soft, NT, ND, no masses, no hernias, no hepatosplenomegaly  Lymphatic: Neck and groin nodes negative  Skin: The skin is warm and dry. No jaundice.  Ext: No c/c/e.  : Test desc debra, no abnormalities of epididymus.  Penis uncircumcised with normal penile and scrotal skin. Meatus normal. Normal rectal tone, no hemorrhoids. Prost 45 gm no nodules or masses appreciated. SV not palpable. Perineum and anus normal.    Labs/Studies:    Latest Reference Range & Units 08/27/21 08:35 07/18/23 10:51 01/24/25 09:24   Prostate Specific Antigen 0.00 - 4.00 ng/mL 5.2 (H) 2.4 3.4   (H): Data is abnormally high    Impression/Plan:   1. BPH  TAMIA indicates a very large prostate gland.  Patient to remain on tamsulosin and Avodart was prescribed and sent to his local pharmacy.  Patient was scheduled with MD to discuss surgical interventions for BPH, elevated PVR.    2.ED - I reviewed the etiology and management of ED.  Management options include oral medications, vacuum erectile devices, MUSE urethral suppositories, intracavernosal injections, and surgical placement of a penile prosthesis.  I reviewed the risks and benefits of each.  For medications, I noted that they are safe and effective, but noted that side effects include flushing of the face, headaches, color vision change, blurry vision, and congestion/runny nose. They should not be used by anyone currently taking nitrates for chest pain, and I reviewed the patient's current medications in the chart and verbally with the patient and he is not. For vacuum erectile devices, I noted that they also are safe but that they require the use of a restrictive ring at the base of the penis in order to prevent a detumescence, which can be uncomfortable for some patients. They also have the added benefits of being able to be combined with medical therapy for added effect.  For MUSE, I noted that these are reasonably effective, but that patients usually experience urethral burning, which can also be experienced by the partner, often requiring the use of condoms.  For intracavernosal injections I explained that this is usually the most efficacious medication to achieve a natural erection, but requires  injecting the penis, which is not a suitable option for everybody.  For surgical options I reviewed a that there is the surgical risks which include pain, bleeding, and infection.  I noted that an infection of the device would require it to be removed, which can make replacement at a later date very difficult.  I explained that once a prosthesis is implanted the patient will never achieve a natural erection ever again.   Appointment was scheduled for [facility]

## 2025-03-31 ENCOUNTER — OFFICE VISIT (OUTPATIENT)
Dept: UROLOGY | Facility: CLINIC | Age: 75
End: 2025-03-31
Payer: OTHER GOVERNMENT

## 2025-03-31 VITALS
DIASTOLIC BLOOD PRESSURE: 81 MMHG | HEART RATE: 64 BPM | SYSTOLIC BLOOD PRESSURE: 123 MMHG | WEIGHT: 227.06 LBS | BODY MASS INDEX: 30.09 KG/M2 | HEIGHT: 73 IN

## 2025-03-31 DIAGNOSIS — N52.01 ERECTILE DYSFUNCTION DUE TO ARTERIAL INSUFFICIENCY: ICD-10-CM

## 2025-03-31 DIAGNOSIS — R39.15 URGENCY OF URINATION: ICD-10-CM

## 2025-03-31 DIAGNOSIS — N40.0 BENIGN PROSTATIC HYPERPLASIA, UNSPECIFIED WHETHER LOWER URINARY TRACT SYMPTOMS PRESENT: Primary | ICD-10-CM

## 2025-03-31 LAB
AMORPH CRY UR QL COMP ASSIST: ABNORMAL
BACTERIA #/AREA URNS AUTO: ABNORMAL /HPF
HYALINE CASTS UR QL AUTO: 3 /LPF (ref 0–1)
MICROSCOPIC COMMENT: ABNORMAL
RBC #/AREA URNS AUTO: 1 /HPF (ref 0–4)
SQUAMOUS #/AREA URNS AUTO: 1 /HPF
WBC #/AREA URNS AUTO: 1 /HPF (ref 0–5)

## 2025-03-31 PROCEDURE — 81001 URINALYSIS AUTO W/SCOPE: CPT | Performed by: UROLOGY

## 2025-03-31 PROCEDURE — 99215 OFFICE O/P EST HI 40 MIN: CPT | Mod: PBBFAC | Performed by: UROLOGY

## 2025-03-31 PROCEDURE — 99214 OFFICE O/P EST MOD 30 MIN: CPT | Mod: S$PBB,,, | Performed by: UROLOGY

## 2025-03-31 PROCEDURE — 99999 PR PBB SHADOW E&M-EST. PATIENT-LVL V: CPT | Mod: PBBFAC,,, | Performed by: UROLOGY

## 2025-03-31 NOTE — PROGRESS NOTES
Chief Complaint:   Encounter Diagnosis   Name Primary?    Benign prostatic hyperplasia, unspecified whether lower urinary tract symptoms present Yes       HPI:  HPI Cole Doan is a 74 y.o. male who presents with follow up to BPH.  He has been on tamsulosin for many years.  [facility].  His PSAs within normal limits.  He was recently started on Avodart after seeing NP rushing.  His main complaints are some postvoid dribbling and urinary urgency.  He is not having frequent urge incontinence episodes.  He states that these are rare.  He does have coronary artery disease in his maintained on Pradaxa.    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    Obesity 05/15/2014    SATYA on CPAP     Pacemaker     PAF (paroxysmal atrial fibrillation) 02/13/2024    Pancreatic cyst     Pneumonia     PTSD (post-traumatic stress disorder) 05/15/2014    RBBB 05/15/2014    S/P PTCA (percutaneous transluminal coronary angioplasty) 05/15/2014    Type 2 diabetes mellitus without complication 09/28/2015     Past Surgical History:   Procedure Laterality Date    APPENDECTOMY      at age 14    ATRIAL CARDIAC PACEMAKER INSERTION      CATARACT EXTRACTION      COLONOSCOPY Left 04/27/2018    Procedure: COLONOSCOPY;  Surgeon: Artem Medeiros MD;  Location: [facility];  Service: Endoscopy;  Laterality: Left;    CORONARY ANGIOPLASTY WITH STENT PLACEMENT      1990, 2008, 2012    ENDOSCOPIC ULTRASOUND OF UPPER  "GASTROINTESTINAL TRACT N/A 07/12/2021    Procedure: ULTRASOUND, UPPER GI TRACT, ENDOSCOPIC;  Surgeon: Popeye Terrell MD;  Location: [facility] ENDO;  Service: Endoscopy;  Laterality: N/A;  upper and linear    ESOPHAGOGASTRODUODENOSCOPY N/A 08/16/2018    Procedure: ESOPHAGOGASTRODUODENOSCOPY (EGD);  Surgeon: Mario Cesar MD;  Location: [facility] ENDO;  Service: General;  Laterality: N/A;    ROBOT-ASSISTED LAPAROSCOPIC SLEEVE GASTRECTOMY USING DA LURDES XI N/A 09/27/2018    Procedure: XI ROBOTIC SLEEVE GASTRECTOMY;  Surgeon: Mario Cesar MD;  Location: [facility] OR;  Service: General;  Laterality: N/A;    VASECTOMY       Family History   Problem Relation Name Age of Onset    Cataracts Father      Heart disease Father      Hypertension Father      Heart disease Mother      Hypertension Mother      Colon cancer Neg Hx         Medications Ordered Prior to Encounter[2]     Objective:     Vitals:    03/31/25 1528   BP: 123/81   Pulse: 64   Weight: 103 kg (227 lb 1.2 oz)   Height: 6' 1" (1.854 m)      BMI Readings from Last 1 Encounters:   03/31/25 29.96 kg/m²          Physical Exam  No acute distress alert and oriented   Respirations even unlabored   Abdomen is soft nontender     Urinalysis shows small amount of blood    Bladder scan shows 32 cc residual  Lab Results   Component Value Date    PSA 3.4 01/24/2025    PSA 2.4 07/18/2023    PSA 5.2 (H) 08/27/2021        Lab Results   Component Value Date    CREATININE 0.8 02/27/2025      Assessment:       1. Benign prostatic hyperplasia, unspecified whether lower urinary tract symptoms present        Plan:     1. Benign prostatic hyperplasia, unspecified whether lower urinary tract symptoms present       Orders Placed This Encounter    Urinalysis Microscopic      BPH with lower urinary tract symptoms.  Large prostate estimated volume approximately 100 cc by us recent CT scan.  Symptoms currently are moderate.  Recently been started on dutasteride.  Recommend continue this and " "re-evaluate in 6 months.  Did discuss surgical therapy for BPH.  At this time plan continued medical treatment.       [1]   Social History  Tobacco Use    Smoking status: Former     Current packs/day: 0.00     Average packs/day: 1 pack/day for 45.0 years (45.0 ttl pk-yrs)     Types: Cigarettes     Start date: 1965     Quit date: 12/31/2009     Years since quitting: 15.2    Smokeless tobacco: Never    Tobacco comments:     Quit 15 years ago.    Substance Use Topics    Alcohol use: No    Drug use: No   [2]   Current Outpatient Medications on File Prior to Visit   Medication Sig Dispense Refill    amLODIPine (NORVASC) 10 MG tablet Take 10 mg by mouth once daily.      buPROPion (WELLBUTRIN SR) 200 MG TbSR Take 200 mg by mouth once daily. Takes 300mg daily      calcium citrate-vitamin D3 315-200 mg (CITRACAL+D) 315 mg-5 mcg (200 unit) per tablet TAKE ONE TABLET BY MOUTH TWICE A DAY FOR SUPPLEMENTATION      calcium-vitamin D3 (OS-ARVIND 500 + D3) 500 mg-5 mcg (200 unit) per tablet Take 1 tablet by mouth 2 (two) times daily with meals.      cetirizine (ZYRTEC) 10 MG tablet Take 10 mg by mouth as needed.      cyanocobalamin (VITAMIN B-12) 1000 MCG tablet Take 100 mcg by mouth once daily.      dabigatran etexilate (PRADAXA) 150 mg Cap Take 150 mg by mouth once daily. "Do NOT break, chew, or open capsules."   Takes 2 caps daily      diclofenac (VOLTAREN) 0.1 % ophthalmic solution 1 drop 4 (four) times daily.      doxycycline (ORACEA) 40 mg capsule Take 40 mg by mouth.      doxycycline (PERIOSTAT) 20 MG tablet Take 20 mg by mouth once daily. 2 weeks on and 2 weeks off ([facility])      dutasteride (AVODART) 0.5 mg capsule Take 1 capsule (0.5 mg total) by mouth once daily. 90 capsule 3    ezetimibe (ZETIA) 10 mg tablet TAKE ONE TABLET BY MOUTH EVERY DAY TO LOWER CHOLESTEROL      fluticasone propionate (FLONASE) 50 mcg/actuation nasal spray 1 spray by Each Nostril route once daily.      furosemide (LASIX) 40 MG tablet Takes " one-half tablet by mouth daily 30 tablet 1     mg tablet Take 800 mg by mouth.      ketotifen (ZADITOR) 0.025 % (0.035 %) ophthalmic solution 1 drop 2 (two) times daily.      melatonin 5 mg Cap Take by mouth.      metoprolol succinate (TOPROL-XL) 100 MG 24 hr tablet Take 100 mg by mouth once daily. Takes 200mg daily      metronidazole 0.75% (METROCREAM) 0.75 % Crea Apply topically 2 (two) times daily.      multivitamin (MULTIPLE VITAMIN ORAL) Take by mouth.      nitroGLYCERIN (NITROSTAT) 0.4 MG SL tablet Place 1 tablet (0.4 mg total) under the tongue every 5 (five) minutes as needed for Chest pain. 60 tablet 12    omeprazole (PRILOSEC) 20 MG capsule Take 20 mg by mouth once daily.      pregabalin (LYRICA) 75 MG capsule Take 75 mg by mouth Daily.      pyridoxine, vitamin B6, (B-6) 100 MG Tab       ranolazine (RANEXA) 1,000 mg Tb12 Take 1,000 mg by mouth 2 (two) times daily. Takes daily      rosuvastatin (CRESTOR) 40 MG Tab Take 40 mg by mouth every evening.      semaglutide (OZEMPIC) 1 mg/dose (4 mg/3 mL) Inject 1 mg into the skin every 7 days. 3 mL 11    tadalafiL (CIALIS) 5 MG tablet Take 5 mg by mouth Daily.      tamsulosin (FLOMAX) 0.4 mg Cap Take 0.4 mg by mouth once daily.      thiamine (VITAMIN B-1) 50 MG tablet Take 50 mg by mouth once daily.      tretinoin (RETIN-A) 0.1 % cream Apply topically.      vitamin D 1000 units Tab Take 2,000 Units by mouth once daily.      white petrolatum 43 % Oint Apply topically.      ALBUTEROL INHL Inhale into the lungs as needed.      doxepin (SINEQUAN) 10 MG capsule Take 1 capsule (10 mg total) by mouth every evening. 30 capsule 5    ipratropium (ATROVENT) 21 mcg (0.03 %) nasal spray 2 sprays by Each Nostril route 3 (three) times daily as needed for Rhinitis (nasal congestion). 30 mL 1    semaglutide (OZEMPIC) 0.25 mg or 0.5 mg (2 mg/3 mL) pen injector Inject 0.5 mg into the skin every 7 days. 1 each 5    sildenafil (VIAGRA) 100 MG tablet Take 1 tablet (100 mg total)  by mouth daily as needed for Erectile Dysfunction. 6 tablet 3     No current facility-administered medications on file prior to visit.

## 2025-04-07 ENCOUNTER — CLINICAL SUPPORT (OUTPATIENT)
Dept: UROLOGY | Facility: CLINIC | Age: 75
End: 2025-04-07
Payer: OTHER GOVERNMENT

## 2025-04-07 DIAGNOSIS — N52.01 ERECTILE DYSFUNCTION DUE TO ARTERIAL INSUFFICIENCY: Primary | ICD-10-CM

## 2025-04-07 NOTE — PROGRESS NOTES
Patient in today and met with Daniel Jaquez  at [facility]. Discussion, education and demonstration of the DENISE provided to the pt. Pt had the opportunity to ask questions regarding the purpose of the device, was shown how to properly use the device and was provided information on the cost of the device. Patient was given contact information and told to contact Mr. Jaquez should he have more questions or concerns.

## 2025-04-21 ENCOUNTER — OFFICE VISIT (OUTPATIENT)
Dept: INTERNAL MEDICINE | Facility: CLINIC | Age: 75
End: 2025-04-21
Payer: MEDICARE

## 2025-04-21 VITALS
BODY MASS INDEX: 29.31 KG/M2 | HEIGHT: 74 IN | SYSTOLIC BLOOD PRESSURE: 132 MMHG | DIASTOLIC BLOOD PRESSURE: 88 MMHG | HEART RATE: 69 BPM | OXYGEN SATURATION: 97 % | WEIGHT: 228.38 LBS

## 2025-04-21 DIAGNOSIS — I15.2 HYPERTENSION ASSOCIATED WITH TYPE 2 DIABETES MELLITUS: ICD-10-CM

## 2025-04-21 DIAGNOSIS — E11.59 HYPERTENSION ASSOCIATED WITH TYPE 2 DIABETES MELLITUS: ICD-10-CM

## 2025-04-21 DIAGNOSIS — G47.33 OSA ON CPAP: ICD-10-CM

## 2025-04-21 DIAGNOSIS — D69.2 SENILE PURPURA: ICD-10-CM

## 2025-04-21 DIAGNOSIS — N40.0 BENIGN PROSTATIC HYPERPLASIA, UNSPECIFIED WHETHER LOWER URINARY TRACT SYMPTOMS PRESENT: ICD-10-CM

## 2025-04-21 DIAGNOSIS — I72.3 ANEURYSM OF RIGHT COMMON ILIAC ARTERY: ICD-10-CM

## 2025-04-21 DIAGNOSIS — I25.10 CORONARY ARTERY DISEASE, UNSPECIFIED VESSEL OR LESION TYPE, UNSPECIFIED WHETHER ANGINA PRESENT, UNSPECIFIED WHETHER NATIVE OR TRANSPLANTED HEART: ICD-10-CM

## 2025-04-21 DIAGNOSIS — F43.10 PTSD (POST-TRAUMATIC STRESS DISORDER): ICD-10-CM

## 2025-04-21 DIAGNOSIS — E11.69 HYPERLIPIDEMIA ASSOCIATED WITH TYPE 2 DIABETES MELLITUS: ICD-10-CM

## 2025-04-21 DIAGNOSIS — I70.0 ATHEROSCLEROSIS OF AORTA: ICD-10-CM

## 2025-04-21 DIAGNOSIS — Z95.0 PACEMAKER: ICD-10-CM

## 2025-04-21 DIAGNOSIS — Z74.09 OTHER REDUCED MOBILITY: ICD-10-CM

## 2025-04-21 DIAGNOSIS — E78.5 HYPERLIPIDEMIA ASSOCIATED WITH TYPE 2 DIABETES MELLITUS: ICD-10-CM

## 2025-04-21 DIAGNOSIS — Z86.0100 HISTORY OF COLONIC POLYPS: ICD-10-CM

## 2025-04-21 DIAGNOSIS — Z00.00 ENCOUNTER FOR MEDICARE ANNUAL WELLNESS EXAM: Primary | ICD-10-CM

## 2025-04-21 DIAGNOSIS — K86.2 PANCREATIC CYST: ICD-10-CM

## 2025-04-21 DIAGNOSIS — I48.0 PAF (PAROXYSMAL ATRIAL FIBRILLATION): ICD-10-CM

## 2025-04-21 PROCEDURE — 99999 PR PBB SHADOW E&M-EST. PATIENT-LVL V: CPT | Mod: PBBFAC,,, | Performed by: NURSE PRACTITIONER

## 2025-04-21 PROCEDURE — 99215 OFFICE O/P EST HI 40 MIN: CPT | Mod: PBBFAC | Performed by: NURSE PRACTITIONER

## 2025-04-21 NOTE — PROGRESS NOTES
"  Cole Doan presented for a  Medicare AWV and comprehensive Health Risk Assessment today. The following components were reviewed and updated:    Medical history  Family History  Social history  Allergies and Current Medications  Health Risk Assessment  Health Maintenance  Care Team         ** See Completed Assessments for Annual Wellness Visit within the encounter summary.**         The following assessments were completed:  Living Situation  CAGE  Depression Screening  Timed Get Up and Go  Whisper Test-na  Cognitive Function Screening  Nutrition Screening  ADL Screening  PAQ Screening      Opioid documentation:      Patient does not have a current opioid prescription.        Vitals:    04/21/25 1012   BP: 132/88   Pulse: 69   SpO2: 97%   Weight: 103.6 kg (228 lb 6.3 oz)   Height: 6' 1.62" (1.87 m)     Body mass index is 29.63 kg/m².  Physical Exam  Vitals and nursing note reviewed.   Constitutional:       Appearance: He is well-developed.   HENT:      Head: Normocephalic.   Cardiovascular:      Rate and Rhythm: Normal rate and regular rhythm.      Heart sounds: Normal heart sounds.   Pulmonary:      Effort: Pulmonary effort is normal. No respiratory distress.      Breath sounds: Normal breath sounds.   Abdominal:      Palpations: Abdomen is soft. There is no mass.      Tenderness: There is no abdominal tenderness.   Musculoskeletal:         General: Normal range of motion.   Skin:     General: Skin is warm and dry.   Neurological:      Mental Status: He is alert and oriented to person, place, and time.      Motor: No abnormal muscle tone.   Psychiatric:         Speech: Speech normal.         Behavior: Behavior normal.               Diagnoses and health risks identified today and associated recommendations/orders:    1. Encounter for Medicare annual wellness exam  - Referral to Enhanced Annual Wellness Visit (eAWV) W+1  Signed RAFA pneumonia vaccine, shingrix, foot exam, AAA screening  Declines flu, covid, and " rsv vaccines  Encouraged healthy diet and exercise as tolerated  Reports cardiac conditions are stable.    Has urine leakage ever interrupted your daily activites or sleep? No  Do you think you could use some help to better manage urine leakage?No    Reports chronic n/t BUE and BLE. Encouraged daily foot inspections. Lyrica. [facility]    2. PTSD (post-traumatic stress disorder)  Wellbutrin  Continue current treatment plan as previously [facility]    3. Aneurysm of right common iliac artery  CT 2/25  Advised to fu with cardiologist. He expressed understanding.      4. Atherosclerosis of aorta  Ct 9/17  Continue current treatment plan as previously prescribed with your  cardiologist.     5. PAF (paroxysmal atrial fibrillation)  Stable. Continue current treatment plan as previously prescribed with your  cardiologist.     6. Hyperlipidemia associated with type 2 diabetes mellitus  A1c 5.0  Lipid-not at goal  Continue current treatment plan as previously prescribed with your  pcp and cardiologist.     7. Senile purpura  Chronic  Continue current treatment plan as previously prescribed with your  pcp     8. Hypertension associated with type 2 diabetes mellitus  Stable. Continue current treatment plan as previously prescribed with your  pcp and cardiologist.     9. Pacemaker  Continue current treatment plan as previously prescribed with your  cardiologist.     10. Pancreatic cyst  Ct 2/25  Advised to fu with gastroenterology for further evaluation. He expressed understanding.      11. SATYA on CPAP  [facility]    12. Coronary artery disease, unspecified vessel or lesion type, unspecified whether angina present, unspecified whether native or transplanted heart  Continue current treatment plan as previously prescribed with your  cardiologist.     13. Benign prostatic hyperplasia, unspecified whether lower urinary tract symptoms present  Flomax. Dutasteride  Continue  current treatment plan as previously prescribed with your urologist.   Has urine leakage ever interrupted your daily activites or sleep? No  Do you think you could use some help to better manage urine leakage?No     14. History of colonic polyps  Reports [facility]. He will [facility].     15. Other reduced mobility  Abnormal timed get up and go test.reports 2+ falls in the last 12 months.    Fall precautions reviewed with patient. Advised to follow up with PCP for further recommendations. Patient expressed understanding.         Provided Cole with a 5-10 year written screening schedule and personal prevention plan. Recommendations were developed using the USPSTF age appropriate recommendations. Education, counseling, and referrals were provided as needed. After Visit Summary , available on [employer],  which includes a list of additional screenings\tests needed.    Follow up in about 1 year (around 4/21/2026) for awv.    Leanna Alexander NP  I offered to discuss advanced care planning, including how to pick a person who would make decisions for you if you were unable to make them for yourself, called a health care power of , and what kind of decisions you might make such as use of life sustaining treatments such as ventilators and tube feeding when faced with a life limiting illness recorded on a living will that they will need to know. (How you want to be cared for as you near the end of your natural life)     X Patient is interested in learning more about how to make advanced directives.  I provided them paperwork and offered to discuss this with them.

## 2025-04-21 NOTE — PATIENT INSTRUCTIONS
Counseling and Referral of Other Preventative  (Italic type indicates deductible and co-insurance are waived)    Patient Name: Cole Doan  Today's Date: 4/21/2025    Health Maintenance       Date Due Completion Date    Abdominal Aortic Aneurysm Screening Never done ---    Pneumococcal Vaccines (Age 50+) (2 of 2 - PPSV23) 04/11/2017 2/14/2017    Shingles Vaccine (3 of 3) 05/27/2019 4/1/2019    Colorectal Cancer Screening 01/30/2024 1/30/2023    Foot Exam 07/21/2024 7/21/2023    Override on 8/25/2021: Done    RSV Vaccine (Age 60+ and Pregnant patients) (1 - 1-dose 75+ series) 04/21/2025 (Originally 9/26/2024) ---    Influenza Vaccine (1) 06/30/2025 (Originally 9/1/2024) 10/26/2023    COVID-19 Vaccine (3 - 2024-25 season) 04/21/2026 (Originally 9/1/2024) 2/25/2021    Diabetic Eye Exam 07/23/2025 7/23/2024    Override on 7/23/2024: Done    Override on 2/29/2024: Done    Diabetes Urine Screening 07/26/2025 7/26/2024    Hemoglobin A1c 08/27/2025 2/27/2025    Lipid Panel 02/27/2026 2/27/2025    High Dose Statin 04/21/2026 4/21/2025    TETANUS VACCINE 11/20/2027 11/20/2017        No orders of the defined types were placed in this encounter.      The following information is provided to all patients.  This information is to help you find resources for any of the problems found today that may be affecting your health:                  Living healthy guide: www.[facility].[place].gov      Understanding Diabetes: www.diabetes.org      Eating healthy: www.cdc.gov/healthyweight      CDC home safety checklist: www.cdc.gov/steadi/patient.html      Agency on Aging: www.goea.[place].gov      Alcoholics anonymous (AA): www.aa.org      Physical Activity: www.jaison.nih.gov/nz6yylj      Tobacco use: www.quitwithusla.org

## 2025-05-15 ENCOUNTER — OFFICE VISIT (OUTPATIENT)
Dept: CARDIOLOGY | Facility: CLINIC | Age: 75
End: 2025-05-15
Payer: OTHER GOVERNMENT

## 2025-05-15 VITALS
DIASTOLIC BLOOD PRESSURE: 76 MMHG | HEIGHT: 73 IN | WEIGHT: 233.38 LBS | BODY MASS INDEX: 30.93 KG/M2 | SYSTOLIC BLOOD PRESSURE: 114 MMHG | HEART RATE: 72 BPM

## 2025-05-15 DIAGNOSIS — I72.3 ANEURYSM OF RIGHT COMMON ILIAC ARTERY: ICD-10-CM

## 2025-05-15 DIAGNOSIS — E11.59 HYPERTENSION ASSOCIATED WITH TYPE 2 DIABETES MELLITUS: ICD-10-CM

## 2025-05-15 DIAGNOSIS — Z95.0 PACEMAKER: ICD-10-CM

## 2025-05-15 DIAGNOSIS — R53.81 PHYSICAL DECONDITIONING: ICD-10-CM

## 2025-05-15 DIAGNOSIS — R06.02 SHORTNESS OF BREATH: ICD-10-CM

## 2025-05-15 DIAGNOSIS — E11.9 TYPE 2 DIABETES MELLITUS WITHOUT COMPLICATION, WITHOUT LONG-TERM CURRENT USE OF INSULIN: ICD-10-CM

## 2025-05-15 DIAGNOSIS — E11.69 HYPERLIPIDEMIA ASSOCIATED WITH TYPE 2 DIABETES MELLITUS: ICD-10-CM

## 2025-05-15 DIAGNOSIS — I15.2 HYPERTENSION ASSOCIATED WITH TYPE 2 DIABETES MELLITUS: ICD-10-CM

## 2025-05-15 DIAGNOSIS — E78.5 HYPERLIPIDEMIA ASSOCIATED WITH TYPE 2 DIABETES MELLITUS: ICD-10-CM

## 2025-05-15 DIAGNOSIS — E66.9 OBESITY (BMI 30-39.9): ICD-10-CM

## 2025-05-15 DIAGNOSIS — I70.0 ATHEROSCLEROSIS OF AORTA: ICD-10-CM

## 2025-05-15 DIAGNOSIS — I48.0 PAF (PAROXYSMAL ATRIAL FIBRILLATION): ICD-10-CM

## 2025-05-15 DIAGNOSIS — I25.10 CAD S/P PERCUTANEOUS CORONARY ANGIOPLASTY: Primary | ICD-10-CM

## 2025-05-15 DIAGNOSIS — G47.33 OSA ON CPAP: ICD-10-CM

## 2025-05-15 DIAGNOSIS — Z98.61 CAD S/P PERCUTANEOUS CORONARY ANGIOPLASTY: Primary | ICD-10-CM

## 2025-05-15 DIAGNOSIS — F43.10 PTSD (POST-TRAUMATIC STRESS DISORDER): ICD-10-CM

## 2025-05-15 PROCEDURE — 99999 PR PBB SHADOW E&M-EST. PATIENT-LVL V: CPT | Mod: PBBFAC,,, | Performed by: INTERNAL MEDICINE

## 2025-05-15 PROCEDURE — 99215 OFFICE O/P EST HI 40 MIN: CPT | Mod: S$PBB,,, | Performed by: INTERNAL MEDICINE

## 2025-05-15 PROCEDURE — 99215 OFFICE O/P EST HI 40 MIN: CPT | Mod: PBBFAC | Performed by: INTERNAL MEDICINE

## 2025-05-15 RX ORDER — EVOLOCUMAB 140 MG/ML
140 INJECTION, SOLUTION SUBCUTANEOUS
Qty: 6 ML | Refills: 3 | Status: SHIPPED | OUTPATIENT
Start: 2025-05-15

## 2025-05-15 NOTE — PROGRESS NOTES
Subjective:   Patient ID:  Cole Doan is a 74 y.o. male who presents for follow up of No chief complaint on file.      HPI  JD MAY NP 2/13/2024   Cole Doan is a 73 year old male who presents to clinic for 6 month follow up with device check.      His current medical conditions include HTN, HLP, SSS s/p PPM (ABT), SATYA on CPAP, CAD s/p PTCA, DM Type II, obesity, PAF on Pradaxa.      Denies chest pain or anginal equivalents. No shortness of breath, HERRON or palpitations. Denies orthopnea, PND or abdominal bloating. Reports regular walking without any issues lately. NO leg swelling or claudications. No recent falls, syncope or near syncopal events. Reports compliance with medications and dietary restrictions. NO CNS complaints to suggest TIA or CVA today. No signs of abnormal bleeding on pradaxa.      Using cpap regularly.      Trouble with Ozempic Rx for diabetes Rx.         8/22/2024   Still has low stamina using cpap regularily his weight stable labs on target. His pacer adequately functioning. Walks for exercise  works in yard cuts grass no cardiac symptoms. Labs on target.     5/15/2025   History of Present Illness    Mr. Doan presents today for follow up of cardiovascular issues and fatigue He experienced a syncopal episode 2 months ago, losing consciousness while standing against his truck and regaining awareness while picking himself up from the concrete. He denies recollection of the actual fall. ED evaluation with extensive testing and scans did not reveal any significant findings or identify a specific cause. He reports dyspnea and fatigue with physical activity but denies chest pain. A right common iliac artery aneurysm was discovered during the fall workup. He uses a CPAP with a sleep mask for management. The CPAP has not been serviced in 1 year. History notable for septic shock requiring hospitalization in 2021, during which imaging revealed spots on the pancreas. He reports significant  "decline in memory function, noting difficulty with name recall except for his daughter's name. Current medications include Ozempic, Pradaxa (reports being "bruised" but denies other issues), Zetia, and Crestor 40 mg. LDL cholesterol was elevated at 101 mg/dL. A1C was 5.      ROS:  General: -fever, -chills, +fatigue, -weight gain, -weight loss, +decreased energy levels  Eyes: -vision changes, -redness, -discharge  ENT: -ear pain, -nasal congestion, -sore throat  Cardiovascular: -chest pain, -palpitations, -lower extremity edema  Respiratory: -cough, -shortness of breath, +exertional dyspnea  Gastrointestinal: -abdominal pain, -nausea, -vomiting, -diarrhea, -constipation, -blood in stool  Genitourinary: -dysuria, -hematuria, -frequency  Musculoskeletal: -joint pain, -muscle pain  Skin: -rash, -lesion  Neurological: -headache, -dizziness, -numbness, -tingling, +syncope, +memory loss  Psychiatric: -anxiety, -depression, -sleep difficulty          ROS    Past Medical History:   Diagnosis Date    Acute coronary syndrome     Anticoagulant long-term use     Plavix    BPH (benign prostatic hypertrophy)     CAD (coronary artery disease)     Cataract     Heart failure, unspecified HF chronicity, unspecified heart failure type 1/29/2025    History of colon polyps     Hyperlipidemia associated with type 2 diabetes mellitus     Hypertension associated with type 2 diabetes mellitus 05/15/2014    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    Obesity 05/15/2014    SATYA on CPAP     Pacemaker     PAF (paroxysmal atrial fibrillation) 02/13/2024    Pancreatic cyst     Pneumonia     PTSD (post-traumatic stress disorder) 05/15/2014    RBBB 05/15/2014    S/P PTCA (percutaneous transluminal coronary angioplasty) 05/15/2014    Type 2 diabetes mellitus without complication 09/28/2015       Past Surgical History:   Procedure Laterality Date    APPENDECTOMY      at age 14    ATRIAL CARDIAC PACEMAKER INSERTION      CATARACT " "EXTRACTION      COLONOSCOPY Left 04/27/2018    Procedure: COLONOSCOPY;  Surgeon: Artem Medeiros MD;  Location: [facility] ENDO;  Service: Endoscopy;  Laterality: Left;    CORONARY ANGIOPLASTY WITH STENT PLACEMENT      1990, 2008, 2012    ENDOSCOPIC ULTRASOUND OF UPPER GASTROINTESTINAL TRACT N/A 07/12/2021    Procedure: ULTRASOUND, UPPER GI TRACT, ENDOSCOPIC;  Surgeon: Popeye Terrell MD;  Location: [facility] ENDO;  Service: Endoscopy;  Laterality: N/A;  upper and linear    ESOPHAGOGASTRODUODENOSCOPY N/A 08/16/2018    Procedure: ESOPHAGOGASTRODUODENOSCOPY (EGD);  Surgeon: Mario Cesar MD;  Location: [facility] ENDO;  Service: General;  Laterality: N/A;    ROBOT-ASSISTED LAPAROSCOPIC SLEEVE GASTRECTOMY USING DA LURDES XI N/A 09/27/2018    Procedure: XI ROBOTIC SLEEVE GASTRECTOMY;  Surgeon: Mario Cesar MD;  Location: [facility] OR;  Service: General;  Laterality: N/A;    VASECTOMY         Social History[1]    Family History   Problem Relation Name Age of Onset    Cataracts Father      Heart disease Father      Hypertension Father      Heart disease Mother      Hypertension Mother      Colon cancer Neg Hx         Current Outpatient Medications   Medication Sig    amLODIPine (NORVASC) 10 MG tablet Take 10 mg by mouth once daily.    buPROPion (WELLBUTRIN SR) 200 MG TbSR Take 200 mg by mouth once daily. Takes 300mg daily    calcium citrate-vitamin D3 315-200 mg (CITRACAL+D) 315 mg-5 mcg (200 unit) per tablet TAKE ONE TABLET BY MOUTH TWICE A DAY FOR SUPPLEMENTATION    cetirizine (ZYRTEC) 10 MG tablet Take 10 mg by mouth as needed.    cyanocobalamin (VITAMIN B-12) 1000 MCG tablet Take 100 mcg by mouth once daily.    dabigatran etexilate (PRADAXA) 150 mg Cap Take 150 mg by mouth once daily. "Do NOT break, chew, or open capsules."   Takes 2 caps daily    diclofenac (VOLTAREN) 0.1 % ophthalmic solution 1 drop 4 (four) times daily.    doxepin (SINEQUAN) 10 MG capsule Take 1 capsule (10 mg total) by mouth every evening.    ezetimibe (ZETIA) " 10 mg tablet TAKE ONE TABLET BY MOUTH EVERY DAY TO LOWER CHOLESTEROL    furosemide (LASIX) 40 MG tablet Takes one-half tablet by mouth daily     mg tablet Take 800 mg by mouth.    ketotifen (ZADITOR) 0.025 % (0.035 %) ophthalmic solution 1 drop 2 (two) times daily.    metoprolol succinate (TOPROL-XL) 100 MG 24 hr tablet Take 100 mg by mouth once daily. Takes 200mg daily    metronidazole 0.75% (METROCREAM) 0.75 % Crea Apply topically 2 (two) times daily.    multivitamin (MULTIPLE VITAMIN ORAL) Take by mouth.    nitroGLYCERIN (NITROSTAT) 0.4 MG SL tablet Place 1 tablet (0.4 mg total) under the tongue every 5 (five) minutes as needed for Chest pain.    omeprazole (PRILOSEC) 20 MG capsule Take 20 mg by mouth once daily.    pregabalin (LYRICA) 75 MG capsule Take 75 mg by mouth Daily.    ranolazine (RANEXA) 1,000 mg Tb12 Take 1,000 mg by mouth 2 (two) times daily. Takes daily    rosuvastatin (CRESTOR) 40 MG Tab Take 40 mg by mouth every evening.    semaglutide (OZEMPIC) 0.25 mg or 0.5 mg (2 mg/3 mL) pen injector Inject 0.5 mg into the skin every 7 days.    semaglutide (OZEMPIC) 1 mg/dose (4 mg/3 mL) Inject 1 mg into the skin every 7 days.    tamsulosin (FLOMAX) 0.4 mg Cap Take 0.4 mg by mouth once daily.    calcium-vitamin D3 (OS-ARVIND 500 + D3) 500 mg-5 mcg (200 unit) per tablet Take 1 tablet by mouth 2 (two) times daily with meals.    doxycycline (ORACEA) 40 mg capsule Take 40 mg by mouth.    doxycycline (PERIOSTAT) 20 MG tablet Take 20 mg by mouth once daily. 2 weeks on and 2 weeks off ([facility])    dutasteride (AVODART) 0.5 mg capsule Take 1 capsule (0.5 mg total) by mouth once daily.    evolocumab (REPATHA SURECLICK) 140 mg/mL PnIj Inject 1 mL (140 mg total) into the skin every 14 (fourteen) days.    fluticasone propionate (FLONASE) 50 mcg/actuation nasal spray 1 spray by Each Nostril route once daily.    ipratropium (ATROVENT) 21 mcg (0.03 %) nasal spray 2 sprays by Each Nostril route 3 (three) times daily as  "needed for Rhinitis (nasal congestion).    melatonin 5 mg Cap Take by mouth.    pyridoxine, vitamin B6, (B-6) 100 MG Tab     sildenafil (VIAGRA) 100 MG tablet Take 1 tablet (100 mg total) by mouth daily as needed for Erectile Dysfunction.    tadalafiL (CIALIS) 5 MG tablet Take 5 mg by mouth Daily.    thiamine (VITAMIN B-1) 50 MG tablet Take 50 mg by mouth once daily.    tretinoin (RETIN-A) 0.1 % cream Apply topically.    vitamin D 1000 units Tab Take 2,000 Units by mouth once daily.    white petrolatum 43 % Oint Apply topically.     No current facility-administered medications for this visit.     Current Outpatient Medications on File Prior to Visit   Medication Sig    amLODIPine (NORVASC) 10 MG tablet Take 10 mg by mouth once daily.    buPROPion (WELLBUTRIN SR) 200 MG TbSR Take 200 mg by mouth once daily. Takes 300mg daily    calcium citrate-vitamin D3 315-200 mg (CITRACAL+D) 315 mg-5 mcg (200 unit) per tablet TAKE ONE TABLET BY MOUTH TWICE A DAY FOR SUPPLEMENTATION    cetirizine (ZYRTEC) 10 MG tablet Take 10 mg by mouth as needed.    cyanocobalamin (VITAMIN B-12) 1000 MCG tablet Take 100 mcg by mouth once daily.    dabigatran etexilate (PRADAXA) 150 mg Cap Take 150 mg by mouth once daily. "Do NOT break, chew, or open capsules."   Takes 2 caps daily    diclofenac (VOLTAREN) 0.1 % ophthalmic solution 1 drop 4 (four) times daily.    doxepin (SINEQUAN) 10 MG capsule Take 1 capsule (10 mg total) by mouth every evening.    ezetimibe (ZETIA) 10 mg tablet TAKE ONE TABLET BY MOUTH EVERY DAY TO LOWER CHOLESTEROL    furosemide (LASIX) 40 MG tablet Takes one-half tablet by mouth daily     mg tablet Take 800 mg by mouth.    ketotifen (ZADITOR) 0.025 % (0.035 %) ophthalmic solution 1 drop 2 (two) times daily.    metoprolol succinate (TOPROL-XL) 100 MG 24 hr tablet Take 100 mg by mouth once daily. Takes 200mg daily    metronidazole 0.75% (METROCREAM) 0.75 % Crea Apply topically 2 (two) times daily.    multivitamin " (MULTIPLE VITAMIN ORAL) Take by mouth.    nitroGLYCERIN (NITROSTAT) 0.4 MG SL tablet Place 1 tablet (0.4 mg total) under the tongue every 5 (five) minutes as needed for Chest pain.    omeprazole (PRILOSEC) 20 MG capsule Take 20 mg by mouth once daily.    pregabalin (LYRICA) 75 MG capsule Take 75 mg by mouth Daily.    ranolazine (RANEXA) 1,000 mg Tb12 Take 1,000 mg by mouth 2 (two) times daily. Takes daily    rosuvastatin (CRESTOR) 40 MG Tab Take 40 mg by mouth every evening.    semaglutide (OZEMPIC) 0.25 mg or 0.5 mg (2 mg/3 mL) pen injector Inject 0.5 mg into the skin every 7 days.    semaglutide (OZEMPIC) 1 mg/dose (4 mg/3 mL) Inject 1 mg into the skin every 7 days.    tamsulosin (FLOMAX) 0.4 mg Cap Take 0.4 mg by mouth once daily.    calcium-vitamin D3 (OS-ARVIND 500 + D3) 500 mg-5 mcg (200 unit) per tablet Take 1 tablet by mouth 2 (two) times daily with meals.    doxycycline (ORACEA) 40 mg capsule Take 40 mg by mouth.    doxycycline (PERIOSTAT) 20 MG tablet Take 20 mg by mouth once daily. 2 weeks on and 2 weeks off ([facility])    dutasteride (AVODART) 0.5 mg capsule Take 1 capsule (0.5 mg total) by mouth once daily.    fluticasone propionate (FLONASE) 50 mcg/actuation nasal spray 1 spray by Each Nostril route once daily.    ipratropium (ATROVENT) 21 mcg (0.03 %) nasal spray 2 sprays by Each Nostril route 3 (three) times daily as needed for Rhinitis (nasal congestion).    melatonin 5 mg Cap Take by mouth.    pyridoxine, vitamin B6, (B-6) 100 MG Tab     sildenafil (VIAGRA) 100 MG tablet Take 1 tablet (100 mg total) by mouth daily as needed for Erectile Dysfunction.    tadalafiL (CIALIS) 5 MG tablet Take 5 mg by mouth Daily.    thiamine (VITAMIN B-1) 50 MG tablet Take 50 mg by mouth once daily.    tretinoin (RETIN-A) 0.1 % cream Apply topically.    vitamin D 1000 units Tab Take 2,000 Units by mouth once daily.    white petrolatum 43 % Oint Apply topically.     No current facility-administered medications on file  "prior to visit.     Review of patient's allergies indicates:   Allergen Reactions    Iodinated contrast media      States, "My arteries started shutting down on me" pt states only to iv dye- was specifically told that oral dye does not have the same reaction            Objective:     Vitals:    05/15/25 1129 05/15/25 1132   BP: 112/78 114/76   BP Location: Left arm Right arm   Patient Position: Sitting Sitting   Pulse: 70 72   Weight: 105.8 kg (233 lb 5.7 oz)    Height: 6' 1" (1.854 m)      Body mass index is 30.79 kg/m².    Physical Exam  Vitals and nursing note reviewed.   Constitutional:       General: He is not in acute distress.     Appearance: He is well-developed. He is obese. He is not diaphoretic.   HENT:      Head: Normocephalic and atraumatic.   Eyes:      General:         Right eye: No discharge.         Left eye: No discharge.      Pupils: Pupils are equal, round, and reactive to light.   Neck:      Thyroid: No thyromegaly.      Vascular: No JVD.   Cardiovascular:      Rate and Rhythm: Normal rate and regular rhythm.      Pulses: Normal pulses and intact distal pulses.      Heart sounds: Normal heart sounds. No murmur heard.     No friction rub. No gallop.   Pulmonary:      Effort: Pulmonary effort is normal. No respiratory distress.      Breath sounds: Normal breath sounds. No wheezing or rales.      Comments: Pacer site well healed.  Chest:      Chest wall: No tenderness.   Abdominal:      General: Bowel sounds are normal. There is no distension.      Palpations: Abdomen is soft.      Tenderness: There is no abdominal tenderness.   Musculoskeletal:         General: Normal range of motion.      Cervical back: Neck supple.      Right lower leg: No edema.      Left lower leg: No edema.   Skin:     General: Skin is warm and dry.      Findings: No erythema or rash.   Neurological:      General: No focal deficit present.      Mental Status: He is alert and oriented to person, place, and time.      Cranial " Nerves: No cranial nerve deficit.   Psychiatric:         Mood and Affect: Mood normal.         Behavior: Behavior normal.       Lab Results   Component Value Date    CHOL 164 02/27/2025    CHOL 177 01/24/2025    CHOL 136 07/26/2024     Lab Results   Component Value Date    HGBA1C 5.0 02/27/2025      BMP  Lab Results   Component Value Date     02/27/2025    K 4.1 02/27/2025     02/27/2025    CO2 29 02/27/2025    BUN 11 02/27/2025    CREATININE 0.8 02/27/2025    CALCIUM 9.4 02/27/2025    ANIONGAP 6 (L) 02/27/2025    EGFRNORACEVR >60.0 02/27/2025      Lab Results   Component Value Date    HDL 34 (L) 02/27/2025    HDL 38 (L) 01/24/2025    HDL 50 07/26/2024     Lab Results   Component Value Date    LDLCALC 101.0 02/27/2025    LDLCALC 115.4 01/24/2025     Lab Results   Component Value Date    TRIG 145 02/27/2025    TRIG 118 01/24/2025    TRIG 93 07/26/2024     Lab Results   Component Value Date    CHOLHDL 20.7 02/27/2025    CHOLHDL 21.5 01/24/2025    CHOLHDL 36.8 07/26/2024       Chemistry        Component Value Date/Time     02/27/2025 0825    K 4.1 02/27/2025 0825     02/27/2025 0825    CO2 29 02/27/2025 0825    BUN 11 02/27/2025 0825    CREATININE 0.8 02/27/2025 0825    GLU 95 02/27/2025 0825        Component Value Date/Time    CALCIUM 9.4 02/27/2025 0825    ALKPHOS 66 02/27/2025 0825    AST 18 02/27/2025 0825    ALT 15 02/27/2025 0825    BILITOT 0.5 02/27/2025 0825    ESTGFRAFRICA >60.0 06/03/2022 0911    EGFRNONAA >60.0 06/03/2022 0911          Lab Results   Component Value Date    TSH 2.29 10/26/2021     Lab Results   Component Value Date    INR 1.3 02/11/2025    INR 1.1 09/30/2018    INR 1.0 09/28/2018     Lab Results   Component Value Date    WBC 4.21 01/24/2025    HGB 14.9 01/24/2025    HCT 45.7 01/24/2025    MCV 93 01/24/2025     01/24/2025     BMP  Sodium   Date Value Ref Range Status   02/27/2025 142 136 - 145 mmol/L Final     Potassium   Date Value Ref Range Status   02/27/2025  4.1 3.5 - 5.1 mmol/L Final     Chloride   Date Value Ref Range Status   02/27/2025 107 95 - 110 mmol/L Final     CO2   Date Value Ref Range Status   02/27/2025 29 23 - 29 mmol/L Final     BUN   Date Value Ref Range Status   02/27/2025 11 8 - 23 mg/dL Final     Creatinine   Date Value Ref Range Status   02/27/2025 0.8 0.5 - 1.4 mg/dL Final     Calcium   Date Value Ref Range Status   02/27/2025 9.4 8.7 - 10.5 mg/dL Final     Anion Gap   Date Value Ref Range Status   02/27/2025 6 (L) 8 - 16 mmol/L Final     eGFR if    Date Value Ref Range Status   06/03/2022 >60.0 >60 mL/min/1.73 m^2 Final     eGFR if non    Date Value Ref Range Status   06/03/2022 >60.0 >60 mL/min/1.73 m^2 Final     Comment:     Calculation used to obtain the estimated glomerular filtration  rate (eGFR) is the CKD-EPI equation.        CrCl cannot be calculated (Patient's most recent lab result is older than the maximum 7 days allowed.).    Assessment:     1. CAD S/P percutaneous coronary angioplasty    2. SATYA on CPAP    3. PTSD (post-traumatic stress disorder)    4. Type 2 diabetes mellitus without complication, without long-term current use of insulin    5. Obesity (BMI 30-39.9)    6. Physical deconditioning    7. Hypertension associated with type 2 diabetes mellitus    8. Hyperlipidemia associated with type 2 diabetes mellitus    9. Pacemaker    10. Atherosclerosis of aorta    11. PAF (paroxysmal atrial fibrillation)    12. Aneurysm of right common iliac artery    13. Shortness of breath        Plan:     Assessment & Plan    IMPRESSION:  Considered repeat echocardiogram and chemical stress test to evaluate persistent shortness of breath and fatigue.  LDL cholesterol elevated at 101 despite current statin therapy, necessitating intensification of lipid management.repatha prescription given  Previously diagnosed right common iliac artery aneurysm requires vascular surgery evaluation.  Continued current management for  aortic atherosclerosis with aspirin and lipid/glycemic control.  cad s/p lcx stent with continued exertional shortness of breath will evaluate for ischemia and assess lvf  Ramone on cpap continue same f/u with pulmonary  Htn controlled low salt diet emphasized   Diabetes controlled A1c on target continue same   Pacer adequately functioning continue monitoring       PLAN SUMMARY:  - Ordered vascular surgery consult for right common iliac artery aneurysm  - Continued Ozempic for diabetes  - Started Repatha for elevated LDL cholesterol  - Continued Pradaxa as anticoagulant  - Continued Crestor 40 mg for cholesterol  - Maintained current antihypertensive regimen  - Repeat labs at next visit  - Follow up in 6 months    ANEURYSM OF ILIAC ARTERY:  - Ordered vascular surgery consult for evaluation of right common iliac artery aneurysm.    TYPE 2 DIABETES MELLITUS:  - Mr. Doan to continue current exercise regimen and maintain current dietary habits for diabetes and weight management.  - Continued Ozempic for diabetes.    HYPERLIPIDEMIA:  - Started Repatha (prescription provided for [facility] approval) to address elevated LDL cholesterol.  - Continued Crestor 40 mg for cholesterol.    LONG TERM USE OF ANTICOAGULANTS:  - Continued Pradaxa as anticoagulant.    HYPERTENSION:  - Continued current antihypertensive regimen.    FOLLOW-UP:  - Repeat labs at next visit.  - Follow up in 6 months.          This note was generated with the assistance of ambient listening technology. Verbal consent was obtained by the patient and accompanying visitor(s) for the recording of patient appointment to facilitate this note. I attest to having reviewed and edited the generated note for accuracy, though some syntax or spelling errors may persist. Please contact the author of this note for any clarification.            [1]   Social History  Tobacco Use    Smoking status: Former     Current packs/day: 0.00     Average packs/day: 1 pack/day for 45.0 years  (45.0 ttl pk-yrs)     Types: Cigarettes     Start date: 1965     Quit date: 12/31/2009     Years since quitting: 15.3    Smokeless tobacco: Never    Tobacco comments:     Quit 15 years ago.    Substance Use Topics    Alcohol use: No    Drug use: No

## 2025-05-16 DIAGNOSIS — I72.3 ANEURYSM OF RIGHT COMMON ILIAC ARTERY: ICD-10-CM

## 2025-05-16 DIAGNOSIS — I70.0 ATHEROSCLEROSIS OF AORTA: Primary | ICD-10-CM

## 2025-05-22 ENCOUNTER — PATIENT OUTREACH (OUTPATIENT)
Dept: ADMINISTRATIVE | Facility: HOSPITAL | Age: 75
End: 2025-05-22
Payer: OTHER GOVERNMENT

## 2025-05-22 NOTE — LETTER
AUTHORIZATION FOR RELEASE OF   CONFIDENTIAL INFORMATION        We are seeing Cole Doan, date of birth 1950, in the clinic at [facility]. Evelyn Martínez MD is the patient's PCP. Cole Doan has an outstanding lab/procedure at the time we reviewed his chart. In order to help keep his health information updated, he has authorized us to request the following medical record(s):         (  x)  FOOT EXAM                                          (  x)  OUTSIDE IMMUNIZATIONS  pneumonia shingrix            (X) AAA screening       Please fax records to [facility], Brignac, Jeanenne C., MD,  at [phone] or email to [email or web address].                 Patient Name: Cole Doan  : 1950  Patient Phone #: [phone]

## 2025-06-04 ENCOUNTER — PATIENT MESSAGE (OUTPATIENT)
Dept: CARDIOLOGY | Facility: HOSPITAL | Age: 75
End: 2025-06-04
Payer: OTHER GOVERNMENT

## 2025-06-05 ENCOUNTER — PATIENT MESSAGE (OUTPATIENT)
Dept: CARDIOLOGY | Facility: HOSPITAL | Age: 75
End: 2025-06-05
Payer: OTHER GOVERNMENT

## 2025-06-06 ENCOUNTER — PATIENT MESSAGE (OUTPATIENT)
Dept: CARDIOLOGY | Facility: HOSPITAL | Age: 75
End: 2025-06-06
Payer: OTHER GOVERNMENT

## 2025-06-15 ENCOUNTER — CLINICAL SUPPORT (OUTPATIENT)
Dept: CARDIOLOGY | Facility: HOSPITAL | Age: 75
End: 2025-06-15
Attending: INTERNAL MEDICINE
Payer: OTHER GOVERNMENT

## 2025-06-15 ENCOUNTER — CLINICAL SUPPORT (OUTPATIENT)
Dept: CARDIOLOGY | Facility: HOSPITAL | Age: 75
End: 2025-06-15
Payer: OTHER GOVERNMENT

## 2025-06-15 DIAGNOSIS — I49.5 SICK SINUS SYNDROME: ICD-10-CM

## 2025-06-15 DIAGNOSIS — Z95.0 PRESENCE OF CARDIAC PACEMAKER: ICD-10-CM

## 2025-06-15 PROCEDURE — 93296 REM INTERROG EVL PM/IDS: CPT | Performed by: INTERNAL MEDICINE

## 2025-06-15 PROCEDURE — 93294 REM INTERROG EVL PM/LDLS PM: CPT | Mod: ,,, | Performed by: INTERNAL MEDICINE

## 2025-06-16 LAB
OHS CV AF BURDEN PERCENT: < 1
OHS CV DC REMOTE DEVICE TYPE: NORMAL
OHS CV RV PACING PERCENT: 1 %

## 2025-07-07 ENCOUNTER — INITIAL CONSULT (OUTPATIENT)
Dept: VASCULAR SURGERY | Facility: CLINIC | Age: 75
End: 2025-07-07
Attending: PHYSICIAN ASSISTANT
Payer: MEDICARE

## 2025-07-07 ENCOUNTER — HOSPITAL ENCOUNTER (OUTPATIENT)
Facility: HOSPITAL | Age: 75
Discharge: HOME | End: 2025-07-07
Attending: PHYSICIAN ASSISTANT
Payer: MEDICARE

## 2025-07-07 DIAGNOSIS — I10 ESSENTIAL HYPERTENSION: Primary | ICD-10-CM

## 2025-07-07 DIAGNOSIS — I72.3 ANEURYSM OF RIGHT COMMON ILIAC ARTERY: ICD-10-CM

## 2025-07-07 DIAGNOSIS — I70.0 ATHEROSCLEROSIS OF AORTA: ICD-10-CM

## 2025-07-07 DIAGNOSIS — E78.2 MIXED HYPERLIPIDEMIA: ICD-10-CM

## 2025-07-07 LAB
ABDOMINAL AORTA DIST EDV: 12.6 CM/S
ABDOMINAL AORTA DIST PSV: 50.39 CM/S
ABDOMINAL AORTA MID EDV: 12.6 CM/S
ABDOMINAL AORTA MID PSV: 68.38 CM/S
ABDOMINAL AORTA PROX EDV: 12 CM/S
ABDOMINAL AORTA PROX PSV: 57.59 CM/S
ABDOMINAL INFRARENAL AORTA AP: 1.6 CM
ABDOMINAL INFRARENAL AORTA PS VEL: 50 CM/S
ABDOMINAL INFRARENAL AORTA TRANS: 1.7 CM
ABDOMINAL JUXTARENAL AORTA AP: 1.9 CM
ABDOMINAL JUXTARENAL AORTA PS VEL: 68 CM/S
ABDOMINAL JUXTARENAL AORTA TRANS: 1.8 CM
ABDOMINAL LT COM ILIAC AP: 1.1 CM
ABDOMINAL LT COM ILIAC TRANS: 1.1 CM
ABDOMINAL LT COM ILIAC VEL: 83 CM/S
ABDOMINAL RT COM ILIAC AP: 1.8 CM
ABDOMINAL RT COM ILIAC TRANS: 1.8 CM
ABDOMINAL RT COM ILIAC VEL: 110 CM/S
ABDOMINAL SUPRARENAL AORTA AP: 2.5 CM
ABDOMINAL SUPRARENAL AORTA PS VEL: 58 CM/S
ABDOMINAL SUPRARENAL AORTA TRANS: 2.4 CM
LEFT ABI: 1.27
LEFT ARM BP: 147 MMHG
LEFT ARM SYSTOLIC BLOOD PRESSURE: 147 MMHG
LEFT CCA DIST DIAS: 16.91 CM/S
LEFT CCA DIST SYS: 56.14 CM/S
LEFT CCA MID DIAS: 22.32 CM/S
LEFT CCA MID SYS: 87.93 CM/S
LEFT CCA PROX DIAS: 26.15 CM/S
LEFT CCA PROX SYS: 101 CM/S
LEFT DORSALIS PEDIS: 186 MMHG
LEFT ECA DIAS: 21.64 CM/S
LEFT ECA SYS: 82.52 CM/S
LEFT ICA DIST DIAS: 30.29 CM/S
LEFT ICA DIST SYS: 84.02 CM/S
LEFT ICA MID DIAS: 30.77 CM/S
LEFT ICA MID SYS: 71.81 CM/S
LEFT ICA PROX DIAS: 15.14 CM/S
LEFT ICA PROX SYS: 41.52 CM/S
LEFT ICA/CCA SYS: 1.5
LEFT POSTERIOR TIBIAL: 181 MMHG
LEFT TBI: 1.07
LEFT TOE PRESSURE: 157 MMHG
LEFT VERTEBRAL DIAS: 14.65 CM/S
LEFT VERTEBRAL SYS: 50.31 CM/S
OHS CV CAROTID RIGHT ICA EDV HIGHEST: 39.9
OHS CV CAROTID ULTRASOUND LEFT ICA/CCA RATIO: 1.5
OHS CV CAROTID ULTRASOUND RIGHT ICA/CCA RATIO: 1.61
OHS CV PV CAROTID LEFT HIGHEST CCA: 101
OHS CV PV CAROTID LEFT HIGHEST ICA: 84.02
OHS CV PV CAROTID RIGHT HIGHEST CCA: 81
OHS CV PV CAROTID RIGHT HIGHEST ICA: 89.28
OHS CV US CAROTID LEFT HIGHEST EDV: 30.77
RIGHT ABI: 1.25
RIGHT ARM BP: 145 MMHG
RIGHT ARM SYSTOLIC BLOOD PRESSURE: 145 MMHG
RIGHT CCA DIST DIAS: 18.26 CM/S
RIGHT CCA DIST SYS: 55.46 CM/S
RIGHT CCA MID DIAS: 20.67 CM/S
RIGHT CCA MID SYS: 71.09 CM/S
RIGHT CCA PROX DIAS: 19.28 CM/S
RIGHT CCA PROX SYS: 81.39 CM/S
RIGHT DORSALIS PEDIS: 184 MMHG
RIGHT ECA DIAS: 19.61 CM/S
RIGHT ECA SYS: 96.72 CM/S
RIGHT ICA DIST DIAS: 39.9 CM/S
RIGHT ICA DIST SYS: 89.28 CM/S
RIGHT ICA MID DIAS: 27.73 CM/S
RIGHT ICA MID SYS: 71.02 CM/S
RIGHT ICA PROX DIAS: 26.38 CM/S
RIGHT ICA PROX SYS: 85.9 CM/S
RIGHT ICA/CCA SYS: 1.61
RIGHT POSTERIOR TIBIAL: 175 MMHG
RIGHT PROX SUBCLAVIAN ARTERY: 188.48 CM/S
RIGHT TBI: 0.93
RIGHT TOE PRESSURE: 136 MMHG
RIGHT VERTEBRAL DIAS: 11.72 CM/S
RIGHT VERTEBRAL SYS: 42.99 CM/S

## 2025-07-07 PROCEDURE — 99204 OFFICE O/P NEW MOD 45 MIN: CPT | Mod: S$PBB,,, | Performed by: SURGERY

## 2025-07-07 PROCEDURE — 93978 VASCULAR STUDY: CPT

## 2025-07-07 PROCEDURE — 99999 PR PBB SHADOW E&M-EST. PATIENT-LVL IV: CPT | Mod: PBBFAC,,, | Performed by: SURGERY

## 2025-07-07 PROCEDURE — 99214 OFFICE O/P EST MOD 30 MIN: CPT | Mod: PBBFAC,25 | Performed by: SURGERY

## 2025-07-07 PROCEDURE — 93880 EXTRACRANIAL BILAT STUDY: CPT

## 2025-07-07 PROCEDURE — 93922 UPR/L XTREMITY ART 2 LEVELS: CPT

## 2025-07-07 PROCEDURE — 93922 UPR/L XTREMITY ART 2 LEVELS: CPT | Mod: 26,,, | Performed by: SURGERY

## 2025-07-07 PROCEDURE — 93978 VASCULAR STUDY: CPT | Mod: 26,,, | Performed by: SURGERY

## 2025-07-07 PROCEDURE — 93880 EXTRACRANIAL BILAT STUDY: CPT | Mod: 26,,, | Performed by: SURGERY

## 2025-07-07 NOTE — PROGRESS NOTES
"  [facility]  [facility]    OFFICE NOTE    DATE OF VISIT: 2025  PATIENT NAME: Cole Doan  : 1950  MRN: [id number]  PRIMARY CARE PHYSICIAN: Evelyn Martínez MD  CARDIOLOGIST: Eric Rutledge MD  REFERRING PROVIDER: Eric Rutledge MD    CHIEF COMPLAINT   Chief Complaint   Patient presents with    Consult     Consult 2.3 cm distal right common iliac artery aneurysm on CT        HISTORY OF PRESENT ILLNESS:  Cole Doan is a 74 y.o. male who presents to clinic today for evaluation of a right common iliac artery aneurysm.  This was an incidental finding on CT abd/pelvis w/o contrast done back in February.  He is not a smoker.  Had weight loss surgery years ago and lost over 100 pounds.  Has neuropathy in both lower legs.  Despite his age, he is very active.  He has had no recent back or abdominal pain.  No recent stroke-like symptoms.  No claudication symptoms.  He has no complaints in clinic.  He had CHOCO, carotid duplex, and aortic u/s prior to clinic.    ALLERGIES:  Review of patient's allergies indicates:   Allergen Reactions    Iodinated contrast media      States, "My arteries started shutting down on me" pt states only to iv dye- was specifically told that oral dye does not have the same reaction       PAST MEDICAL HISTORY:  Past Medical History:   Diagnosis Date    Acute coronary syndrome     Anticoagulant long-term use     Plavix    BPH (benign prostatic hypertrophy)     CAD (coronary artery disease)     Cataract     Heart failure, unspecified HF chronicity, unspecified heart failure type 2025    History of colon polyps     Hyperlipidemia associated with type 2 diabetes mellitus     Hypertension associated with type 2 diabetes mellitus 05/15/2014    Mixed restrictive and obstructive lung disease 2017    Obesity 05/15/2014    SATYA on CPAP     Pacemaker     PAF (paroxysmal atrial fibrillation) 2024    Pancreatic cyst     Pneumonia     PTSD (post-traumatic " stress disorder) 05/15/2014    RBBB 05/15/2014    S/P PTCA (percutaneous transluminal coronary angioplasty) 05/15/2014    Type 2 diabetes mellitus without complication 09/28/2015       PAST SURGICAL HISTORY:  Past Surgical History:   Procedure Laterality Date    APPENDECTOMY      at age 14    ATRIAL CARDIAC PACEMAKER INSERTION      CATARACT EXTRACTION      COLONOSCOPY Left 04/27/2018    Procedure: COLONOSCOPY;  Surgeon: Artem Medeiros MD;  Location: [facility] ENDO;  Service: Endoscopy;  Laterality: Left;    CORONARY ANGIOPLASTY WITH STENT PLACEMENT      1990, 2008, 2012    ENDOSCOPIC ULTRASOUND OF UPPER GASTROINTESTINAL TRACT N/A 07/12/2021    Procedure: ULTRASOUND, UPPER GI TRACT, ENDOSCOPIC;  Surgeon: Popeye Terrell MD;  Location: [facility] ENDO;  Service: Endoscopy;  Laterality: N/A;  upper and linear    ESOPHAGOGASTRODUODENOSCOPY N/A 08/16/2018    Procedure: ESOPHAGOGASTRODUODENOSCOPY (EGD);  Surgeon: Mario Cesar MD;  Location: [facility] ENDO;  Service: General;  Laterality: N/A;    ROBOT-ASSISTED LAPAROSCOPIC SLEEVE GASTRECTOMY USING DA LURDES XI N/A 09/27/2018    Procedure: XI ROBOTIC SLEEVE GASTRECTOMY;  Surgeon: Mario Cesar MD;  Location: [facility] OR;  Service: General;  Laterality: N/A;    VASECTOMY         SOCIAL HISTORY:   Social History[1]    FAMILY HISTORY:  Family History   Problem Relation Name Age of Onset    Cataracts Father      Heart disease Father      Hypertension Father      Heart disease Mother      Hypertension Mother      Colon cancer Neg Hx         REVIEW OF SYSTEMS:  Review of Systems   Constitutional: Negative.   HENT: Negative.     Eyes: Negative.    Cardiovascular: Negative.    Respiratory: Negative.     Endocrine: Negative.    Hematologic/Lymphatic: Negative.    Skin: Negative.    Musculoskeletal: Negative.    Gastrointestinal: Negative.    Genitourinary: Negative.    Neurological:         Neuropathy in both lower legs   Psychiatric/Behavioral: Negative.     Allergic/Immunologic: Negative.       PHYSICAL EXAM:  There were no vitals filed for this visit.   Physical Exam  Vitals reviewed.   Constitutional:       Appearance: Normal appearance. He is normal weight.   HENT:      Head: Normocephalic and atraumatic.      Nose: Nose normal.      Mouth/Throat:      Mouth: Mucous membranes are moist.   Eyes:      Pupils: Pupils are equal, round, and reactive to light.   Cardiovascular:      Rate and Rhythm: Normal rate and regular rhythm.   Pulmonary:      Effort: Pulmonary effort is normal.      Breath sounds: Normal breath sounds.   Abdominal:      General: Bowel sounds are normal.   Musculoskeletal:         General: Normal range of motion.      Cervical back: Normal range of motion.   Skin:     General: Skin is warm.      Capillary Refill: Capillary refill takes less than 2 seconds.   Neurological:      General: No focal deficit present.      Mental Status: He is alert and oriented to person, place, and time. Mental status is at baseline.   Psychiatric:         Mood and Affect: Mood normal.         Behavior: Behavior normal.         Thought Content: Thought content normal.        VASCULAR LAB STUDIES:  CHOCO normal bilaterally    Carotid duplex with 20-39% ICA stenosis bilaterally    Aortic duplex shows right CCA aneurysm to be 1.8 cm in size (I have reviewed the images and in actually cross sectional diameter this aneurysm measures 2.1 cm in size)    ASSESSMENT AND PLAN:  73 y/o male with a right common iliac artery aneurysm that measures 2.1 cm in size on CT abd/pelvis.  U/s today shows 1.8 cm in size but we know from CT this is slightly larger.  He is not a smoker.  He is active.  He is on a statin medication.  We will monitor this for now.  We will plan intervention if necessary around 3 to 3.5 cm in size.  He has no restrictions.  He will return to clinic in 6 months with a repeat aortic u/s.      Hypertension: stable on home regimen    Hyperlipidemia: stable on home regimen      Jason Clark  [facility]  [place]  [phone] (Clinic Number)          [1]   Social History  Tobacco Use    Smoking status: Former     Current packs/day: 0.00     Average packs/day: 1 pack/day for 45.0 years (45.0 ttl pk-yrs)     Types: Cigarettes     Start date: 1965     Quit date: 12/31/2009     Years since quitting: 15.5    Smokeless tobacco: Never    Tobacco comments:     Quit 15 years ago.    Substance Use Topics    Alcohol use: No    Drug use: No

## 2025-07-26 ENCOUNTER — OFFICE VISIT (OUTPATIENT)
Dept: URGENT CARE | Facility: CLINIC | Age: 75
End: 2025-07-26
Payer: MEDICARE

## 2025-07-26 VITALS
WEIGHT: 225 LBS | OXYGEN SATURATION: 95 % | TEMPERATURE: 97 F | RESPIRATION RATE: 14 BRPM | BODY MASS INDEX: 28.88 KG/M2 | HEIGHT: 74 IN | SYSTOLIC BLOOD PRESSURE: 125 MMHG | HEART RATE: 68 BPM | DIASTOLIC BLOOD PRESSURE: 60 MMHG

## 2025-07-26 DIAGNOSIS — R31.9 URINARY TRACT INFECTION WITH HEMATURIA, SITE UNSPECIFIED: ICD-10-CM

## 2025-07-26 DIAGNOSIS — R30.0 DYSURIA: Primary | ICD-10-CM

## 2025-07-26 DIAGNOSIS — N39.0 URINARY TRACT INFECTION WITH HEMATURIA, SITE UNSPECIFIED: ICD-10-CM

## 2025-07-26 LAB
BILIRUBIN, UA POC OHS: NEGATIVE
BLOOD, UA POC OHS: ABNORMAL
CLARITY, UA POC OHS: ABNORMAL
COLOR, UA POC OHS: ABNORMAL
GLUCOSE, UA POC OHS: NEGATIVE
KETONES, UA POC OHS: NEGATIVE
LEUKOCYTES, UA POC OHS: ABNORMAL
NITRITE, UA POC OHS: NEGATIVE
PH, UA POC OHS: 7.5
PROTEIN, UA POC OHS: >=300
SPECIFIC GRAVITY, UA POC OHS: 1.02
UROBILINOGEN, UA POC OHS: 2

## 2025-07-26 PROCEDURE — 99214 OFFICE O/P EST MOD 30 MIN: CPT | Mod: S$GLB,,, | Performed by: NURSE PRACTITIONER

## 2025-07-26 PROCEDURE — 87086 URINE CULTURE/COLONY COUNT: CPT | Performed by: NURSE PRACTITIONER

## 2025-07-26 PROCEDURE — 81003 URINALYSIS AUTO W/O SCOPE: CPT | Mod: QW,S$GLB,, | Performed by: NURSE PRACTITIONER

## 2025-07-26 RX ORDER — PHENAZOPYRIDINE HYDROCHLORIDE 200 MG/1
200 TABLET, FILM COATED ORAL 3 TIMES DAILY PRN
Qty: 10 TABLET | Refills: 0 | Status: SHIPPED | OUTPATIENT
Start: 2025-07-26 | End: 2025-07-29

## 2025-07-26 RX ORDER — CIPROFLOXACIN 500 MG/1
500 TABLET, FILM COATED ORAL EVERY 12 HOURS
Qty: 14 TABLET | Refills: 0 | Status: SHIPPED | OUTPATIENT
Start: 2025-07-26 | End: 2025-08-02

## 2025-07-26 NOTE — PROGRESS NOTES
"Subjective:      Patient ID: Cole Doan is a 74 y.o. male.    Vitals:  height is 6' 1.5" (1.867 m) and weight is 102.1 kg (225 lb). His tympanic temperature is 96.9 °F (36.1 °C). His blood pressure is 125/60 and his pulse is 68. His respiration is 14 and oxygen saturation is 95%.     Chief Complaint: Dysuria    Patient is a 74-year-old male who presents for evaluation of dysuria and urinary frequency.  States his urine is little bit darker than usual thinks there may be some blood in it.  Onset of symptoms July 23rd.  Reports previous history of UTI resulting in urosepsis in ICU care.  No medications used to alleviate.  Denies fever, chills, nausea, vomiting, flank pain.  No other concerns voiced.    Dysuria   This is a new problem. The current episode started acute onset. The problem occurs every urination. The problem has been unchanged. The quality of the pain is described as burning. The pain is at a severity of 5/10. The pain is mild. There has been no fever. Associated symptoms include frequency, hematuria and urgency. Pertinent negatives include no chills, flank pain, nausea or vomiting. He has tried nothing for the symptoms. His past medical history is significant for recurrent UTIs.       Constitution: Negative for chills and fever.   Respiratory:  Negative for shortness of breath.    Gastrointestinal:  Negative for abdominal pain, nausea and vomiting.   Genitourinary:  Positive for dysuria, frequency, urgency and hematuria. Negative for urine decreased, flank pain, bladder incontinence and pelvic pain.   Skin:  Negative for erythema.      Objective:     Physical Exam   Constitutional: He is oriented to person, place, and time. He appears well-developed.   HENT:   Head: Normocephalic and atraumatic. Head is without abrasion, without contusion and without laceration.   Ears:   Right Ear: External ear normal.   Left Ear: External ear normal.   Nose: Nose normal.   Mouth/Throat: Oropharynx is clear and " moist and mucous membranes are normal.   Eyes: Conjunctivae, EOM and lids are normal. Pupils are equal, round, and reactive to light.   Neck: Trachea normal and phonation normal. Neck supple.   Cardiovascular: Normal rate, regular rhythm and normal heart sounds.   Pulmonary/Chest: Effort normal and breath sounds normal. No stridor. No respiratory distress.   Abdominal: Bowel sounds are normal. He exhibits no distension and no mass. Soft. flat abdomen There is no abdominal tenderness. There is no rebound, no guarding, no left CVA tenderness and no right CVA tenderness.   Musculoskeletal: Normal range of motion.         General: Normal range of motion.   Neurological: He is alert and oriented to person, place, and time.   Skin: Skin is warm, dry, intact and no rash. Capillary refill takes less than 2 seconds. No abrasion, No burn, No bruising, No erythema and No ecchymosis   Psychiatric: His speech is normal and behavior is normal. Judgment and thought content normal.   Nursing note and vitals reviewed.      Assessment:     1. Dysuria    2. Urinary tract infection with hematuria, site unspecified        Plan:       Dysuria  -     POCT Urinalysis(Instrument)  -     Urine Culture High Risk ($$)    Urinary tract infection with hematuria, site unspecified  -     Urine Culture High Risk ($$)    Other orders  -     ciprofloxacin HCl (CIPRO) 500 MG tablet; Take 1 tablet (500 mg total) by mouth every 12 (twelve) hours. for 7 days  Dispense: 14 tablet; Refill: 0  -     phenazopyridine (PYRIDIUM) 200 MG tablet; Take 1 tablet (200 mg total) by mouth 3 (three) times daily as needed for Pain.  Dispense: 10 tablet; Refill: 0          Medical Decision Making:   Initial Assessment:   After complete evaluation, including thorough history and physical exam, presentation is most consistent with uncomplicated UTI. The patient has no severe flank pain or systemic symptoms to suggest pyelonephritis or sepsis. Physical exam is inconsistent  with nephrolithiasis or acute intra-abdominal infection. Patient has no signs of acute distress, vital signs are stable. Patient is tolerating PO  is stable for D/C with PO antibiotics. Patient instructed to drink plenty of water. The patient was informed of findings, and recommended to follow-up with PCP for further management and ER precautions were given.              Results for orders placed or performed in visit on 07/26/25   POCT Urinalysis(Instrument)    Collection Time: 07/26/25  9:23 AM   Result Value Ref Range    Color, POC UA Millie (A) Yellow, Straw, Colorless    Clarity, POC UA Cloudy (A) Clear    Glucose, POC UA Negative Negative    Bilirubin, POC UA Negative Negative    Ketones, POC UA Negative Negative    Spec Grav POC UA 1.025 1.005 - 1.030    Blood, POC UA Large (A) Negative    pH, POC UA 7.5 5.0 - 8.0    Protein, POC UA >=300 (A) Negative    Urobilinogen, POC UA 2.0 (A) <=1.0    Nitrite, POC UA Negative Negative    WBC, POC UA Large (A) Negative     Patient Instructions   Take the antibiotics to completion.     Drink plenty of fluids, take showers not baths, no scented soaps, wear breathable cotton underwear,     A urine culture was sent. You will be contacted once it results and appropriate action will be taken if needed.     Take the pyridium three times a day with meals. It will turn your urine orange. You do not need to take the whole prescription you can stop this once the pain is better and finish out the antibiotics    Please go to the ER for worsening symptoms including fever, worsening flank pain, vomiting, etc.       Please return or see your primary care doctor if you develop new or worsening symptoms.     Please arrange follow up with your primary medical clinic as soon as possible. You must understand that you've received an Urgent Care treatment only and that you may be released before all of your medical problems are known or treated. You, the patient, will arrange for follow up as  instructed. If your symptoms worsen or fail to improve you should go to the Emergency Room.          (2) good, crying

## 2025-07-26 NOTE — PATIENT INSTRUCTIONS
Take the antibiotics to completion.     Drink plenty of fluids, take showers not baths, no scented soaps, wear breathable cotton underwear,     A urine culture was sent. You will be contacted once it results and appropriate action will be taken if needed.     Take the pyridium three times a day with meals. It will turn your urine orange. You do not need to take the whole prescription you can stop this once the pain is better and finish out the antibiotics    Please go to the ER for worsening symptoms including fever, worsening flank pain, vomiting, etc.       Please return or see your primary care doctor if you develop new or worsening symptoms.     Please arrange follow up with your primary medical clinic as soon as possible. You must understand that you've received an Urgent Care treatment only and that you may be released before all of your medical problems are known or treated. You, the patient, will arrange for follow up as instructed. If your symptoms worsen or fail to improve you should go to the Emergency Room.

## 2025-07-27 LAB — BACTERIA UR CULT: NO GROWTH

## 2025-07-28 ENCOUNTER — TELEPHONE (OUTPATIENT)
Dept: URGENT CARE | Facility: CLINIC | Age: 75
End: 2025-07-28
Payer: OTHER GOVERNMENT

## 2025-07-30 DIAGNOSIS — E11.9 TYPE 2 DIABETES MELLITUS WITHOUT COMPLICATION: ICD-10-CM

## 2025-07-31 ENCOUNTER — HOSPITAL ENCOUNTER (OUTPATIENT)
Dept: RADIOLOGY | Facility: HOSPITAL | Age: 75
Discharge: HOME | End: 2025-07-31
Attending: INTERNAL MEDICINE
Payer: OTHER GOVERNMENT

## 2025-07-31 ENCOUNTER — HOSPITAL ENCOUNTER (OUTPATIENT)
Dept: PULMONOLOGY | Facility: HOSPITAL | Age: 75
Discharge: HOME | End: 2025-07-31
Attending: INTERNAL MEDICINE
Payer: OTHER GOVERNMENT

## 2025-07-31 ENCOUNTER — HOSPITAL ENCOUNTER (OUTPATIENT)
Dept: CARDIOLOGY | Facility: HOSPITAL | Age: 75
Discharge: HOME | End: 2025-07-31
Attending: INTERNAL MEDICINE
Payer: OTHER GOVERNMENT

## 2025-07-31 VITALS
HEART RATE: 60 BPM | WEIGHT: 225 LBS | HEIGHT: 73 IN | BODY MASS INDEX: 29.82 KG/M2 | DIASTOLIC BLOOD PRESSURE: 84 MMHG | SYSTOLIC BLOOD PRESSURE: 143 MMHG

## 2025-07-31 VITALS
WEIGHT: 225 LBS | SYSTOLIC BLOOD PRESSURE: 125 MMHG | BODY MASS INDEX: 29.82 KG/M2 | HEART RATE: 68 BPM | DIASTOLIC BLOOD PRESSURE: 60 MMHG | HEIGHT: 73 IN

## 2025-07-31 DIAGNOSIS — I25.10 CAD S/P PERCUTANEOUS CORONARY ANGIOPLASTY: ICD-10-CM

## 2025-07-31 DIAGNOSIS — Z98.61 CAD S/P PERCUTANEOUS CORONARY ANGIOPLASTY: ICD-10-CM

## 2025-07-31 DIAGNOSIS — R06.02 SHORTNESS OF BREATH: ICD-10-CM

## 2025-07-31 LAB
AORTIC ROOT ANNULUS: 3.5 CM
AORTIC SIZE INDEX: 1.4 CM/M2
APICAL FOUR CHAMBER EJECTION FRACTION: 54 %
ASCENDING AORTA: 3.1 CM
AV INDEX (PROSTH): 0.65
AV MEAN GRADIENT: 4 MMHG
AV PEAK GRADIENT: 7 MMHG
AV VALVE AREA BY VELOCITY RATIO: 2.4 CM²
AV VALVE AREA: 2.2 CM²
AV VELOCITY RATIO: 0.69
BSA FOR ECHO PROCEDURE: 2.29 M2
CV ECHO LV RWT: 0.48 CM
DOP CALC AO PEAK VEL: 1.3 M/S
DOP CALC AO VTI: 34.5 CM
DOP CALC LVOT AREA: 3.5 CM2
DOP CALC LVOT DIAMETER: 2.1 CM
DOP CALC LVOT PEAK VEL: 0.9 M/S
DOP CALC RVOT PEAK VEL: 0.82 M/S
DOP CALC RVOT VTI: 19.7 CM
DOP CALCLVOT PEAK VEL VTI: 22.3 CM
E WAVE DECELERATION TIME: 203 MSEC
E/A RATIO: 1.04
E/E' RATIO: 9 M/S
ECHO LV POSTERIOR WALL: 1.2 CM (ref 0.6–1.1)
EJECTION FRACTION: 60 %
FRACTIONAL SHORTENING: 36 % (ref 28–44)
INTERVENTRICULAR SEPTUM: 1.3 CM (ref 0.6–1.1)
IVC DIAMETER: 1.8 CM
IVRT: 65 MSEC
LA MAJOR: 5.8 CM
LA MINOR: 5.7 CM
LA WIDTH: 4.2 CM
LEFT ATRIUM SIZE: 4 CM
LEFT ATRIUM VOLUME INDEX: 36 ML/M2
LEFT ATRIUM VOLUME: 82 CM3
LEFT INTERNAL DIMENSION IN SYSTOLE: 3.2 CM (ref 2.1–4)
LEFT VENTRICLE DIASTOLIC VOLUME INDEX: 53.1 ML/M2
LEFT VENTRICLE DIASTOLIC VOLUME: 120 ML
LEFT VENTRICLE END DIASTOLIC VOLUME APICAL 4 CHAMBER INDEX BSA: 36.49 ML/M2
LEFT VENTRICLE END DIASTOLIC VOLUME APICAL 4 CHAMBER: 82.47 ML
LEFT VENTRICLE MASS INDEX: 109.6 G/M2
LEFT VENTRICLE SYSTOLIC VOLUME INDEX: 18.1 ML/M2
LEFT VENTRICLE SYSTOLIC VOLUME: 41 ML
LEFT VENTRICULAR INTERNAL DIMENSION IN DIASTOLE: 5 CM (ref 3.5–6)
LEFT VENTRICULAR MASS: 247.6 G
LV LATERAL E/E' RATIO: 7.9 M/S
LV SEPTAL E/E' RATIO: 9.7 M/S
LVED V (TEICH): 119.51 ML
LVES V (TEICH): 40.63 ML
LVOT MG: 2.18 MMHG
LVOT MV: 0.73 CM/S
Lab: 1.7 CM/M
MV PEAK A VEL: 0.84 M/S
MV PEAK E VEL: 0.87 M/S
MV STENOSIS PRESSURE HALF TIME: 58.91 MS
MV VALVE AREA P 1/2 METHOD: 3.73 CM2
OHS CV CPX PATIENT HEIGHT IN: 73
PISA MRMAX VEL: 4.07 M/S
PISA TR MAX VEL: 2.6 M/S
PV MEAN GRADIENT: 2 MMHG
RA MAJOR: 4.64 CM
RA PRESSURE ESTIMATED: 3 MMHG
RA WIDTH: 3.1 CM
RV TB RVSP: 6 MMHG
STJ: 3.2 CM
TDI LATERAL: 0.11 M/S
TDI SEPTAL: 0.09 M/S
TDI: 0.1 M/S
TR MAX PG: 26 MMHG
TRICUSPID ANNULAR PLANE SYSTOLIC EXCURSION: 2.3 CM
TV REST PULMONARY ARTERY PRESSURE: 30 MMHG
Z-SCORE OF LEFT VENTRICULAR DIMENSION IN END DIASTOLE: -5.06
Z-SCORE OF LEFT VENTRICULAR DIMENSION IN END SYSTOLE: -3.56

## 2025-07-31 PROCEDURE — A9502 TC99M TETROFOSMIN: HCPCS | Performed by: INTERNAL MEDICINE

## 2025-07-31 PROCEDURE — 93306 TTE W/DOPPLER COMPLETE: CPT

## 2025-07-31 PROCEDURE — 63600175 PHARM REV CODE 636 W HCPCS: Performed by: INTERNAL MEDICINE

## 2025-07-31 PROCEDURE — 78452 HT MUSCLE IMAGE SPECT MULT: CPT

## 2025-07-31 PROCEDURE — 93306 TTE W/DOPPLER COMPLETE: CPT | Mod: 26,,, | Performed by: INTERNAL MEDICINE

## 2025-07-31 RX ORDER — REGADENOSON 0.08 MG/ML
0.4 INJECTION, SOLUTION INTRAVENOUS ONCE
Status: COMPLETED | OUTPATIENT
Start: 2025-07-31 | End: 2025-07-31

## 2025-07-31 RX ADMIN — REGADENOSON 0.4 MG: 0.08 INJECTION, SOLUTION INTRAVENOUS at 10:07

## 2025-07-31 RX ADMIN — TETROFOSMIN 30.5 MILLICURIE: 1.38 INJECTION, POWDER, LYOPHILIZED, FOR SOLUTION INTRAVENOUS at 10:07

## 2025-07-31 RX ADMIN — TETROFOSMIN 10.2 MILLICURIE: 1.38 INJECTION, POWDER, LYOPHILIZED, FOR SOLUTION INTRAVENOUS at 09:07

## 2025-08-01 ENCOUNTER — TELEPHONE (OUTPATIENT)
Dept: CARDIOLOGY | Facility: CLINIC | Age: 75
End: 2025-08-01
Payer: OTHER GOVERNMENT

## 2025-08-01 LAB
CV STRESS BASE HR: 60 BPM
DIASTOLIC BLOOD PRESSURE: 84 MMHG
EJECTION FRACTION- HIGH: 73 %
END DIASTOLIC INDEX-HIGH: 165 ML/M2
END DIASTOLIC INDEX-LOW: 101 ML/M2
END SYSTOLIC INDEX-HIGH: 64 ML/M2
END SYSTOLIC INDEX-LOW: 28 ML/M2
NUC REST EJECTION FRACTION: 62
NUC STRESS EJECTION FRACTION: 66 %
OHS CV CPX 85 PERCENT MAX PREDICTED HEART RATE MALE: 123
OHS CV CPX MAX PREDICTED HEART RATE: 145
OHS CV CPX PATIENT HEIGHT IN: 73
OHS CV CPX PATIENT IS FEMALE: 0
OHS CV CPX PATIENT IS MALE: 1
OHS CV CPX PEAK DIASTOLIC BLOOD PRESSURE: 84 MMHG
OHS CV CPX PEAK HEAR RATE: 67 BPM
OHS CV CPX PEAK RATE PRESSURE PRODUCT: 9581
OHS CV CPX PEAK SYSTOLIC BLOOD PRESSURE: 143 MMHG
OHS CV CPX PERCENT MAX PREDICTED HEART RATE ACHIEVED: 46
OHS CV CPX RATE PRESSURE PRODUCT PRESENTING: 8580
OHS CV INITIAL DOSE: 10.2 MCG/KG/MIN
OHS CV PEAK DOSE: 30.5 MCG/KG/MIN
RETIRED EF AND QEF - SEE NOTES: 59 %
SYSTOLIC BLOOD PRESSURE: 143 MMHG

## 2025-08-01 NOTE — TELEPHONE ENCOUNTER
Pt was contacted about results:     Stress test looks good  Nuclear Stress - Cardiology Interpreted        Pt verbalized understanding with no questions or concerns.

## 2025-08-01 NOTE — TELEPHONE ENCOUNTER
Contacted PT and reviewed results -Heart function normal.      PT stated verbal understanding       ----- Message from Eric Rutledge MD sent at 7/31/2025  9:23 PM CDT -----  Heart function normal.  ----- Message -----  From: Eric Rutledge MD  Sent: 7/31/2025   5:27 PM CDT  To: Eric Rutledge MD

## 2025-08-01 NOTE — TELEPHONE ENCOUNTER
----- Message from Eric Rutledge MD sent at 8/1/2025  3:41 PM CDT -----  Stress test looks good  ----- Message -----  From: Rogerio Reynolds MD  Sent: 8/1/2025   9:07 AM CDT  To: Eric Rutledge MD

## 2025-08-13 ENCOUNTER — OFFICE VISIT (OUTPATIENT)
Dept: INTERNAL MEDICINE | Facility: CLINIC | Age: 75
End: 2025-08-13
Payer: MEDICARE

## 2025-08-13 ENCOUNTER — LAB VISIT (OUTPATIENT)
Dept: LAB | Facility: HOSPITAL | Age: 75
End: 2025-08-13
Attending: FAMILY MEDICINE
Payer: MEDICARE

## 2025-08-13 VITALS
TEMPERATURE: 97 F | SYSTOLIC BLOOD PRESSURE: 104 MMHG | DIASTOLIC BLOOD PRESSURE: 62 MMHG | HEART RATE: 65 BPM | WEIGHT: 235.25 LBS | OXYGEN SATURATION: 97 % | HEIGHT: 73 IN | BODY MASS INDEX: 31.18 KG/M2

## 2025-08-13 DIAGNOSIS — E11.9 TYPE 2 DIABETES MELLITUS WITHOUT COMPLICATION, WITHOUT LONG-TERM CURRENT USE OF INSULIN: Primary | ICD-10-CM

## 2025-08-13 DIAGNOSIS — Z12.11 COLON CANCER SCREENING: ICD-10-CM

## 2025-08-13 DIAGNOSIS — E11.59 HYPERTENSION ASSOCIATED WITH TYPE 2 DIABETES MELLITUS: ICD-10-CM

## 2025-08-13 DIAGNOSIS — E11.69 HYPERLIPIDEMIA ASSOCIATED WITH TYPE 2 DIABETES MELLITUS: ICD-10-CM

## 2025-08-13 DIAGNOSIS — E11.9 TYPE 2 DIABETES MELLITUS WITHOUT COMPLICATION, WITHOUT LONG-TERM CURRENT USE OF INSULIN: ICD-10-CM

## 2025-08-13 DIAGNOSIS — E78.5 HYPERLIPIDEMIA ASSOCIATED WITH TYPE 2 DIABETES MELLITUS: ICD-10-CM

## 2025-08-13 DIAGNOSIS — R53.83 FATIGUE, UNSPECIFIED TYPE: ICD-10-CM

## 2025-08-13 DIAGNOSIS — I15.2 HYPERTENSION ASSOCIATED WITH TYPE 2 DIABETES MELLITUS: ICD-10-CM

## 2025-08-13 LAB
ALBUMIN SERPL BCP-MCNC: 3.5 G/DL (ref 3.5–5.2)
ALP SERPL-CCNC: 60 UNIT/L (ref 40–150)
ALT SERPL W/O P-5'-P-CCNC: 39 UNIT/L (ref 0–55)
ANION GAP (OHS): 8 MMOL/L (ref 8–16)
AST SERPL-CCNC: 33 UNIT/L (ref 0–50)
BILIRUB SERPL-MCNC: 0.7 MG/DL (ref 0.1–1)
BUN SERPL-MCNC: 15 MG/DL (ref 8–23)
CALCIUM SERPL-MCNC: 8.7 MG/DL (ref 8.7–10.5)
CHLORIDE SERPL-SCNC: 108 MMOL/L (ref 95–110)
CHOLEST SERPL-MCNC: 79 MG/DL (ref 120–199)
CHOLEST/HDLC SERPL: 1.8 {RATIO} (ref 2–5)
CO2 SERPL-SCNC: 26 MMOL/L (ref 23–29)
CREAT SERPL-MCNC: 0.7 MG/DL (ref 0.5–1.4)
EAG (OHS): 97 MG/DL (ref 68–131)
GFR SERPLBLD CREATININE-BSD FMLA CKD-EPI: >60 ML/MIN/1.73/M2
GLUCOSE SERPL-MCNC: 91 MG/DL (ref 70–110)
HBA1C MFR BLD: 5 % (ref 4–5.6)
HDLC SERPL-MCNC: 43 MG/DL (ref 40–75)
HDLC SERPL: 54.4 % (ref 20–50)
LDLC SERPL CALC-MCNC: 19.6 MG/DL (ref 63–159)
NONHDLC SERPL-MCNC: 36 MG/DL
POTASSIUM SERPL-SCNC: 3.7 MMOL/L (ref 3.5–5.1)
PROT SERPL-MCNC: 6.2 GM/DL (ref 6–8.4)
SODIUM SERPL-SCNC: 142 MMOL/L (ref 136–145)
TESTOST SERPL-MCNC: 463 NG/DL (ref 304–1227)
TRIGL SERPL-MCNC: 82 MG/DL (ref 30–150)

## 2025-08-13 PROCEDURE — 83036 HEMOGLOBIN GLYCOSYLATED A1C: CPT

## 2025-08-13 PROCEDURE — G2211 COMPLEX E/M VISIT ADD ON: HCPCS | Mod: ,,, | Performed by: FAMILY MEDICINE

## 2025-08-13 PROCEDURE — 99214 OFFICE O/P EST MOD 30 MIN: CPT | Mod: S$PBB,,, | Performed by: FAMILY MEDICINE

## 2025-08-13 PROCEDURE — 80061 LIPID PANEL: CPT

## 2025-08-13 PROCEDURE — 36415 COLL VENOUS BLD VENIPUNCTURE: CPT | Mod: PO

## 2025-08-13 PROCEDURE — 99999 PR PBB SHADOW E&M-EST. PATIENT-LVL V: CPT | Mod: PBBFAC,,, | Performed by: FAMILY MEDICINE

## 2025-08-13 PROCEDURE — 84403 ASSAY OF TOTAL TESTOSTERONE: CPT

## 2025-08-13 PROCEDURE — 99215 OFFICE O/P EST HI 40 MIN: CPT | Mod: PBBFAC,PO | Performed by: FAMILY MEDICINE

## 2025-08-13 PROCEDURE — 80053 COMPREHEN METABOLIC PANEL: CPT

## 2025-08-13 RX ORDER — PREGABALIN 100 MG/1
100 CAPSULE ORAL DAILY
COMMUNITY

## 2025-09-05 ENCOUNTER — OFFICE VISIT (OUTPATIENT)
Dept: PULMONOLOGY | Facility: CLINIC | Age: 75
End: 2025-09-05
Attending: NURSE PRACTITIONER
Payer: MEDICARE

## 2025-09-05 ENCOUNTER — HOSPITAL ENCOUNTER (OUTPATIENT)
Dept: RADIOLOGY | Facility: HOSPITAL | Age: 75
Discharge: HOME | End: 2025-09-05
Attending: NURSE PRACTITIONER
Payer: MEDICARE

## 2025-09-05 VITALS
BODY MASS INDEX: 30.5 KG/M2 | SYSTOLIC BLOOD PRESSURE: 128 MMHG | HEART RATE: 60 BPM | OXYGEN SATURATION: 98 % | HEIGHT: 74 IN | RESPIRATION RATE: 15 BRPM | WEIGHT: 237.63 LBS | DIASTOLIC BLOOD PRESSURE: 82 MMHG

## 2025-09-05 DIAGNOSIS — F17.211 CIGARETTE NICOTINE DEPENDENCE IN REMISSION: ICD-10-CM

## 2025-09-05 DIAGNOSIS — G47.33 OSA (OBSTRUCTIVE SLEEP APNEA): Primary | ICD-10-CM

## 2025-09-05 DIAGNOSIS — G47.33 OSA ON CPAP: ICD-10-CM

## 2025-09-05 PROCEDURE — 99999 PR PBB SHADOW E&M-EST. PATIENT-LVL V: CPT | Mod: PBBFAC,,, | Performed by: NURSE PRACTITIONER

## 2025-09-05 PROCEDURE — 71271 CT THORAX LUNG CANCER SCR C-: CPT | Mod: TC

## 2025-09-05 PROCEDURE — 99215 OFFICE O/P EST HI 40 MIN: CPT | Mod: PBBFAC,25 | Performed by: NURSE PRACTITIONER

## 2025-09-05 PROCEDURE — 71271 CT THORAX LUNG CANCER SCR C-: CPT | Mod: 26,,, | Performed by: RADIOLOGY

## (undated) DEVICE — CANNULA SEAL 12MM

## (undated) DEVICE — APPLICATOR CHLORAPREP ORN 26ML

## (undated) DEVICE — SUT STRATAFIX 2-0 30CM

## (undated) DEVICE — NDL SAFETY 25G X 1.5 ECLIPSE

## (undated) DEVICE — SEE MEDLINE ITEM 157117

## (undated) DEVICE — KIT ANTIFOG

## (undated) DEVICE — Device

## (undated) DEVICE — EVACUATOR KIT SMOKE PLUME AWAY

## (undated) DEVICE — SEAL UNIVERSAL 5MM-8MM XI

## (undated) DEVICE — SUT CTD VICRYL 0 UND BR SUT

## (undated) DEVICE — STAPLER ENDOWRIST 45 BLUE XI

## (undated) DEVICE — OBTURATOR STAPLER BLDESS 12MM

## (undated) DEVICE — DEVICE CLOSURE DISP 14G

## (undated) DEVICE — ADHESIVE DERMABOND ADVANCED

## (undated) DEVICE — TUBING HEATED INSUFFLATOR

## (undated) DEVICE — SOL NS 1000CC

## (undated) DEVICE — SYR 10CC LUER LOCK

## (undated) DEVICE — SHEATH ENDOWRIST 45MM

## (undated) DEVICE — SEE MEDLINE ITEM 157027

## (undated) DEVICE — DRAPE ARM DAVINCI XI

## (undated) DEVICE — STAPLER ENDOWRIST 45 GREEN XI

## (undated) DEVICE — OBTURATOR BLADELESS 8MM XI CLR

## (undated) DEVICE — ELECTRODE REM PLYHSV RETURN 9

## (undated) DEVICE — CANNULA REDUCER 12-8MM

## (undated) DEVICE — DRAPE COLUMN DAVINCI XI

## (undated) DEVICE — SEE MEDLINE ITEM 152622

## (undated) DEVICE — DRAPE ABDOMINAL TIBURON 14X11

## (undated) DEVICE — SYR 3CC LUER LOC

## (undated) DEVICE — SUT MONOCRYL 4.0 PS2 CP496G

## (undated) DEVICE — SEALER VESSEL DAVINCI XI

## (undated) DEVICE — SUT VICRYL 0 27 CT-2

## (undated) DEVICE — COVER OVERHEAD SURG LT BLUE

## (undated) DEVICE — MANIFOLD 4 PORT

## (undated) DEVICE — COVER TIP CURVED SCISSORS XI